# Patient Record
Sex: MALE | Race: WHITE | Employment: OTHER | URBAN - METROPOLITAN AREA
[De-identification: names, ages, dates, MRNs, and addresses within clinical notes are randomized per-mention and may not be internally consistent; named-entity substitution may affect disease eponyms.]

---

## 2017-01-16 DIAGNOSIS — N18.2 RENAL FAILURE, CHRONIC, STAGE 2 (MILD): ICD-10-CM

## 2017-01-16 DIAGNOSIS — I10 ESSENTIAL HYPERTENSION: ICD-10-CM

## 2017-01-16 DIAGNOSIS — E11.9 TYPE 2 DIABETES MELLITUS WITHOUT COMPLICATION, WITHOUT LONG-TERM CURRENT USE OF INSULIN (HCC): ICD-10-CM

## 2017-01-16 RX ORDER — LISINOPRIL 5 MG/1
5 TABLET ORAL DAILY
Qty: 90 TAB | Refills: 3 | Status: SHIPPED | OUTPATIENT
Start: 2017-01-16 | End: 2018-01-24 | Stop reason: SDUPTHER

## 2017-01-23 ENCOUNTER — HOSPITAL ENCOUNTER (OUTPATIENT)
Dept: LAB | Age: 72
Discharge: HOME | End: 2017-01-23
Payer: MEDICARE

## 2017-01-23 DIAGNOSIS — R23.3 EASY BRUISABILITY: ICD-10-CM

## 2017-01-23 PROBLEM — N52.9 ERECTILE DYSFUNCTION: Status: ACTIVE | Noted: 2017-01-23

## 2017-01-23 LAB
BASOPHILS # BLD AUTO: 0 K/UL (ref 0–0.06)
BASOPHILS # BLD: 0 % (ref 0–2)
DIFFERENTIAL METHOD BLD: ABNORMAL
EOSINOPHIL # BLD: 0.2 K/UL (ref 0–0.4)
EOSINOPHIL NFR BLD: 3 % (ref 0–5)
ERYTHROCYTE [DISTWIDTH] IN BLOOD BY AUTOMATED COUNT: 13.4 % (ref 11.6–14.5)
HCT VFR BLD AUTO: 42.2 % (ref 36–48)
HGB BLD-MCNC: 14.5 G/DL (ref 13–16)
LYMPHOCYTES # BLD AUTO: 32 % (ref 21–52)
LYMPHOCYTES # BLD: 1.9 K/UL (ref 0.9–3.6)
MCH RBC QN AUTO: 31.4 PG (ref 24–34)
MCHC RBC AUTO-ENTMCNC: 34.4 G/DL (ref 31–37)
MCV RBC AUTO: 91.3 FL (ref 74–97)
MONOCYTES # BLD: 0.3 K/UL (ref 0.05–1.2)
MONOCYTES NFR BLD AUTO: 6 % (ref 3–10)
NEUTS SEG # BLD: 3.5 K/UL (ref 1.8–8)
NEUTS SEG NFR BLD AUTO: 59 % (ref 40–73)
PLATELET # BLD AUTO: 196 K/UL (ref 135–420)
PMV BLD AUTO: 10.5 FL (ref 9.2–11.8)
RBC # BLD AUTO: 4.62 M/UL (ref 4.7–5.5)
WBC # BLD AUTO: 5.8 K/UL (ref 4.6–13.2)

## 2017-01-23 PROCEDURE — 36415 COLL VENOUS BLD VENIPUNCTURE: CPT | Performed by: INTERNAL MEDICINE

## 2017-01-23 PROCEDURE — 85025 COMPLETE CBC W/AUTO DIFF WBC: CPT | Performed by: INTERNAL MEDICINE

## 2017-01-24 ENCOUNTER — TELEPHONE (OUTPATIENT)
Dept: INTERNAL MEDICINE CLINIC | Age: 72
End: 2017-01-24

## 2017-01-24 NOTE — TELEPHONE ENCOUNTER
----- Message from Rohini Mei MD sent at 1/23/2017  9:50 PM EST -----  Please let Amanda Garcia know that his blood counts on his CBC are normal. His platelet count, WBC, and hemoglobin are within normal limits.     Dr. Olive Johnson  [facility] [place] [address]  Phone: [phone]  Fax: [phone]

## 2017-02-02 ENCOUNTER — OFFICE VISIT (OUTPATIENT)
Dept: INTERNAL MEDICINE CLINIC | Age: 72
End: 2017-02-02

## 2017-02-02 VITALS
HEART RATE: 57 BPM | WEIGHT: 228.2 LBS | OXYGEN SATURATION: 96 % | DIASTOLIC BLOOD PRESSURE: 82 MMHG | RESPIRATION RATE: 18 BRPM | BODY MASS INDEX: 32.67 KG/M2 | HEIGHT: 70 IN | TEMPERATURE: 97.2 F | SYSTOLIC BLOOD PRESSURE: 144 MMHG

## 2017-02-02 DIAGNOSIS — E03.4 HYPOTHYROIDISM DUE TO ACQUIRED ATROPHY OF THYROID: Primary | ICD-10-CM

## 2017-02-02 DIAGNOSIS — I10 ESSENTIAL HYPERTENSION WITH GOAL BLOOD PRESSURE LESS THAN 140/90: ICD-10-CM

## 2017-02-02 DIAGNOSIS — N52.1 ERECTILE DYSFUNCTION ASSOCIATED WITH TYPE 2 DIABETES MELLITUS (HCC): ICD-10-CM

## 2017-02-02 DIAGNOSIS — E11.69 ERECTILE DYSFUNCTION ASSOCIATED WITH TYPE 2 DIABETES MELLITUS (HCC): ICD-10-CM

## 2017-02-02 DIAGNOSIS — F33.40 RECURRENT MAJOR DEPRESSIVE DISORDER, IN REMISSION (HCC): ICD-10-CM

## 2017-02-02 RX ORDER — CARVEDILOL 6.25 MG/1
6.25 TABLET ORAL 2 TIMES DAILY WITH MEALS
Qty: 180 TAB | Refills: 2
Start: 2017-02-02 | End: 2017-05-03

## 2017-02-02 RX ORDER — LEVOTHYROXINE SODIUM 50 UG/1
50 TABLET ORAL
Qty: 90 TAB | Refills: 4 | Status: SHIPPED | OUTPATIENT
Start: 2017-02-02 | End: 2017-10-23 | Stop reason: SDUPTHER

## 2017-02-02 RX ORDER — TADALAFIL 2.5 MG/1
2.5 TABLET ORAL DAILY
Qty: 30 TAB | Refills: 11 | Status: SHIPPED | OUTPATIENT
Start: 2017-02-02 | End: 2017-05-22

## 2017-02-02 NOTE — PROGRESS NOTES
Chief Complaint   Patient presents with    Hypertension    Labs     done 01-23-17 to discuss     Patient advised to bring in all prescribed and over the counter medications at his next follow up with review. Patient still has a copy of the Advanced Directive form and understands to bring it in once completed. 1. Have you been to the ER, urgent care clinic since your last visit? Hospitalized since your last visit? No    2. Have you seen or consulted any other health care providers outside of the [address] [place] [address] since your last visit? Include any pap smears or colon screening.  No

## 2017-02-02 NOTE — PROGRESS NOTES
INTERNISTS OF [facility]:  2/7/2017, MRN: [id number]      Lacey Sellers is a 66 y.o. male and presents to clinic for Hypertension and Labs (done 01-23-17 to discuss)    Subjective:   Pt is a 75yo male with h/o ED, melanoma in situ on left dorsal forearm s/p removal, MDD, rectal polyp, diverticulosis, CKD stage 3, HTN, type 2 DM, hypothyroidism, DJD, gout, vocal cord nodule, vitamin D deficiency, and HLD. 1. HTN:   - Chronic, present >6 months   - On coreg and lisinopril   - Pt needs a refill on his coreg   - No adverse drug effects    2. ED:   - Chronic, present >6 months   - Pt was unable to afford viagra as his insurance did not cover it   - Pt wants to know if his insurance will cover cialis this year    3. Hypothyroidism:   - Present >6 months   - On Synthroid, needs a refill    4.  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 - just not being able to keep up with his work load   - He retired in December 2016   - He is enjoying his new free time, exercising regularly   - On venlafaxine, no adverse side effects   - Pt states that the venlafaxine is working well to control his sx of depression   - No suicidal ideation      Patient Active Problem List    Diagnosis Date Noted    Erectile dysfunction 01/23/2017    Melanoma in situ - on left dorsal forearm 2016 09/16/2016    Nevus 08/23/2016    Major depressive disorder in remission 08/03/2016    Rectal polyp -  seen on colonoscopy from 8/20/15 08/02/2016    Diverticulosis of intestine without bleeding - seen on colonoscopy from 8/20/15 08/02/2016    CKD (chronic kidney disease) stage 3, GFR 30-59 ml/min 11/09/2015    Zoster 07/21/2015    Essential hypertension 06/24/2015    Type 2 diabetes mellitus without complication ([facility] [address] 75/85/3382    Hypothyroidism due to acquired atrophy of thyroid 06/24/2015    Osteoarthritis, Status post left total knee replacement 04/23/2014    Vocal cord nodule 10/21/2013    Gout 10/21/2011    Family history of colon cancer. personal hx colon polyps 10/21/2011    Hyperlipidemia     Hypovitaminosis D        Current Outpatient Prescriptions   Medication Sig Dispense Refill    carvedilol (COREG) 6.25 mg tablet Take 1 Tab by mouth two (2) times daily (with meals) for 90 days. 180 Tab 2    levothyroxine (SYNTHROID) 50 mcg tablet Take 1 Tab by mouth Daily (before breakfast). 90 Tab 4    tadalafil (CIALIS) 2.5 mg tablet Take 1 Tab by mouth daily. 30 Tab 11    lisinopril (PRINIVIL, ZESTRIL) 5 mg tablet Take 1 Tab by mouth daily. 90 Tab 3    allopurinol (ZYLOPRIM) 100 mg tablet Take 2 Tabs by mouth daily. 180 Tab 3    atorvastatin (LIPITOR) 40 mg tablet Take 1 Tab by mouth daily. 30 Tab 11    multivitamin (ONE A DAY) tablet Take 1 Tab by mouth daily.  omega-3 fatty acids-vitamin e (FISH OIL) 1,000 mg cap Take 4 capsules by mouth daily.  Cholecalciferol, Vitamin D3, (VITAMIN D) 2,000 unit Cap Take 5,000 Units by mouth daily.  CoQ10, Ubiquinol, 200 mg Cap Take 200 mg by mouth daily.  aspirin 81 mg tablet Take 81 mg by mouth daily.  venlafaxine-SR (EFFEXOR XR) 37.5 mg capsule Take 37.5 mg by mouth daily.          Allergies   Allergen Reactions    Amlodipine Other (comments)     BLE edema       Past Medical History   Diagnosis Date    Chronic kidney disease     Colon polyps      dysplastic    Depression     Diabetes mellitus (HCC)      no meds    Diverticulosis      seen on colonoscopy from 8/20/15    Diverticulosis of sigmoid colon 01/27/10    GERD (gastroesophageal reflux disease)      no meds    Gout     Hepatitis     Hyperlipidemia     Hypertension     Hypovitaminosis D     Lumbar spondylosis     Nephrolithiasis     Plantar wart     Rectal polyp      seen on colonoscopy from 8/20/15    Thyroid disease      hypothyroidism       Past Surgical History   Procedure Laterality Date    Hx tonsillectomy      Lithotripsy      Endoscopy, colon, diagnostic  01/27/2010     polyp distal rectum, removed; diverticulosis of sigmoid    Hx polypectomy  01/27/2010     Distal rectum    Pr total knee arthroplasty Left 1/2014       Family History   Problem Relation Age of Onset   [address] [facility] Arthritis-rheumatoid Mother     Hypertension Mother     Elevated Lipids Mother     Thyroid Disease Sister     Cancer Father      colon    Heart Disease Other     Hypertension Other     Cancer Other      breast    Heart Disease Maternal Grandmother     Dementia Maternal Grandfather     Cancer Paternal Grandmother      breast    No Known Problems Paternal Grandfather        Social History   Substance Use Topics    Smoking status: Former Smoker     Packs/day: 1.00     Years: 6.00     Types: Cigarettes     Quit date: 1/1/1992    Smokeless tobacco: Never Used    Alcohol use No       ROS   Review of Systems   Constitutional: Negative for chills and fever. HENT: Negative for ear pain and sore throat. Eyes: Negative for blurred vision and pain. Respiratory: Negative for cough and shortness of breath. Cardiovascular: Negative for chest pain. Gastrointestinal: Negative for abdominal pain. Genitourinary: Negative for dysuria. Musculoskeletal: Negative for joint pain and myalgias. Skin: Negative for rash. Neurological: Negative for tingling, focal weakness and headaches. Endo/Heme/Allergies: Negative for environmental allergies. Does not bruise/bleed easily. Psychiatric/Behavioral: Negative for substance abuse. Objective     Vitals:    02/02/17 1002   BP: 144/82   Pulse: (!) 57   Resp: 18   Temp: 97.2 °F (36.2 °C)   TempSrc: Oral   SpO2: 96%   Weight: 228 lb 3.2 oz (103.5 kg)   Height: 5' 10\" (1.778 m)   PainSc:   0 - No pain       Physical Exam   Constitutional: He is oriented to person, place, and time and well-developed, well-nourished, and in no distress. HENT:   Head: Normocephalic and atraumatic.    Right Ear: External ear normal. Left Ear: External ear normal.   Nose: Nose normal.   Mouth/Throat: Oropharynx is clear and moist. No oropharyngeal exudate. Clear TMs   Eyes: Conjunctivae and EOM are normal. Pupils are equal, round, and reactive to light. Right eye exhibits no discharge. Left eye exhibits no discharge. No scleral icterus. Neck: Neck supple. Cardiovascular: Normal rate, regular rhythm, normal heart sounds and intact distal pulses. Pulmonary/Chest: Effort normal and breath sounds normal. No respiratory distress. He has no wheezes. He has no rales. Abdominal: Soft. Bowel sounds are normal. He exhibits no distension. There is no tenderness. There is no rebound and no guarding. Musculoskeletal: He exhibits no edema or tenderness (BUE are NTTP). Lymphadenopathy:     He has no cervical adenopathy. Neurological: He is alert and oriented to person, place, and time. He exhibits normal muscle tone. Gait normal.   Skin: Skin is warm and dry. No erythema. Psychiatric: Affect normal.   Nursing note and vitals reviewed.       LABS   Data Review:   Lab Results   Component Value Date/Time    WBC 5.8 01/23/2017 11:28 AM    HGB 14.5 01/23/2017 11:28 AM    HCT 42.2 01/23/2017 11:28 AM    PLATELET 781 09/74/3454 11:28 AM    MCV 91.3 01/23/2017 11:28 AM       Lab Results   Component Value Date/Time    Sodium 139 08/04/2016 09:20 AM    Potassium 4.2 08/04/2016 09:20 AM    Chloride 104 08/04/2016 09:20 AM    CO2 26 08/04/2016 09:20 AM    Anion gap 9 08/04/2016 09:20 AM    Glucose 125 08/04/2016 09:20 AM    BUN 28 08/04/2016 09:20 AM    Creatinine 1.38 08/04/2016 09:20 AM    BUN/Creatinine ratio 20 08/04/2016 09:20 AM    GFR est AA >60 08/04/2016 09:20 AM    GFR est non-AA 51 08/04/2016 09:20 AM    Calcium 8.9 08/04/2016 09:20 AM       Lab Results   Component Value Date/Time    Cholesterol, total 194 08/04/2016 09:20 AM    HDL Cholesterol 35 08/04/2016 09:20 AM    LDL, calculated 125.6 08/04/2016 09:20 AM    VLDL, calculated 33.4 08/04/2016 09:20 AM    Triglyceride 167 08/04/2016 09:20 AM    CHOL/HDL Ratio 5.5 08/04/2016 09:20 AM       Lab Results   Component Value Date/Time    Hemoglobin A1c 6.0 11/03/2015 07:56 AM    Hemoglobin A1c 6.6 05/17/2012 07:57 AM    Hemoglobin A1c (POC) 6.3 08/03/2016 09:37 AM       Assessment/Plan:   1. Essential hypertension with goal blood pressure less than 140/90: BP is borderline.  - C/w rx as prescribed. Coreg refilled. ORDERS:  - carvedilol (COREG) 6.25 mg tablet; Take 1 Tab by mouth two (2) times daily (with meals) for 90 days. Dispense: 180 Tab; Refill: 2    2. Hypothyroidism due to acquired atrophy of thyroid: Stable. - Synthroid refilled. ORDERS:  - levothyroxine (SYNTHROID) 50 mcg tablet; Take 1 Tab by mouth Daily (before breakfast). Dispense: 90 Tab; Refill: 4    3. Erectile dysfunction associated with type 2 diabetes mellitus:  - Cialis prescribed. ORDERS:  - tadalafil (CIALIS) 2.5 mg tablet; Take 1 Tab by mouth daily. Dispense: 30 Tab; Refill: 11    4. Depression: Stable. Followed by Psychiatry team.   - C/w venlafaxine. Health Maintenance Due   Topic Date Due    DTaP/Tdap/Td series (1 - Tdap) 01/02/2007    EYE EXAM RETINAL OR DILATED Q1  06/01/2015    Pneumococcal 65+ High/Highest Risk (2 of 2 - PPSV23) 08/19/2015    GLAUCOMA SCREENING Q2Y  06/01/2016    HEMOGLOBIN A1C Q6M  02/03/2017       Lab review: labs are reviewed    I have discussed the diagnosis with the patient and the intended plan as seen in the above orders. The patient has received an after-visit summary and questions were answered concerning future plans. I have discussed medication side effects and warnings with the patient as well. I have reviewed the plan of care with the patient, accepted their input and they are in agreement with the treatment goals. All questions were answered. The patient understands the plan of care. Handouts provided today with above information.  Pt instructed if symptoms worsen to call the office or report to the ED for continued care. Greater than 50% of the visit time was spent in counseling and/or coordination of care. Follow-up Disposition:  Return in about 6 months (around 8/2/2017) for BP check, DM check.     Erika Phoenix MD

## 2017-02-02 NOTE — MR AVS SNAPSHOT
Visit Information Date & Time Provider Department Dept. Phone Encounter #  
 2/2/2017  9:30 AM Chandler Rudd MD Internist of [facility] [id number] Follow-up Instructions Return in about 6 months (around 8/2/2017) for BP check, DM check. Your Appointments 8/2/2017  8:30 AM  
Office Visit with Chandler Rudd MD  
Internist of [address] [place] [address]) Appt Note:   
 [address] [facility], [address] [facility] [address] [place]  
  
   
 [address], [address]  
  
    
 8/24/2017  8:30 AM  
Office Visit with Chandler Rudd MD  
Internist of [address] [place] [address]) Appt Note: 1 yr; 1 yr  
 [address] [facility], [address] [facility] [address] [facility]  
[phone] Upcoming Health Maintenance Date Due DTaP/Tdap/Td series (1 - Tdap) 1/2/2007 EYE EXAM RETINAL OR DILATED Q1 6/1/2015 Pneumococcal 65+ High/Highest Risk (2 of 2 - PPSV23) 8/19/2015 GLAUCOMA SCREENING Q2Y 6/1/2016 HEMOGLOBIN A1C Q6M 2/3/2017 FOOT EXAM Q1 8/3/2017 MEDICARE YEARLY EXAM 8/4/2017 MICROALBUMIN Q1 8/4/2017 LIPID PANEL Q1 8/4/2017 COLONOSCOPY 8/20/2020 Allergies as of 2/2/2017  Review Complete On: 2/2/2017 By: Justice Code Severity Noted Reaction Type Reactions Amlodipine  08/23/2016   Intolerance Other (comments) BLE edema Current Immunizations  Reviewed on 6/24/2015 Name Date Influenza High Dose Vaccine PF 10/24/2016, 10/2/2015 Influenza Vaccine 11/20/2014, 10/15/2013 Influenza Vaccine Split 11/9/2012  2:07 PM, 10/21/2011 Pneumococcal Conjugate (PCV-13) 6/24/2015 10:06 AM  
 Pneumococcal Vaccine (Unspecified Type) 1/1/2007 Td 1/1/2007 Zoster Vaccine, Live 2/1/2013  2:19 PM  
  
 Not reviewed this visit You Were Diagnosed With   
  
 Codes Comments Hypothyroidism due to acquired atrophy of thyroid    -  Primary ICD-10-CM: E03.4 ICD-9-CM: 244.8, 246.8 Essential hypertension with goal blood pressure less than 140/90     ICD-10-CM: I10 
ICD-9-CM: 401.9 Erectile dysfunction associated with type 2 diabetes mellitus ([facility]     ICD-10-CM: E11.69, N52.1 ICD-9-CM: 250.80, 607.84 Vitals BP Pulse Temp Resp Height(growth percentile) Weight(growth percentile) 144/82 (BP 1 Location: Left arm, BP Patient Position: Sitting) (!) 57 97.2 °F (36.2 °C) (Oral) 18 5' 10\" (1.778 m) 228 lb 3.2 oz (103.5 kg) SpO2 BMI Smoking Status 96% 32.74 kg/m2 Former Smoker Vitals History BMI and BSA Data Body Mass Index Body Surface Area 32.74 kg/m 2 2.26 m 2 Preferred Pharmacy Pharmacy Name Phone [address] [facility] 22 1700 [facility] [phone] Your Updated Medication List  
  
   
This list is accurate as of: 2/2/17 10:32 AM.  Always use your most recent med list.  
  
  
  
  
 allopurinol 100 mg tablet Commonly known as:  Alexis Gault Take 2 Tabs by mouth daily. aspirin 81 mg tablet Take 81 mg by mouth daily. atorvastatin 40 mg tablet Commonly known as:  LIPITOR Take 1 Tab by mouth daily. carvedilol 6.25 mg tablet Commonly known as:  Chance Lawman Take 1 Tab by mouth two (2) times daily (with meals) for 90 days. CoQ10 (Ubiquinol) 200 mg Cap Take 200 mg by mouth daily. EFFEXOR XR 37.5 mg capsule Generic drug:  venlafaxine-SR Take 37.5 mg by mouth daily. FISH OIL 1,000 mg Cap Generic drug:  omega-3 fatty acids-vitamin e Take 4 capsules by mouth daily. levothyroxine 50 mcg tablet Commonly known as:  SYNTHROID Take 1 Tab by mouth Daily (before breakfast). lisinopril 5 mg tablet Commonly known as:  Delsie Commander Take 1 Tab by mouth daily. multivitamin tablet Commonly known as:  ONE A DAY Take 1 Tab by mouth daily. tadalafil 2.5 mg tablet Commonly known as:  CIALIS  
 Take 1 Tab by mouth daily. VITAMIN D 2,000 unit Cap capsule Generic drug:  Cholecalciferol (Vitamin D3) Take 5,000 Units by mouth daily. Prescriptions Sent to Pharmacy Refills  
 levothyroxine (SYNTHROID) 50 mcg tablet 4 Sig: Take 1 Tab by mouth Daily (before breakfast). Class: Normal  
 Pharmacy: [address] [place] [address] Ph #: [phone] Route: Oral  
 tadalafil (CIALIS) 2.5 mg tablet 11 Sig: Take 1 Tab by mouth daily. Class: Normal  
 Pharmacy: [address] [place] [address] Ph #: [phone] Route: Oral  
  
Follow-up Instructions Return in about 6 months (around 8/2/2017) for BP check, DM check. Patient Instructions Chief Complaint Patient presents with  Hypertension  Labs  
  done 01-23-17 to discuss Patient still has a copy of the Advanced Directive form and understands to bring it in once completed. PLAN: 
Jimenes points we discussed today: 1. Call our office if symptoms worsen or if you have any questions 2. Medications refilled. Dr. Noe Zavala [facility] of [address] [place] [address], [address] [place] [address] Phone: [phone] Fax: [phone] Thank you for choosing [facility] for all of your health care needs. Skin Cancer Prevention: Care Instructions Your Care Instructions Skin cancer is the abnormal growth of cells in the skin. It usually appears as a growth that changes in color, shape, or size. This can be a sore that does not heal or a change in a wart or a mole. Skin cancer is almost always curable when found early and treated. So it is important to see your doctor if you have any of these changes in your skin. Skin cancer is the most common type of cancer.  It often appears on areas of the body that have been exposed to the sun, such as the head, face, neck, back, chest, or shoulders. Follow-up care is a key part of your treatment and safety. Be sure to make and go to all appointments, and call your doctor if you are having problems. It's also a good idea to know your test results and keep a list of the medicines you take. How can you care for yourself at home? · Wear a wide-brimmed hat and long sleeves and pants if you are going to be outdoors for a long time. · Avoid the sun between 10 a.m. and 4 p.m., which is the peak time for UV rays. · Wear sunscreen on exposed skin. Make sure to use a broad-spectrum sunscreen that has a sun protection factor (SPF) of 30 or higher. Use it every day, even when it is cloudy. · Do not use tanning booths or sunlamps. · Use lip balm or cream that has sun protection factor (SPF) to protect your lips from getting sunburned. · Wear sunglasses that block UV rays. When should you call for help? Watch closely for changes in your health, and be sure to contact your doctor if: 
· You are concerned about any problem areas on your skin. · You notice a change in a mole or skin growth. For example: ¨ It gets bigger. ¨ It develops uneven borders. ¨ It gets thicker, raised, or worn down. ¨ It changes color. ¨ It starts to bleed easily. Where can you learn more? Go to http://sapna-araseli.info/. Enter P392 in the search box to learn more about \"Skin Cancer Prevention: Care Instructions. \" Current as of: July 26, 2016 Content Version: 11.1 © 3211-8173 MobileOCT. Care instructions adapted under license by [employer] (which disclaims liability or warranty for this information). If you have questions about a medical condition or this instruction, always ask your healthcare professional. [address] any warranty or liability for your use of this information. Introducing [address]! Dear Ivon Leggett: Thank you for requesting a [employer] account. Our records indicate that you already have an active [employer]ulStar account. You can access your account anytime at [email or web address] Did you know that you can access your hospital and ER discharge instructions at any time in AzulStar? You can also review all of your test results from your hospital stay or ER visit. Additional Information If you have questions, please visit the Frequently Asked Questions section of the [employer]ulStar website at [email or web address] Remember, AzulStar is NOT to be used for urgent needs. For medical emergencies, dial 911. Now available from your iPhone and Android! Please provide this summary of care documentation to your next provider. Your primary care clinician is listed as Wilda Martines. If you have any questions after today's visit, please call [phone].

## 2017-02-02 NOTE — PATIENT INSTRUCTIONS
Chief Complaint   Patient presents with    Hypertension    Labs     done 01-23-17 to discuss     Patient still has a copy of the Advanced Directive form and understands to bring it in once completed. PLAN:  Jimenes points we discussed today:  1. Call our office if symptoms worsen or if you have any questions    2. Medications refilled. Dr. Nnamdi Diane  Internists of [address], [facility] [address]  Phone: [phone]  Fax: [phone]    Thank you for choosing [facility] for all of your health care needs. Skin Cancer Prevention: Care Instructions  Your Care Instructions  Skin cancer is the abnormal growth of cells in the skin. It usually appears as a growth that changes in color, shape, or size. This can be a sore that does not heal or a change in a wart or a mole. Skin cancer is almost always curable when found early and treated. So it is important to see your doctor if you have any of these changes in your skin. Skin cancer is the most common type of cancer. It often appears on areas of the body that have been exposed to the sun, such as the head, face, neck, back, chest, or shoulders. Follow-up care is a key part of your treatment and safety. Be sure to make and go to all appointments, and call your doctor if you are having problems. It's also a good idea to know your test results and keep a list of the medicines you take. How can you care for yourself at home? · Wear a wide-brimmed hat and long sleeves and pants if you are going to be outdoors for a long time. · Avoid the sun between 10 a.m. and 4 p.m., which is the peak time for UV rays. · Wear sunscreen on exposed skin. Make sure to use a broad-spectrum sunscreen that has a sun protection factor (SPF) of 30 or higher. Use it every day, even when it is cloudy. · Do not use tanning booths or sunlamps.   · Use lip balm or cream that has sun protection factor (SPF) to protect your lips from getting sunburned. · Wear sunglasses that block UV rays. When should you call for help? Watch closely for changes in your health, and be sure to contact your doctor if:  · You are concerned about any problem areas on your skin. · You notice a change in a mole or skin growth. For example:  ¨ It gets bigger. ¨ It develops uneven borders. ¨ It gets thicker, raised, or worn down. ¨ It changes color. ¨ It starts to bleed easily. Where can you learn more? Go to http://sapna-araseli.info/. Enter P392 in the search box to learn more about \"Skin Cancer Prevention: Care Instructions. \"  Current as of: July 26, 2016  Content Version: 11.1  © 4098-0621 Pay4later[employer]orporated. Care instructions adapted under license by [employer] (which disclaims liability or warranty for this information). If you have questions about a medical condition or this instruction, always ask your healthcare professional. [place] [address] any warranty or liability for your use of this information.

## 2017-02-07 ENCOUNTER — TELEPHONE (OUTPATIENT)
Dept: INTERNAL MEDICINE CLINIC | Age: 72
End: 2017-02-07

## 2017-02-07 NOTE — TELEPHONE ENCOUNTER
Dr. Reyna Lobo, I spoke with [employer] regarding Prior Auth on Cialis. We faxed request over to them. Cialis has been denied, the only way this can be covered on his plan would be if he has a dx of BPH. Dr. Reyna Lobo, do you want to try to appeal this decision and explain medical necessity?

## 2017-02-17 ENCOUNTER — TELEPHONE (OUTPATIENT)
Dept: INTERNAL MEDICINE CLINIC | Age: 72
End: 2017-02-17

## 2017-02-17 NOTE — TELEPHONE ENCOUNTER
Please let Jimbo Lopez know that his insurance denied coverage of cialis.     Dr. Wilfredo Aomr  Internists of [facility], [address] [place] [address] Str.  Phone: [phone]  Fax: [phone]

## 2017-05-22 DIAGNOSIS — N52.9 ERECTILE DYSFUNCTION, UNSPECIFIED ERECTILE DYSFUNCTION TYPE: Primary | ICD-10-CM

## 2017-05-22 RX ORDER — SILDENAFIL CITRATE 20 MG/1
TABLET ORAL
Qty: 30 TAB | Refills: 11 | Status: SHIPPED | OUTPATIENT
Start: 2017-05-22 | End: 2018-08-02 | Stop reason: SDUPTHER

## 2017-05-31 RX ORDER — CARVEDILOL 6.25 MG/1
6.25 TABLET ORAL 2 TIMES DAILY WITH MEALS
Qty: 30 TAB | Status: SHIPPED | OUTPATIENT
Start: 2017-05-31 | End: 2017-10-03 | Stop reason: SDUPTHER

## 2017-07-25 DIAGNOSIS — E78.2 MIXED HYPERLIPIDEMIA: ICD-10-CM

## 2017-07-25 DIAGNOSIS — E11.9 TYPE 2 DIABETES MELLITUS WITHOUT COMPLICATION (HCC): ICD-10-CM

## 2017-07-26 RX ORDER — ATORVASTATIN CALCIUM 40 MG/1
40 TABLET, FILM COATED ORAL DAILY
Qty: 30 TAB | Refills: 11 | Status: SHIPPED | OUTPATIENT
Start: 2017-07-26 | End: 2018-01-12 | Stop reason: SDUPTHER

## 2017-07-26 NOTE — TELEPHONE ENCOUNTER
From: Lefty Yepez  To: Adriano Gonzalez MD  Sent: 7/25/2017 11:34 PM EDT  Subject: Medication Renewal Request    Original authorizing provider: MD Lefty Ridley would like a refill of the following medications:  atorvastatin (LIPITOR) 40 mg tablet Adriano Gonzalez MD]    Preferred pharmacy: [address] [place]:

## 2017-08-10 ENCOUNTER — OFFICE VISIT (OUTPATIENT)
Dept: INTERNAL MEDICINE CLINIC | Age: 72
End: 2017-08-10

## 2017-08-10 ENCOUNTER — HOSPITAL ENCOUNTER (OUTPATIENT)
Dept: LAB | Age: 72
Discharge: HOME | End: 2017-08-10
Payer: MEDICARE

## 2017-08-10 VITALS
TEMPERATURE: 96.7 F | WEIGHT: 228 LBS | HEIGHT: 70 IN | BODY MASS INDEX: 32.64 KG/M2 | SYSTOLIC BLOOD PRESSURE: 137 MMHG | OXYGEN SATURATION: 96 % | DIASTOLIC BLOOD PRESSURE: 94 MMHG | RESPIRATION RATE: 16 BRPM | HEART RATE: 65 BPM

## 2017-08-10 DIAGNOSIS — D03.9 MELANOMA IN SITU, UNSPECIFIED SITE (HCC): ICD-10-CM

## 2017-08-10 DIAGNOSIS — E11.9 TYPE 2 DIABETES MELLITUS WITHOUT COMPLICATION, UNSPECIFIED LONG TERM INSULIN USE STATUS: Primary | ICD-10-CM

## 2017-08-10 DIAGNOSIS — M10.9 ACUTE GOUT, UNSPECIFIED CAUSE, UNSPECIFIED SITE: ICD-10-CM

## 2017-08-10 DIAGNOSIS — I10 ESSENTIAL HYPERTENSION: ICD-10-CM

## 2017-08-10 DIAGNOSIS — B35.1 ONYCHOMYCOSIS: ICD-10-CM

## 2017-08-10 DIAGNOSIS — E55.9 HYPOVITAMINOSIS D: ICD-10-CM

## 2017-08-10 DIAGNOSIS — Z00.00 ROUTINE GENERAL MEDICAL EXAMINATION AT A HEALTH CARE FACILITY: ICD-10-CM

## 2017-08-10 DIAGNOSIS — Z87.891 PERSONAL HISTORY OF TOBACCO USE, PRESENTING HAZARDS TO HEALTH: ICD-10-CM

## 2017-08-10 DIAGNOSIS — F33.40 RECURRENT MAJOR DEPRESSIVE DISORDER, IN REMISSION (HCC): ICD-10-CM

## 2017-08-10 DIAGNOSIS — Z13.39 SCREENING FOR ALCOHOLISM: ICD-10-CM

## 2017-08-10 DIAGNOSIS — E03.4 HYPOTHYROIDISM DUE TO ACQUIRED ATROPHY OF THYROID: ICD-10-CM

## 2017-08-10 DIAGNOSIS — N52.9 ERECTILE DYSFUNCTION, UNSPECIFIED ERECTILE DYSFUNCTION TYPE: ICD-10-CM

## 2017-08-10 DIAGNOSIS — E78.2 MIXED HYPERLIPIDEMIA: ICD-10-CM

## 2017-08-10 DIAGNOSIS — E11.9 TYPE 2 DIABETES MELLITUS WITHOUT COMPLICATION, UNSPECIFIED LONG TERM INSULIN USE STATUS: ICD-10-CM

## 2017-08-10 LAB
ALBUMIN SERPL BCP-MCNC: 4 G/DL (ref 3.4–5)
ALBUMIN/GLOB SERPL: 1.3 {RATIO} (ref 0.8–1.7)
ALP SERPL-CCNC: 95 U/L (ref 45–117)
ALT SERPL-CCNC: 43 U/L (ref 16–61)
ANION GAP BLD CALC-SCNC: 9 MMOL/L (ref 3–18)
AST SERPL W P-5'-P-CCNC: 28 U/L (ref 15–37)
BILIRUB SERPL-MCNC: 0.5 MG/DL (ref 0.2–1)
BUN SERPL-MCNC: 17 MG/DL (ref 7–18)
BUN/CREAT SERPL: 13 (ref 12–20)
CALCIUM SERPL-MCNC: 8.9 MG/DL (ref 8.5–10.1)
CHLORIDE SERPL-SCNC: 104 MMOL/L (ref 100–108)
CHOLEST SERPL-MCNC: 163 MG/DL
CO2 SERPL-SCNC: 26 MMOL/L (ref 21–32)
CREAT SERPL-MCNC: 1.32 MG/DL (ref 0.6–1.3)
GLOBULIN SER CALC-MCNC: 3 G/DL (ref 2–4)
GLUCOSE SERPL-MCNC: 138 MG/DL (ref 74–99)
HBA1C MFR BLD: 7.5 % (ref 4.2–5.6)
HDLC SERPL-MCNC: 41 MG/DL (ref 40–60)
HDLC SERPL: 4 {RATIO} (ref 0–5)
LDLC SERPL CALC-MCNC: 84.2 MG/DL (ref 0–100)
LIPID PROFILE,FLP: ABNORMAL
POTASSIUM SERPL-SCNC: 4.5 MMOL/L (ref 3.5–5.5)
PROT SERPL-MCNC: 7 G/DL (ref 6.4–8.2)
SODIUM SERPL-SCNC: 139 MMOL/L (ref 136–145)
T4 FREE SERPL-MCNC: 1.3 NG/DL (ref 0.7–1.5)
TRIGL SERPL-MCNC: 189 MG/DL (ref ?–150)
TSH SERPL DL<=0.05 MIU/L-ACNC: 3.79 UIU/ML (ref 0.36–3.74)
VLDLC SERPL CALC-MCNC: 37.8 MG/DL

## 2017-08-10 PROCEDURE — 82043 UR ALBUMIN QUANTITATIVE: CPT | Performed by: INTERNAL MEDICINE

## 2017-08-10 PROCEDURE — 83036 HEMOGLOBIN GLYCOSYLATED A1C: CPT | Performed by: INTERNAL MEDICINE

## 2017-08-10 PROCEDURE — 82306 VITAMIN D 25 HYDROXY: CPT | Performed by: INTERNAL MEDICINE

## 2017-08-10 PROCEDURE — 80053 COMPREHEN METABOLIC PANEL: CPT | Performed by: INTERNAL MEDICINE

## 2017-08-10 PROCEDURE — 80061 LIPID PANEL: CPT | Performed by: INTERNAL MEDICINE

## 2017-08-10 PROCEDURE — 84439 ASSAY OF FREE THYROXINE: CPT | Performed by: INTERNAL MEDICINE

## 2017-08-10 RX ORDER — VENLAFAXINE HYDROCHLORIDE 150 MG/1
150 CAPSULE, EXTENDED RELEASE ORAL DAILY
COMMUNITY
Start: 2017-07-17 | End: 2020-10-06

## 2017-08-10 RX ORDER — METHYLPREDNISOLONE 4 MG/1
TABLET ORAL
Qty: 1 DOSE PACK | Refills: 1 | Status: SHIPPED | OUTPATIENT
Start: 2017-08-10 | End: 2017-12-06 | Stop reason: ALTCHOICE

## 2017-08-10 NOTE — PROGRESS NOTES
Chief Complaint   Patient presents with    Vitamin D Deficiency    Thyroid Problem    Diabetes    Results     Lab results   Patient is here today for 6 mth F/U. Patient will need medication refills. 1. Have you been to the ER, urgent care clinic since your last visit? Hospitalized since your last visit? No    2. Have you seen or consulted any other health care providers outside of the [address] [place] [address] since your last visit? Include any pap smears or colon screening. Yes Dr Schafer Nose [facility].

## 2017-08-10 NOTE — MR AVS SNAPSHOT
Visit Information Date & Time Provider Department Dept. Phone Encounter #  
 8/10/2017  8:00 AM Kimberley Ko MD Internist of [address] [place] [address] Place [id number] Your Appointments 8/24/2017  8:30 AM  
Office Visit with Kimberley Ko MD  
Internist of [address] [place] [address]) Appt Note: 1 yr; 1 yr  
 [address] [facility] [id number] [place] [address] [address]  
  
   
 [address], [address]  
  
    
 2/6/2018  8:05 AM  
LAB with [facility] NURSE VISIT Internist of [facility] ([address] [place] [address]) Appt Note: 6 mo lab  
 [address], Suite [facility] [address] [address]  
  
   
 [address], [address]  
  
    
 2/13/2018  8:00 AM  
Office Visit with Kimberley Ko MD  
Internist of [address] [place] [address]) Appt Note: 6 mo fu  
 [address], [facility] [address] [facility]  
[phone] Upcoming Health Maintenance Date Due DTaP/Tdap/Td series (1 - Tdap) 1/2/2007 EYE EXAM RETINAL OR DILATED Q1 6/1/2015 Pneumococcal 65+ High/Highest Risk (2 of 2 - PPSV23) 8/19/2015 GLAUCOMA SCREENING Q2Y 6/1/2016 HEMOGLOBIN A1C Q6M 2/3/2017 INFLUENZA AGE 9 TO ADULT 8/1/2017 FOOT EXAM Q1 8/3/2017 MEDICARE YEARLY EXAM 8/4/2017 MICROALBUMIN Q1 8/4/2017 LIPID PANEL Q1 8/4/2017 COLONOSCOPY 8/20/2020 Allergies as of 8/10/2017  Review Complete On: 8/10/2017 By: Kimberley Ko MD  
  
 Severity Noted Reaction Type Reactions Amlodipine  08/23/2016   Intolerance Other (comments) BLE edema Current Immunizations  Reviewed on 6/24/2015 Name Date Influenza High Dose Vaccine PF 10/24/2016, 10/2/2015 Influenza Vaccine 11/20/2014, 10/15/2013 Influenza Vaccine Split 11/9/2012  2:07 PM, 10/21/2011  Pneumococcal Conjugate (PCV-13) 6/24/2015 10:06 AM  
 Pneumococcal Polysaccharide (PPSV-23) 8/10/2017  8:43 AM  
 Pneumococcal Vaccine (Unspecified Type) 1/1/2007 Td 1/1/2007 Zoster Vaccine, Live 2/1/2013  2:19 PM  
  
 Not reviewed this visit You Were Diagnosed With   
  
 Codes Comments Type 2 diabetes mellitus without complication, unspecified long term insulin use status (HCC)    -  Primary ICD-10-CM: E11.9 ICD-9-CM: 250.00 Mixed hyperlipidemia     ICD-10-CM: E78.2 ICD-9-CM: 272.2 Essential hypertension     ICD-10-CM: I10 
ICD-9-CM: 401.9 Hypothyroidism due to acquired atrophy of thyroid     ICD-10-CM: E03.4 ICD-9-CM: 244.8, 246.8 Hypovitaminosis D     ICD-10-CM: E55.9 ICD-9-CM: 268.9 Routine general medical examination at a health care facility     ICD-10-CM: Z00.00 ICD-9-CM: V70.0 Screening for alcoholism     ICD-10-CM: Z13.89 ICD-9-CM: V79.1 Personal history of tobacco use, presenting hazards to health     ICD-10-CM: P71.808 ICD-9-CM: V15.82 Acute gout, unspecified cause, unspecified site     ICD-10-CM: M10.9 ICD-9-CM: 274.01 Vitals BP Pulse Temp Resp Height(growth percentile) Weight(growth percentile) (!) 137/94 (BP 1 Location: Left arm, BP Patient Position: Sitting) 65 96.7 °F (35.9 °C) (Oral) 16 5' 10\" (1.778 m) 228 lb (103.4 kg) SpO2 BMI Smoking Status 96% 32.71 kg/m2 Former Smoker Vitals History BMI and BSA Data Body Mass Index Body Surface Area 32.71 kg/m 2 2.26 m 2 Preferred Pharmacy Pharmacy Name Phone Myriam Verden [address] [facility], [address] [phone] Your Updated Medication List  
  
   
This list is accurate as of: 8/10/17  9:02 AM.  Always use your most recent med list.  
  
  
  
  
 allopurinol 100 mg tablet Commonly known as:  Theoplis Spry Take 2 Tabs by mouth daily. aspirin 81 mg tablet Take 81 mg by mouth daily. atorvastatin 40 mg tablet Commonly known as:  LIPITOR Take 1 Tab by mouth daily. carvedilol 6.25 mg tablet Commonly known as:  Pink Parrot Take 1 Tab by mouth two (2) times daily (with meals). CoQ10 (Ubiquinol) 200 mg Cap Take 200 mg by mouth daily. FISH OIL 1,000 mg Cap Generic drug:  omega-3 fatty acids-vitamin e Take 4 capsules by mouth daily. levothyroxine 50 mcg tablet Commonly known as:  SYNTHROID Take 1 Tab by mouth Daily (before breakfast). lisinopril 5 mg tablet Commonly known as:  Raul Bryan Take 1 Tab by mouth daily. methylPREDNISolone 4 mg tablet Commonly known as:  Vernestine Bonds Please follow the directions on the package. multivitamin tablet Commonly known as:  ONE A DAY Take 1 Tab by mouth daily. sildenafil 20 mg tablet Commonly known as:  REVATIO Take po 40 mg once daily 1 hour (range: 30 minutes to 4 hours) before sexual activity as needed; maximum dose: 100 mg once daily  
  
 venlafaxine- mg capsule Commonly known as:  EFFEXOR-XR  
  
 VITAMIN D 2,000 unit Cap capsule Generic drug:  Cholecalciferol (Vitamin D3) Take 5,000 Units by mouth daily. Prescriptions Sent to Pharmacy Refills  
 methylPREDNISolone (MEDROL DOSEPACK) 4 mg tablet 1 Sig: Please follow the directions on the package. Class: Normal  
 Pharmacy: Neftaly Yang [address] [place] [address] Ph #: [phone] We Performed the Following  DIABETES FOOT EXAM [[facility] Custom] To-Do List   
 Around 08/10/2017 Lab:  HEMOGLOBIN A1C W/O EAG   
  
 08/10/2017 Lab:  LIPID PANEL   
  
 08/10/2017 Lab:  METABOLIC PANEL, COMPREHENSIVE   
  
 08/10/2017 Lab:  MICROALBUMIN, UR, RAND W/ MICROALBUMIN/CREA RATIO Around 08/10/2017 Lab:  TSH AND FREE T4   
  
 08/10/2017 Lab:  VITAMIN D, 25 HYDROXY   
  
 08/13/2017 Imaging:  US EXAM SCREENING AAA Patient Instructions Medicare Part B Preventive Services Limitations Recommendation Scheduled Bone Mass Measurement (age 72 & older, biennial) Requires diagnosis related to osteoporosis or estrogen deficiency. Biennial benefit unless patient has history of long-term glucocorticoid tx or baseline is needed because initial test was by other method Not applicable Cardiovascular Screening Blood Tests (every 5 years) Total cholesterol, HDL, Triglycerides Order as a panel if possible We should check your cholesterol panel at least once every 5 years. Ordered today Colorectal Cancer Screening 
-Fecal occult blood test (annual) -Flexible sigmoidoscopy (5y) 
-Screening colonoscopy (10y) -Barium Enema  Due per your Gastroenterologist's recommendations. Next colonoscopy is due in 2020 Counseling to Prevent Tobacco Use (up to 8 sessions per year) - Counseling greater than 3 and up to 10 minutes - Counseling greater than 10 minutes Patients must be asymptomatic of tobacco-related conditions to receive as preventive service Continue with smoking cessation efforts. Diabetes Screening Tests (at least every 3 years, Medicare covers annually or at 6-month intervals for prediabetic patients) Fasting blood sugar (FBS) or glucose tolerance test (GTT) Patient must be diagnosed with one of the following: 
-Hypertension, Dyslipidemia, obesity, previous impaired FBS or GTT 
Or any two of the following: overweight, FH of diabetes, age ? 72, history of gestational diabetes, birth of baby weighing more than 9 pounds Should be done yearly Ordered today Diabetes Self-Management Training (DSMT) (no USPSTF recommendation) Requires referral by treating physician for patient with diabetes or renal disease. 10 hours of initial DSMT session of no less than 30 minutes each in a continuous 12-month period. 2 hours of follow-up DSMT in subsequent years. Not applicable Glaucoma Screening (no USPSTF recommendation) Diabetes mellitus, family history, , age 48 or over,  American, age 72 or over Continue with annual eye exams. Discussed today Human Immunodeficiency Virus (HIV) Screening (annually for increased risk patients) HIV-1 and HIV-2 by EIA, JEFERSON, rapid antibody test, or oral mucosa transudate Patient must be at increased risk for HIV infection per USPSTF guidelines or pregnant. Tests covered annually for patients at increased risk. Pregnant patients may receive up to 3 test during pregnancy. Not applicable Medical Nutrition Therapy (MNT) (for diabetes or renal disease not recommended schedule) Requires referral by treating physician for patient with diabetes or renal disease. Can be provided in same year as diabetes self-management training (DSMT), and CMS recommends medical nutrition therapy take place after DSMT. Up to 3 hours for initial year and 2 hours in subsequent years. Not applicable Shingles Vaccination A shingles vaccine is also recommended once in a lifetime after age 61 Vaccination recommended for shingles vaccination once after age 61 Up to date Seasonal Influenza Vaccination (annually)  Continue with yearly \"flu\" shot annually Due later this year Pneumococcal Vaccination (once after 65)  Please receive this vaccination at age 72. Overdue Hepatitis B Vaccinations (if medium/high risk) Medium/high risk factors:  End-stage renal disease, Hemophiliacs who received Factor VIII or IX concentrates, Clients of institutions for the mentally retarded, Persons who live in the same house as a HepB virus carrier, Homosexual men, Illicit injectable drug abusers. Not applicable Screening Mammography (biennial age 54-69) Annually (age 36 or over) Not applicable Screening Pap Tests and Pelvic Examination (up to age 79 and after 79 if unknown history or abnormal study last 10 years) Every 24 months except high risk Not applicable Ultrasound Screening for Abdominal Aortic Aneurysm (AAA) (once) Patient must be referred through [facility] and not have had a screening for abdominal aortic aneurysm before under Medicare. Limited to patients who meet one of the following criteria: 
- Men who are 73-68 years old and have smoked more than 100 cigarettes in their lifetime. 
-Anyone with a FH of AAA 
-Anyone recommended for screening by USPSTF Recommended once after age 72 if you have smoked cigarettes. Discussed today A Healthy Lifestyle: Care Instructions Your Care Instructions A healthy lifestyle can help you feel good, stay at a healthy weight, and have plenty of energy for both work and play. A healthy lifestyle is something you can share with your whole family. A healthy lifestyle also can lower your risk for serious health problems, such as high blood pressure, heart disease, and diabetes. You can follow a few steps listed below to improve your health and the health of your family. Follow-up care is a key part of your treatment and safety. Be sure to make and go to all appointments, and call your doctor if you are having problems. Its also a good idea to know your test results and keep a list of the medicines you take. How can you care for yourself at home? · Do not eat too much sugar, fat, or fast foods. You can still have dessert and treats now and then. The goal is moderation. · Start small to improve your eating habits. Pay attention to portion sizes, drink less juice and soda pop, and eat more fruits and vegetables. ¨ Eat a healthy amount of food. A 3-ounce serving of meat, for example, is about the size of a deck of cards. Fill the rest of your plate with vegetables and whole grains. ¨ Limit the amount of soda and sports drinks you have every day. Drink more water when you are thirsty. ¨ Eat at least 5 servings of fruits and vegetables every day.  It may seem like a lot, but it is not hard to reach this goal. A serving or helping is 1 piece of fruit, 1 cup of vegetables, or 2 cups of leafy, raw vegetables. Have an apple or some carrot sticks as an afternoon snack instead of a candy bar. Try to have fruits and/or vegetables at every meal. 
· Make exercise part of your daily routine. You may want to start with simple activities, such as walking, bicycling, or slow swimming. Try to be active 30 to 60 minutes every day. You do not need to do all 30 to 60 minutes all at once. For example, you can exercise 3 times a day for 10 or 20 minutes. Moderate exercise is safe for most people, but it is always a good idea to talk to your doctor before starting an exercise program. 
· Keep moving. Vance Mass the lawn, work in the garden, or [employer] 42cm. Take the stairs instead of the elevator at work. · If you smoke, quit. People who smoke have an increased risk for heart attack, stroke, cancer, and other lung illnesses. Quitting is hard, but there are ways to boost your chance of quitting tobacco for good. ¨ Use nicotine gum, patches, or lozenges. ¨ Ask your doctor about stop-smoking programs and medicines. ¨ Keep trying. In addition to reducing your risk of diseases in the future, you will notice some benefits soon after you stop using tobacco. If you have shortness of breath or asthma symptoms, they will likely get better within a few weeks after you quit. · Limit how much alcohol you drink. Moderate amounts of alcohol (up to 2 drinks a day for men, 1 drink a day for women) are okay. But drinking too much can lead to liver problems, high blood pressure, and other health problems. Family health If you have a family, there are many things you can do together to improve your health. · Eat meals together as a family as often as possible. · Eat healthy foods. This includes fruits, vegetables, lean meats and dairy, and whole grains. · Include your family in your fitness plan.  Most people think of activities such as jogging or tennis as the way to fitness, but there are many ways you and your family can be more active. Anything that makes you breathe hard and gets your heart pumping is exercise. Here are some tips: 
¨ Walk to do errands or to take your child to school or the bus. ¨ Go for a family bike ride after dinner instead of watching TV. Where can you learn more? Go to http://sapna-araseli.info/. Enter T531 in the search box to learn more about \"A Healthy Lifestyle: Care Instructions. \" Current as of: July 26, 2016 Content Version: 11.3 © 1672-2479 [employer]. Care instructions adapted under license by [employer] (formerly [employer]) (which disclaims liability or warranty for this information). If you have questions about a medical condition or this instruction, always ask your healthcare professional. [address] any warranty or liability for your use of this information. Introducing [facility]! Dear Angel Stephenson: Thank you for requesting a [employer] account. Our records indicate that you already have an active [employer] account. You can access your account anytime at [email or web address] Did you know that you can access your hospital and ER discharge instructions at any time in [employer]? You can also review all of your test results from your hospital stay or ER visit. Additional Information If you have questions, please visit the Frequently Asked Questions section of the [employer] website at [email or web address] Remember, [employer] is NOT to be used for urgent needs. For medical emergencies, dial 911. Now available from your iPhone and Android! Please provide this summary of care documentation to your next provider. Your primary care clinician is listed as Jhony Gao. If you have any questions after today's visit, please call [phone].

## 2017-08-10 NOTE — PATIENT INSTRUCTIONS
Medicare Part B Preventive Services Limitations Recommendation Scheduled   Bone Mass Measurement  (age 72 & older, biennial) Requires diagnosis related to osteoporosis or estrogen deficiency. Biennial benefit unless patient has history of long-term glucocorticoid tx or baseline is needed because initial test was by other method Not applicable    Cardiovascular Screening Blood Tests (every 5 years)  Total cholesterol, HDL, Triglycerides Order as a panel if possible We should check your cholesterol panel at least once every 5 years. Ordered today   Colorectal Cancer Screening  -Fecal occult blood test (annual)  -Flexible sigmoidoscopy (5y)  -Screening colonoscopy (10y)  -Barium Enema  Due per your Gastroenterologist's recommendations. Next colonoscopy is due in 2020   Counseling to Prevent Tobacco Use (up to 8 sessions per year)  - Counseling greater than 3 and up to 10 minutes  - Counseling greater than 10 minutes Patients must be asymptomatic of tobacco-related conditions to receive as preventive service Continue with smoking cessation efforts. Diabetes Screening Tests (at least every 3 years, Medicare covers annually or at 6-month intervals for prediabetic patients)    Fasting blood sugar (FBS) or glucose tolerance test (GTT) Patient must be diagnosed with one of the following:  -Hypertension, Dyslipidemia, obesity, previous impaired FBS or GTT  Or any two of the following: overweight, FH of diabetes, age ? 72, history of gestational diabetes, birth of baby weighing more than 9 pounds Should be done yearly Ordered today   Diabetes Self-Management Training (DSMT) (no USPSTF recommendation) Requires referral by treating physician for patient with diabetes or renal disease. 10 hours of initial DSMT session of no less than 30 minutes each in a continuous 12-month period. 2 hours of follow-up DSMT in subsequent years.  Not applicable    Glaucoma Screening (no USPSTF recommendation) Diabetes mellitus, family history, , age 48 or over,  American, age 72 or over Continue with annual eye exams. Discussed today   Human Immunodeficiency Virus (HIV) Screening (annually for increased risk patients)  HIV-1 and HIV-2 by EIA, JEFERSON, rapid antibody test, or oral mucosa transudate Patient must be at increased risk for HIV infection per USPSTF guidelines or pregnant. Tests covered annually for patients at increased risk. Pregnant patients may receive up to 3 test during pregnancy. Not applicable    Medical Nutrition Therapy (MNT) (for diabetes or renal disease not recommended schedule) Requires referral by treating physician for patient with diabetes or renal disease. Can be provided in same year as diabetes self-management training (DSMT), and CMS recommends medical nutrition therapy take place after DSMT. Up to 3 hours for initial year and 2 hours in subsequent years. Not applicable    Shingles Vaccination A shingles vaccine is also recommended once in a lifetime after age 61 Vaccination recommended for shingles vaccination once after age 61 Up to date   Seasonal Influenza Vaccination (annually)  Continue with yearly \"flu\" shot annually Due later this year   Pneumococcal Vaccination (once after 72)  Please receive this vaccination at age 72. Overdue   Hepatitis B Vaccinations (if medium/high risk) Medium/high risk factors:  End-stage renal disease,  Hemophiliacs who received Factor VIII or IX concentrates, Clients of institutions for the mentally retarded, Persons who live in the same house as a HepB virus carrier, Homosexual men, Illicit injectable drug abusers.  Not applicable    Screening Mammography (biennial age 54-69) Annually (age 36 or over) Not applicable    Screening Pap Tests and Pelvic Examination (up to age 79 and after 79 if unknown history or abnormal study last 10 years) Every 24 months except high risk Not applicable    Ultrasound Screening for Abdominal Aortic Aneurysm (AAA) (once) Patient must be referred through [facility] and not have had a screening for abdominal aortic aneurysm before under Medicare. Limited to patients who meet one of the following criteria:  - Men who are 73-68 years old and have smoked more than 100 cigarettes in their lifetime.  -Anyone with a FH of AAA  -Anyone recommended for screening by USPSTF Recommended once after age 72 if you have smoked cigarettes. Discussed today          A Healthy Lifestyle: Care Instructions  Your Care Instructions  A healthy lifestyle can help you feel good, stay at a healthy weight, and have plenty of energy for both work and play. A healthy lifestyle is something you can share with your whole family. A healthy lifestyle also can lower your risk for serious health problems, such as high blood pressure, heart disease, and diabetes. You can follow a few steps listed below to improve your health and the health of your family. Follow-up care is a key part of your treatment and safety. Be sure to make and go to all appointments, and call your doctor if you are having problems. Its also a good idea to know your test results and keep a list of the medicines you take. How can you care for yourself at home? · Do not eat too much sugar, fat, or fast foods. You can still have dessert and treats now and then. The goal is moderation. · Start small to improve your eating habits. Pay attention to portion sizes, drink less juice and soda pop, and eat more fruits and vegetables. ¨ Eat a healthy amount of food. A 3-ounce serving of meat, for example, is about the size of a deck of cards. Fill the rest of your plate with vegetables and whole grains. ¨ Limit the amount of soda and sports drinks you have every day. Drink more water when you are thirsty. ¨ Eat at least 5 servings of fruits and vegetables every day.  It may seem like a lot, but it is not hard to reach this goal. A serving or helping is 1 piece of fruit, 1 cup of vegetables, or 2 cups of leafy, raw vegetables. Have an apple or some carrot sticks as an afternoon snack instead of a candy bar. Try to have fruits and/or vegetables at every meal.  · Make exercise part of your daily routine. You may want to start with simple activities, such as walking, bicycling, or slow swimming. Try to be active 30 to 60 minutes every day. You do not need to do all 30 to 60 minutes all at once. For example, you can exercise 3 times a day for 10 or 20 minutes. Moderate exercise is safe for most people, but it is always a good idea to talk to your doctor before starting an exercise program.  · Keep moving. Angelito Aguilar the lawn, work in the garden, or [place]. Take the stairs instead of the elevator at work. · If you smoke, quit. People who smoke have an increased risk for heart attack, stroke, cancer, and other lung illnesses. Quitting is hard, but there are ways to boost your chance of quitting tobacco for good. ¨ Use nicotine gum, patches, or lozenges. ¨ Ask your doctor about stop-smoking programs and medicines. ¨ Keep trying. In addition to reducing your risk of diseases in the future, you will notice some benefits soon after you stop using tobacco. If you have shortness of breath or asthma symptoms, they will likely get better within a few weeks after you quit. · Limit how much alcohol you drink. Moderate amounts of alcohol (up to 2 drinks a day for men, 1 drink a day for women) are okay. But drinking too much can lead to liver problems, high blood pressure, and other health problems. Family health  If you have a family, there are many things you can do together to improve your health. · Eat meals together as a family as often as possible. · Eat healthy foods. This includes fruits, vegetables, lean meats and dairy, and whole grains. · Include your family in your fitness plan.  Most people think of activities such as jogging or tennis as the way to fitness, but there are many ways you and your family can be more active. Anything that makes you breathe hard and gets your heart pumping is exercise. Here are some tips:  ¨ Walk to do errands or to take your child to school or the bus. ¨ Go for a family bike ride after dinner instead of watching TV. Where can you learn more? Go to http://sapna-araseli.info/. Enter W874 in the search box to learn more about \"A Healthy Lifestyle: Care Instructions. \"  Current as of: July 26, 2016  Content Version: 11.3  © 8868-3859 [employer]. Care instructions adapted under license by [employer] (which disclaims liability or warranty for this information). If you have questions about a medical condition or this instruction, always ask your healthcare professional. [address] any warranty or liability for your use of this information.

## 2017-08-10 NOTE — ACP (ADVANCE CARE PLANNING)
Advance Care Planning  Advance Care Planning (ACP) Provider Conversation     Date of ACP Conversation: 08/10/17  Persons included in Conversation:  patient    Authorized Decision Maker (if patient is incapable of making informed decisions): This person is:   Healthcare Agent/Medical Power of  under Advance Directive (see below)          For Patients with Decision Making Capacity:   Values/Goals: Exploration of values, goals, and preferences if recovery is not expected, even with continued medical treatment in the event of:  Imminent death  Severe, permanent brain injury    Conversation Outcomes / Follow-Up Plan:   Completed new Advance Directive. Advanced Directive in the case that an injury or illness causes the patient to be unable to make health care decisions was discussed with the patient. He completed his advance directive today. He wants his wife to his power of  and his daughter to be his successor power of . He does not want aggressive care to prolong his life in the event that he is dying, death is eminent, and his doctors concluded there is no improvement or recovery even with medical treatment. He also does not want life prolonging procedures in the event that he is unaware of himself, surroundings, and others and is unable to regain  these abilities even with medical treatment.     Dr. Becca Dorantes  [facility] [address]  Phone: [phone]  Fax: [phone]

## 2017-08-10 NOTE — PROGRESS NOTES
INTERNISTS [facility]:  8/10/2017, MRN: [id number]      David Ayoub is a 66 y.o. male and presents to clinic for Vitamin D Deficiency; Thyroid Problem; Diabetes; and Results (Lab results)    Subjective:   Pt is a 77yo male with h/o ED, melanoma in situ on left dorsal forearm s/p removal, MDD, rectal polyp, diverticulosis, CKD stage 3, HTN, type 2 DM, hypothyroidism, DJD, gout, vocal cord nodule, vitamin D deficiency, and HLD. 1.  Hypertension: This is a chronic condition, present over 6 months. He is on carvedilol and lisinopril. He does not need any refills. No adverse side effects are known. 2.  Type 2 diabetes and HLD: The patient had hemoglobin A1c of 6.7 several years ago. Since then, his A1c's have been below 6.5. He has been controlling his blood sugar for the past several years with diet modification efforts alone. He is overdue for a diabetic foot exam.  He is on a statin and an ACE inhibitor. Atorvastatin was added to reduce his CVD risk given his most recent lipid panel. He has CKD stage 3.     3.  Hypothyroidism: This is a chronic condition, present over 6 months. He is on Synthroid. He is overdue for TFTs. 4.  Depression: The patient has a long-standing history of depression, present over 6 months. He is on venlafaxine. His dose was increased to 150 mg daily. He reports no adverse side effects from taking the higher dose of venlafaxine. He states that his depression anxiety has improved while on the venlafaxine and while participating in talk therapy sessions. 5.  Vitamin D deficiency: The patient has a history of vitamin D deficiency and has taken vitamin D supplementation in the past.  He has been taking 2 2000 international unit tabs every other day and 3 2000 international unit tabs on opposite days. 6.  Gout: This is a chronic condition, present over 6 months. Patient is on allopurinol. No refills are needed. He has not had a recent flare.   He has no joint aches today.    7.  Erectile dysfunction: This is a chronic condition, present over 6 months. He takes sildenafil and this is working well for him. He reports no adverse side effects to this medication. No refills are needed. 8.  Melanoma: The patient had a melanoma removed last year. He is followed by the dermatology team.  He reports no new skin lesions. No rashes. Patient Active Problem List    Diagnosis Date Noted    Erectile dysfunction 01/23/2017    Melanoma in situ - on left dorsal forearm 2016 09/16/2016    Major depressive disorder in remission 08/03/2016    Rectal polyp -  seen on colonoscopy from 8/20/15 08/02/2016    Diverticulosis of intestine without bleeding - seen on colonoscopy from 8/20/15 08/02/2016    CKD (chronic kidney disease) stage 3, GFR 30-59 ml/min 11/09/2015    Zoster 07/21/2015    Essential hypertension 06/24/2015    Type 2 diabetes mellitus without complication ([facility] [address] 91/30/3481    Hypothyroidism due to acquired atrophy of thyroid 06/24/2015    Osteoarthritis, Status post left total knee replacement 04/23/2014    Vocal cord nodule 10/21/2013    Gout 10/21/2011    Family history of colon cancer. personal hx colon polyps 10/21/2011    Hyperlipidemia     Hypovitaminosis D        Current Outpatient Prescriptions   Medication Sig Dispense Refill    venlafaxine-SR (EFFEXOR-XR) 150 mg capsule       methylPREDNISolone (MEDROL DOSEPACK) 4 mg tablet Please follow the directions on the package. 1 Dose Pack 1    atorvastatin (LIPITOR) 40 mg tablet Take 1 Tab by mouth daily. 30 Tab 11    carvedilol (COREG) 6.25 mg tablet Take 1 Tab by mouth two (2) times daily (with meals). 30 Tab 3refill request for    sildenafil (REVATIO) 20 mg tablet Take po 40 mg once daily 1 hour (range: 30 minutes to 4 hours) before sexual activity as needed; maximum dose: 100 mg once daily 30 Tab 11    levothyroxine (SYNTHROID) 50 mcg tablet Take 1 Tab by mouth Daily (before breakfast).  90 Tab 4  lisinopril (PRINIVIL, ZESTRIL) 5 mg tablet Take 1 Tab by mouth daily. 90 Tab 3    allopurinol (ZYLOPRIM) 100 mg tablet Take 2 Tabs by mouth daily. 180 Tab 3    multivitamin (ONE A DAY) tablet Take 1 Tab by mouth daily.  omega-3 fatty acids-vitamin e (FISH OIL) 1,000 mg cap Take 4 capsules by mouth daily.  Cholecalciferol, Vitamin D3, (VITAMIN D) 2,000 unit Cap Take 5,000 Units by mouth daily.  CoQ10, Ubiquinol, 200 mg Cap Take 200 mg by mouth daily.  aspirin 81 mg tablet Take 81 mg by mouth daily.          Allergies   Allergen Reactions    Amlodipine Other (comments)     BLE edema       Past Medical History:   Diagnosis Date    Chronic kidney disease     Colon polyps     dysplastic    Depression     Diabetes mellitus ([facility] [address]     no meds    Diverticulosis     seen on colonoscopy from 8/20/15    Diverticulosis of sigmoid colon 01/27/10    GERD (gastroesophageal reflux disease)     no meds    Gout     Hepatitis     Hyperlipidemia     Hypertension     Hypovitaminosis D     Lumbar spondylosis     Nephrolithiasis     Plantar wart     Rectal polyp     seen on colonoscopy from 8/20/15    Thyroid disease     hypothyroidism       Past Surgical History:   Procedure Laterality Date    ENDOSCOPY, COLON, DIAGNOSTIC  01/27/2010    polyp distal rectum, removed; diverticulosis of sigmoid    HX POLYPECTOMY  01/27/2010    Distal rectum    HX TONSILLECTOMY      LITHOTRIPSY      TOTAL KNEE ARTHROPLASTY Left 1/2014       Family History   Problem Relation Age of Onset    Arthritis-rheumatoid Mother     Hypertension Mother     Elevated Lipids Mother     Thyroid Disease Sister     Cancer Father      colon    Heart Disease Other     Hypertension Other     Cancer Other      breast    Heart Disease Maternal Grandmother     Dementia Maternal Grandfather     Cancer Paternal Grandmother      breast    No Known Problems Paternal Grandfather        Social History   Substance Use Topics    Smoking status: Former Smoker     Packs/day: 1.00     Years: 6.00     Types: Cigarettes     Quit date: 1/1/1992    Smokeless tobacco: Never Used    Alcohol use No       ROS   Review of Systems   Constitutional: Negative for chills and fever. HENT: Positive for hearing loss. Negative for ear pain and sore throat. Eyes: Negative for blurred vision and pain. Respiratory: Negative for shortness of breath. Cardiovascular: Negative for chest pain and leg swelling. Gastrointestinal: Negative for abdominal pain, blood in stool and melena. Genitourinary: Negative for dysuria. Musculoskeletal: Negative for joint pain and myalgias. Skin: Negative for rash. Neurological: Negative for tingling, focal weakness and headaches. Endo/Heme/Allergies: Does not bruise/bleed easily. Psychiatric/Behavioral: Negative for substance abuse. Objective     Vitals:    08/10/17 0752   BP: (!) 137/94   Pulse: 65   Resp: 16   Temp: 96.7 °F (35.9 °C)   TempSrc: Oral   SpO2: 96%   Weight: 228 lb (103.4 kg)   Height: 5' 10\" (1.778 m)   PainSc:   0 - No pain       Physical Exam   Constitutional: He is oriented to person, place, and time and well-developed, well-nourished, and in no distress. HENT:   Head: Normocephalic and atraumatic. Right Ear: External ear normal.   Left Ear: External ear normal.   Nose: Nose normal.   Mouth/Throat: Oropharynx is clear and moist. No oropharyngeal exudate. Eyes: Conjunctivae and EOM are normal. Pupils are equal, round, and reactive to light. Right eye exhibits no discharge. Left eye exhibits no discharge. No scleral icterus. Neck: Neck supple. No thyromegaly present. Cardiovascular: Normal rate, regular rhythm, normal heart sounds and intact distal pulses. Pulmonary/Chest: Effort normal and breath sounds normal. No respiratory distress. He has no wheezes. He has no rales. Abdominal: Soft. Bowel sounds are normal. He exhibits no distension. There is no tenderness.  There is no rebound and no guarding. Musculoskeletal: He exhibits no edema or tenderness (BUE are NTTP). Lymphadenopathy:     He has no cervical adenopathy. Neurological: He is alert and oriented to person, place, and time. He exhibits normal muscle tone. Gait normal.   Skin: Skin is warm and dry. No erythema. Psychiatric: Affect normal.   Nursing note and vitals reviewed. Diabetic foot exam:     Left: Filament test normal sensation with micro filament   Pulse DP: 2+ (normal)   Pulse PT: 2+ (normal)   Deformities: Yes - His toes look pale but he has +2 distal pulses. +Onychomycosis  Right: Filament test normal sensation with micro filament   Pulse DP: 2+ (normal)   Pulse PT: 2+ (normal)   Deformities: Yes - His toes look pale but he has +2 distal pulses.  +Onychomycosis      LABS   Data Review:   Lab Results   Component Value Date/Time    WBC 5.8 01/23/2017 11:28 AM    HGB 14.5 01/23/2017 11:28 AM    HCT 42.2 01/23/2017 11:28 AM    PLATELET 206 57/78/0943 11:28 AM    MCV 91.3 01/23/2017 11:28 AM       Lab Results   Component Value Date/Time    Sodium 139 08/04/2016 09:20 AM    Potassium 4.2 08/04/2016 09:20 AM    Chloride 104 08/04/2016 09:20 AM    CO2 26 08/04/2016 09:20 AM    Anion gap 9 08/04/2016 09:20 AM    Glucose 125 08/04/2016 09:20 AM    BUN 28 08/04/2016 09:20 AM    Creatinine 1.38 08/04/2016 09:20 AM    BUN/Creatinine ratio 20 08/04/2016 09:20 AM    GFR est AA >60 08/04/2016 09:20 AM    GFR est non-AA 51 08/04/2016 09:20 AM    Calcium 8.9 08/04/2016 09:20 AM       Lab Results   Component Value Date/Time    Cholesterol, total 194 08/04/2016 09:20 AM    HDL Cholesterol 35 08/04/2016 09:20 AM    LDL, calculated 125.6 08/04/2016 09:20 AM    VLDL, calculated 33.4 08/04/2016 09:20 AM    Triglyceride 167 08/04/2016 09:20 AM    CHOL/HDL Ratio 5.5 08/04/2016 09:20 AM       Lab Results   Component Value Date/Time    Hemoglobin A1c 6.0 11/03/2015 07:56 AM    Hemoglobin A1c (POC) 6.3 08/03/2016 09:37 AM Assessment/Plan:   1. Type 2 diabetes mellitus without complication: Diet controlled. -Checking a lipid panel, CMP, A1c, TFTs,  and a urine protein screen.  -A diabetic foot exam was done today. Placing a referral to Podiatry for assistance with onychomycosis and with cutting thickened toe nails.  -I am requesting his last formal eye exam.  -Continue with dietary modification efforts pending lab results mentioned above. ORDERS:  - LIPID PANEL; Future  - METABOLIC PANEL, COMPREHENSIVE; Future  - HEMOGLOBIN A1C W/O EAG; Future  - MICROALBUMIN, UR, RAND W/ MICROALBUMIN/CREA RATIO; Future  - TSH AND FREE T4; Future  -  DIABETES FOOT EXAM    2. Mixed hyperlipidemia: Stable. -Checking a lipid panel, CMP, and A1c.  -Continue statin therapy as prescribed. ORDERS:  - LIPID PANEL; Future  - METABOLIC PANEL, COMPREHENSIVE; Future  - HEMOGLOBIN A1C W/O EAG; Future    3. Essential hypertension: Stable. -Checking a lipid panel, CMP, A1c, PFTs, and a urine protein screen.  -Continue with medication as prescribed. ORDERS:  - LIPID PANEL; Future  - METABOLIC PANEL, COMPREHENSIVE; Future  - HEMOGLOBIN A1C W/O EAG; Future  - MICROALBUMIN, UR, RAND W/ MICROALBUMIN/CREA RATIO; Future  - TSH AND FREE T4; Future    4. Hypothyroidism due to acquired atrophy of thyroid: Stable. -Checking lipid panel, CMP, A1c, and TFTs.  -Continue Synthroid as prescribed pending the results of the labs mentioned above. ORDERS:  - LIPID PANEL; Future  - METABOLIC PANEL, COMPREHENSIVE; Future  - HEMOGLOBIN A1C W/O EAG; Future  - TSH AND FREE T4; Future    5. Hypovitaminosis D: Stable. -Continue taking over-the-counter vitamin D pending a repeat vitamin D level and CMP. ORDERS:   - METABOLIC PANEL, COMPREHENSIVE; Future    6. Gout: Stable. -Continue with allopurinol. Will give a Medrol pack to use as needed in the event that he has a gout flareup. 7.  Erectile dysfunction: Stable.   -Continue with sildenafil as prescribed. 8.  Melanoma: Status post removal.  -His next exam is later this year. I encouraged him to follow-up with the dermatology team for this exam.      Lab review: labs are reviewed    I have discussed the diagnosis with the patient and the intended plan as seen in the above orders. The patient has received an after-visit summary and questions were answered concerning future plans. I have discussed medication side effects and warnings with the patient as well. I have reviewed the plan of care with the patient, accepted their input and they are in agreement with the treatment goals. All questions were answered. The patient understands the plan of care. Handouts provided today with above information. Pt instructed if symptoms worsen to call the office or report to the ED for continued care. Greater than 50% of the visit time was spent in counseling and/or coordination of care. Follow-up Disposition:  Return if symptoms worsen or fail to improve. MEDICARE ANNUAL WELLNESS VISIT/EXAM   INTERNISTS [facility]:  08/10/17, [id number]      The Subsequent AWV PROGRESS NOTE    I have reviewed the patient's medical history in detail and updated the computerized patient record. Carlos Márquez is a 66 y.o.  male and presents for an annual wellness exam.    Subjective:   Health Maintenance History:  Immunizations reviewed:    Tdap over-due   Pneumovax: over-due   Flu: due later this year  Zoster: up to date    Immunization History   Administered Date(s) Administered    Influenza High Dose Vaccine PF 10/02/2015, 10/24/2016    Influenza Vaccine 10/15/2013, 11/20/2014    Influenza Vaccine Split 10/21/2011, 11/09/2012    Pneumococcal Conjugate (PCV-13) 06/24/2015    Pneumococcal Polysaccharide (PPSV-23) 08/10/2017    Pneumococcal Vaccine (Unspecified Type) 01/01/2007    Td 01/01/2007    Zoster Vaccine, Live 02/01/2013       Colonoscopy: Due in 2020     Eye exam: Overdue per our records      Past Medical History:   Diagnosis Date    Chronic kidney disease     Colon polyps     dysplastic    Depression     Diabetes mellitus ([facility] [address]     no meds    Diverticulosis     seen on colonoscopy from 8/20/15    Diverticulosis of sigmoid colon 01/27/10    GERD (gastroesophageal reflux disease)     no meds    Gout     Hepatitis     Hyperlipidemia     Hypertension     Hypovitaminosis D     Lumbar spondylosis     Nephrolithiasis     Plantar wart     Rectal polyp     seen on colonoscopy from 8/20/15    Thyroid disease     hypothyroidism        Past Surgical History:   Procedure Laterality Date    ENDOSCOPY, COLON, DIAGNOSTIC  01/27/2010    polyp distal rectum, removed; diverticulosis of sigmoid    HX POLYPECTOMY  01/27/2010    Distal rectum    HX TONSILLECTOMY      LITHOTRIPSY      TOTAL KNEE ARTHROPLASTY Left 1/2014       Current Outpatient Prescriptions   Medication Sig Dispense Refill    venlafaxine-SR (EFFEXOR-XR) 150 mg capsule       methylPREDNISolone (MEDROL DOSEPACK) 4 mg tablet Please follow the directions on the package. 1 Dose Pack 1    atorvastatin (LIPITOR) 40 mg tablet Take 1 Tab by mouth daily. 30 Tab 11    carvedilol (COREG) 6.25 mg tablet Take 1 Tab by mouth two (2) times daily (with meals). 30 Tab 3refill request for    sildenafil (REVATIO) 20 mg tablet Take po 40 mg once daily 1 hour (range: 30 minutes to 4 hours) before sexual activity as needed; maximum dose: 100 mg once daily 30 Tab 11    levothyroxine (SYNTHROID) 50 mcg tablet Take 1 Tab by mouth Daily (before breakfast). 90 Tab 4    lisinopril (PRINIVIL, ZESTRIL) 5 mg tablet Take 1 Tab by mouth daily. 90 Tab 3    allopurinol (ZYLOPRIM) 100 mg tablet Take 2 Tabs by mouth daily. 180 Tab 3    multivitamin (ONE A DAY) tablet Take 1 Tab by mouth daily.  omega-3 fatty acids-vitamin e (FISH OIL) 1,000 mg cap Take 4 capsules by mouth daily.       Cholecalciferol, Vitamin D3, (VITAMIN D) 2,000 unit Cap Take 5,000 Units by mouth daily.  CoQ10, Ubiquinol, 200 mg Cap Take 200 mg by mouth daily.  aspirin 81 mg tablet Take 81 mg by mouth daily. Allergies   Allergen Reactions    Amlodipine Other (comments)     BLE edema       Family History   Problem Relation Age of Onset   [facility] Arthritis-rheumatoid Mother     Hypertension Mother    [facility] Elevated Lipids Mother     Thyroid Disease Sister     Cancer Father      colon    Heart Disease Other     Hypertension Other     Cancer Other      breast    Heart Disease Maternal Grandmother     Dementia Maternal Grandfather     Cancer Paternal Grandmother      breast    No Known Problems Paternal Grandfather        Social History   Substance Use Topics    Smoking status: Former Smoker     Packs/day: 1.00     Years: 6.00     Types: Cigarettes     Quit date: 1/1/1992    Smokeless tobacco: Never Used    Alcohol use No       Patient Active Problem List   Diagnosis Code    Hyperlipidemia E78.5    Hypovitaminosis D E55.9    Gout M10.9    Family history of colon cancer. personal hx colon polyps Z80.0    Vocal cord nodule J38.2    Osteoarthritis, Status post left total knee replacement M19.90    Essential hypertension I10    Type 2 diabetes mellitus without complication (HCC) C58.4    Hypothyroidism due to acquired atrophy of thyroid E03.4    Zoster B02.9    CKD (chronic kidney disease) stage 3, GFR 30-59 ml/min N18.3    Rectal polyp -  seen on colonoscopy from 8/20/15 K62.1    Diverticulosis of intestine without bleeding - seen on colonoscopy from 8/20/15 K57.90    Major depressive disorder in remission F32.9    Melanoma in situ - on left dorsal forearm 2016 D03.9    Erectile dysfunction N52.9       Review of Systems   Constitutional: Negative for chills and fever. HENT: Positive for hearing loss. Negative for ear pain and sore throat. Eyes: Negative for blurred vision and pain. Respiratory: Negative for shortness of breath.     Cardiovascular: Negative for chest pain and leg swelling. Gastrointestinal: Negative for abdominal pain, blood in stool and melena. Genitourinary: Negative for dysuria. Musculoskeletal: Negative for joint pain and myalgias. Skin: Negative for rash. Neurological: Negative for tingling, focal weakness and headaches. Endo/Heme/Allergies: Does not bruise/bleed easily. Psychiatric/Behavioral: Negative for substance abuse. Depression Risk Factor Screening:    Patient Health Questionnaire (PHQ-2)   Over the last 2 weeks, how often have you been bothered by any of the following problems? · Little interest or pleasure in doing things? · Not at all. [0]  · Feeling down, depressed, or hopeless? · Several days. [1]    Total Score: 1/6  PHQ-2 Assessment Scoring:   A score of 2 or more requires further screening with the PHQ-9    Alcohol Risk Factor Screening:   Men:   1. On any occasion during the past 3 months, have you had more than 4 drinks containing alcohol? no  2. Do you average more than 14 drinks per week? no    Tobacco Use Screening:     History   Smoking Status    Former Smoker    Packs/day: 1.00    Years: 6.00    Types: Cigarettes    Quit date: 1/1/1992   Smokeless Tobacco    Never Used       Hearing Loss    Yes but the pt declines audiometry    Activities of Daily Living   Self-care. Requires assistance with: no ADLs    Fall Risk   No fall risk factors; no falls within the past year    Abuse Screen   None; he feels safe in his home. Additional Examination Findings:  Vitals:    08/10/17 0752   BP: (!) 137/94   Pulse: 65   Resp: 16   Temp: 96.7 °F (35.9 °C)   TempSrc: Oral   SpO2: 96%   Weight: 228 lb (103.4 kg)   Height: 5' 10\" (1.778 m)   PainSc:   0 - No pain      Body mass index is 32.71 kg/(m^2). Evaluation of Cognitive Function:  Mood/affect: Euthymic  Appearance: Well-groomed  Family member/caregiver input: He is not accompanied by a family member.     Physical Exam   Constitutional: He is oriented to person, place, and time and well-developed, well-nourished, and in no distress. HENT:   Head: Normocephalic and atraumatic. Right Ear: External ear normal.   Left Ear: External ear normal.   Nose: Nose normal.   Mouth/Throat: Oropharynx is clear and moist. No oropharyngeal exudate. Eyes: Conjunctivae and EOM are normal. Pupils are equal, round, and reactive to light. Right eye exhibits no discharge. Left eye exhibits no discharge. No scleral icterus. Neck: Neck supple. No thyromegaly present. Cardiovascular: Normal rate, regular rhythm, normal heart sounds and intact distal pulses. Pulmonary/Chest: Effort normal and breath sounds normal. No respiratory distress. He has no wheezes. He has no rales. Abdominal: Soft. Bowel sounds are normal. He exhibits no distension. There is no tenderness. There is no rebound and no guarding. Musculoskeletal: He exhibits no edema or tenderness (BUE are NTTP). Lymphadenopathy:     He has no cervical adenopathy. Neurological: He is alert and oriented to person, place, and time. He exhibits normal muscle tone. Gait normal.   Skin: Skin is warm and dry. No erythema. Psychiatric: Affect normal.   Nursing note and vitals reviewed. Dementia Screen (Mini-Cog):   Three Item Recall: 3/3  Clock Drawing (ten past eleven) Exercise: unremarkable clock drawing exercise      LABS   Data Review:   Lab Results   Component Value Date/Time    Sodium 139 08/04/2016 09:20 AM    Potassium 4.2 08/04/2016 09:20 AM    Chloride 104 08/04/2016 09:20 AM    CO2 26 08/04/2016 09:20 AM    Anion gap 9 08/04/2016 09:20 AM    Glucose 125 08/04/2016 09:20 AM    BUN 28 08/04/2016 09:20 AM    Creatinine 1.38 08/04/2016 09:20 AM    BUN/Creatinine ratio 20 08/04/2016 09:20 AM    GFR est AA >60 08/04/2016 09:20 AM    GFR est non-AA 51 08/04/2016 09:20 AM    Calcium 8.9 08/04/2016 09:20 AM       Lab Results   Component Value Date/Time    WBC 5.8 01/23/2017 11:28 AM    HGB 14.5 2017 11:28 AM    HCT 42.2 2017 11:28 AM    PLATELET 851  11:28 AM    MCV 91.3 2017 11:28 AM       Lab Results   Component Value Date/Time    Hemoglobin A1c 6.0 2015 07:56 AM    Hemoglobin A1c (POC) 6.3 2016 09:37 AM       Lab Results   Component Value Date/Time    Cholesterol, total 194 2016 09:20 AM    HDL Cholesterol 35 2016 09:20 AM    LDL, calculated 125.6 2016 09:20 AM    VLDL, calculated 33.4 2016 09:20 AM    Triglyceride 167 2016 09:20 AM    CHOL/HDL Ratio 5.5 2016 09:20 AM       Patient Care Team:  Stone Moore MD as PCP - General (Family Practice)  Patricia Amaya RN as [facility] (Internal Medicine)  JOHN Sequeira (Physician Assistant)  China Willis MD (Gastroenterology)  Norah Flood MD (Otolaryngology)  Mliss Saint, MD (Orthopedic Surgery)  Ludmila Gotti DPM (Podiatry)  Alferd Apgar, DPM (Podiatry)  Elton Christensen MD (Ophthalmology)  Glenn Fontenot MD (Orthopedic Surgery)  Geneva Gallagher MD (Unknown Physician Specialty)  Timoteo Townsend, SHELBIE as Ambulatory Care Navigator (Internal Medicine)    End-of-life planning  Advanced Directive in the case that an injury or illness causes the patient to be unable to make health care decisions was discussed with the patient. He completed his advance directive today. He wants his wife to his power of  and his daughter to be his successor power of . He does not want aggressive care to prolong his life in the event that he is dying, death is eminent, and his doctors concluded there is no improvement or recovery even with medical treatment. He also does not want life prolonging procedures in the event that he is unaware of himself, surroundings, and others and is unable to regain  these abilities even with medical treatment.     Advice/Referrals/Counselling/Plan:   Education and counseling provided:  Are appropriate based on today's review and evaluation  End-of-Life planning (with patient's consent)  Pneumococcal Vaccine  Influenza Vaccine  Prostate cancer screening tests (PSA, covered annually)  Colorectal cancer screening tests  Cardiovascular screening blood test  Screening for glaucoma  Diabetes screening test     Include in education list (weight loss, physical activity, smoking cessation, fall prevention, and nutrition)    ICD-10-CM ICD-9-CM    1. Type 2 diabetes mellitus without complication, unspecified long term insulin use status (HCC) E11.9 250.00 LIPID PANEL      METABOLIC PANEL, COMPREHENSIVE      HEMOGLOBIN A1C W/O EAG      MICROALBUMIN, UR, RAND W/ MICROALBUMIN/CREA RATIO      TSH AND FREE T4      HM DIABETES FOOT EXAM      REFERRAL TO PODIATRY   2. Mixed hyperlipidemia E78.2 272.2 LIPID PANEL      METABOLIC PANEL, COMPREHENSIVE      HEMOGLOBIN A1C W/O EAG   3. Essential hypertension I10 401.9 LIPID PANEL      METABOLIC PANEL, COMPREHENSIVE      HEMOGLOBIN A1C W/O EAG      MICROALBUMIN, UR, RAND W/ MICROALBUMIN/CREA RATIO      TSH AND FREE T4   4. Hypothyroidism due to acquired atrophy of thyroid E03.4 244.8 LIPID PANEL     177.1 METABOLIC PANEL, COMPREHENSIVE      HEMOGLOBIN A1C W/O EAG      TSH AND FREE T4   5. Hypovitaminosis D V78.0 982.3 METABOLIC PANEL, COMPREHENSIVE      VITAMIN D, 25 HYDROXY   6. Routine general medical examination at a health care facility Z00.00 V70.0    7. Screening for alcoholism Z13.89 V79.1    8. Personal history of tobacco use, presenting hazards to health Z87.891 V15.82 US EXAM SCREENING AAA   9. Acute gout, unspecified cause, unspecified site M10.9 274.01 methylPREDNISolone (MEDROL DOSEPACK) 4 mg tablet   10. Onychomycosis B35.1 110.1 REFERRAL TO PODIATRY     reviewed diet, exercise and weight control. Brief written plan, checklist - health maintenance - given to patient.     Assessment/Plan:    Health Maintenance:  -AAA screening ordered as well  -Checking a lipid panel and A1c today.  - Advanced care planning session - his directive was completed today  - Pneumococcal 23 vaccine administered today  - Colonoscopy is due in 2020  - Asymptomatic, so will not pursue PSA/АННА at this time.  - Requesting a copy of his last formal eye exam.    ORDERS:  - LIPID PANEL; Future  - HEMOGLOBIN A1C W/O EAG; Future      Lab review: labs are reviewed in the [facility] ([facility]) Provider Conversation     Date of ACP Conversation: 08/10/17  Persons included in Conversation:  patient    Authorized Decision Maker (if patient is incapable of making informed decisions): This person is:   Healthcare Agent/Medical Power of  under Advance Directive (see below)          For Patients with Decision Making Capacity:   Values/Goals: Exploration of values, goals, and preferences if recovery is not expected, even with continued medical treatment in the event of:  Imminent death  Severe, permanent brain injury    Conversation Outcomes / Follow-Up Plan:   Completed new Advance Directive. Advanced Directive in the case that an injury or illness causes the patient to be unable to make health care decisions was discussed with the patient. He completed his advance directive today. He wants his wife to his power of  and his daughter to be his successor power of . He does not want aggressive care to prolong his life in the event that he is dying, death is eminent, and his doctors concluded there is no improvement or recovery even with medical treatment. He also does not want life prolonging procedures in the event that he is unaware of himself, surroundings, and others and is unable to regain  these abilities even with medical treatment. I have discussed the diagnosis/diagnoses with the patient and the intended plan as seen in the above orders. The patient has received an after-visit summary and questions were answered concerning future plans.   I have discussed medication side effects and warnings with the patient as well. I have reviewed the plan of care with the patient, accepted their input and they are in agreement with the treatment goals. All questions were answered. Follow-up Disposition:  Return if symptoms worsen or fail to improve.

## 2017-08-10 NOTE — ACP (ADVANCE CARE PLANNING)
Do you have an Advance Care Planning? Yes  Would you like to receive some information about ACP?  YES

## 2017-08-11 LAB
25(OH)D3 SERPL-MCNC: 43.1 NG/ML (ref 30–100)
CREAT UR-MCNC: 168.22 MG/DL (ref 30–125)
MICROALBUMIN UR-MCNC: 1.6 MG/DL (ref 0–3)
MICROALBUMIN/CREAT UR-RTO: 10 MG/G (ref 0–30)

## 2017-08-15 ENCOUNTER — HOSPITAL ENCOUNTER (OUTPATIENT)
Dept: ULTRASOUND IMAGING | Age: 72
Discharge: HOME | End: 2017-08-15
Attending: INTERNAL MEDICINE
Payer: MEDICARE

## 2017-08-15 DIAGNOSIS — Z87.891 PERSONAL HISTORY OF TOBACCO USE, PRESENTING HAZARDS TO HEALTH: ICD-10-CM

## 2017-08-15 PROCEDURE — 76706 US ABDL AORTA SCREEN AAA: CPT

## 2017-08-16 ENCOUNTER — TELEPHONE (OUTPATIENT)
Dept: INTERNAL MEDICINE CLINIC | Age: 72
End: 2017-08-16

## 2017-08-16 DIAGNOSIS — E11.9 TYPE 2 DIABETES MELLITUS WITHOUT COMPLICATION, UNSPECIFIED LONG TERM INSULIN USE STATUS: Primary | ICD-10-CM

## 2017-08-18 DIAGNOSIS — E11.9 TYPE 2 DIABETES MELLITUS WITHOUT COMPLICATION, UNSPECIFIED LONG TERM INSULIN USE STATUS: Primary | ICD-10-CM

## 2017-08-18 RX ORDER — GLIMEPIRIDE 1 MG/1
1 TABLET ORAL
Qty: 30 TAB | Refills: 6 | Status: SHIPPED | OUTPATIENT
Start: 2017-08-18 | End: 2017-12-07 | Stop reason: ALTCHOICE

## 2017-08-18 NOTE — PROGRESS NOTES
I discussed his new diagnosis of type 2 Diabetes. Ordering glimepiride in setting of CKD. We discussed the remainder of his labs.      PLAN:   - F/u with me for DM counseling next week   - Amaryl ordered   - Rechecking TFTs in 6-12 months    Dr. Shanika Carr  [facility] [address]  Phone: [phone]  Fax: [phone]

## 2017-08-24 ENCOUNTER — OFFICE VISIT (OUTPATIENT)
Dept: INTERNAL MEDICINE CLINIC | Age: 72
End: 2017-08-24

## 2017-08-24 VITALS
DIASTOLIC BLOOD PRESSURE: 86 MMHG | HEIGHT: 70 IN | RESPIRATION RATE: 18 BRPM | SYSTOLIC BLOOD PRESSURE: 134 MMHG | WEIGHT: 221.8 LBS | TEMPERATURE: 97.4 F | BODY MASS INDEX: 31.75 KG/M2 | HEART RATE: 67 BPM | OXYGEN SATURATION: 98 %

## 2017-08-24 DIAGNOSIS — H66.92 LEFT OTITIS MEDIA, UNSPECIFIED CHRONICITY, UNSPECIFIED OTITIS MEDIA TYPE: ICD-10-CM

## 2017-08-24 DIAGNOSIS — E11.9 TYPE 2 DIABETES MELLITUS WITHOUT COMPLICATION, UNSPECIFIED LONG TERM INSULIN USE STATUS: Primary | ICD-10-CM

## 2017-08-24 RX ORDER — AMOXICILLIN AND CLAVULANATE POTASSIUM 875; 125 MG/1; MG/1
1 TABLET, FILM COATED ORAL EVERY 12 HOURS
Qty: 20 TAB | Refills: 0 | Status: SHIPPED | OUTPATIENT
Start: 2017-08-24 | End: 2017-09-03

## 2017-08-24 RX ORDER — TERBINAFINE HYDROCHLORIDE 250 MG/1
250 TABLET ORAL
COMMUNITY
End: 2020-10-06

## 2017-08-24 NOTE — PROGRESS NOTES
1. Have you been to the ER, urgent care clinic since your last visit? Hospitalized since your last visit? No    2. Have you seen or consulted any other health care providers outside of the [address] [place] [address] since your last visit? Include any pap smears or colon screening.  No

## 2017-08-24 NOTE — MR AVS SNAPSHOT
Visit Information Date & Time Provider Department Dept. Phone Encounter #  
 8/24/2017  8:30 AM Adriano Gonzalez MD [facility] of [address] [id number] Your Appointments 2/6/2018  8:05 AM  
LAB with C NURSE VISIT [facility] of [facility] ([address]) Appt Note: 6 mo lab  
 [address], [address] [address] [address]  
  
   
 [address], [address]  
  
    
 2/13/2018  8:00 AM  
Office Visit with Adriano Gonzalez MD  
[facility] of [address] [place] [address] [address] [facility]) Appt Note: 6 mo fu  
 [address], [address] [address] [address]  
  
   
 [address], [address] Upcoming Health Maintenance Date Due DTaP/Tdap/Td series (1 - Tdap) 1/2/2007 EYE EXAM RETINAL OR DILATED Q1 6/1/2015 GLAUCOMA SCREENING Q2Y 6/1/2016 INFLUENZA AGE 9 TO ADULT 8/1/2017 HEMOGLOBIN A1C Q6M 2/10/2018 FOOT EXAM Q1 8/10/2018 MICROALBUMIN Q1 8/10/2018 LIPID PANEL Q1 8/10/2018 MEDICARE YEARLY EXAM 8/11/2018 COLONOSCOPY 8/20/2020 Allergies as of 8/24/2017  Review Complete On: 8/24/2017 By: Carol Funk LPN Severity Noted Reaction Type Reactions Amlodipine  08/23/2016   Intolerance Other (comments) BLE edema Current Immunizations  Reviewed on 6/24/2015 Name Date Influenza High Dose Vaccine PF 10/24/2016, 10/2/2015 Influenza Vaccine 11/20/2014, 10/15/2013 Influenza Vaccine Split 11/9/2012  2:07 PM, 10/21/2011 Pneumococcal Conjugate (PCV-13) 6/24/2015 10:06 AM  
 Pneumococcal Polysaccharide (PPSV-23) 8/10/2017  8:43 AM  
 Pneumococcal Vaccine (Unspecified Type) 1/1/2007 Td 1/1/2007 Zoster Vaccine, Live 2/1/2013  2:19 PM  
  
 Not reviewed this visit You Were Diagnosed With   
  
 Codes Comments  Left otitis media, unspecified chronicity, unspecified otitis media type    -  Primary ICD-10-CM: G85.25 
 ICD-9-CM: 382. 9 Type 2 diabetes mellitus without complication, unspecified long term insulin use status (HCC)     ICD-10-CM: E11.9 ICD-9-CM: 250.00 Vitals BP Pulse Temp Resp Height(growth percentile) Weight(growth percentile) 134/86 67 97.4 °F (36.3 °C) 18 5' 10\" (1.778 m) 221 lb 12.8 oz (100.6 kg) SpO2 BMI Smoking Status 98% 31.82 kg/m2 Former Smoker BMI and BSA Data Body Mass Index Body Surface Area  
 31.82 kg/m 2 2.23 m 2 [facility] Pharm[facility] [address], [address] [phone] Your Updated Medication List  
  
   
This list is accurate as of: 8/24/17  9:19 AM.  Always use your most recent med list.  
  
  
  
  
 allopurinol 100 mg tablet Commonly known as:  Vena Ditch Take 2 Tabs by mouth daily. amoxicillin-clavulanate 875-125 mg per tablet Commonly known as:  AUGMENTIN Take 1 Tab by mouth every twelve (12) hours for 10 days. aspirin 81 mg tablet Take 81 mg by mouth daily. atorvastatin 40 mg tablet Commonly known as:  LIPITOR Take 1 Tab by mouth daily. carvedilol 6.25 mg tablet Commonly known as:  Pennye Louder Take 1 Tab by mouth two (2) times daily (with meals). CoQ10 (Ubiquinol) 200 mg Cap Take 200 mg by mouth daily. FISH OIL 1,000 mg Cap Generic drug:  omega-3 fatty acids-vitamin e Take 4 capsules by mouth daily. glimepiride 1 mg tablet Commonly known as:  AMARYL Take 1 Tab by mouth Daily (before breakfast). levothyroxine 50 mcg tablet Commonly known as:  SYNTHROID Take 1 Tab by mouth Daily (before breakfast). lisinopril 5 mg tablet Commonly known as:  Wayne Boehringer Take 1 Tab by mouth daily. methylPREDNISolone 4 mg tablet Commonly known as:  [place] Sample Please follow the directions on the package. multivitamin tablet Commonly known as:  ONE A DAY Take 1 Tab by mouth daily. sildenafil 20 mg tablet Commonly known as:  REVATIO Take po 40 mg once daily 1 hour (range: 30 minutes to 4 hours) before sexual activity as needed; maximum dose: 100 mg once daily  
  
 terbinafine HCl 250 mg tablet Commonly known as:  LAMISIL Take 250 mg by mouth daily. venlafaxine- mg capsule Commonly known as:  EFFEXOR-XR  
  
 VITAMIN D 2,000 unit Cap capsule Generic drug:  Cholecalciferol (Vitamin D3) Take 5,000 Units by mouth daily. Prescriptions Sent to Pharmacy Refills  
 amoxicillin-clavulanate (AUGMENTIN) 875-125 mg per tablet 0 Sig: Take 1 Tab by mouth every twelve (12) hours for 10 days. Class: Normal  
 Pharmacy: [facility] [address] [place] [address] Ph #: [phone] Route: Oral  
  
Patient Instructions DIABETES:  
 
 1. Your fasting (before breakfast) blood sugar goal is less than 140. 
 
2. Your blood sugar goal 2 hours after you eat a meal is less than 180. 
 
3. Your \"A1C\" measures your average blood sugar over the course of 3-4 months. Your goal is to keep your \"A1C\" lab test measurement, less than 7. 
 
4. Eye exam: You should have a formal eye exam to screen for diabetic eye disease once a year 5. Vaccinations: You should have a tetanus vaccination every 10 years, a flu shot once every winter/fall season, and a pneumonia vaccination every 5 years 6. Do not skip meals. Eat small meals throughout the day. Try to limit carb/sugar intake by limiting: soda intake, pasta/rice/chips/pretzels/potato/bread/bagels/cookies/candy in your diet 7. Cholesterol: You need to have your cholesterol checked once a year 8. Kidney function blood/urine tests: You need to have a blood and urine test once a year to screen for diabetic kidney disease 9. Diabetes supplies: Let us know if you need any diabetes supplies 10.  Limit your carb intake to no more than 45 grams per meal assuming you eat 3 meals a day. Do not consume more than 15 grams of carb per snack. Learning About Tests When You Have Diabetes Why do you need regular diabetes tests? Diabetes can be hard on your body if it's not well controlled. But having tests on a regular schedule can help you and your doctor find problems early, when it's easier to start managing them. What tests do you need? The tests you may have, how often you should have them, and the goals of the tests are: 
A1c blood test. This test shows the average level of blood sugar over the past 2 to 3 months. It helps your doctor see whether blood sugar levels have been staying within your target range. · How often: Every 3 to 6 months · Goal: A blood sugar level in your target range Blood pressure test: This test measures the pressure of blood flow in the arteries. Controlling blood pressure can help prevent damage to nerves and blood vessels. · How often: Every 3 to 6 months · Goal: A blood pressure level in your target range Cholesterol test: This test measures the amount of a type of fat in the blood. It is common for people with diabetes to also have high cholesterol. Too much cholesterol in the blood can build up inside the blood vessels and raise the risk for heart attack and stroke. · How often: At the time of your diabetes diagnosis, and as often as your doctor recommends after that · Goal: A cholesterol level in your target range Albumin-creatinine ratio test: This test checks for kidney damage by looking for the protein albumin (say \"al-BYOO-anushka\") in the urine. Albumin is normally found in the blood. Kidney damage can let small amounts of it (microalbumin) leak into the urine. · How often: Once a year · Goal: No protein in the urine Blood creatinine test/estimated glomerular filtration (eGFR): The blood creatinine (say \"wpqd-YG-nf-neen\") level shows how well your kidneys are working.  Creatinine is a waste product that muscles release into the blood. Blood creatinine is used to estimate the glomerular filtration rate. A high level of creatinine and/or a low eGFR may mean your kidneys are not working as well as they should. · How often: Once a year · Goal: Normal level of creatinine in the blood. The eGFR goal is greater than 60 mL/min/1.73 m². Complete foot exam: The doctor checks for foot sores and whether any sensation has been lost. 
· How often: Once a year · Goal: Healthy feet with no foot ulcers or loss of feeling Dental exam and cleaning: The dentist checks for gum disease and tooth decay. People with high blood sugar are more likely to have these problems. · How often: Every 6 months · Goal: Healthy teeth and gums Complete eye exam: High blood sugar levels can damage the eyes. This exam is done by an ophthalmologist or optometrist. It includes a dilated eye exam. The exam shows whether there's damage to the back of the eye (diabetic retinopathy). · How often: Once a year. If you don't have any signs of diabetic retinopathy, your doctor may recommend an exam every 2 years. · Goal: No damage to the back of the eye Thyroid-stimulating hormone (TSH) blood test: This test checks for thyroid disease. Too little thyroid hormone can cause some medicines (like insulin) to stay in the body longer. This can cause low blood sugar. You may be tested if you have high cholesterol or are a woman over 48years old. · How often: As part of your diabetes diagnosis, and as often as your doctor recommends after that · Goal: Normal level of TSH in the blood Follow-up care is a key part of your treatment and safety. Be sure to make and go to all appointments, and call your doctor if you are having problems. It's also a good idea to know your test results and keep a list of the medicines you take. Where can you learn more? Go to http://sapna-araseli.info/.  
Enter [id number] in the search box to learn more about \"Learning About Tests When You Have Diabetes. \" Current as of: March 13, 2017 Content Version: 11.3 © 8231-7668 [employer]. Care instructions adapted under license by [employer] (which disclaims liability or warranty for this information). If you have questions about a medical condition or this instruction, always ask your healthcare professional. [address] any warranty or liability for your use of this information. Introducing [facility]! Dear Annita Grover: Thank you for requesting a Recondo account. Our records indicate that you already have an active Recondo account. You can access your account anytime at [email or web address] Did you know that you can access your hospital and ER discharge instructions at any time in Recondo? You can also review all of your test results from your hospital stay or ER visit. Additional Information If you have questions, please visit the Frequently Asked Questions section of the Recondo website at [email or web address] Remember, Recondo is NOT to be used for urgent needs. For medical emergencies, dial 911. Now available from your iPhone and Android! Please provide this summary of care documentation to your next provider. Your primary care clinician is listed as Diana Sotelo. If you have any questions after today's visit, please call [phone].

## 2017-08-24 NOTE — PATIENT INSTRUCTIONS
DIABETES:      1. Your fasting (before breakfast) blood sugar goal is less than 140.    2. Your blood sugar goal 2 hours after you eat a meal is less than 180.    3. Your \"A1C\" measures your average blood sugar over the course of 3-4 months. Your goal is to keep your \"A1C\" lab test measurement, less than 7.    4. Eye exam: You should have a formal eye exam to screen for diabetic eye disease once a year    5. Vaccinations: You should have a tetanus vaccination every 10 years, a flu shot once every winter/fall season, and a pneumonia vaccination every 5 years    6. Do not skip meals. Eat small meals throughout the day. Try to limit carb/sugar intake by limiting: soda intake, pasta/rice/chips/pretzels/potato/bread/bagels/cookies/candy in your diet    7. Cholesterol: You need to have your cholesterol checked once a year    8. Kidney function blood/urine tests: You need to have a blood and urine test once a year to screen for diabetic kidney disease    9. Diabetes supplies: Let us know if you need any diabetes supplies    10. Limit your carb intake to no more than 45 grams per meal assuming you eat 3 meals a day. Do not consume more than 15 grams of carb per snack. Learning About Tests When You Have Diabetes  Why do you need regular diabetes tests? Diabetes can be hard on your body if it's not well controlled. But having tests on a regular schedule can help you and your doctor find problems early, when it's easier to start managing them. What tests do you need? The tests you may have, how often you should have them, and the goals of the tests are:  A1c blood test. This test shows the average level of blood sugar over the past 2 to 3 months. It helps your doctor see whether blood sugar levels have been staying within your target range. · How often: Every 3 to 6 months  · Goal: A blood sugar level in your target range  Blood pressure test: This test measures the pressure of blood flow in the arteries. Controlling blood pressure can help prevent damage to nerves and blood vessels. · How often: Every 3 to 6 months  · Goal: A blood pressure level in your target range  Cholesterol test: This test measures the amount of a type of fat in the blood. It is common for people with diabetes to also have high cholesterol. Too much cholesterol in the blood can build up inside the blood vessels and raise the risk for heart attack and stroke. · How often: At the time of your diabetes diagnosis, and as often as your doctor recommends after that  · Goal: A cholesterol level in your target range  Albumin-creatinine ratio test: This test checks for kidney damage by looking for the protein albumin (say \"al-BYOO-anushka\") in the urine. Albumin is normally found in the blood. Kidney damage can let small amounts of it (microalbumin) leak into the urine. · How often: Once a year  · Goal: No protein in the urine  Blood creatinine test/estimated glomerular filtration (eGFR): The blood creatinine (say \"tlqh-MT-ft-neen\") level shows how well your kidneys are working. Creatinine is a waste product that muscles release into the blood. Blood creatinine is used to estimate the glomerular filtration rate. A high level of creatinine and/or a low eGFR may mean your kidneys are not working as well as they should. · How often: Once a year  · Goal: Normal level of creatinine in the blood. The eGFR goal is greater than 60 mL/min/1.73 m². Complete foot exam: The doctor checks for foot sores and whether any sensation has been lost.  · How often: Once a year  · Goal: Healthy feet with no foot ulcers or loss of feeling  Dental exam and cleaning: The dentist checks for gum disease and tooth decay. People with high blood sugar are more likely to have these problems. · How often: Every 6 months  · Goal: Healthy teeth and gums  Complete eye exam: High blood sugar levels can damage the eyes.  This exam is done by an ophthalmologist or optometrist. It includes a dilated eye exam. The exam shows whether there's damage to the back of the eye (diabetic retinopathy). · How often: Once a year. If you don't have any signs of diabetic retinopathy, your doctor may recommend an exam every 2 years. · Goal: No damage to the back of the eye  Thyroid-stimulating hormone (TSH) blood test: This test checks for thyroid disease. Too little thyroid hormone can cause some medicines (like insulin) to stay in the body longer. This can cause low blood sugar. You may be tested if you have high cholesterol or are a woman over 48years old. · How often: As part of your diabetes diagnosis, and as often as your doctor recommends after that  · Goal: Normal level of TSH in the blood  Follow-up care is a key part of your treatment and safety. Be sure to make and go to all appointments, and call your doctor if you are having problems. It's also a good idea to know your test results and keep a list of the medicines you take. Where can you learn more? Go to http://sapna-araseli.info/. Enter [id number] in the search box to learn more about \"Learning About Tests When You Have Diabetes. \"  Current as of: March 13, 2017  Content Version: 11.3  © 0953-1282 [employer]. Care instructions adapted under license by [employer] (which disclaims liability or warranty for this information). If you have questions about a medical condition or this instruction, always ask your healthcare professional. [place] [address] any warranty or liability for your use of this information.

## 2017-08-24 NOTE — PROGRESS NOTES
INTERNISTS [facility]:  8/24/2017, MRN: [id number]      Black Ziegler is a 66 y.o. male and presents to clinic for Hypertension (follow up) and Diabetes (follow up)    Subjective:   Pt is a 77yo male with h/o ED, melanoma in situ on left dorsal forearm s/p removal, MDD, rectal polyp, diverticulosis, CKD stage 3, HTN, type 2 DM, hypothyroidism, DJD, gout, vocal cord nodule, vitamin D deficiency, and HLD.     1. Type 2 diabetes: The patient's last A1c was 7.5. He was started in between appointments on Amaryl. He reports no adverse side effects in taking his medication. No refills are needed. Since finding out that his A1c was at this range, he has aggressively pursue dietary modifications. He is participating with his wife in a chemical free non-wheat-based diet. He has never checked his blood sugars. He is on atorvastatin and ACE inhibitor. He has a stable chronic kidney disease stage III. He is accompanied by his wife today to this appointment. His weight is down to 221 pounds since his last visit. 2.  Upper respiratory tract infection: The patient reports a one-week history of worsening nasal congestion. Initially he had a sore throat. He has no sore throat today. No cough. No fever or chills. He has had exposure to sick individuals in his family. No alleviating factors are known.       Patient Active Problem List    Diagnosis Date Noted    Erectile dysfunction 01/23/2017    Melanoma in situ - on left dorsal forearm 2016 09/16/2016    Major depressive disorder in remission 08/03/2016    Rectal polyp -  seen on colonoscopy from 8/20/15 08/02/2016    Diverticulosis of intestine without bleeding - seen on colonoscopy from 8/20/15 08/02/2016    CKD (chronic kidney disease) stage 3, GFR 30-59 ml/min 11/09/2015    Zoster 07/21/2015    Essential hypertension 06/24/2015    Type 2 diabetes mellitus without complication ([facility] [address] 49/41/6944    Hypothyroidism due to acquired atrophy of thyroid 06/24/2015    Osteoarthritis, Status post left total knee replacement 04/23/2014    Vocal cord nodule 10/21/2013    Gout 10/21/2011    Family history of colon cancer. personal hx colon polyps 10/21/2011    Hyperlipidemia     Hypovitaminosis D        Current Outpatient Prescriptions   Medication Sig Dispense Refill    terbinafine HCl (LAMISIL) 250 mg tablet Take 250 mg by mouth daily.  amoxicillin-clavulanate (AUGMENTIN) 875-125 mg per tablet Take 1 Tab by mouth every twelve (12) hours for 10 days. 20 Tab 0    glimepiride (AMARYL) 1 mg tablet Take 1 Tab by mouth Daily (before breakfast). 30 Tab 6    venlafaxine-SR (EFFEXOR-XR) 150 mg capsule       methylPREDNISolone (MEDROL DOSEPACK) 4 mg tablet Please follow the directions on the package. 1 Dose Pack 1    atorvastatin (LIPITOR) 40 mg tablet Take 1 Tab by mouth daily. 30 Tab 11    carvedilol (COREG) 6.25 mg tablet Take 1 Tab by mouth two (2) times daily (with meals). 30 Tab 3refill request for    sildenafil (REVATIO) 20 mg tablet Take po 40 mg once daily 1 hour (range: 30 minutes to 4 hours) before sexual activity as needed; maximum dose: 100 mg once daily 30 Tab 11    levothyroxine (SYNTHROID) 50 mcg tablet Take 1 Tab by mouth Daily (before breakfast). 90 Tab 4    lisinopril (PRINIVIL, ZESTRIL) 5 mg tablet Take 1 Tab by mouth daily. 90 Tab 3    allopurinol (ZYLOPRIM) 100 mg tablet Take 2 Tabs by mouth daily. 180 Tab 3    multivitamin (ONE A DAY) tablet Take 1 Tab by mouth daily.  omega-3 fatty acids-vitamin e (FISH OIL) 1,000 mg cap Take 4 capsules by mouth daily.  Cholecalciferol, Vitamin D3, (VITAMIN D) 2,000 unit Cap Take 5,000 Units by mouth daily.  CoQ10, Ubiquinol, 200 mg Cap Take 200 mg by mouth daily.  aspirin 81 mg tablet Take 81 mg by mouth daily.          Allergies   Allergen Reactions    Amlodipine Other (comments)     BLE edema       Past Medical History:   Diagnosis Date    Chronic kidney disease     Colon polyps     dysplastic    Depression     Diabetes mellitus ([facility] [address]     no meds    Diverticulosis     seen on colonoscopy from 8/20/15    Diverticulosis of sigmoid colon 01/27/10    GERD (gastroesophageal reflux disease)     no meds    Gout     Hepatitis     Hyperlipidemia     Hypertension     Hypovitaminosis D     Lumbar spondylosis     Nephrolithiasis     Plantar wart     Rectal polyp     seen on colonoscopy from 8/20/15    Thyroid disease     hypothyroidism       Past Surgical History:   Procedure Laterality Date    ENDOSCOPY, COLON, DIAGNOSTIC  01/27/2010    polyp distal rectum, removed; diverticulosis of sigmoid    HX POLYPECTOMY  01/27/2010    Distal rectum    HX TONSILLECTOMY      LITHOTRIPSY      TOTAL KNEE ARTHROPLASTY Left 1/2014       Family History   Problem Relation Age of Onset    Arthritis-rheumatoid Mother     Hypertension Mother     Elevated Lipids Mother     Thyroid Disease Sister     Cancer Father      colon    Heart Disease Other     Hypertension Other     Cancer Other      breast    Heart Disease Maternal Grandmother     Dementia Maternal Grandfather     Cancer Paternal Grandmother      breast    No Known Problems Paternal Grandfather        Social History   Substance Use Topics    Smoking status: Former Smoker     Packs/day: 1.00     Years: 6.00     Types: Cigarettes     Quit date: 1/1/1992    Smokeless tobacco: Never Used    Alcohol use No       ROS   Review of Systems   Constitutional: Negative for chills and fever. HENT: Negative for ear pain and sore throat. Eyes: Negative for blurred vision and pain. Respiratory: Negative for cough and shortness of breath. Cardiovascular: Negative for chest pain. Gastrointestinal: Negative for abdominal pain, blood in stool and melena. Genitourinary: Negative for dysuria and hematuria. Musculoskeletal: Negative for joint pain and myalgias. Skin: Negative for rash.    Neurological: Negative for tingling, focal weakness and headaches. Endo/Heme/Allergies: Does not bruise/bleed easily. Psychiatric/Behavioral: Negative for substance abuse. Objective     Vitals:    08/24/17 0834   BP: 134/86   Pulse: 67   Resp: 18   Temp: 97.4 °F (36.3 °C)   SpO2: 98%   Weight: 221 lb 12.8 oz (100.6 kg)   Height: 5' 10\" (1.778 m)   PainSc:   0 - No pain       Physical Exam   Constitutional: He is oriented to person, place, and time and well-developed, well-nourished, and in no distress. HENT:   Head: Normocephalic and atraumatic. Right Ear: External ear normal.   Left Ear: External ear normal.   Nose: Nose normal.   Mouth/Throat: Oropharynx is clear and moist. No oropharyngeal exudate. Left TM is erythematous Right TM is unremarkable. Sinus areas are NTTP   Eyes: Conjunctivae and EOM are normal. Pupils are equal, round, and reactive to light. Right eye exhibits no discharge. Left eye exhibits no discharge. No scleral icterus. Neck: Neck supple. Cardiovascular: Normal rate, regular rhythm, normal heart sounds and intact distal pulses. Pulmonary/Chest: Effort normal and breath sounds normal. No respiratory distress. He has no wheezes. He has no rales. Abdominal: Soft. Bowel sounds are normal. He exhibits no distension. There is no tenderness. There is no rebound and no guarding. Musculoskeletal: He exhibits no edema or tenderness (BUE are NTTP). Lymphadenopathy:     He has no cervical adenopathy. Neurological: He is alert and oriented to person, place, and time. He exhibits normal muscle tone. Gait normal.   Skin: Skin is warm and dry. No erythema. Psychiatric: Affect normal.   Nursing note and vitals reviewed.       LABS   Data Review:   Lab Results   Component Value Date/Time    WBC 5.8 01/23/2017 11:28 AM    HGB 14.5 01/23/2017 11:28 AM    HCT 42.2 01/23/2017 11:28 AM    PLATELET 736 20/02/5217 11:28 AM    MCV 91.3 01/23/2017 11:28 AM       Lab Results   Component Value Date/Time    Sodium 139 08/10/2017 08:40 AM    Potassium 4.5 08/10/2017 08:40 AM    Chloride 104 08/10/2017 08:40 AM    CO2 26 08/10/2017 08:40 AM    Anion gap 9 08/10/2017 08:40 AM    Glucose 138 08/10/2017 08:40 AM    BUN 17 08/10/2017 08:40 AM    Creatinine 1.32 08/10/2017 08:40 AM    BUN/Creatinine ratio 13 08/10/2017 08:40 AM    GFR est AA >60 08/10/2017 08:40 AM    GFR est non-AA 53 08/10/2017 08:40 AM    Calcium 8.9 08/10/2017 08:40 AM       Lab Results   Component Value Date/Time    Cholesterol, total 163 08/10/2017 08:40 AM    HDL Cholesterol 41 08/10/2017 08:40 AM    LDL, calculated 84.2 08/10/2017 08:40 AM    VLDL, calculated 37.8 08/10/2017 08:40 AM    Triglyceride 189 08/10/2017 08:40 AM    CHOL/HDL Ratio 4.0 08/10/2017 08:40 AM       Lab Results   Component Value Date/Time    Hemoglobin A1c 7.5 08/10/2017 08:40 AM    Hemoglobin A1c (POC) 6.3 08/03/2016 09:37 AM       Assessment/Plan:   1. Left otitis media:   -A course of Augmentin was prescribed. ORDERS:  - amoxicillin-clavulanate (AUGMENTIN) 875-125 mg per tablet; Take 1 Tab by mouth every twelve (12) hours for 10 days. Dispense: 20 Tab; Refill: 0    2. Type 2 diabetes mellitus without complication: His most recent A1C is 7.5  -I had a thorough conversation with the patient and his wife about diabetes. We discussed his A1c, with his A1c goal is, his blood sugar goals are, ways to prevent complications, recommended preventative screenings, and how to maintain a diabetic diet. I also went over her carb counting. I encouraged patient to maintain less than 45 g of carb per meal and less than 15 g of carb per snack. I also encouraged him to attend prediabetes education classes at [address] were also distributed to the patient and his wife.     Health Maintenance Due   Topic Date Due    DTaP/Tdap/Td series (1 - Tdap) 01/02/2007    EYE EXAM RETINAL OR DILATED Q1  06/01/2015    GLAUCOMA SCREENING Q2Y  06/01/2016    INFLUENZA AGE 9 TO ADULT  08/01/2017     Lab review: labs are reviewed in the EHR    I have discussed the diagnosis with the patient and the intended plan as seen in the above orders. The patient has received an after-visit summary and questions were answered concerning future plans. I have discussed medication side effects and warnings with the patient as well. I have reviewed the plan of care with the patient, accepted their input and they are in agreement with the treatment goals. All questions were answered. The patient understands the plan of care. Handouts provided today with above information. Pt instructed if symptoms worsen to call the office or report to the ED for continued care. Greater than 50% of the visit time was spent in counseling and/or coordination of care. I spent 25 minutes with the patient for this encounter, 15 of which were spent counseling him as described above. Follow-up Disposition:  Return in about 3 months (around 11/24/2017) for BP check, DM check.     Catherine Bansal MD

## 2017-09-05 ENCOUNTER — TELEPHONE (OUTPATIENT)
Dept: INTERNAL MEDICINE CLINIC | Age: 72
End: 2017-09-05

## 2017-09-05 DIAGNOSIS — E11.21 CONTROLLED TYPE 2 DIABETES MELLITUS WITH DIABETIC NEPHROPATHY, WITHOUT LONG-TERM CURRENT USE OF INSULIN (HCC): Primary | ICD-10-CM

## 2017-10-04 DIAGNOSIS — E08.00 DIABETES MELLITUS DUE TO UNDERLYING CONDITION WITH HYPEROSMOLARITY WITHOUT COMA, WITHOUT LONG-TERM CURRENT USE OF INSULIN (HCC): Primary | ICD-10-CM

## 2017-10-04 RX ORDER — LANCETS
EACH MISCELLANEOUS
Qty: 1 PACKAGE | Refills: 11 | Status: SHIPPED | OUTPATIENT
Start: 2017-10-04 | End: 2017-10-06 | Stop reason: SDUPTHER

## 2017-10-06 ENCOUNTER — TELEPHONE (OUTPATIENT)
Dept: INTERNAL MEDICINE CLINIC | Age: 72
End: 2017-10-06

## 2017-10-06 DIAGNOSIS — E08.00 DIABETES MELLITUS DUE TO UNDERLYING CONDITION WITH HYPEROSMOLARITY WITHOUT COMA, WITHOUT LONG-TERM CURRENT USE OF INSULIN (HCC): ICD-10-CM

## 2017-10-06 RX ORDER — LANCETS
EACH MISCELLANEOUS
Qty: 1 PACKAGE | Refills: 11 | Status: SHIPPED | OUTPATIENT
Start: 2017-10-06 | End: 2018-02-01

## 2017-10-06 NOTE — TELEPHONE ENCOUNTER
Jewell at [address] [place] [address] is calling regarging rx for Lancets. They need another rx sent that says exactly what type of lancet.

## 2017-10-09 NOTE — TELEPHONE ENCOUNTER
Adelina never had to send name brand lancets before. .. Im wondering if they meant test strips.  Spoke with [facility] they said that the pt already picked up the testing supplies and his supplies were covered

## 2017-10-23 DIAGNOSIS — E03.4 HYPOTHYROIDISM DUE TO ACQUIRED ATROPHY OF THYROID: ICD-10-CM

## 2017-10-23 RX ORDER — LEVOTHYROXINE SODIUM 50 UG/1
50 TABLET ORAL
Qty: 90 TAB | Refills: 4 | Status: SHIPPED | OUTPATIENT
Start: 2017-10-23 | End: 2018-11-06 | Stop reason: SDUPTHER

## 2017-10-23 NOTE — TELEPHONE ENCOUNTER
From: Bela Laboy  To: Windle Babinski, MD  Sent: 10/23/2017 9:26 AM EDT  Subject: Medication Renewal Request    Original authorizing provider: Windle Babinski, MD Benita Awe would like a refill of the following medications:  levothyroxine (SYNTHROID) 50 mcg tablet Windle Babinski, MD]    [facility]:

## 2017-11-06 DIAGNOSIS — N18.2 RENAL FAILURE, CHRONIC, STAGE 2 (MILD): ICD-10-CM

## 2017-11-06 DIAGNOSIS — M10.9 ACUTE GOUT, UNSPECIFIED CAUSE, UNSPECIFIED SITE: ICD-10-CM

## 2017-11-06 DIAGNOSIS — E11.9 TYPE 2 DIABETES MELLITUS WITHOUT COMPLICATION (HCC): ICD-10-CM

## 2017-11-06 NOTE — TELEPHONE ENCOUNTER
From: Marcio Garcia  To: Esther Contreras MD  Sent: 11/6/2017 1:35 PM EST  Subject: Medication Renewal Request    Original authorizing provider: MD Marcio Nelson would like a refill of the following medications:  allopurinol (ZYLOPRIM) 100 mg tablet Esther Contreras MD]    [facility]erred [facility]:

## 2017-11-08 RX ORDER — ALLOPURINOL 100 MG/1
200 TABLET ORAL DAILY
Qty: 180 TAB | Refills: 3 | Status: SHIPPED | OUTPATIENT
Start: 2017-11-08 | End: 2018-12-03 | Stop reason: SDUPTHER

## 2017-12-06 ENCOUNTER — HOSPITAL ENCOUNTER (OUTPATIENT)
Dept: LAB | Age: 72
Discharge: HOME | End: 2017-12-06
Payer: MEDICARE

## 2017-12-06 ENCOUNTER — OFFICE VISIT (OUTPATIENT)
Dept: INTERNAL MEDICINE CLINIC | Age: 72
End: 2017-12-06

## 2017-12-06 VITALS
TEMPERATURE: 98 F | HEART RATE: 71 BPM | RESPIRATION RATE: 18 BRPM | WEIGHT: 210 LBS | BODY MASS INDEX: 30.06 KG/M2 | DIASTOLIC BLOOD PRESSURE: 76 MMHG | HEIGHT: 70 IN | OXYGEN SATURATION: 98 % | SYSTOLIC BLOOD PRESSURE: 132 MMHG

## 2017-12-06 DIAGNOSIS — E11.9 TYPE 2 DIABETES MELLITUS WITHOUT COMPLICATION, UNSPECIFIED LONG TERM INSULIN USE STATUS: Primary | ICD-10-CM

## 2017-12-06 DIAGNOSIS — R79.89 ABNORMAL TSH: ICD-10-CM

## 2017-12-06 DIAGNOSIS — I10 ESSENTIAL HYPERTENSION: ICD-10-CM

## 2017-12-06 DIAGNOSIS — N52.9 ERECTILE DYSFUNCTION, UNSPECIFIED ERECTILE DYSFUNCTION TYPE: ICD-10-CM

## 2017-12-06 DIAGNOSIS — E03.4 HYPOTHYROIDISM DUE TO ACQUIRED ATROPHY OF THYROID: ICD-10-CM

## 2017-12-06 DIAGNOSIS — E11.9 TYPE 2 DIABETES MELLITUS WITHOUT COMPLICATION, UNSPECIFIED LONG TERM INSULIN USE STATUS: ICD-10-CM

## 2017-12-06 DIAGNOSIS — J01.00 ACUTE NON-RECURRENT MAXILLARY SINUSITIS: ICD-10-CM

## 2017-12-06 DIAGNOSIS — F33.40 RECURRENT MAJOR DEPRESSIVE DISORDER, IN REMISSION (HCC): ICD-10-CM

## 2017-12-06 DIAGNOSIS — E78.2 MIXED HYPERLIPIDEMIA: ICD-10-CM

## 2017-12-06 LAB
CHOLEST SERPL-MCNC: 146 MG/DL
HBA1C MFR BLD: 5.6 % (ref 4.2–5.6)
HDLC SERPL-MCNC: 39 MG/DL (ref 40–60)
HDLC SERPL: 3.7 {RATIO} (ref 0–5)
LDLC SERPL CALC-MCNC: 89.4 MG/DL (ref 0–100)
LIPID PROFILE,FLP: ABNORMAL
T4 FREE SERPL-MCNC: 1.3 NG/DL (ref 0.7–1.5)
TRIGL SERPL-MCNC: 88 MG/DL (ref ?–150)
TSH SERPL DL<=0.05 MIU/L-ACNC: 2.48 UIU/ML (ref 0.36–3.74)
VLDLC SERPL CALC-MCNC: 17.6 MG/DL

## 2017-12-06 PROCEDURE — 84439 ASSAY OF FREE THYROXINE: CPT | Performed by: INTERNAL MEDICINE

## 2017-12-06 PROCEDURE — 80061 LIPID PANEL: CPT | Performed by: INTERNAL MEDICINE

## 2017-12-06 PROCEDURE — 36415 COLL VENOUS BLD VENIPUNCTURE: CPT | Performed by: INTERNAL MEDICINE

## 2017-12-06 PROCEDURE — 83036 HEMOGLOBIN GLYCOSYLATED A1C: CPT | Performed by: INTERNAL MEDICINE

## 2017-12-06 RX ORDER — AMOXICILLIN AND CLAVULANATE POTASSIUM 875; 125 MG/1; MG/1
1 TABLET, FILM COATED ORAL EVERY 12 HOURS
Qty: 10 TAB | Refills: 0 | Status: SHIPPED | OUTPATIENT
Start: 2017-12-06 | End: 2017-12-11

## 2017-12-06 NOTE — PROGRESS NOTES
1. Have you been to the ER, urgent care clinic since your last visit? Hospitalized since your last visit? No    2. Have you seen or consulted any other health care providers outside of the [address] [place] [address] since your last visit? Include any pap smears or colon screening.  No

## 2017-12-06 NOTE — PROGRESS NOTES
INTERNISTS OF [facility]:  12/6/2017, MRN: [id number]      Aura Noe is a 74 y.o. male and presents to clinic for Diabetes (follow up) and Cold Symptoms (nasal coingestion for a week)    Subjective:   Pt is a 66yo male with h/o ED, melanoma in situ on left dorsal forearm s/p removal, MDD, rectal polyp, diverticulosis, CKD stage 3, HTN, type 2 DM (foot exams are done by Lucho Garces), hypothyroidism, DJD, gout, vocal cord nodule, vitamin D deficiency, and HLD. 1.  Type 2 diabetes: Patient's most recent A1c was 7.5. He was started on Amaryl given history of underlying CKD stage III. His creatinine is in 1.3 range. He completed diabetes education classes. Since his last appointment, his weight has come down to 210 pounds. He was 221 pounds. He has been working aggressively on lifestyle modification measures. He is on a statin and an ACE inhibitor. He reports no adverse side effects of taking his medications. No refills are needed. He does yoga once a week. He gave up all week based foods. He had a formal foot exam by his podiatry team and on terbinafine for onychomycosis. Wt Readings from Last 3 Encounters:   12/06/17 210 lb (95.3 kg)   08/24/17 221 lb 12.8 oz (100.6 kg)   08/10/17 228 lb (103.4 kg)     2. Respiratory tract infection: Patient reports worsening nasal congestion for 1 week. His grandson was ill with similar sx. No ear pain, eye pain, and sore throat. No cough or shortness of breath. No chest pain. No rash or diarrhea. No relief with over-the-counter medications. 3.  Hypothyroidism: His most recent TSH was slightly elevated. His free T4 was normal.  He is on Synthroid. No refills are needed. He has not had follow-up labs to investigate his slightly elevated TSH. 4.  Erectile dysfunction: Chronic condition, present for at least 3 months. He had significant relief with sildenafil but this medication is no longer working well for symptomatic relief.   He has a difficult time maintaining an erection. His insurance will not cover other medication options. 5.  Depression: The patient is followed by a counselor for talk therapy. Depression symptoms are stable and have not worsened since his last appointment. He plans to clean out his garage tomorrow in admits that this is stressful for him. He takes Effexor 150 mg daily. He reports no adverse side effects to taking this medication. His PHQ 9 score is 5 today. There is no suicidal ideation. 6.  Hypertension and hyperlipidemia: These are chronic conditions, present for at least 6 months. He is on lisinopril, carvedilol, and atorvastatin. He reports no adverse side effects to taking his medications. He is losing weight as mentioned above. Patient Active Problem List    Diagnosis Date Noted    Erectile dysfunction 01/23/2017    Melanoma in situ - on left dorsal forearm 2016 09/16/2016    Major depressive disorder in remission 08/03/2016    Rectal polyp -  seen on colonoscopy from 8/20/15 08/02/2016    Diverticulosis of intestine without bleeding - seen on colonoscopy from 8/20/15 08/02/2016    CKD (chronic kidney disease) stage 3, GFR 30-59 ml/min 11/09/2015    Zoster 07/21/2015    Essential hypertension 06/24/2015    [facility] 76/39/2928    Hypothyroidism due to acquired atrophy of thyroid 06/24/2015    Osteoarthritis, Status post left total knee replacement 04/23/2014    Vocal cord nodule 10/21/2013    Gout 10/21/2011    Family history of colon cancer. personal hx colon polyps 10/21/2011    Hyperlipidemia     Hypovitaminosis D        Current Outpatient Prescriptions   Medication Sig Dispense Refill    allopurinol (ZYLOPRIM) 100 mg tablet Take 2 Tabs by mouth daily. 180 Tab 3    levothyroxine (SYNTHROID) 50 mcg tablet Take 1 Tab by mouth Daily (before breakfast).  90 Tab 4    glucose blood VI test strips (CONTOUR NEXT STRIPS) strip Use test strips with associated glucometer to check your blood sugars twice a day diagnosis code e11.9 200 Strip 11    Lancets misc Diabetes type 2 E08.0 1 Package 11    carvedilol (COREG) 6.25 mg tablet Take 1 Tab by mouth two (2) times daily (with meals). 30 Tab 11    terbinafine HCl (LAMISIL) 250 mg tablet Take 250 mg by mouth daily.  glimepiride (AMARYL) 1 mg tablet Take 1 Tab by mouth Daily (before breakfast). 30 Tab 6    venlafaxine-SR (EFFEXOR-XR) 150 mg capsule       atorvastatin (LIPITOR) 40 mg tablet Take 1 Tab by mouth daily. 30 Tab 11    sildenafil (REVATIO) 20 mg tablet Take po 40 mg once daily 1 hour (range: 30 minutes to 4 hours) before sexual activity as needed; maximum dose: 100 mg once daily 30 Tab 11    lisinopril (PRINIVIL, ZESTRIL) 5 mg tablet Take 1 Tab by mouth daily. 90 Tab 3    multivitamin (ONE A DAY) tablet Take 1 Tab by mouth daily.  omega-3 fatty acids-vitamin e (FISH OIL) 1,000 mg cap Take 4 capsules by mouth daily.  Cholecalciferol, Vitamin D3, (VITAMIN D) 2,000 unit Cap Take 5,000 Units by mouth daily.  CoQ10, Ubiquinol, 200 mg Cap Take 200 mg by mouth daily.  aspirin 81 mg tablet Take 81 mg by mouth daily.  methylPREDNISolone (MEDROL DOSEPACK) 4 mg tablet Please follow the directions on the package.  1 Dose Pack 1       Allergies   Allergen Reactions    Amlodipine Other (comments)     BLE edema       Past Medical History:   Diagnosis Date    Chronic kidney disease     Colon polyps     dysplastic    Depression     Diabetes mellitus (HCC)     no meds    Diverticulosis     seen on colonoscopy from 8/20/15    Diverticulosis of sigmoid colon 01/27/10    GERD (gastroesophageal reflux disease)     no meds    Gout     Hepatitis     Hyperlipidemia     Hypertension     Hypovitaminosis D     Lumbar spondylosis     Nephrolithiasis     Plantar wart     Rectal polyp     seen on colonoscopy from 8/20/15    Thyroid disease     hypothyroidism       Past Surgical History:   Procedure Laterality Date    ENDOSCOPY, COLON, DIAGNOSTIC  01/27/2010    polyp distal rectum, removed; diverticulosis of sigmoid    HX POLYPECTOMY  01/27/2010    Distal rectum    HX TONSILLECTOMY      LITHOTRIPSY      TOTAL KNEE ARTHROPLASTY Left 1/2014       Family History   Problem Relation Age of Onset   [de-identified] Arthritis-rheumatoid Mother     Hypertension Mother     Elevated Lipids Mother     Thyroid Disease Sister     Cancer Father      colon    Heart Disease Other     Hypertension Other     Cancer Other      breast    Heart Disease Maternal Grandmother     Dementia Maternal Grandfather     Cancer Paternal Grandmother      breast    No Known Problems Paternal Grandfather        Social History   Substance Use Topics    Smoking status: Former Smoker     Packs/day: 1.00     Years: 6.00     Types: Cigarettes     Quit date: 1/1/1992    Smokeless tobacco: Never Used    Alcohol use No       ROS   Review of Systems   Constitutional: Negative for chills and fever. HENT: Positive for congestion. Negative for ear pain and sore throat. Eyes: Negative for blurred vision and pain. Respiratory: Negative for cough and shortness of breath. Cardiovascular: Negative for chest pain. Gastrointestinal: Negative for abdominal pain, blood in stool and melena. Genitourinary: Negative for dysuria and hematuria. Musculoskeletal: Negative for joint pain and myalgias. Skin: Negative for rash. Neurological: Negative for tingling, focal weakness and headaches. Endo/Heme/Allergies: Does not bruise/bleed easily. Psychiatric/Behavioral: Negative for substance abuse. Objective     Vitals:    12/06/17 0838   Weight: 210 lb (95.3 kg)   Height: 5' 10\" (1.778 m)   PainSc:   0 - No pain       Physical Exam   Constitutional: He is oriented to person, place, and time and well-developed, well-nourished, and in no distress. HENT:   Head: Normocephalic and atraumatic.    Right Ear: External ear normal.   Left Ear: External ear normal.   Mouth/Throat: Oropharynx is clear and moist. No oropharyngeal exudate. Sinus areas are NTTP but his maxillary sinus area is erythematous. +Nasal congestion   Eyes: Conjunctivae and EOM are normal. Pupils are equal, round, and reactive to light. Right eye exhibits no discharge. Left eye exhibits no discharge. No scleral icterus. Neck: Neck supple. Cardiovascular: Normal rate, regular rhythm, normal heart sounds and intact distal pulses. Pulmonary/Chest: Effort normal and breath sounds normal. No respiratory distress. He has no wheezes. He has no rales. Abdominal: Soft. Bowel sounds are normal. He exhibits no distension. There is no tenderness. There is no rebound and no guarding. No hepatomegaly   Musculoskeletal: He exhibits no edema or tenderness (BUE are NTTP). Lymphadenopathy:     He has no cervical adenopathy. Neurological: He is alert and oriented to person, place, and time. He exhibits normal muscle tone. Gait normal.   Skin: Skin is warm and dry. No erythema. Psychiatric: Affect normal.   Nursing note and vitals reviewed.       LABS   Data Review:   Lab Results   Component Value Date/Time    WBC 5.8 01/23/2017 11:28 AM    HGB 14.5 01/23/2017 11:28 AM    HCT 42.2 01/23/2017 11:28 AM    PLATELET 262 66/72/8265 11:28 AM    MCV 91.3 01/23/2017 11:28 AM       Lab Results   Component Value Date/Time    Sodium 139 08/10/2017 08:40 AM    Potassium 4.5 08/10/2017 08:40 AM    Chloride 104 08/10/2017 08:40 AM    CO2 26 08/10/2017 08:40 AM    Anion gap 9 08/10/2017 08:40 AM    Glucose 138 08/10/2017 08:40 AM    BUN 17 08/10/2017 08:40 AM    Creatinine 1.32 08/10/2017 08:40 AM    BUN/Creatinine ratio 13 08/10/2017 08:40 AM    GFR est AA >60 08/10/2017 08:40 AM    GFR est non-AA 53 08/10/2017 08:40 AM    Calcium 8.9 08/10/2017 08:40 AM       Lab Results   Component Value Date/Time    Cholesterol, total 163 08/10/2017 08:40 AM    HDL Cholesterol 41 08/10/2017 08:40 AM    LDL, calculated 84.2 08/10/2017 08:40 AM    VLDL, calculated 37.8 08/10/2017 08:40 AM    Triglyceride 189 08/10/2017 08:40 AM    CHOL/HDL Ratio 4.0 08/10/2017 08:40 AM       Lab Results   Component Value Date/Time    Hemoglobin A1c 7.5 08/10/2017 08:40 AM    Hemoglobin A1c (POC) 6.3 08/03/2016 09:37 AM       Assessment/Plan:   1. Type 2 diabetes: +Losing weight. +Weight is down to 210lbs. -Continue with aggressive lifestyle modification measures.  -Continue with Amaryl.  -Retrieving his last formal eye exam records.  -Checking his A1c today.  -I encouraged the patient to incorporate more exercise into his daily routine and to continue with aggressive lifestyle modifications. I encouraged him to continue limiting his grain intake. I will recheck his weight at his follow-up appointment.  -Continue to follow-up with Dr. Isidro Christensen (Podiatry)    2. Sinusitis:  -Prescribing a 5 day course of Augmentin. The patient was instructed to take probiotics while on antibiotics and to notify me if he develops diarrhea. 3.  Erectile dysfunction: No longer responding to sildenafil. His insurance will not cover other therapeutic options and the patient cannot afford out-of-pocket cost of these other options. .  Observation. 4.  Depression: PHQ 9 score today is 5.  -Continue with counseling services. Continue with Effexor. 5.  Hypothyroidism:  -Checking TFTs. Continue with Synthroid pending results of the PFTs. 6.  Hyperlipidemia and hypertension: Stable. -I will check a follow-up lipid panel to assess how his numbers have improved with his weight loss to provide encouragement to the patient to continue with aggressive lifestyle modification measures as mentioned above.  -Continue with medications as prescribed.       Health Maintenance Due   Topic Date Due    DTaP/Tdap/Td series (1 - Tdap) 01/02/2007    EYE EXAM RETINAL OR DILATED Q1  06/01/2015    GLAUCOMA SCREENING Q2Y  06/01/2016     Lab review: labs are reviewed in the EHR    I have discussed the diagnosis with the patient and the intended plan as seen in the above orders. The patient has received an after-visit summary and questions were answered concerning future plans. I have discussed medication side effects and warnings with the patient as well. I have reviewed the plan of care with the patient, accepted their input and they are in agreement with the treatment goals. All questions were answered. The patient understands the plan of care. Handouts provided today with above information. Pt instructed if symptoms worsen to call the office or report to the ED for continued care. Greater than 50% of the visit time was spent in counseling and/or coordination of care.       Follow-up Disposition: Not on File    Hussain Burleson MD

## 2017-12-06 NOTE — PATIENT INSTRUCTIONS

## 2017-12-06 NOTE — MR AVS SNAPSHOT
Visit Information Date & Time Provider Department Dept. Phone Encounter #  
 12/6/2017  8:30 AM Rickey Garber MD [facility] [id number] [id number] [phone] Follow-up Instructions Return in about 4 months (around 4/6/2018) for BP check, DM check. Your Appointments 12/18/2017 11:30 AM  
Office Visit with Rickey Garber MD  
[facility] [address]) Appt Note: surgery clearance / Dr Pal/ knee replacement 01/2/2018  
 [address] [facility], [address] [address] [address] [place] [place]  
  
   
 [address], [address]  
  
    
 2/6/2018  8:05 AM  
LAB with [facility] NURSE VISIT [facility] ([address] [facility]) Appt Note: 6 mo lab  
 [address], [address] [address] [address] [place]  
  
   
 [address], [address]  
  
    
 2/13/2018  8:00 AM  
Office Visit with Rickey Garber MD  
[facility] [address]) Appt Note: 6 mo fu  
 [address], [address] [facility]  
[phone] Upcoming Health Maintenance Date Due DTaP/Tdap/Td series (1 - Tdap) 1/2/2007 EYE EXAM RETINAL OR DILATED Q1 6/1/2015 GLAUCOMA SCREENING Q2Y 6/1/2016 HEMOGLOBIN A1C Q6M 2/10/2018 MICROALBUMIN Q1 8/10/2018 LIPID PANEL Q1 8/10/2018 MEDICARE YEARLY EXAM 8/11/2018 FOOT EXAM Q1 8/16/2018 COLONOSCOPY 8/20/2020 Allergies as of 12/6/2017  Review Complete On: 12/6/2017 By: Loida Johnson LPN Severity Noted Reaction Type Reactions Amlodipine  08/23/2016   Intolerance Other (comments) BLE edema Current Immunizations  Reviewed on 6/24/2015 Name Date Influenza High Dose Vaccine PF 10/5/2017, 10/24/2016, 10/2/2015 Influenza Vaccine 11/20/2014, 10/15/2013 Influenza Vaccine Split 11/9/2012  2:07 PM, 10/21/2011  Pneumococcal Conjugate (PCV-13) 6/24/2015 10:06 AM  
 Pneumococcal Polysaccharide (PPSV-23) 8/10/2017  8:43 AM  
 Pneumococcal Vaccine (Unspecified Type) 1/1/2007 Td 1/1/2007 Zoster Vaccine, Live 2/1/2013  2:19 PM  
  
 Not reviewed this visit You Were Diagnosed With   
  
 Codes Comments Abnormal TSH    -  Primary ICD-10-CM: R94.6 ICD-9-CM: 790.6 Type 2 diabetes mellitus without complication, unspecified long term insulin use status (HCC)     ICD-10-CM: E11.9 ICD-9-CM: 250.00 Hypothyroidism due to acquired atrophy of thyroid     ICD-10-CM: E03.4 ICD-9-CM: 244.8, 246.8 Acute non-recurrent maxillary sinusitis     ICD-10-CM: J01.00 ICD-9-CM: 461.0 Vitals BP Pulse Temp Resp Height(growth percentile) Weight(growth percentile) 132/76 71 98 °F (36.7 °C) 18 5' 10\" (1.778 m) 210 lb (95.3 kg) SpO2 BMI Smoking Status 98% 30.13 kg/m2 Former Smoker Vitals History BMI and BSA Data Body Mass Index Body Surface Area  
 30.13 kg/m 2 2.17 m 2 Preferred Pharmacy Pharmacy Name Phone [facility] [address] [facility], [address] [place] [address] Road [phone] Your Updated Medication List  
  
   
This list is accurate as of: 12/6/17  9:06 AM.  Always use your most recent med list.  
  
  
  
  
 allopurinol 100 mg tablet Commonly known as:  Bernett Carlos Take 2 Tabs by mouth daily. amoxicillin-clavulanate 875-125 mg per tablet Commonly known as:  AUGMENTIN Take 1 Tab by mouth every twelve (12) hours for 5 days. aspirin 81 mg tablet Take 81 mg by mouth daily. atorvastatin 40 mg tablet Commonly known as:  LIPITOR Take 1 Tab by mouth daily. carvedilol 6.25 mg tablet Commonly known as:  Tho Liter Take 1 Tab by mouth two (2) times daily (with meals). CoQ10 (Ubiquinol) 200 mg Cap Take 200 mg by mouth daily. FISH OIL 1,000 mg Cap Generic drug:  omega-3 fatty acids-vitamin e Take 4 capsules by mouth daily. glimepiride 1 mg tablet Commonly known as:  AMARYL Take 1 Tab by mouth Daily (before breakfast). glucose blood VI test strips strip Commonly known as:  CONTOUR NEXT STRIPS Use test strips with associated glucometer to check your blood sugars twice a day diagnosis code e11.9 Lancets Misc Diabetes type 2 E08.0  
  
 levothyroxine 50 mcg tablet Commonly known as:  SYNTHROID Take 1 Tab by mouth Daily (before breakfast). lisinopril 5 mg tablet Commonly known as:  Donnald Code Take 1 Tab by mouth daily. multivitamin tablet Commonly known as:  ONE A DAY Take 1 Tab by mouth daily. sildenafil 20 mg tablet Commonly known as:  REVATIO Take po 40 mg once daily 1 hour (range: 30 minutes to 4 hours) before sexual activity as needed; maximum dose: 100 mg once daily  
  
 terbinafine HCl 250 mg tablet Commonly known as:  LAMISIL Take 250 mg by mouth daily. venlafaxine- mg capsule Commonly known as:  EFFEXOR-XR  
  
 VITAMIN D 2,000 unit Cap capsule Generic drug:  Cholecalciferol (Vitamin D3) Take 5,000 Units by mouth daily. Prescriptions Sent to Pharmacy Refills  
 amoxicillin-clavulanate (AUGMENTIN) 875-125 mg per tablet 0 Sig: Take 1 Tab by mouth every twelve (12) hours for 5 days. Class: Normal  
 Pharmacy: [facility] [address] [place] [address] Ph #: [phone] Route: Oral  
  
Follow-up Instructions Return in about 4 months (around 4/6/2018) for BP check, DM check. To-Do List   
 Around 12/06/2017 Lab:  HEMOGLOBIN A1C W/O EAG   
  
 12/06/2017 Lab:  LIPID PANEL Around 12/06/2017 Lab:  TSH AND FREE T4 Patient Instructions Learning About Diabetes Food Guidelines Your Care Instructions Meal planning is important to manage diabetes. It helps keep your blood sugar at a target level (which you set with your doctor).  You don't have to eat special foods. You can eat what your family eats, including sweets once in a while. But you do have to pay attention to how often you eat and how much you eat of certain foods. You may want to work with a dietitian or a certified diabetes educator (CDE) to help you plan meals and snacks. A dietitian or CDE can also help you lose weight if that is one of your goals. What should you know about eating carbs? Managing the amount of carbohydrate (carbs) you eat is an important part of healthy meals when you have diabetes. Carbohydrate is found in many foods. · Learn which foods have carbs. And learn the amounts of carbs in different foods. ¨ Bread, cereal, pasta, and rice have about 15 grams of carbs in a serving. A serving is 1 slice of bread (1 ounce), ½ cup of cooked cereal, or 1/3 cup of cooked pasta or rice. ¨ Fruits have 15 grams of carbs in a serving. A serving is 1 small fresh fruit, such as an apple or orange; ½ of a banana; ½ cup of cooked or canned fruit; ½ cup of fruit juice; 1 cup of melon or raspberries; or 2 tablespoons of dried fruit. ¨ Milk and no-sugar-added yogurt have 15 grams of carbs in a serving. A serving is 1 cup of milk or 2/3 cup of no-sugar-added yogurt. ¨ Starchy vegetables have 15 grams of carbs in a serving. A serving is ½ cup of mashed potatoes or sweet potato; 1 cup winter squash; ½ of a small baked potato; ½ cup of cooked beans; or ½ cup cooked corn or green peas. · Learn how much carbs to eat each day and at each meal. A dietitian or CDE can teach you how to keep track of the amount of carbs you eat. This is called carbohydrate counting. · If you are not sure how to count carbohydrate grams, use the Plate Method to plan meals. It is a good, quick way to make sure that you have a balanced meal. It also helps you spread carbs throughout the day. ¨ Divide your plate by types of foods.  Put non-starchy vegetables on half the plate, meat or other protein food on one-quarter of the plate, and a grain or starchy vegetable in the final quarter of the plate. To this you can add a small piece of fruit and 1 cup of milk or yogurt, depending on how many carbs you are supposed to eat at a meal. 
· Try to eat about the same amount of carbs at each meal. Do not \"save up\" your daily allowance of carbs to eat at one meal. 
· Proteins have very little or no carbs per serving. Examples of proteins are beef, chicken, turkey, fish, eggs, tofu, cheese, cottage cheese, and peanut butter. A serving size of meat is 3 ounces, which is about the size of a deck of cards. Examples of meat substitute serving sizes (equal to 1 ounce of meat) are 1/4 cup of cottage cheese, 1 egg, 1 tablespoon of peanut butter, and ½ cup of tofu. How can you eat out and still eat healthy? · Learn to estimate the serving sizes of foods that have carbohydrate. If you measure food at home, it will be easier to estimate the amount in a serving of restaurant food. · If the meal you order has too much carbohydrate (such as potatoes, corn, or baked beans), ask to have a low-carbohydrate food instead. Ask for a salad or green vegetables. · If you use insulin, check your blood sugar before and after eating out to help you plan how much to eat in the future. · If you eat more carbohydrate at a meal than you had planned, take a walk or do other exercise. This will help lower your blood sugar. What else should you know? · Limit saturated fat, such as the fat from meat and dairy products. This is a healthy choice because people who have diabetes are at higher risk of heart disease. So choose lean cuts of meat and nonfat or low-fat dairy products. Use olive or canola oil instead of butter or shortening when cooking. · Don't skip meals. Your blood sugar may drop too low if you skip meals and take insulin or certain medicines for diabetes. · Check with your doctor before you drink alcohol. Alcohol can cause your blood sugar to drop too low. Alcohol can also cause a bad reaction if you take certain diabetes medicines. Follow-up care is a key part of your treatment and safety. Be sure to make and go to all appointments, and call your doctor if you are having problems. It's also a good idea to know your test results and keep a list of the medicines you take. Where can you learn more? Go to http://sapna-araseli.info/. Enter I075 in the search box to learn more about \"Learning About Diabetes Food Guidelines. \" Current as of: March 13, 2017 Content Version: 11.4 © 3507-6093 [employer]. Care instructions adapted under license by [employer] (which disclaims liability or warranty for this information). If you have questions about a medical condition or this instruction, always ask your healthcare professional. [place] [address] any warranty or liability for your use of this information. Introducing \[facility]\"" & HEALTH SERVICES! Dear Jocelyn Ferraro: Thank you for requesting a [employer] account. Our records indicate that you already have an active [employer] account. You can access your account anytime at [email or web address] Did you know that you can access your hospital and ER discharge instructions at any time in [employer]? You can also review all of your test results from your hospital stay or ER visit. Additional Information If you have questions, please visit the Frequently Asked Questions section of the [employer] website at [email or web address] Remember, [employer] is NOT to be used for urgent needs. For medical emergencies, dial 911. Now available from your iPhone and Android! Please provide this summary of care documentation to your next provider. Your primary care clinician is listed as Sallie Price.  If you have any questions after today's visit, please call [phone].

## 2017-12-07 ENCOUNTER — TELEPHONE (OUTPATIENT)
Dept: INTERNAL MEDICINE CLINIC | Age: 72
End: 2017-12-07

## 2017-12-07 NOTE — TELEPHONE ENCOUNTER
----- Message from Sherman Barragan MD sent at 12/7/2017  9:08 AM EST -----  Please let the pt know that he decreased his A1C to 5.6!!! This is great! He can stop taking glimepiride/amaryl for now. I will recheck his A1C in 3 months at his f/u apt. His thyroid function is normal. He should continue taking all of his other medications including Synthroid and Atorvastatin as prescribed - just not the glimepiride. He needs to continue adhering to a low carb diet in order to diet control his diabetes. He should also continue getting regular exercise to reduce his A1C and CVD risk.     Dr. Sree Ho  [facility], [address] [place] [address].  Phone: [phone]  Fax: [phone]

## 2017-12-07 NOTE — PROGRESS NOTES
Please let the pt know that he decreased his A1C to 5.6!!! This is great! He can stop taking glimepiride/amaryl for now. I will recheck his A1C in 3 months at his f/u apt. His thyroid function is normal. He should continue taking all of his other medications including Synthroid and Atorvastatin as prescribed - just not the glimepiride. He needs to continue adhering to a low carb diet in order to diet control his diabetes. He should also continue getting regular exercise to reduce his A1C and CVD risk.     Dr. Trever Lowe  [facility] [address].  Phone: [phone]  Fax: [phone]

## 2018-01-12 DIAGNOSIS — E78.2 MIXED HYPERLIPIDEMIA: ICD-10-CM

## 2018-01-12 RX ORDER — ATORVASTATIN CALCIUM 40 MG/1
40 TABLET, FILM COATED ORAL DAILY
Qty: 90 TAB | Refills: 3 | Status: SHIPPED | OUTPATIENT
Start: 2018-01-12 | End: 2019-02-25 | Stop reason: SDUPTHER

## 2018-01-24 DIAGNOSIS — I10 ESSENTIAL HYPERTENSION: ICD-10-CM

## 2018-01-24 DIAGNOSIS — E11.9 TYPE 2 DIABETES MELLITUS WITHOUT COMPLICATION, WITHOUT LONG-TERM CURRENT USE OF INSULIN (HCC): ICD-10-CM

## 2018-01-24 DIAGNOSIS — N18.2 RENAL FAILURE, CHRONIC, STAGE 2 (MILD): ICD-10-CM

## 2018-01-24 RX ORDER — LISINOPRIL 5 MG/1
5 TABLET ORAL DAILY
Qty: 90 TAB | Refills: 3 | Status: SHIPPED | OUTPATIENT
Start: 2018-01-24 | End: 2019-02-11 | Stop reason: SDUPTHER

## 2018-01-24 RX ORDER — LISINOPRIL 5 MG/1
5 TABLET ORAL DAILY
Qty: 90 TAB | Refills: 3 | Status: CANCELLED | OUTPATIENT
Start: 2018-01-24

## 2018-01-24 NOTE — TELEPHONE ENCOUNTER
Last Visit: 12/06/2017 with MD Misti Dong    Next Appointment: 02/13/2018 with MD Misti Dong   Previous Refill Encounters: 01/16/2017 per MD Misti Dong #[phone] with 3 refills     Requested Prescriptions     Pending Prescriptions Disp Refills    lisinopril (PRINIVIL, ZESTRIL) 5 mg tablet 90 Tab 3     Sig: Take 1 Tab by mouth daily.

## 2018-01-30 ENCOUNTER — HOSPITAL ENCOUNTER (OUTPATIENT)
Dept: PREADMISSION TESTING | Age: 73
Discharge: HOME | End: 2018-01-30
Payer: MEDICARE

## 2018-01-30 DIAGNOSIS — M17.11 OSTEOARTHRITIS OF RIGHT KNEE: ICD-10-CM

## 2018-01-30 LAB
ALBUMIN SERPL-MCNC: 4.1 G/DL (ref 3.4–5)
ALBUMIN/GLOB SERPL: 1.5 {RATIO} (ref 0.8–1.7)
ALP SERPL-CCNC: 101 U/L (ref 45–117)
ALT SERPL-CCNC: 35 U/L (ref 16–61)
ANION GAP SERPL CALC-SCNC: 10 MMOL/L (ref 3–18)
APPEARANCE UR: CLEAR
APTT PPP: 32.2 SEC (ref 23–36.4)
AST SERPL-CCNC: 32 U/L (ref 15–37)
ATRIAL RATE: 54 BPM
BACTERIA SPEC CULT: NORMAL
BASOPHILS # BLD: 0 K/UL (ref 0–0.06)
BASOPHILS NFR BLD: 0 % (ref 0–2)
BILIRUB SERPL-MCNC: 0.6 MG/DL (ref 0.2–1)
BILIRUB UR QL: NEGATIVE
BUN SERPL-MCNC: 29 MG/DL (ref 7–18)
BUN/CREAT SERPL: 20 (ref 12–20)
CALCIUM SERPL-MCNC: 9 MG/DL (ref 8.5–10.1)
CALCULATED P AXIS, ECG09: 60 DEGREES
CALCULATED R AXIS, ECG10: 67 DEGREES
CALCULATED T AXIS, ECG11: 70 DEGREES
CHLORIDE SERPL-SCNC: 106 MMOL/L (ref 100–108)
CO2 SERPL-SCNC: 28 MMOL/L (ref 21–32)
COLOR UR: YELLOW
CREAT SERPL-MCNC: 1.42 MG/DL (ref 0.6–1.3)
DIAGNOSIS, 93000: NORMAL
DIFFERENTIAL METHOD BLD: ABNORMAL
EOSINOPHIL # BLD: 0.1 K/UL (ref 0–0.4)
EOSINOPHIL NFR BLD: 2 % (ref 0–5)
ERYTHROCYTE [DISTWIDTH] IN BLOOD BY AUTOMATED COUNT: 13.4 % (ref 11.6–14.5)
ERYTHROCYTE [SEDIMENTATION RATE] IN BLOOD: 7 MM/HR (ref 0–20)
EST. AVERAGE GLUCOSE BLD GHB EST-MCNC: 126 MG/DL
GLOBULIN SER CALC-MCNC: 2.8 G/DL (ref 2–4)
GLUCOSE SERPL-MCNC: 124 MG/DL (ref 74–99)
GLUCOSE UR STRIP.AUTO-MCNC: NEGATIVE MG/DL
HBA1C MFR BLD: 6 % (ref 4.5–5.6)
HCT VFR BLD AUTO: 42.5 % (ref 36–48)
HGB BLD-MCNC: 14.1 G/DL (ref 13–16)
HGB UR QL STRIP: NEGATIVE
INR PPP: 1 (ref 0.8–1.2)
KETONES UR QL STRIP.AUTO: NEGATIVE MG/DL
LEUKOCYTE ESTERASE UR QL STRIP.AUTO: NEGATIVE
LYMPHOCYTES # BLD: 1.5 K/UL (ref 0.9–3.6)
LYMPHOCYTES NFR BLD: 29 % (ref 21–52)
MCH RBC QN AUTO: 30.2 PG (ref 24–34)
MCHC RBC AUTO-ENTMCNC: 33.2 G/DL (ref 31–37)
MCV RBC AUTO: 91 FL (ref 74–97)
MONOCYTES # BLD: 0.5 K/UL (ref 0.05–1.2)
MONOCYTES NFR BLD: 9 % (ref 3–10)
NEUTS SEG # BLD: 3.1 K/UL (ref 1.8–8)
NEUTS SEG NFR BLD: 60 % (ref 40–73)
NITRITE UR QL STRIP.AUTO: NEGATIVE
P-R INTERVAL, ECG05: 184 MS
PH UR STRIP: 5 [PH] (ref 5–8)
PLATELET # BLD AUTO: 216 K/UL (ref 135–420)
PMV BLD AUTO: 9.6 FL (ref 9.2–11.8)
POTASSIUM SERPL-SCNC: 4.6 MMOL/L (ref 3.5–5.5)
PROT SERPL-MCNC: 6.9 G/DL (ref 6.4–8.2)
PROT UR STRIP-MCNC: NEGATIVE MG/DL
PROTHROMBIN TIME: 12.8 SEC (ref 11.5–15.2)
Q-T INTERVAL, ECG07: 448 MS
QRS DURATION, ECG06: 108 MS
QTC CALCULATION (BEZET), ECG08: 424 MS
RBC # BLD AUTO: 4.67 M/UL (ref 4.7–5.5)
SERVICE CMNT-IMP: NORMAL
SODIUM SERPL-SCNC: 144 MMOL/L (ref 136–145)
SP GR UR REFRACTOMETRY: 1.02 (ref 1–1.03)
UROBILINOGEN UR QL STRIP.AUTO: 1 EU/DL (ref 0.2–1)
VENTRICULAR RATE, ECG03: 54 BPM
WBC # BLD AUTO: 5.1 K/UL (ref 4.6–13.2)

## 2018-01-30 PROCEDURE — 83036 HEMOGLOBIN GLYCOSYLATED A1C: CPT | Performed by: ORTHOPAEDIC SURGERY

## 2018-01-30 PROCEDURE — 93005 ELECTROCARDIOGRAM TRACING: CPT

## 2018-01-30 PROCEDURE — 85025 COMPLETE CBC W/AUTO DIFF WBC: CPT | Performed by: ORTHOPAEDIC SURGERY

## 2018-01-30 PROCEDURE — 80053 COMPREHEN METABOLIC PANEL: CPT | Performed by: ORTHOPAEDIC SURGERY

## 2018-01-30 PROCEDURE — 85652 RBC SED RATE AUTOMATED: CPT | Performed by: ORTHOPAEDIC SURGERY

## 2018-01-30 PROCEDURE — 36415 COLL VENOUS BLD VENIPUNCTURE: CPT | Performed by: ORTHOPAEDIC SURGERY

## 2018-01-30 PROCEDURE — 81003 URINALYSIS AUTO W/O SCOPE: CPT | Performed by: ORTHOPAEDIC SURGERY

## 2018-01-30 PROCEDURE — 85730 THROMBOPLASTIN TIME PARTIAL: CPT | Performed by: ORTHOPAEDIC SURGERY

## 2018-01-30 PROCEDURE — 87641 MR-STAPH DNA AMP PROBE: CPT | Performed by: ORTHOPAEDIC SURGERY

## 2018-01-30 PROCEDURE — 85610 PROTHROMBIN TIME: CPT | Performed by: ORTHOPAEDIC SURGERY

## 2018-01-31 LAB
FAX TO INFO,FAXT: NORMAL
FAX TO NUMBER,FAXN: NORMAL

## 2018-02-13 ENCOUNTER — OFFICE VISIT (OUTPATIENT)
Dept: INTERNAL MEDICINE CLINIC | Age: 73
End: 2018-02-13

## 2018-02-13 VITALS
HEIGHT: 69 IN | OXYGEN SATURATION: 98 % | WEIGHT: 207 LBS | HEART RATE: 62 BPM | TEMPERATURE: 96 F | DIASTOLIC BLOOD PRESSURE: 80 MMHG | BODY MASS INDEX: 30.66 KG/M2 | SYSTOLIC BLOOD PRESSURE: 134 MMHG | RESPIRATION RATE: 12 BRPM

## 2018-02-13 DIAGNOSIS — I45.10 RIGHT BUNDLE BRANCH BLOCK (RBBB): Primary | ICD-10-CM

## 2018-02-13 DIAGNOSIS — E11.9 TYPE 2 DIABETES MELLITUS WITHOUT COMPLICATION, UNSPECIFIED LONG TERM INSULIN USE STATUS: ICD-10-CM

## 2018-02-13 DIAGNOSIS — I10 ESSENTIAL HYPERTENSION: ICD-10-CM

## 2018-02-13 DIAGNOSIS — M25.561 CHRONIC PAIN OF RIGHT KNEE: ICD-10-CM

## 2018-02-13 DIAGNOSIS — G89.29 CHRONIC PAIN OF RIGHT KNEE: ICD-10-CM

## 2018-02-13 DIAGNOSIS — E03.4 HYPOTHYROIDISM DUE TO ACQUIRED ATROPHY OF THYROID: ICD-10-CM

## 2018-02-13 RX ORDER — GLIMEPIRIDE 1 MG/1
TABLET ORAL
COMMUNITY
Start: 2017-12-18 | End: 2018-02-13 | Stop reason: ALTCHOICE

## 2018-02-13 NOTE — PROGRESS NOTES
Chief Complaint   Patient presents with    Pre-op Exam     Orthopedic Surgery scheduled for 02-26-18 for Right Knee Total Replacement with Dr Devon Eugene     1. Have you been to the ER, urgent care clinic since your last visit? Hospitalized since your last visit? No    2. Have you seen or consulted any other health care providers outside of the [facility] [place] since your last visit? Include any pap smears or colon screening.  No

## 2018-02-13 NOTE — MR AVS SNAPSHOT
[address] [facility] 
 
 
 [address], [facility] [address] [facility] [address] 
[phone] Patient: Carolin Aceves MRN: [id number] Visit Information Date & Time Provider Department Dept. Phone Encounter #  
 2/13/2018  8:00 AM Idania Red MD [facility] [address] [place] [address] [phone] [id number] Your Appointments 4/5/2018  8:30 AM  
Office Visit with Idania Red MD  
[facility] [address] [place] [address]) Appt Note: ov 4mo [address] [facility] [address] [address] [facility] [address]  
  
   
 [address] [facility]  
  
    
 8/13/2018  8:00 AM  
Office Visit with Idania Red MD  
[facility] of [address] [place] [address]) Appt Note: 6 month f/u  
 [address] [address] [address] [facility] [address]  
[phone] Upcoming Health Maintenance Date Due DTaP/Tdap/Td series (1 - Tdap) 1/2/2007 EYE EXAM RETINAL OR DILATED Q1 6/1/2015 GLAUCOMA SCREENING Q2Y 6/1/2016 HEMOGLOBIN A1C Q6M 7/30/2018 MICROALBUMIN Q1 8/10/2018 MEDICARE YEARLY EXAM 8/11/2018 FOOT EXAM Q1 11/20/2018 LIPID PANEL Q1 12/6/2018 COLONOSCOPY 8/20/2020 Allergies as of 2/13/2018  Review Complete On: 2/13/2018 By: Gail Euceda Severity Noted Reaction Type Reactions Amlodipine  08/23/2016   Intolerance Other (comments) BLE edema Current Immunizations  Reviewed on 6/24/2015 Name Date Influenza High Dose Vaccine PF 10/5/2017, 10/24/2016, 10/2/2015 Influenza Vaccine 11/20/2014, 10/15/2013 Influenza Vaccine Split 11/9/2012  2:07 PM, 10/21/2011 Pneumococcal Conjugate (PCV-13) 6/24/2015 10:06 AM  
 Pneumococcal Polysaccharide (PPSV-23) 8/10/2017  8:43 AM  
 Pneumococcal Vaccine (Unspecified Type) 1/1/2007 Td 1/1/2007 Zoster Vaccine, Live 2/1/2013  2:19 PM  
  
 Not reviewed this visit You Were Diagnosed With   
  
 Codes Comments Right bundle branch block (RBBB)    -  Primary ICD-10-CM: I45.10 ICD-9-CM: 426.4 Vitals BP Pulse Temp Resp Height(growth percentile) Weight(growth percentile) 134/80 (BP 1 Location: Left arm, BP Patient Position: Sitting) 62 96 °F (35.6 °C) (Oral) 12 5' 9\" (1.753 m) 207 lb (93.9 kg) SpO2 BMI Smoking Status 98% 30.57 kg/m2 Former Smoker BMI and BSA Data Body Mass Index Body Surface Area 30.57 kg/m 2 2.14 m 2 Preferred Pharmacy Pharmacy Name Phone [facility] [address], [address] [phone] Your Updated Medication List  
  
   
This list is accurate as of: 2/13/18  8:47 AM.  Always use your most recent med list.  
  
  
  
  
 allopurinol 100 mg tablet Commonly known as:  Jalaine Peeks Take 2 Tabs by mouth daily. aspirin 81 mg tablet Take 81 mg by mouth daily. atorvastatin 40 mg tablet Commonly known as:  LIPITOR Take 1 Tab by mouth daily. carvedilol 6.25 mg tablet Commonly known as:  Kip Evener Take 1 Tab by mouth two (2) times daily (with meals). CoQ10 (Ubiquinol) 200 mg Cap Take 200 mg by mouth daily. levothyroxine 50 mcg tablet Commonly known as:  SYNTHROID Take 1 Tab by mouth Daily (before breakfast). lisinopril 5 mg tablet Commonly known as:  Eli Cockayne Take 1 Tab by mouth daily. multivitamin tablet Commonly known as:  ONE A DAY Take 1 Tab by mouth daily. sildenafil (antihypertensive) 20 mg tablet Commonly known as:  REVATIO Take po 40 mg once daily 1 hour (range: 30 minutes to 4 hours) before sexual activity as needed; maximum dose: 100 mg once daily  
  
 terbinafine HCl 250 mg tablet Commonly known as:  LAMISIL Take 250 mg by mouth daily. venlafaxine- mg capsule Commonly known as:  EFFEXOR-XR  
150 mg daily. VITAMIN D 2,000 unit Cap capsule Generic drug:  Cholecalciferol (Vitamin D3) Take 4,000 Units by mouth daily. To-Do List   
 02/13/2018 Imaging:  NM CARDIAC SPECT W STRS/REST SNGL Patient Instructions Learning About Diabetes Food Guidelines Your Care Instructions Meal planning is important to manage diabetes. It helps keep your blood sugar at a target level (which you set with your doctor). You don't have to eat special foods. You can eat what your family eats, including sweets once in a while. But you do have to pay attention to how often you eat and how much you eat of certain foods. You may want to work with a dietitian or a certified diabetes educator (CDE) to help you plan meals and snacks. A dietitian or CDE can also help you lose weight if that is one of your goals. What should you know about eating carbs? Managing the amount of carbohydrate (carbs) you eat is an important part of healthy meals when you have diabetes. Carbohydrate is found in many foods. · Learn which foods have carbs. And learn the amounts of carbs in different foods. ¨ Bread, cereal, pasta, and rice have about 15 grams of carbs in a serving. A serving is 1 slice of bread (1 ounce), ½ cup of cooked cereal, or 1/3 cup of cooked pasta or rice. ¨ Fruits have 15 grams of carbs in a serving. A serving is 1 small fresh fruit, such as an apple or orange; ½ of a banana; ½ cup of cooked or canned fruit; ½ cup of fruit juice; 1 cup of melon or raspberries; or 2 tablespoons of dried fruit. ¨ Milk and no-sugar-added yogurt have 15 grams of carbs in a serving. A serving is 1 cup of milk or 2/3 cup of no-sugar-added yogurt. ¨ Starchy vegetables have 15 grams of carbs in a serving. A serving is ½ cup of mashed potatoes or sweet potato; 1 cup winter squash; ½ of a small baked potato; ½ cup of cooked beans; or ½ cup cooked corn or green peas. · Learn how much carbs to eat each day and at each meal. A dietitian or CDE can teach you how to keep track of the amount of carbs you eat.  This is called carbohydrate counting. · If you are not sure how to count carbohydrate grams, use the Plate Method to plan meals. It is a good, quick way to make sure that you have a balanced meal. It also helps you spread carbs throughout the day. ¨ Divide your plate by types of foods. Put non-starchy vegetables on half the plate, meat or other protein food on one-quarter of the plate, and a grain or starchy vegetable in the final quarter of the plate. To this you can add a small piece of fruit and 1 cup of milk or yogurt, depending on how many carbs you are supposed to eat at a meal. 
· Try to eat about the same amount of carbs at each meal. Do not \"save up\" your daily allowance of carbs to eat at one meal. 
· Proteins have very little or no carbs per serving. Examples of proteins are beef, chicken, turkey, fish, eggs, tofu, cheese, cottage cheese, and peanut butter. A serving size of meat is 3 ounces, which is about the size of a deck of cards. Examples of meat substitute serving sizes (equal to 1 ounce of meat) are 1/4 cup of cottage cheese, 1 egg, 1 tablespoon of peanut butter, and ½ cup of tofu. How can you eat out and still eat healthy? · Learn to estimate the serving sizes of foods that have carbohydrate. If you measure food at home, it will be easier to estimate the amount in a serving of restaurant food. · If the meal you order has too much carbohydrate (such as potatoes, corn, or baked beans), ask to have a low-carbohydrate food instead. Ask for a salad or green vegetables. · If you use insulin, check your blood sugar before and after eating out to help you plan how much to eat in the future. · If you eat more carbohydrate at a meal than you had planned, take a walk or do other exercise. This will help lower your blood sugar. What else should you know? · Limit saturated fat, such as the fat from meat and dairy products.  This is a healthy choice because people who have diabetes are at higher risk of heart disease. So choose lean cuts of meat and nonfat or low-fat dairy products. Use olive or canola oil instead of butter or shortening when cooking. · Don't skip meals. Your blood sugar may drop too low if you skip meals and take insulin or certain medicines for diabetes. · Check with your doctor before you drink alcohol. Alcohol can cause your blood sugar to drop too low. Alcohol can also cause a bad reaction if you take certain diabetes medicines. Follow-up care is a key part of your treatment and safety. Be sure to make and go to all appointments, and call your doctor if you are having problems. It's also a good idea to know your test results and keep a list of the medicines you take. Where can you learn more? Go to http://sapna-araseli.info/. Enter M123 in the search box to learn more about \"Learning About Diabetes Food Guidelines. \" Current as of: March 13, 2017 Content Version: 11.4 © 0104-4001 [employer]. Care instructions adapted under license by [employer] (which disclaims liability or warranty for this information). If you have questions about a medical condition or this instruction, always ask your healthcare professional. [place] [address] any warranty or liability for your use of this information. Health Maintenance Due Topic Date Due  
 DTaP/Tdap/Td series (1 - Tdap) 01/02/2007  
 EYE EXAM RETINAL OR DILATED Q1  06/01/2015  GLAUCOMA SCREENING Q2Y  06/01/2016 Introducing [address]! Dear Johnathon Ulloa: Thank you for requesting a Feebbo account. Our records indicate that you already have an active Feebbo account. You can access your account anytime at [email or web address] Did you know that you can access your hospital and ER discharge instructions at any time in Feebbo? You can also review all of your test results from your hospital stay or ER visit. Additional Information If you have questions, please visit the Frequently Asked Questions section of the [email or web address] website at [email or web address] Remember, LUMOback is NOT to be used for urgent needs. For medical emergencies, dial 911. Now available from your iPhone and Android! Please provide this summary of care documentation to your next provider. Your primary care clinician is listed as Jordyn Acosta. If you have any questions after today's visit, please call [phone].

## 2018-02-13 NOTE — PROGRESS NOTES
[facility]:   Preoperative Evaluation    Date of Exam: 02/13/18    MRN: [id number]    Owen Orozco  Is a 74 y.o.  male  who presents for preoperative evaluation. Chief Complaint   Patient presents with    Pre-op Exam     Orthopedic Surgery scheduled for 02-26-18 for Right Knee Total Replacement with Dr Monroy Silence:   Pt is a 64yo male with h/o ED, melanoma in situ on left dorsal forearm s/p removal, MDD, rectal polyp, diverticulosis, CKD stage 3, HTN, type 2 DM (foot exams are done by Royce Dillard), hypothyroidism, DJD, gout, vocal cord nodule, vitamin D deficiency, RBBB, and HLD. 1. Type 2 DM: A1C dropped from 7.5 last year to 6.0 on 1/30/18. On ACEi and a statin. He is diet controlled. Wt Readings from Last 3 Encounters:   02/13/18 207 lb (93.9 kg)   02/01/18 205 lb (93 kg)   12/06/17 210 lb (95.3 kg)     2. HTN: Chronic condition, present >3 months. On lisinopril and carvedilol. He has CKD stage 3 but his creatinine is stable in the 1.3-1.4 range. 3. Hypothyroidism: Chronic condition, present >3 months. +Synthroid. His most recent TFTs were unremarkable. Lab Results   Component Value Date/Time    TSH 2.48 12/06/2017 09:19 AM     4. Right Knee Pain: Present for several wks. Pain has been worsening. Pt is scheduled for a right total knee replacement and presents to clinic for clearance. General Information:  Procedure/Surgery: Right knee replacement  Date of Procedure/Surgery: 2/26/18  Surgeon: Becky Green  [facility]  Primary Physician: Tara Negrete MD  Surgery status: Elective  Surgery risk: Intermediate (head/neck, intraperitoneal, intrathoracic, orthopedic, and prostate    Cardiovascular Risk Factors:  1. Coronary revascularization within 5 years: no  2. Recurrent chest pain: no  3. Shortness of breath:  no  4. Recent coronary evaluation/stress test/angiogram:  no  5. Recent MI (less than 1 month ago):  no  6.  Prior MI (by way of history or pathological waves):  no  7. Compensated CHF or h/o CHF:  no  8. Severe valvular disease:  no  9. Decompensated CHF:  no  10. High-grade atrioventricular block:  no  11. Arrhythmia:  no but EKGs have been positive for RBBB since 2009 per EKGs and  's notes. The pt has never had a cardiac stress test. Meanwhile, he had no complications during his last knee replacement surgery (left knee) since 2009 per his hx. Other Risk Factors:  1. Diabetes hx:  yes  2. H/o CVA:  no  3. Uncontrolled hypertension:  no  4, Advanced age:  yes  5. Low functional capacity:  no  6. Recent use of: +ASA  7. Tetanus up to date: tetanus status unknown to the patient  8. Anesthesia Complications: None  9. History of abnormal bleeding : None  10. History of Blood Transfusions: no  11. Health Care Directive or Living Will: yes, pt wants to remove his DNR status from his advance directive. 12. Latex Allergy: no      Problem List:     Patient Active Problem List    Diagnosis Date Noted    Erectile dysfunction 01/23/2017    Melanoma in situ - on left dorsal forearm 2016 09/16/2016    Major depressive disorder in remission 08/03/2016    Rectal polyp -  seen on colonoscopy from 8/20/15 08/02/2016    Diverticulosis of intestine without bleeding - seen on colonoscopy from 8/20/15 08/02/2016    CKD (chronic kidney disease) stage 3, GFR 30-59 ml/min 11/09/2015    Zoster 07/21/2015    Essential hypertension 06/24/2015    Type [facility] 20/07/6564    Hypothyroidism due to acquired atrophy of thyroid 06/24/2015    Osteoarthritis, Status post left total knee replacement 04/23/2014    Vocal cord nodule 10/21/2013    Gout 10/21/2011    Family history of colon cancer.   personal hx colon polyps 10/21/2011    Hyperlipidemia     Hypovitaminosis D      Medical History:     Past Medical History:   Diagnosis Date    Arthritis     hip, knee    Chronic kidney disease     had previous reaction with kidneys after a bp med, no issues now    Colon polyps     dysplastic    Depression     Diabetes mellitus ([facility] 2017    no meds    Diverticulosis     seen on colonoscopy from 8/20/15    Diverticulosis of sigmoid colon 01/27/10    GERD (gastroesophageal reflux disease)     no meds    Gout     Hepatitis     Hyperlipidemia     Hypertension 15 years    Hypovitaminosis D     Nephrolithiasis     Plantar wart     Rectal polyp     seen on colonoscopy from 8/20/15    Thyroid disease     hypothyroidism     Allergies: Allergies   Allergen Reactions    Amlodipine Other (comments)     BLE edema      Medications:     Current Outpatient Prescriptions   Medication Sig    lisinopril (PRINIVIL, ZESTRIL) 5 mg tablet Take 1 Tab by mouth daily. (Patient taking differently: Take 5 mg by mouth nightly.)    atorvastatin (LIPITOR) 40 mg tablet Take 1 Tab by mouth daily. (Patient taking differently: Take 40 mg by mouth nightly.)    allopurinol (ZYLOPRIM) 100 mg tablet Take 2 Tabs by mouth daily.  levothyroxine (SYNTHROID) 50 mcg tablet Take 1 Tab by mouth Daily (before breakfast).  carvedilol (COREG) 6.25 mg tablet Take 1 Tab by mouth two (2) times daily (with meals).  terbinafine HCl (LAMISIL) 250 mg tablet Take 250 mg by mouth daily.  venlafaxine-SR (EFFEXOR-XR) 150 mg capsule 150 mg daily.  sildenafil (REVATIO) 20 mg tablet Take po 40 mg once daily 1 hour (range: 30 minutes to 4 hours) before sexual activity as needed; maximum dose: 100 mg once daily    multivitamin (ONE A DAY) tablet Take 1 Tab by mouth daily.  Cholecalciferol, Vitamin D3, (VITAMIN D) 2,000 unit Cap Take 4,000 Units by mouth daily.  CoQ10, Ubiquinol, 200 mg Cap Take 200 mg by mouth daily.  aspirin 81 mg tablet Take 81 mg by mouth daily. No current facility-administered medications for this visit.       Surgical History:     Past Surgical History:   Procedure Laterality Date    ENDOSCOPY, COLON, DIAGNOSTIC 01/27/2010    polyp distal rectum, removed; diverticulosis of sigmoid    HX CATARACT REMOVAL Bilateral     HX HEENT  2008    vocal cord polyp removed    HX POLYPECTOMY  01/27/2010    Distal rectum    HX TONSILLECTOMY      LITHOTRIPSY      TOTAL KNEE ARTHROPLASTY Left 1/2014     Social History:     Social History     Social History    Marital status:      Spouse name: N/A    Number of children: N/A    Years of education: N/A     Social History Main Topics    Smoking status: Former Smoker     Packs/day: 1.00     Years: 6.00     Types: Cigarettes     Quit date: 1/1/1992    Smokeless tobacco: Never Used    Alcohol use No      Comment: wine with dinner sometimes    Drug use: No    Sexual activity: Yes     Partners: Female     Other Topics Concern    Not on file     Social History Narrative         REVIEW OF SYSTEMS:  Review of Systems   Constitutional: Negative for chills and fever. HENT: Negative for ear pain and sore throat. Eyes: Negative for blurred vision and pain. Respiratory: Negative for cough and shortness of breath. Cardiovascular: Negative for chest pain. Gastrointestinal: Negative for abdominal pain, blood in stool and melena. Genitourinary: Negative for dysuria and hematuria. Musculoskeletal: Positive for joint pain. Negative for myalgias. Skin: Negative for rash. Neurological: Negative for tingling, focal weakness and headaches. Endo/Heme/Allergies: Does not bruise/bleed easily. Psychiatric/Behavioral: Negative for substance abuse. Objective: There were no vitals filed for this visit. Physical Exam   Constitutional: He is oriented to person, place, and time and well-developed, well-nourished, and in no distress. HENT:   Head: Normocephalic and atraumatic. Right Ear: External ear normal.   Left Ear: External ear normal.   Nose: Nose normal.   Mouth/Throat: Oropharynx is clear and moist. No oropharyngeal exudate.    Eyes: Conjunctivae and EOM are normal. Pupils are equal, round, and reactive to light. Right eye exhibits no discharge. Left eye exhibits no discharge. No scleral icterus. Neck: Neck supple. Cardiovascular: Normal rate, regular rhythm, normal heart sounds and intact distal pulses. Pulmonary/Chest: Effort normal and breath sounds normal. No respiratory distress. He has no wheezes. He has no rales. Abdominal: Soft. Bowel sounds are normal. He exhibits no distension. There is no tenderness. There is no rebound and no guarding. Musculoskeletal: He exhibits no edema or tenderness (BUe0. Pt has pain with passive flexion/extension along right knee. Right knee jt is NTTP   Lymphadenopathy:     He has no cervical adenopathy. Neurological: He is alert and oriented to person, place, and time. He exhibits normal muscle tone. Gait normal.   Skin: Skin is warm and dry. No erythema. Psychiatric: Affect normal.   Nursing note and vitals reviewed. DIAGNOSTICS:   Lab Results   Component Value Date/Time    WBC 5.1 01/30/2018 08:14 AM    HGB 14.1 01/30/2018 08:14 AM    HCT 42.5 01/30/2018 08:14 AM    PLATELET 178 31/63/4409 08:14 AM    MCV 91.0 01/30/2018 08:14 AM     Lab Results   Component Value Date/Time    Sodium 144 01/30/2018 08:14 AM    Potassium 4.6 01/30/2018 08:14 AM    Chloride 106 01/30/2018 08:14 AM    CO2 28 01/30/2018 08:14 AM    Anion gap 10 01/30/2018 08:14 AM    Glucose 124 (H) 01/30/2018 08:14 AM    BUN 29 (H) 01/30/2018 08:14 AM    Creatinine 1.42 (H) 01/30/2018 08:14 AM    BUN/Creatinine ratio 20 01/30/2018 08:14 AM    GFR est AA 59 (L) 01/30/2018 08:14 AM    GFR est non-AA 49 (L) 01/30/2018 08:14 AM    Calcium 9.0 01/30/2018 08:14 AM     Lab Results   Component Value Date/Time    Hemoglobin A1c 6.0 (H) 01/30/2018 08:17 AM    Hemoglobin A1c (POC) 6.3 08/03/2016 09:37 AM       Assessment/Plan:   1. HTN: Stable. +H/o RBBB.  - C/w rx as prescribed.   - Ordering a stat nuclear stress test since the pt has never had one since being diagnosed with RBBB    2. DM Type 2: Stable. - C/w dietary control alone. Recheck A1C in 3-6 months    3. Hypothyroidism: Stable. - C/w Synthroid     4. Right Knee Pain:  Preoperative Assessment: No contraindications to planned surgery pending cardiac stress test results. Orders/studies that need to be obtained prior to surgical clearance: medical clearance has been obtained pending cardiac stress test results. Pt is to undergo a low risk procedure with a low cardiac risk based on current history. Labs and imaging within acceptable range. No contraindications to planned surgery. Discontinue NSAIDS 1 week prior to surgical procedure. Follow up as scheduled post operatively. I have discussed the plan of care with the patient. The patient has received an after-visit summary and questions were answered concerning future plans. I have discussed medication side effects and warnings with the patient as well. All questions were answered. The patient understands the plan of care. Handouts provided today with the above information. Pt instructed if symptoms worsen to call the office or report to the ED for continued care. Greater than 50% of the visit time was spent in counseling and/or coordination of care.       Dr. Dougie Blake  [facility] of [address].  Phone: [phone]  Fax: [phone]

## 2018-02-13 NOTE — PATIENT INSTRUCTIONS

## 2018-02-15 ENCOUNTER — TELEPHONE (OUTPATIENT)
Dept: INTERNAL MEDICINE CLINIC | Age: 73
End: 2018-02-15

## 2018-02-15 NOTE — TELEPHONE ENCOUNTER
Call from Dr. Anuja Sanchez office, pt is having total knee replacement on 02/26/18, they are asking if from his last ov 02/13/18 there can be a medical clearance note written and sent to them, he has had all of his tests done, fax to [phone], BRENT

## 2018-02-19 ENCOUNTER — HOSPITAL ENCOUNTER (OUTPATIENT)
Dept: NON INVASIVE DIAGNOSTICS | Age: 73
Discharge: HOME | End: 2018-02-19
Attending: INTERNAL MEDICINE
Payer: MEDICARE

## 2018-02-19 DIAGNOSIS — I45.10 RIGHT BUNDLE BRANCH BLOCK (RBBB): ICD-10-CM

## 2018-02-19 PROBLEM — M17.11 OSTEOARTHRITIS OF RIGHT KNEE: Chronic | Status: ACTIVE | Noted: 2018-02-19

## 2018-02-19 LAB
ATTENDING PHYSICIAN, CST07: NORMAL
DIAGNOSIS, 93000: NORMAL
DUKE TM SCORE RESULT, CST14: NORMAL
DUKE TREADMILL SCORE, CST13: NORMAL
ECG INTERP BEFORE EX, CST11: NORMAL
ECG INTERP DURING EX, CST12: NORMAL
FUNCTIONAL CAPACITY, CST17: NORMAL
KNOWN CARDIAC CONDITION, CST08: NORMAL
MAX. DIASTOLIC BP, CST04: 76 MMHG
MAX. HEART RATE, CST05: 81 BPM
MAX. SYSTOLIC BP, CST03: 120 MMHG
OVERALL BP RESPONSE TO EXERCISE, CST16: NORMAL
OVERALL HR RESPONSE TO EXERCISE, CST15: NORMAL
PEAK EX METS, CST10: 1.5 METS
PROTOCOL NAME, CST01: NORMAL
TEST INDICATION, CST09: NORMAL

## 2018-02-19 PROCEDURE — A9500 TC99M SESTAMIBI: HCPCS

## 2018-02-19 PROCEDURE — 78452 HT MUSCLE IMAGE SPECT MULT: CPT

## 2018-02-19 PROCEDURE — 74011250636 HC RX REV CODE- 250/636: Performed by: INTERNAL MEDICINE

## 2018-02-19 PROCEDURE — 93017 CV STRESS TEST TRACING ONLY: CPT

## 2018-02-19 PROCEDURE — 74011000258 HC RX REV CODE- 258: Performed by: INTERNAL MEDICINE

## 2018-02-19 RX ORDER — SODIUM CHLORIDE 9 MG/ML
100 INJECTION, SOLUTION INTRAVENOUS ONCE
Status: COMPLETED | OUTPATIENT
Start: 2018-02-19 | End: 2018-02-19

## 2018-02-19 RX ADMIN — REGADENOSON 0.4 MG: 0.08 INJECTION, SOLUTION INTRAVENOUS at 09:45

## 2018-02-19 RX ADMIN — SODIUM CHLORIDE 100 ML/HR: 900 INJECTION, SOLUTION INTRAVENOUS at 09:45

## 2018-02-19 NOTE — PROCEDURES
[address]  MR#: [id number]  : 1945  ACCOUNT #: [de-identified]   DATE OF SERVICE: 2018    PHARMACOLOGIC NUCLEAR STRESS GATED SPECT IMAGING    ORDERING PHYSICIAN:  Dr. Pierre Adam, PCP. INDICATION:  Abnormal EKG. Resting heart rate 57, blood pressure 120/76. Resting EKG is sinus rhythm, incomplete right bundle branch block with nonspecific T-wave abnormality. PHARMACOLOGICAL STRESS PORTION:  The patient underwent Lexiscan infusion 0.4 mg intravenously per protocol via the left antecubital IV and then did a low level 3 minute walk. The patient remained in sinus rhythm, did not have any significant ST-T abnormalities during the stress portion making this a negative EKG portion of the stress test.    PERFUSION IMAGING:  The patient received intravenous technetium 99 sestamibi 10.9 mCi intravenously per protocol via the left antecubital IV for resting images and then 33.0 mCi via the same IV an hour and 45 minutes later for stress images. Tomographic views of the left ventricle obtained to compare. The pelvic cavity size is normal during both rest and stress imaging. There is no transient ischemic dilatation present. There is fairly uniform radiotracer uptake throughout the left anterior myocardium in both stress and rest imaging. There are no perfusion image abnormalities concerning for ischemia or infarction. Gated analysis shows normal LV size, normal systolic function, normal regional wall motion with ejection fraction calculated at 61%. CONCLUSIONS  1. Normal and low risk pharmacological nuclear stress test.  2.  No perfusion imaging abnormalities concerning for ischemia or infarction. 3.  Normal LV size and systolic function, ejection fraction calculated at 61%.       Norman Lind MD       MR / Ples Expose  D: 2018 12:37     T: 2018 13:19  JOB #: [id number]

## 2018-02-19 NOTE — PROGRESS NOTES
The pt is cleared for surgery! Please let him know that he is cleared for surgery and that his stress test is negative. Meanwhile, please fax over my last office note and this result note to his surgeon.     Dr. Tsiha Baez  [facility] [address]  Phone: [phone]  Fax: [phone]

## 2018-02-19 NOTE — DISCHARGE INSTRUCTIONS
[address] [facility] Sports Medicine   Patient Discharge Instructions    Jesica Hamilton / [id number] : 1945    Admitted (Not on file) Discharged: 2018     IF YOU HAVE ANY PROBLEMS ONCE YOU ARE  [facility] [address]:   Main office number: [phone]    Your follow up appointment to see either Dr. Thony Denson PA-C, or [facility] AUDRA as scheduled in 2 weeks. If you are unsure of your appointment date call the office at [phone]. Medication Instructions     · Resume your home medictions as directed, you may have directed not to resume supplements until after your follow up. · A prescription for pain medication has been given   · It is important that you take the medication exactly as they are prescribed. · Keep your medication in the bottles provided by the pharmacist and keep a list of the medication names, dosages, and times to be taken in your wallet. · Do not take other medications without consulting your doctor. What to do at [address] [place] [address] Ave your prehospital diet. If you have excessive nausea or vomitting call your doctor's office. Be sure to maintain adequate fluid intake. Some pain medications may cause constipation. Remember to drink fluids, stay as active as possible, and eat plenty of fiber-rich foods. Begin In-Home Physical Therapy; 3 times a week to work on gait training, range of motion, strengthening, and weight bearing exercises as tolerable. Continue to use your walker or cane when walking. May progress from the walker to a cane to complete total bearing as tolerable. Patient may shower. Wrap incision with plastic wrap/covering to prevent incision from getting wet. Avoid complete immersion. YOUR DRESSING SHOULD BE CHANGED IN 3-5 DAYS BY [facility] NURSE.       When to Call    - Call if you have a temperature greater then 101  - Unable to keep food down  - Are unable to bear any wieght   - Need a pain medication refill     Information obtained by :  I understand that if any problems occur once I am at home I am to contact my physician. I understand and acknowledge receipt of the instructions indicated above.                                                                                                                                            Physician's or R.N.'s Signature                                                                  Date/Time                                                                                                                                              Patient or Representative Signature                                                          Date/Time

## 2018-02-19 NOTE — H&P
[address] [facility] [address]  History and Physical Exam    Patient: Linda Cotton MRN: [id number]  SSN: [id number]    YOB: 1945  Age: 74 y.o. Sex: male      Subjective:      Chief Complaint: Right knee pain    History of Present Illness:  Patient complains of pain to the right knee and difficulty ambulating, which has progressively worsened over several months. X-rays showed osteoarthritis of the joint. The patient's pain has persisted and progressed despite conservative treatments and therapies. The patient has been previously treated with nsaids. The patient has at this time opted for surgical intervention. Admitting as inpatient because patient will need SNF placement for recovery due to lack of appropriate in home adult caretaker availability. Past Medical History:   Diagnosis Date    Arthritis     hip, knee    Chronic kidney disease     had previous reaction with kidneys after a bp med, no issues now    Colon polyps     dysplastic    Depression     Diabetes mellitus ([facility] [address] 2017    no meds    Diverticulosis     seen on colonoscopy from 8/20/15    Diverticulosis of sigmoid colon 01/27/10    GERD (gastroesophageal reflux disease)     no meds    Gout     Hepatitis     Hyperlipidemia     Hypertension 15 years    Hypovitaminosis D     Nephrolithiasis     Osteoarthritis of right knee 2/19/2018    Plantar wart     Rectal polyp     seen on colonoscopy from 8/20/15    Thyroid disease     hypothyroidism     Past Surgical History:   Procedure Laterality Date    ENDOSCOPY, COLON, DIAGNOSTIC  01/27/2010    polyp distal rectum, removed; diverticulosis of sigmoid    HX CATARACT REMOVAL Bilateral     HX HEENT  2008    vocal cord polyp removed    HX POLYPECTOMY  01/27/2010    Distal rectum    HX TONSILLECTOMY      LITHOTRIPSY      TOTAL KNEE ARTHROPLASTY Left 1/2014     Social History     Occupational History    Not on file.      Social History Main Topics  Smoking status: Former Smoker     Packs/day: 1.00     Years: 6.00     Types: Cigarettes     Quit date: 1/1/1992    Smokeless tobacco: Never Used    Alcohol use No      Comment: wine with dinner sometimes    Drug use: No    Sexual activity: Yes     Partners: Female     Prior to Admission medications    Medication Sig Start Date End Date Taking? Authorizing Provider   lisinopril (PRINIVIL, ZESTRIL) 5 mg tablet Take 1 Tab by mouth daily. Patient taking differently: Take 5 mg by mouth nightly. 1/24/18   Antoine Olivera MD   atorvastatin (LIPITOR) 40 mg tablet Take 1 Tab by mouth daily. Patient taking differently: Take 40 mg by mouth nightly. 1/12/18   Antoine Olivera MD   allopurinol (ZYLOPRIM) 100 mg tablet Take 2 Tabs by mouth daily. 11/8/17   Antoine Olivera MD   levothyroxine (SYNTHROID) 50 mcg tablet Take 1 Tab by mouth Daily (before breakfast). 10/23/17   Antoine Olivera MD   carvedilol (COREG) 6.25 mg tablet Take 1 Tab by mouth two (2) times daily (with meals). 10/3/17   Antoine Olivera MD   terbinafine HCl (LAMISIL) 250 mg tablet Take 250 mg by mouth daily. Historical Provider   venlafaxine-SR [facility] 150 mg capsule 150 mg daily. 7/17/17   Historical Provider   sildenafil (REVATIO) 20 mg tablet Take po 40 mg once daily 1 hour (range: 30 minutes to 4 hours) before sexual activity as needed; maximum dose: 100 mg once daily 5/22/17   Antoine Olivera MD   multivitamin (ONE A DAY) tablet Take 1 Tab by mouth daily. Historical Provider   Cholecalciferol, Vitamin D3, (VITAMIN D) 2,000 unit Cap Take 4,000 Units by mouth daily. Historical Provider   CoQ10, Ubiquinol, 200 mg Cap Take 200 mg by mouth daily. Historical Provider   aspirin 81 mg tablet Take 81 mg by mouth daily. Historical Provider       Allergies:    Allergies   Allergen Reactions    Amlodipine Other (comments)     BLE edema        Review of Systems:  A comprehensive review of systems was negative except for that written in the History of Present Illness. Objective:       Physical Exam:  HEENT: Normocephalic, atraumatic  Lungs:  Clear to auscultation  Heart:   Regular rate and rhythm  Abdomen: Soft  Extremities:  Pain with range of motion of the right knee(s). Active extension decreased, active flexion decreased. Tenderness generalized. No deformity. No effusion. Positive crepitus. Antalgic gait. Assessment:      Arthritis of the right knee. Plan:       Proceed with scheduled RIGHT TOTAL KNEE ARTHROPLASTY. The various methods of treatment have been discussed with the patient and family. After consideration of risks, benefits, and other options for treatment, the patient has consented to surgical interventions. Questions were answered and preoperative teaching was done by Dr Beryle Crumb.      Signed By: JOHN Kahn     February 19, 2018

## 2018-02-19 NOTE — PROGRESS NOTES
Patient was given 10.87 milliCuries of 99mTc-Sestamibi for the resting images. Patient was also given 33.0 milliCuries of 99mTc-Sestamibi for the stress images. Injected 0.4mg Lexiscan while slowly walking on treadmill. Patient's armband was discarded and shredded.

## 2018-02-26 ENCOUNTER — ANESTHESIA EVENT (OUTPATIENT)
Dept: SURGERY | Age: 73
DRG: 470 | End: 2018-02-26
Payer: MEDICARE

## 2018-02-26 ENCOUNTER — HOSPITAL ENCOUNTER (INPATIENT)
Age: 73
LOS: 3 days | Discharge: NURSING, REHAB OR LONG-TERM CARE | DRG: 470 | End: 2018-03-01
Attending: ORTHOPAEDIC SURGERY | Admitting: ORTHOPAEDIC SURGERY
Payer: MEDICARE

## 2018-02-26 ENCOUNTER — APPOINTMENT (OUTPATIENT)
Dept: GENERAL RADIOLOGY | Age: 73
DRG: 470 | End: 2018-02-26
Attending: PHYSICIAN ASSISTANT
Payer: MEDICARE

## 2018-02-26 ENCOUNTER — ANESTHESIA (OUTPATIENT)
Dept: SURGERY | Age: 73
DRG: 470 | End: 2018-02-26
Payer: MEDICARE

## 2018-02-26 DIAGNOSIS — M17.11 PRIMARY OSTEOARTHRITIS OF RIGHT KNEE: Primary | Chronic | ICD-10-CM

## 2018-02-26 LAB
ABO + RH BLD: NORMAL
BLOOD GROUP ANTIBODIES SERPL: NORMAL
GLUCOSE BLD STRIP.AUTO-MCNC: 119 MG/DL (ref 70–110)
GLUCOSE BLD STRIP.AUTO-MCNC: 138 MG/DL (ref 70–110)
GLUCOSE BLD STRIP.AUTO-MCNC: 138 MG/DL (ref 70–110)
GLUCOSE BLD STRIP.AUTO-MCNC: 179 MG/DL (ref 70–110)
GLUCOSE BLD STRIP.AUTO-MCNC: 199 MG/DL (ref 70–110)
SPECIMEN EXP DATE BLD: NORMAL

## 2018-02-26 PROCEDURE — 3E0T3BZ INTRODUCTION OF ANESTHETIC AGENT INTO PERIPHERAL NERVES AND PLEXI, PERCUTANEOUS APPROACH: ICD-10-PCS | Performed by: ORTHOPAEDIC SURGERY

## 2018-02-26 PROCEDURE — 36415 COLL VENOUS BLD VENIPUNCTURE: CPT | Performed by: ORTHOPAEDIC SURGERY

## 2018-02-26 PROCEDURE — 76060000033 HC ANESTHESIA 1 TO 1.5 HR: Performed by: ORTHOPAEDIC SURGERY

## 2018-02-26 PROCEDURE — 76010000149 HC OR TIME 1 TO 1.5 HR: Performed by: ORTHOPAEDIC SURGERY

## 2018-02-26 PROCEDURE — C9290 INJ, BUPIVACAINE LIPOSOME: HCPCS | Performed by: ORTHOPAEDIC SURGERY

## 2018-02-26 PROCEDURE — 77030020813 HC INST SCULP CEM KT DISP S&N -B: Performed by: ORTHOPAEDIC SURGERY

## 2018-02-26 PROCEDURE — 77030018836 HC SOL IRR NACL ICUM -A: Performed by: ORTHOPAEDIC SURGERY

## 2018-02-26 PROCEDURE — 77010033678 HC OXYGEN DAILY

## 2018-02-26 PROCEDURE — 73560 X-RAY EXAM OF KNEE 1 OR 2: CPT

## 2018-02-26 PROCEDURE — 74011250636 HC RX REV CODE- 250/636: Performed by: ANESTHESIOLOGY

## 2018-02-26 PROCEDURE — 74011250637 HC RX REV CODE- 250/637: Performed by: ANESTHESIOLOGY

## 2018-02-26 PROCEDURE — 97116 GAIT TRAINING THERAPY: CPT

## 2018-02-26 PROCEDURE — 77030037875 HC DRSG MEPILEX <16IN BORD MOLN -A: Performed by: ORTHOPAEDIC SURGERY

## 2018-02-26 PROCEDURE — 74011250637 HC RX REV CODE- 250/637: Performed by: PHYSICIAN ASSISTANT

## 2018-02-26 PROCEDURE — 77030020782 HC GWN BAIR PAWS FLX 3M -B: Performed by: ORTHOPAEDIC SURGERY

## 2018-02-26 PROCEDURE — 77030002934 HC SUT MCRYL J&J -B: Performed by: ORTHOPAEDIC SURGERY

## 2018-02-26 PROCEDURE — 77030034694 HC SCPL CANADY PLSM DISP USMD -E: Performed by: ORTHOPAEDIC SURGERY

## 2018-02-26 PROCEDURE — 77030036563 HC WRP CLD THER KNE S2SG -B: Performed by: ORTHOPAEDIC SURGERY

## 2018-02-26 PROCEDURE — 77030027138 HC INCENT SPIROMETER -A: Performed by: ORTHOPAEDIC SURGERY

## 2018-02-26 PROCEDURE — 77030011628: Performed by: ORTHOPAEDIC SURGERY

## 2018-02-26 PROCEDURE — 86900 BLOOD TYPING SEROLOGIC ABO: CPT | Performed by: ORTHOPAEDIC SURGERY

## 2018-02-26 PROCEDURE — 74011000258 HC RX REV CODE- 258: Performed by: ORTHOPAEDIC SURGERY

## 2018-02-26 PROCEDURE — 77030016060 HC NDL NRV BLK TELE -A: Performed by: ANESTHESIOLOGY

## 2018-02-26 PROCEDURE — 76210000006 HC OR PH I REC 0.5 TO 1 HR: Performed by: ORTHOPAEDIC SURGERY

## 2018-02-26 PROCEDURE — 65270000029 HC RM PRIVATE

## 2018-02-26 PROCEDURE — 77030003666 HC NDL SPINAL BD -A: Performed by: ORTHOPAEDIC SURGERY

## 2018-02-26 PROCEDURE — 74011250637 HC RX REV CODE- 250/637: Performed by: ORTHOPAEDIC SURGERY

## 2018-02-26 PROCEDURE — 74011000250 HC RX REV CODE- 250: Performed by: ORTHOPAEDIC SURGERY

## 2018-02-26 PROCEDURE — 76942 ECHO GUIDE FOR BIOPSY: CPT | Performed by: ORTHOPAEDIC SURGERY

## 2018-02-26 PROCEDURE — 74011636637 HC RX REV CODE- 636/637: Performed by: ANESTHESIOLOGY

## 2018-02-26 PROCEDURE — 74011250636 HC RX REV CODE- 250/636

## 2018-02-26 PROCEDURE — 77030013708 HC HNDPC SUC IRR PULS STRY –B: Performed by: ORTHOPAEDIC SURGERY

## 2018-02-26 PROCEDURE — 74011250636 HC RX REV CODE- 250/636: Performed by: PHYSICIAN ASSISTANT

## 2018-02-26 PROCEDURE — 64447 NJX AA&/STRD FEMORAL NRV IMG: CPT | Performed by: ANESTHESIOLOGY

## 2018-02-26 PROCEDURE — 77030011640 HC PAD GRND REM COVD -A: Performed by: ORTHOPAEDIC SURGERY

## 2018-02-26 PROCEDURE — 97161 PT EVAL LOW COMPLEX 20 MIN: CPT

## 2018-02-26 PROCEDURE — 74011000250 HC RX REV CODE- 250

## 2018-02-26 PROCEDURE — C1776 JOINT DEVICE (IMPLANTABLE): HCPCS | Performed by: ORTHOPAEDIC SURGERY

## 2018-02-26 PROCEDURE — 77030012891

## 2018-02-26 PROCEDURE — 74011636637 HC RX REV CODE- 636/637: Performed by: ORTHOPAEDIC SURGERY

## 2018-02-26 PROCEDURE — 0SRC0J9 REPLACEMENT OF RIGHT KNEE JOINT WITH SYNTHETIC SUBSTITUTE, CEMENTED, OPEN APPROACH: ICD-10-PCS | Performed by: ORTHOPAEDIC SURGERY

## 2018-02-26 PROCEDURE — 82962 GLUCOSE BLOOD TEST: CPT

## 2018-02-26 PROCEDURE — 77030038010: Performed by: ORTHOPAEDIC SURGERY

## 2018-02-26 PROCEDURE — 77030037400 HC ADH TISS HI VISC EXOFIN CHMP -B: Performed by: ORTHOPAEDIC SURGERY

## 2018-02-26 PROCEDURE — C1713 ANCHOR/SCREW BN/BN,TIS/BN: HCPCS | Performed by: ORTHOPAEDIC SURGERY

## 2018-02-26 PROCEDURE — 77030020259 HC SOL INJ SOD CL 0.9% 100ML BG: Performed by: ORTHOPAEDIC SURGERY

## 2018-02-26 PROCEDURE — 77030031139 HC SUT VCRL2 J&J -A: Performed by: ORTHOPAEDIC SURGERY

## 2018-02-26 PROCEDURE — 74011250636 HC RX REV CODE- 250/636: Performed by: ORTHOPAEDIC SURGERY

## 2018-02-26 PROCEDURE — 77030012508 HC MSK AIRWY LMA AMBU -A: Performed by: ANESTHESIOLOGY

## 2018-02-26 PROCEDURE — 77030032489 HC SLV COMPR SCD FT CUF COVD -B: Performed by: ORTHOPAEDIC SURGERY

## 2018-02-26 DEVICE — CEMENT BNE 20GM HALF DOSE PMMA VISC RADPQ FAST: Type: IMPLANTABLE DEVICE | Site: KNEE | Status: FUNCTIONAL

## 2018-02-26 DEVICE — COMPONENT PAT DIA41MM POLYETH DOME CEM MEDIALIZED ATTUNE: Type: IMPLANTABLE DEVICE | Site: KNEE | Status: FUNCTIONAL

## 2018-02-26 DEVICE — INSERT TIB RP FEM KNEE CEM: Type: IMPLANTABLE DEVICE | Site: KNEE | Status: FUNCTIONAL

## 2018-02-26 RX ORDER — INSULIN LISPRO 100 [IU]/ML
INJECTION, SOLUTION INTRAVENOUS; SUBCUTANEOUS ONCE
Status: COMPLETED | OUTPATIENT
Start: 2018-02-26 | End: 2018-02-26

## 2018-02-26 RX ORDER — MAGNESIUM SULFATE 100 %
16 CRYSTALS MISCELLANEOUS AS NEEDED
Status: DISCONTINUED | OUTPATIENT
Start: 2018-02-26 | End: 2018-02-26 | Stop reason: HOSPADM

## 2018-02-26 RX ORDER — OXYCODONE HYDROCHLORIDE 5 MG/1
5-10 TABLET ORAL
Status: DISCONTINUED | OUTPATIENT
Start: 2018-02-26 | End: 2018-03-01 | Stop reason: HOSPADM

## 2018-02-26 RX ORDER — GLYCOPYRROLATE 0.2 MG/ML
INJECTION INTRAMUSCULAR; INTRAVENOUS AS NEEDED
Status: DISCONTINUED | OUTPATIENT
Start: 2018-02-26 | End: 2018-02-26 | Stop reason: HOSPADM

## 2018-02-26 RX ORDER — VENLAFAXINE HYDROCHLORIDE 150 MG/1
150 CAPSULE, EXTENDED RELEASE ORAL DAILY
Status: DISCONTINUED | OUTPATIENT
Start: 2018-02-27 | End: 2018-03-01 | Stop reason: HOSPADM

## 2018-02-26 RX ORDER — ATORVASTATIN CALCIUM 20 MG/1
40 TABLET, FILM COATED ORAL
Status: DISCONTINUED | OUTPATIENT
Start: 2018-02-26 | End: 2018-03-01 | Stop reason: HOSPADM

## 2018-02-26 RX ORDER — SODIUM CHLORIDE 0.9 % (FLUSH) 0.9 %
5-10 SYRINGE (ML) INJECTION AS NEEDED
Status: DISCONTINUED | OUTPATIENT
Start: 2018-02-26 | End: 2018-03-01 | Stop reason: HOSPADM

## 2018-02-26 RX ORDER — POLYETHYLENE GLYCOL 3350 17 G/17G
17 POWDER, FOR SOLUTION ORAL DAILY
Status: DISCONTINUED | OUTPATIENT
Start: 2018-02-27 | End: 2018-02-26

## 2018-02-26 RX ORDER — INSULIN LISPRO 100 [IU]/ML
INJECTION, SOLUTION INTRAVENOUS; SUBCUTANEOUS
Status: DISCONTINUED | OUTPATIENT
Start: 2018-02-26 | End: 2018-03-01 | Stop reason: HOSPADM

## 2018-02-26 RX ORDER — MELATONIN
4000 DAILY
Status: DISCONTINUED | OUTPATIENT
Start: 2018-02-27 | End: 2018-03-01 | Stop reason: HOSPADM

## 2018-02-26 RX ORDER — KETAMINE HYDROCHLORIDE 10 MG/ML
INJECTION, SOLUTION INTRAMUSCULAR; INTRAVENOUS AS NEEDED
Status: DISCONTINUED | OUTPATIENT
Start: 2018-02-26 | End: 2018-02-26 | Stop reason: HOSPADM

## 2018-02-26 RX ORDER — METOCLOPRAMIDE HYDROCHLORIDE 5 MG/ML
10 INJECTION INTRAMUSCULAR; INTRAVENOUS
Status: DISCONTINUED | OUTPATIENT
Start: 2018-02-26 | End: 2018-03-01 | Stop reason: HOSPADM

## 2018-02-26 RX ORDER — SODIUM CHLORIDE 0.9 % (FLUSH) 0.9 %
5-10 SYRINGE (ML) INJECTION EVERY 8 HOURS
Status: DISCONTINUED | OUTPATIENT
Start: 2018-02-26 | End: 2018-03-01 | Stop reason: HOSPADM

## 2018-02-26 RX ORDER — MAGNESIUM SULFATE 100 %
16 CRYSTALS MISCELLANEOUS AS NEEDED
Status: DISCONTINUED | OUTPATIENT
Start: 2018-02-26 | End: 2018-03-01 | Stop reason: HOSPADM

## 2018-02-26 RX ORDER — DEXTROSE 50 % IN WATER (D50W) INTRAVENOUS SYRINGE
25-50 AS NEEDED
Status: DISCONTINUED | OUTPATIENT
Start: 2018-02-26 | End: 2018-02-26 | Stop reason: HOSPADM

## 2018-02-26 RX ORDER — CEFAZOLIN SODIUM 2 G/50ML
2 SOLUTION INTRAVENOUS EVERY 8 HOURS
Status: COMPLETED | OUTPATIENT
Start: 2018-02-26 | End: 2018-02-27

## 2018-02-26 RX ORDER — NALOXONE HYDROCHLORIDE 0.4 MG/ML
0.4 INJECTION, SOLUTION INTRAMUSCULAR; INTRAVENOUS; SUBCUTANEOUS AS NEEDED
Status: DISCONTINUED | OUTPATIENT
Start: 2018-02-26 | End: 2018-03-01 | Stop reason: HOSPADM

## 2018-02-26 RX ORDER — SODIUM CHLORIDE 0.9 % (FLUSH) 0.9 %
5-10 SYRINGE (ML) INJECTION AS NEEDED
Status: DISCONTINUED | OUTPATIENT
Start: 2018-02-26 | End: 2018-02-26 | Stop reason: HOSPADM

## 2018-02-26 RX ORDER — CELECOXIB 100 MG/1
400 CAPSULE ORAL
Status: COMPLETED | OUTPATIENT
Start: 2018-02-26 | End: 2018-02-26

## 2018-02-26 RX ORDER — SODIUM CHLORIDE 9 MG/ML
125 INJECTION, SOLUTION INTRAVENOUS CONTINUOUS
Status: DISPENSED | OUTPATIENT
Start: 2018-02-26 | End: 2018-02-27

## 2018-02-26 RX ORDER — MIDAZOLAM HYDROCHLORIDE 1 MG/ML
INJECTION, SOLUTION INTRAMUSCULAR; INTRAVENOUS AS NEEDED
Status: DISCONTINUED | OUTPATIENT
Start: 2018-02-26 | End: 2018-02-26 | Stop reason: HOSPADM

## 2018-02-26 RX ORDER — DOCUSATE SODIUM 100 MG/1
100 CAPSULE, LIQUID FILLED ORAL 2 TIMES DAILY
Status: DISCONTINUED | OUTPATIENT
Start: 2018-02-26 | End: 2018-03-01 | Stop reason: HOSPADM

## 2018-02-26 RX ORDER — CEFAZOLIN SODIUM 2 G/50ML
2 SOLUTION INTRAVENOUS ONCE
Status: COMPLETED | OUTPATIENT
Start: 2018-02-26 | End: 2018-02-26

## 2018-02-26 RX ORDER — LANOLIN ALCOHOL/MO/W.PET/CERES
1 CREAM (GRAM) TOPICAL 3 TIMES DAILY
Status: DISCONTINUED | OUTPATIENT
Start: 2018-02-26 | End: 2018-03-01 | Stop reason: HOSPADM

## 2018-02-26 RX ORDER — LISINOPRIL 5 MG/1
5 TABLET ORAL
Status: DISCONTINUED | OUTPATIENT
Start: 2018-02-26 | End: 2018-03-01 | Stop reason: HOSPADM

## 2018-02-26 RX ORDER — ASPIRIN 325 MG
325 TABLET ORAL 2 TIMES DAILY
Qty: 42 TAB | Refills: 0 | Status: SHIPPED | OUTPATIENT
Start: 2018-02-26 | End: 2018-03-19

## 2018-02-26 RX ORDER — DEXTROSE 50 % IN WATER (D50W) INTRAVENOUS SYRINGE
50 AS NEEDED
Status: DISCONTINUED | OUTPATIENT
Start: 2018-02-26 | End: 2018-02-26 | Stop reason: HOSPADM

## 2018-02-26 RX ORDER — DIPHENHYDRAMINE HYDROCHLORIDE 50 MG/ML
12.5 INJECTION, SOLUTION INTRAMUSCULAR; INTRAVENOUS
Status: DISCONTINUED | OUTPATIENT
Start: 2018-02-26 | End: 2018-03-01 | Stop reason: HOSPADM

## 2018-02-26 RX ORDER — ROPIVACAINE HYDROCHLORIDE 5 MG/ML
INJECTION, SOLUTION EPIDURAL; INFILTRATION; PERINEURAL AS NEEDED
Status: DISCONTINUED | OUTPATIENT
Start: 2018-02-26 | End: 2018-02-26 | Stop reason: HOSPADM

## 2018-02-26 RX ORDER — PANTOPRAZOLE SODIUM 40 MG/1
40 TABLET, DELAYED RELEASE ORAL DAILY
Status: DISCONTINUED | OUTPATIENT
Start: 2018-02-26 | End: 2018-03-01 | Stop reason: HOSPADM

## 2018-02-26 RX ORDER — LIDOCAINE HYDROCHLORIDE 20 MG/ML
INJECTION, SOLUTION EPIDURAL; INFILTRATION; INTRACAUDAL; PERINEURAL AS NEEDED
Status: DISCONTINUED | OUTPATIENT
Start: 2018-02-26 | End: 2018-02-26 | Stop reason: HOSPADM

## 2018-02-26 RX ORDER — ACETAMINOPHEN 325 MG/1
650 TABLET ORAL EVERY 6 HOURS
Status: DISCONTINUED | OUTPATIENT
Start: 2018-02-26 | End: 2018-03-01 | Stop reason: HOSPADM

## 2018-02-26 RX ORDER — NALOXONE HYDROCHLORIDE 0.4 MG/ML
0.4 INJECTION, SOLUTION INTRAMUSCULAR; INTRAVENOUS; SUBCUTANEOUS AS NEEDED
Status: DISCONTINUED | OUTPATIENT
Start: 2018-02-26 | End: 2018-02-26 | Stop reason: HOSPADM

## 2018-02-26 RX ORDER — FENTANYL CITRATE 50 UG/ML
50 INJECTION, SOLUTION INTRAMUSCULAR; INTRAVENOUS
Status: DISCONTINUED | OUTPATIENT
Start: 2018-02-26 | End: 2018-02-26 | Stop reason: HOSPADM

## 2018-02-26 RX ORDER — TRANEXAMIC ACID 650 1/1
1950 TABLET ORAL ONCE
Status: COMPLETED | OUTPATIENT
Start: 2018-02-26 | End: 2018-02-26

## 2018-02-26 RX ORDER — DEXTROSE 50 % IN WATER (D50W) INTRAVENOUS SYRINGE
25-50 AS NEEDED
Status: DISCONTINUED | OUTPATIENT
Start: 2018-02-26 | End: 2018-02-26 | Stop reason: SDUPTHER

## 2018-02-26 RX ORDER — FLUMAZENIL 0.1 MG/ML
0.2 INJECTION INTRAVENOUS
Status: DISCONTINUED | OUTPATIENT
Start: 2018-02-26 | End: 2018-02-26 | Stop reason: HOSPADM

## 2018-02-26 RX ORDER — ACETAMINOPHEN 10 MG/ML
1000 INJECTION, SOLUTION INTRAVENOUS EVERY 6 HOURS
Status: DISCONTINUED | OUTPATIENT
Start: 2018-02-26 | End: 2018-02-26 | Stop reason: ALTCHOICE

## 2018-02-26 RX ORDER — SODIUM CHLORIDE, SODIUM LACTATE, POTASSIUM CHLORIDE, CALCIUM CHLORIDE 600; 310; 30; 20 MG/100ML; MG/100ML; MG/100ML; MG/100ML
125 INJECTION, SOLUTION INTRAVENOUS CONTINUOUS
Status: DISCONTINUED | OUTPATIENT
Start: 2018-02-26 | End: 2018-03-01 | Stop reason: HOSPADM

## 2018-02-26 RX ORDER — CARVEDILOL 3.12 MG/1
6.25 TABLET ORAL 2 TIMES DAILY WITH MEALS
Status: DISCONTINUED | OUTPATIENT
Start: 2018-02-26 | End: 2018-03-01 | Stop reason: HOSPADM

## 2018-02-26 RX ORDER — DEXTROSE 50 % IN WATER (D50W) INTRAVENOUS SYRINGE
25-50 AS NEEDED
Status: DISCONTINUED | OUTPATIENT
Start: 2018-02-26 | End: 2018-03-01 | Stop reason: HOSPADM

## 2018-02-26 RX ORDER — ACETAMINOPHEN 10 MG/ML
1000 INJECTION, SOLUTION INTRAVENOUS ONCE
Status: COMPLETED | OUTPATIENT
Start: 2018-02-26 | End: 2018-02-26

## 2018-02-26 RX ORDER — PREGABALIN 25 MG/1
25 CAPSULE ORAL ONCE
Status: COMPLETED | OUTPATIENT
Start: 2018-02-26 | End: 2018-02-26

## 2018-02-26 RX ORDER — MAGNESIUM SULFATE 100 %
16 CRYSTALS MISCELLANEOUS AS NEEDED
Status: DISCONTINUED | OUTPATIENT
Start: 2018-02-26 | End: 2018-02-26 | Stop reason: SDUPTHER

## 2018-02-26 RX ORDER — SODIUM CHLORIDE, SODIUM LACTATE, POTASSIUM CHLORIDE, CALCIUM CHLORIDE 600; 310; 30; 20 MG/100ML; MG/100ML; MG/100ML; MG/100ML
125 INJECTION, SOLUTION INTRAVENOUS CONTINUOUS
Status: DISCONTINUED | OUTPATIENT
Start: 2018-02-26 | End: 2018-02-26 | Stop reason: HOSPADM

## 2018-02-26 RX ORDER — LIDOCAINE HYDROCHLORIDE 10 MG/ML
INJECTION INFILTRATION; PERINEURAL AS NEEDED
Status: DISCONTINUED | OUTPATIENT
Start: 2018-02-26 | End: 2018-02-26 | Stop reason: HOSPADM

## 2018-02-26 RX ORDER — ONDANSETRON 2 MG/ML
INJECTION INTRAMUSCULAR; INTRAVENOUS AS NEEDED
Status: DISCONTINUED | OUTPATIENT
Start: 2018-02-26 | End: 2018-02-26 | Stop reason: HOSPADM

## 2018-02-26 RX ORDER — INSULIN LISPRO 100 [IU]/ML
INJECTION, SOLUTION INTRAVENOUS; SUBCUTANEOUS ONCE
Status: DISCONTINUED | OUTPATIENT
Start: 2018-02-26 | End: 2018-02-26 | Stop reason: HOSPADM

## 2018-02-26 RX ORDER — DEXAMETHASONE SODIUM PHOSPHATE 4 MG/ML
4 INJECTION, SOLUTION INTRA-ARTICULAR; INTRALESIONAL; INTRAMUSCULAR; INTRAVENOUS; SOFT TISSUE ONCE
Status: COMPLETED | OUTPATIENT
Start: 2018-02-26 | End: 2018-02-26

## 2018-02-26 RX ORDER — ALLOPURINOL 100 MG/1
200 TABLET ORAL DAILY
Status: DISCONTINUED | OUTPATIENT
Start: 2018-02-26 | End: 2018-03-01 | Stop reason: HOSPADM

## 2018-02-26 RX ORDER — SODIUM CHLORIDE 9 MG/ML
300 INJECTION, SOLUTION INTRAVENOUS CONTINUOUS
Status: DISPENSED | OUTPATIENT
Start: 2018-02-26 | End: 2018-02-26

## 2018-02-26 RX ORDER — LEVOTHYROXINE SODIUM 50 UG/1
50 TABLET ORAL
Status: DISCONTINUED | OUTPATIENT
Start: 2018-02-27 | End: 2018-03-01 | Stop reason: HOSPADM

## 2018-02-26 RX ORDER — INSULIN LISPRO 100 [IU]/ML
INJECTION, SOLUTION INTRAVENOUS; SUBCUTANEOUS
Status: DISCONTINUED | OUTPATIENT
Start: 2018-02-26 | End: 2018-02-26 | Stop reason: HOSPADM

## 2018-02-26 RX ORDER — DEXAMETHASONE SODIUM PHOSPHATE 4 MG/ML
8 INJECTION, SOLUTION INTRA-ARTICULAR; INTRALESIONAL; INTRAMUSCULAR; INTRAVENOUS; SOFT TISSUE ONCE
Status: DISCONTINUED | OUTPATIENT
Start: 2018-02-26 | End: 2018-02-26

## 2018-02-26 RX ORDER — ONDANSETRON 2 MG/ML
4 INJECTION INTRAMUSCULAR; INTRAVENOUS
Status: DISCONTINUED | OUTPATIENT
Start: 2018-02-26 | End: 2018-03-01 | Stop reason: HOSPADM

## 2018-02-26 RX ORDER — POLYETHYLENE GLYCOL 3350 17 G/17G
17 POWDER, FOR SOLUTION ORAL DAILY
Status: DISCONTINUED | OUTPATIENT
Start: 2018-02-26 | End: 2018-03-01 | Stop reason: HOSPADM

## 2018-02-26 RX ORDER — MAGNESIUM SULFATE 100 %
4 CRYSTALS MISCELLANEOUS AS NEEDED
Status: DISCONTINUED | OUTPATIENT
Start: 2018-02-26 | End: 2018-02-26 | Stop reason: HOSPADM

## 2018-02-26 RX ORDER — ZOLPIDEM TARTRATE 5 MG/1
5-10 TABLET ORAL
Status: DISCONTINUED | OUTPATIENT
Start: 2018-02-26 | End: 2018-03-01 | Stop reason: HOSPADM

## 2018-02-26 RX ORDER — PANTOPRAZOLE SODIUM 40 MG/1
40 TABLET, DELAYED RELEASE ORAL DAILY
Status: DISCONTINUED | OUTPATIENT
Start: 2018-02-26 | End: 2018-02-26

## 2018-02-26 RX ORDER — ASPIRIN 325 MG
325 TABLET ORAL 2 TIMES DAILY
Qty: 42 TAB | Refills: 0 | Status: SHIPPED | OUTPATIENT
Start: 2018-02-26 | End: 2018-02-26

## 2018-02-26 RX ORDER — OXYCODONE AND ACETAMINOPHEN 5; 325 MG/1; MG/1
TABLET ORAL
Qty: 60 TAB | Refills: 0 | Status: SHIPPED | OUTPATIENT
Start: 2018-02-26 | End: 2018-05-29

## 2018-02-26 RX ORDER — PROPOFOL 10 MG/ML
INJECTION, EMULSION INTRAVENOUS AS NEEDED
Status: DISCONTINUED | OUTPATIENT
Start: 2018-02-26 | End: 2018-02-26 | Stop reason: HOSPADM

## 2018-02-26 RX ORDER — ASPIRIN 325 MG/1
325 TABLET, FILM COATED ORAL
Status: DISCONTINUED | OUTPATIENT
Start: 2018-02-26 | End: 2018-03-01 | Stop reason: HOSPADM

## 2018-02-26 RX ORDER — DIPHENHYDRAMINE HCL 25 MG
25 CAPSULE ORAL
Status: DISCONTINUED | OUTPATIENT
Start: 2018-02-26 | End: 2018-03-01 | Stop reason: HOSPADM

## 2018-02-26 RX ADMIN — ROPIVACAINE HYDROCHLORIDE 25 ML: 5 INJECTION, SOLUTION EPIDURAL; INFILTRATION; PERINEURAL at 08:36

## 2018-02-26 RX ADMIN — ACETAMINOPHEN 650 MG: 325 TABLET ORAL at 21:58

## 2018-02-26 RX ADMIN — CEFAZOLIN SODIUM 2 G: 2 SOLUTION INTRAVENOUS at 17:51

## 2018-02-26 RX ADMIN — CEFAZOLIN SODIUM 2 G: 2 SOLUTION INTRAVENOUS at 09:39

## 2018-02-26 RX ADMIN — CELECOXIB 400 MG: 100 CAPSULE ORAL at 08:18

## 2018-02-26 RX ADMIN — INSULIN LISPRO 2 UNITS: 100 INJECTION, SOLUTION INTRAVENOUS; SUBCUTANEOUS at 15:00

## 2018-02-26 RX ADMIN — MIDAZOLAM HYDROCHLORIDE 2 MG: 1 INJECTION, SOLUTION INTRAMUSCULAR; INTRAVENOUS at 09:31

## 2018-02-26 RX ADMIN — FERROUS SULFATE TAB 325 MG (65 MG ELEMENTAL FE) 325 MG: 325 (65 FE) TAB at 17:50

## 2018-02-26 RX ADMIN — ALLOPURINOL 200 MG: 100 TABLET ORAL at 13:40

## 2018-02-26 RX ADMIN — ASPIRIN 325 MG: 325 TABLET, FILM COATED ORAL at 17:51

## 2018-02-26 RX ADMIN — GLYCOPYRROLATE 0.2 MG: 0.2 INJECTION INTRAMUSCULAR; INTRAVENOUS at 09:31

## 2018-02-26 RX ADMIN — PROPOFOL 200 MG: 10 INJECTION, EMULSION INTRAVENOUS at 09:36

## 2018-02-26 RX ADMIN — LISINOPRIL 5 MG: 5 TABLET ORAL at 21:58

## 2018-02-26 RX ADMIN — TRANEXAMIC ACID 1950 MG: 650 TABLET ORAL at 08:02

## 2018-02-26 RX ADMIN — PANTOPRAZOLE SODIUM 40 MG: 40 TABLET, DELAYED RELEASE ORAL at 08:18

## 2018-02-26 RX ADMIN — LIDOCAINE HYDROCHLORIDE 5 ML: 10 INJECTION INFILTRATION; PERINEURAL at 08:34

## 2018-02-26 RX ADMIN — DOCUSATE SODIUM 100 MG: 100 CAPSULE, LIQUID FILLED ORAL at 13:40

## 2018-02-26 RX ADMIN — FERROUS SULFATE TAB 325 MG (65 MG ELEMENTAL FE) 325 MG: 325 (65 FE) TAB at 21:58

## 2018-02-26 RX ADMIN — SODIUM CHLORIDE, SODIUM LACTATE, POTASSIUM CHLORIDE, AND CALCIUM CHLORIDE 1000 ML: 600; 310; 30; 20 INJECTION, SOLUTION INTRAVENOUS at 08:07

## 2018-02-26 RX ADMIN — SODIUM CHLORIDE, SODIUM LACTATE, POTASSIUM CHLORIDE, AND CALCIUM CHLORIDE 125 ML/HR: 600; 310; 30; 20 INJECTION, SOLUTION INTRAVENOUS at 08:06

## 2018-02-26 RX ADMIN — OXYCODONE HYDROCHLORIDE 5 MG: 5 TABLET ORAL at 17:51

## 2018-02-26 RX ADMIN — CARVEDILOL 6.25 MG: 3.12 TABLET, FILM COATED ORAL at 17:51

## 2018-02-26 RX ADMIN — ATORVASTATIN CALCIUM 40 MG: 20 TABLET, FILM COATED ORAL at 21:57

## 2018-02-26 RX ADMIN — OXYCODONE HYDROCHLORIDE 5 MG: 5 TABLET ORAL at 14:16

## 2018-02-26 RX ADMIN — KETAMINE HYDROCHLORIDE 30 MG: 10 INJECTION, SOLUTION INTRAMUSCULAR; INTRAVENOUS at 09:36

## 2018-02-26 RX ADMIN — PREGABALIN 25 MG: 25 CAPSULE ORAL at 08:18

## 2018-02-26 RX ADMIN — SODIUM CHLORIDE 300 ML/HR: 900 INJECTION, SOLUTION INTRAVENOUS at 12:20

## 2018-02-26 RX ADMIN — POLYETHYLENE GLYCOL 3350 17 G: 17 POWDER, FOR SOLUTION ORAL at 17:50

## 2018-02-26 RX ADMIN — SODIUM CHLORIDE, SODIUM LACTATE, POTASSIUM CHLORIDE, AND CALCIUM CHLORIDE: 600; 310; 30; 20 INJECTION, SOLUTION INTRAVENOUS at 10:09

## 2018-02-26 RX ADMIN — ACETAMINOPHEN 1000 MG: 10 INJECTION, SOLUTION INTRAVENOUS at 09:47

## 2018-02-26 RX ADMIN — ACETAMINOPHEN 650 MG: 325 TABLET ORAL at 17:51

## 2018-02-26 RX ADMIN — DOCUSATE SODIUM 100 MG: 100 CAPSULE, LIQUID FILLED ORAL at 21:58

## 2018-02-26 RX ADMIN — KETAMINE HYDROCHLORIDE 50 MG: 10 INJECTION, SOLUTION INTRAMUSCULAR; INTRAVENOUS at 09:45

## 2018-02-26 RX ADMIN — MIDAZOLAM HYDROCHLORIDE 2 MG: 1 INJECTION, SOLUTION INTRAMUSCULAR; INTRAVENOUS at 08:31

## 2018-02-26 RX ADMIN — PROPOFOL 40 MG: 10 INJECTION, EMULSION INTRAVENOUS at 09:44

## 2018-02-26 RX ADMIN — INSULIN LISPRO 2 UNITS: 100 INJECTION, SOLUTION INTRAVENOUS; SUBCUTANEOUS at 11:15

## 2018-02-26 RX ADMIN — ONDANSETRON 4 MG: 2 INJECTION INTRAMUSCULAR; INTRAVENOUS at 09:55

## 2018-02-26 RX ADMIN — LIDOCAINE HYDROCHLORIDE 60 MG: 20 INJECTION, SOLUTION EPIDURAL; INFILTRATION; INTRACAUDAL; PERINEURAL at 09:36

## 2018-02-26 RX ADMIN — DEXAMETHASONE SODIUM PHOSPHATE 4 MG: 4 INJECTION, SOLUTION INTRAMUSCULAR; INTRAVENOUS at 08:19

## 2018-02-26 NOTE — OP NOTES
[address] [address]  Total Knee Arthroplasty    Patient: Lo White MRN: [id number]  SSN: [id number]    YOB: 1945  Age: 74 y.o. Sex: male      Date of Surgery: 2/26/2018   Preoperative Diagnosis: RIGHT KNEE OSTEOARTHRITIS   Postoperative Diagnosis: RIGHT KNEE OSTEOARTHRITIS   Location: [facility]  Surgeon: Carla Beyer MD  [facility]    Anesthesia: General and Femoral Nerve Block    Procedure: Total Knee Arthroplasty: Depuy   The complexity of the total joint surgery requires the use of a first assistant for positioning, retraction and assistance in closure. Tourniquet Time: Tourniquet not used. Estimated Blood Loss: Less than 100cc     Implants:   Implant Name Type Inv. Item Serial No.  Lot No. LRB No. Used Action   CEMENT BNE FAST SET 20GM --  - [id number]  CEMENT BNE FAST SET 20GM --   [facility] [employer] [id number] Right 1 Implanted   ATTUNE TIBIAL BASE    [facility] [employer] [id number] Right 1 Implanted   PAT MAVIS DOME MEDIAL 41MM -- ATTUNE - [id number]  PAT MAVIS DOME MEDIAL 41MM -- ATTUNE  [facility] [employer] [id number] Right 1 Implanted   ATTUNE TIBIAL INSERT    [facility] [employer] [id number] Right 1 Implanted   ATTUNE FEMORAL       [facility] [employer] [id number] Right 1 Implanted        Specimens: None    Additional Findings: None     Body Mass Index: Body mass index is 30.62 kg/(m^2). Procedure Detail:  Prior to the surgery the patient was administered a femoral nerve block in the preoperative holding area by the anesthesiologist. Lo White was brought to the operating room and positioned on the operating table. He was anesthetized with anesthesia. Intravenous antibiotics were administered. Prior to the incision being made a timeout was called identifying the patient, procedure, operative side, and surgeon.  A pneumatic tourniquet was placed about the limb and the right leg was prepped and draped in the usual sterile manner. The tourniquet was not inflated throughout the case. A midline anterior incision made over the knee. The incision was carried down through the subcutaneous tissue to the underlying capsule. A medial parapatellar capsular incision was performed. The medial capsular flap was carefully elevated around to the posterior medial corner protecting the medial collateral ligaments and the fibers. The patella was sized with a caliper, and approximately 10-12 mm was resected with an oscillating saw allowing the patella to be slid into the lateral gutter. It was not everted throughout the case. Our attention was first turned to the distal femur and using intermedullary instrumentation, a 5-degree valgus cut was on the distal end of the femur. The distal end of the femur was sized to a size 9+2 femoral component. Pins were inserted through the sizer and the corresponding 4-in-1 block was slid into place and pinned for stability. Anterior and posterior and chamfer cuts were made to accommodate the femoral component. The medial and lateral menisci were excised as were the anterior cruciate ligaments. Our attention was then turned to the tibia. Using extramedullary instrumentation, a 3-degree cut was made on the proximal end of the tibia. A spacer block was placed to show gaps had been balanced and a size 9  tibial base plate was placed on the tibia and pinned into place. Intramedullary reaming guide was placed on the tibia and the appropriate reamer was used followed by the keel punch to complete the preparation of the tibia. A trial femoral component was then impacted on to the distal end of the femur. Trial reduction was then performed with incremental size trial bearing surfaces. The Attune RP CR 8mm bearing surface matching the femur was inserted and allowed for full extension, good medial, lateral stability at 90 degrees of flexion, especially medially.     Our attention was then turned to the patella. The patella was sized to a 41mm oval DePuy patella. The guide was pinned, placed over patella, 3 holes were drilled. Trial patella button was inserted and the patella was reduced in the knee. The patella tracked normally using no-touch technique. The trial components were then all removed. The real components were opened on the back. The cut surfaces of the bone were prepared using the PulsaVac lavage. Prior to this, the Aquamantys was used to cauterize any soft tissue bleeding. 20 grams of Depuy #2 cement was mixed. The femoral and tibial components were impacted in place and patellar component was cemented into place. Excess cement was removed from around the edge of bone using plastic curette. Once the cement had hardened, the knee was placed through range of motion and noted to be stable as mentioned above with the trail components. The wound was dry, therefore no drain was used. The operative knee was injected with 20 cc of exparel. The knee was then soaked with a diluted betadine solution for approximately 3 min. This was then thoroughly irrigated. The capsular layer was closed using a #2 stratafix suture with the knee flexed 90 degrees, while subcutaneous layers were closed using 2-0 Vicryl suture. Finally the skin was closed using Prineo, which were applied in occlusive fashion and sterile bandage applied. An Iceman cryotherapy pad was applied on the operative leg. Sponge count and needle counts were correct. He was taken to the recovery room extubated in stable condition.     Signed By: Josefa Ayon MD     February 26, 2018

## 2018-02-26 NOTE — PERIOP NOTES
Patient transferred to room 208, [facility] via bed. NC at 2 LPM. PIV with IVF infusing per orders. Blood pressure 137/85, pulse 66, temperature 98.3 °F (36.8 °C), resp. rate 16, height 5' 9\" (1.753 m), weight 94.1 kg (207 lb 6 oz), SpO2 97 %. JARED Gusman RN.

## 2018-02-26 NOTE — ANESTHESIA PROCEDURE NOTES
Peripheral Block    Start time: 2/26/2018 8:31 AM  End time: 2/26/2018 8:36 AM  Performed by: Facundo Dahl  Authorized by: Facundo Dahl       Pre-procedure: Indications: at surgeon's request and post-op pain management    Preanesthetic Checklist: patient identified, risks and benefits discussed, site marked, timeout performed, anesthesia consent given and patient being monitored    Timeout Time: 08:31          Block Type:   Block Type:   Adductor canal  Monitoring:  Standard ASA monitoring, continuous pulse ox, frequent vital sign checks, heart rate, responsive to questions and oxygen  Injection Technique:  Single shot  Procedures: ultrasound guided    Patient Position: supine  Prep: chlorhexidine    Location:  Lower thigh  Needle Type:  Stimuplex  Needle Gauge:  21 G  Needle Localization:  Anatomical landmarks, infiltration and ultrasound guidance  Medication Injected:  0.5%  ropivacaine  Volume (mL):  25    Assessment:  Number of attempts:  1  Injection Assessment:  Incremental injection every 5 mL, local visualized surrounding nerve on ultrasound, negative aspiration for blood, no paresthesia, no intravascular symptoms and ultrasound image on chart  Patient tolerance:  Patient tolerated the procedure well with no immediate complications

## 2018-02-26 NOTE — ANESTHESIA PREPROCEDURE EVALUATION
Anesthetic History        Pertinent negatives: No PONV       Review of Systems / Medical History  Patient summary reviewed, nursing notes reviewed and pertinent labs reviewed    Pulmonary              Pertinent negatives: No asthma, sleep apnea and smoker (former)     Neuro/Psych         Psychiatric history (h/o depression; took antidepressant this AM)  Pertinent negatives: No seizures and CVA  Comments: H/o vertigo with frequent falls; pt being followed for vertigo Cardiovascular    Hypertension (did not take meds this AM): well controlled          Hyperlipidemia  Pertinent negatives: No past MI and CAD (negative stress test 2/2018)  Exercise tolerance: >4 METS     GI/Hepatic/Renal     GERD (very rare; diet related; no meds): well controlled      Liver disease (h/o hepatitis; resolved)  Pertinent negatives: No renal disease (episode of renal dysfunction related to amlodipine allergy/sensitivity; resolved)   Endo/Other    Diabetes (diet controlled; no meds): type 2  Hypothyroidism (took meds this AM): well controlled  Arthritis     Other Findings            Physical Exam    Airway  Mallampati: II  TM Distance: 4 - 6 cm  Neck ROM: normal range of motion   Mouth opening: Normal     Cardiovascular    Rhythm: regular  Rate: normal         Dental  No notable dental hx       Pulmonary  Breath sounds clear to auscultation               Abdominal  GI exam deferred       Other Findings            Anesthetic Plan    ASA: 2  Anesthesia type: general      Post-op pain plan if not by surgeon: peripheral nerve block single    Induction: Intravenous  Anesthetic plan and risks discussed with: Patient      Plan GA with single shot adductor canal block. All risks discussed. Pt accepts risks and agrees to proceed.

## 2018-02-26 NOTE — ANESTHESIA POSTPROCEDURE EVALUATION
Post-Anesthesia Evaluation and Assessment    Cardiovascular Function/Vital Signs  Visit Vitals    /71    Pulse 67    Temp 36.5 °C (97.7 °F)    Resp 12    Ht 5' 9\" (1.753 m)    Wt 94.1 kg (207 lb 6 oz)    SpO2 97%    BMI 30.62 kg/m2       Patient is status post Procedure(s):  RIGHT TOTAL KNEE REPLACEMENT . Nausea/Vomiting: Controlled. Postoperative hydration reviewed and adequate. Pain:  Pain Scale 1: Numeric (0 - 10) (02/26/18 1120)  Pain Intensity 1: 0 (02/26/18 1120)   Managed. Neurological Status:   Neuro (WDL): Exceptions to WDL (02/26/18 1120)   At baseline. Mental Status and Level of Consciousness: Arousable. Pulmonary Status:   O2 Device: Nasal cannula (02/26/18 1105)   Adequate oxygenation and airway patent. Complications related to anesthesia: None    Post-anesthesia assessment completed. No concerns. Patient has met all discharge requirements.     Signed By: Robert Camp MD    February 26, 2018

## 2018-02-26 NOTE — PROGRESS NOTES
1140 - Patient arrives to unit at this time. Admission completed at this time. Patient is A/O x 4. IV to left forearm intact and patent. TEDS and plexis applied bilaterally. Patient fell from vertigo on 2/25 and hit his head and has red johanny between eyes on forehead. ACE dressing to right knee CDI. Patient denies numbness/tingling. Pedal pulses palpable. Pain 2/10. Patient was oriented to the room to include use of call bell, meal ordering, and use of incentive spirometer patient able to get up to 4000 on IS. Patient was given explanation of \" up for dinner\" program and has verbalized understanding. Phone and call bell left within reach. Plan of care for the day addressed with patient. Educated on pain medication availability and possible side effects. Whiteboard updated. 1410-Left message for [place] that patient would like to go to rehab. 1417 - Pain 3/10. PRN 5mg roxicodone pain medication administered at this time. Patient has been educated on side effects. 1515-Paged [facility] to request Miralax due to patient report of severe narcotic induced constipation. 1528-Took telephone order with readback for miralax. 1750- Pain 2/10. PRN 5 mg roxicodone pain medication administered at this time. Patient has been educated on side effects. 1905-Shift Summary: Patient's pain well controlled this shift. IV remains intact. Dressing to right knee remains CDI. DIANA to left knee and plexis compression device in place.

## 2018-02-26 NOTE — PERIOP NOTES
TRANSFER - OUT REPORT:    Verbal report given to JARED Gusman RN(name) on Gee Garcia  being transferred to [address] [place] [address], room 208(unit) for routine progression of care       Report consisted of patients Situation, Background, Assessment and   Recommendations(SBAR). Information from the following report(s) SBAR, OR Summary, Procedure Summary, Intake/Output and MAR was reviewed with the receiving nurse. Lines:   Peripheral IV 02/26/18 Left Forearm (Active)   Site Assessment Clean, dry, & intact 2/26/2018 11:20 AM   Phlebitis Assessment 0 2/26/2018 11:20 AM   Infiltration Assessment 0 2/26/2018 11:20 AM   Dressing Status Clean, dry, & intact 2/26/2018 11:20 AM   Dressing Type Transparent;Tape 2/26/2018 11:20 AM   Hub Color/Line Status Green; Infusing;Patent 2/26/2018 11:20 AM        Opportunity for questions and clarification was provided.       Patient transported with:   O2 @ 2 liters  Registered Nurse  [facility]

## 2018-02-26 NOTE — PERIOP NOTES
TRANSFER - IN REPORT:    Verbal report received from JARED Haile RN and CHRISTOPHER Romero CRNA(name) on Sia Hernandez  being received from OR(unit) for routine post - op      Report consisted of patients Situation, Background, Assessment and   Recommendations(SBAR). Information from the following report(s) SBAR, OR Summary, Procedure Summary, Intake/Output and MAR was reviewed with the receiving nurse. Opportunity for questions and clarification was provided. Assessment completed upon patients arrival to unit and care assumed.

## 2018-02-26 NOTE — PERIOP NOTES
Pt. Offered restroom while in pre-op, declined at this present time. Patient placed on Deven Paws for a minimum of 30 min in  Preop.

## 2018-02-26 NOTE — PROGRESS NOTES
Problem: Mobility Impaired (Adult and Pediatric)  Goal: *Acute Goals and Plan of Care (Insert Text)  In 1-7 days pt will be able to perform:  ST.  Bed mobility:  Rolling L to R to L modified independent for positioning. 2.  Supine to sit to supine S with HR for meals. 3.  Sit to stand to sit S with RW in prep for ambulation. LT.  Gait:  Ambulate >150ft S with RW, WBAT, for home/community mobility. 2.  Activity tolerance: Tolerate up in chair 1-2 hours for ADLs. 3.  Patient/Family Education:  Patient/family to be independent with HEP for follow-up care and safe discharge. physical Therapy EVALUATION    Patient: Felice Huizar (49 y.o. male)  Date: 2018  Primary Diagnosis: RIGHT KNEE OSTEOARTHRITIS  Osteoarthritis of right knee  Procedure(s) (LRB):  RIGHT TOTAL KNEE REPLACEMENT  (Right) Day of Surgery   Precautions:   Fall, WBAT    ASSESSMENT :  Based on the objective data described below, the patient presents with decreased functional mobility and independence in regard to bed mobility, transfers, gt quality and tolerance, R knee AROM, R knee strength, pain, stair negotiation and safety due to recent R TKA surgery. Pt rating pain in R knee 3/10 on numerical pain scale. Pt participated in sit to stand CGA/min A. Pt able to participate in gt training w/ RW, WBAT, GB and CGA w/ antalgic pattern. Pt had recent fall 2018 w/ hit to head, LOC. Pt reports vertigo due to fall. Pt returned to sitting EOB w/ all needs within reach, and ice pack to R knee. Nurse Jyothi Carter aware and family present. Recommend rehab upon hospital d/c. Patient will benefit from skilled intervention to address the above impairments.   Patients rehabilitation potential is considered to be Good  Factors which may influence rehabilitation potential include:   []         None noted  []         Mental ability/status  []         Medical condition  []         Home/family situation and support systems  []         Safety awareness  [x]         Pain tolerance/management  []         Other:      PLAN :  Recommendations and Planned Interventions:  [x]           Bed Mobility Training             []    Neuromuscular Re-Education  [x]           Transfer Training                   []    Orthotic/Prosthetic Training  [x]           Gait Training                          []    Modalities  [x]           Therapeutic Exercises          []    Edema Management/Control  [x]           Therapeutic Activities            [x]    Patient and Family Training/Education  []           Other (comment):    Frequency/Duration: Patient will be followed by physical therapy 1-2 times per day/4-7 days per week to address goals. Discharge Recommendations: Rehab  Further Equipment Recommendations for Discharge: N/A     SUBJECTIVE:   Patient stated I want to try.     OBJECTIVE DATA SUMMARY:     Past Medical History:   Diagnosis Date    Arthritis     hip, knee    Chronic kidney disease     had previous reaction with kidneys after a bp med, no issues now    Colon polyps     dysplastic    Depression     Diabetes mellitus ([facility] [address] 2017    no meds    Diverticulosis     seen on colonoscopy from 8/20/15    Diverticulosis of sigmoid colon 01/27/10    GERD (gastroesophageal reflux disease)     no meds    Gout     Hepatitis     Hyperlipidemia     Hypertension 15 years    Hypovitaminosis D     Nephrolithiasis     Osteoarthritis of right knee 2/19/2018    Plantar wart     Rectal polyp     seen on colonoscopy from 8/20/15    Thyroid disease     hypothyroidism     Past Surgical History:   Procedure Laterality Date    ENDOSCOPY, COLON, DIAGNOSTIC  01/27/2010    polyp distal rectum, removed; diverticulosis of sigmoid    HX CATARACT REMOVAL Bilateral     HX HEENT  2008    vocal cord polyp removed    HX POLYPECTOMY  01/27/2010    Distal rectum    HX TONSILLECTOMY      LITHOTRIPSY      TOTAL KNEE ARTHROPLASTY Left 1/2014     Barriers to Learning/Limitations: None  Compensate with: visual, verbal, tactile, kinesthetic cues/model  Prior Level of Function/Home Situation:   Home Situation  Home Environment: Rehabilitation facility  # Steps to Enter: 3  Rails to Enter: No  One/Two Story Residence: One story  Living Alone: No  Support Systems: Spouse/Significant Other/Partner, Family member(s)  Patient Expects to be Discharged to[de-identified] Rehabilitation facility  Current DME Used/Available at Home: Walker, rolling, Shower chair, Raised toilet seat, Grab bars  Critical Behavior:  Neurologic State: Alert; Appropriate for age  Orientation Level: Oriented X4  Cognition: Appropriate decision making; Appropriate for age attention/concentration; Appropriate safety awareness; Follows commands  Safety/Judgement: Awareness of environment  Psychosocial  Patient Behaviors: Calm; Cooperative  Purposeful Interaction: Yes  Pt Identified Daily Priority: Clinical issues (comment)  Caritas Process: Nurture loving kindness;Establish trust;Teaching/learning;Create healing environment  Caring Interventions: Reassure  Skin Condition/Temp: Dry;Warm  Skin Integrity: Incision (comment) (R knee)  Skin Integumentary  Skin Color: Appropriate for ethnicity  Skin Condition/Temp: Dry;Warm  Skin Integrity: Incision (comment) (R knee)  Turgor: Non-tenting  Hair Growth: Present  Varicosities: Absent  Strength:    Strength: Generally decreased, functional  Tone & Sensation:   Tone: Normal  Sensation: Intact  Range Of Motion:  AROM: Generally decreased, functional  Functional Mobility:  Bed Mobility:  Supine to Sit: Contact guard assistance  Scooting: Contact guard assistance  Transfers:  Sit to Stand: Minimum assistance;Contact guard assistance (vc)  Stand to Sit: Contact guard assistance (vc)  Balance:   Sitting: Intact  Standing: Intact; With support  Ambulation/Gait Training:  Distance (ft): 30 Feet (ft)  Assistive Device: Walker, rolling;Gait belt  Ambulation - Level of Assistance: Contact guard assistance (vc)  Gait Abnormalities: Antalgic;Decreased step clearance; Step to gait  Right Side Weight Bearing: As tolerated  Base of Support: Shift to left  Stance: Right decreased  Speed/Sidra: Slow  Step Length: Left shortened;Right shortened  Swing Pattern: Left asymmetrical;Right asymmetrical  Interventions: Safety awareness training; Tactile cues; Verbal cues  Therapeutic Exercises:   HEP written copy issued to pt per MD protocol. Pain:  Pain Scale 1: Numeric (0 - 10)  Pain Intensity 1: 3  Pain Location 1: Knee  Pain Orientation 1: Posterior;Right  Pain Description 1: Aching  Pain Intervention(s) 1: Medication (see MAR)  Activity Tolerance:   Fair   Please refer to the flowsheet for vital signs taken during this treatment. After treatment:   [x]         Patient left in no apparent distress sitting up EOB  []         Patient left in no apparent distress in bed  [x]         Call bell left within reach  [x]         Nursing notified  []         Caregiver present  []         Bed alarm activated    COMMUNICATION/EDUCATION:   [x]         Fall prevention education was provided and the patient/caregiver indicated understanding. [x]         Patient/family have participated as able in goal setting and plan of care. [x]         Patient/family agree to work toward stated goals and plan of care. []         Patient understands intent and goals of therapy, but is neutral about his/her participation. []         Patient is unable to participate in goal setting and plan of care.     Thank you for this referral.  Douglas Carrillo, PT   Time Calculation: 27 mins    Eval Complexity: History: HIGH Complexity :3+ comorbidities / personal factors will impact the outcome/ POC Exam:MEDIUM Complexity : 3 Standardized tests and measures addressing body structure, function, activity limitation and / or participation in recreation  Presentation: MEDIUM Complexity : Evolving with changing characteristics  Clinical Decision Making:Low Complexity amb >30' Overall Complexity:LOW      Mobility [id number] Current  CJ= 20-39%   Goal  CI= 1-19%. The severity rating is based on the Level of Assistance required for Functional Mobility and ADLs.

## 2018-02-26 NOTE — ROUTINE PROCESS
TRANSFER - IN REPORT:    Verbal report received from Maggie Healy on Felice Huizar  being received from PACU for routine post - op. Report consisted of patients Situation, Background, Assessment and   Recommendations(SBAR). Information from the following report(s) SBAR, Kardex, OR Summary, Intake/Output and MAR was reviewed with the receiving nurse. Opportunity for questions and clarification was provided. Assessment to be completed upon patients arrival to unit and care assumed.

## 2018-02-26 NOTE — DISCHARGE SUMMARY
[address] [address]     DISCHARGE SUMMARY     PATIENT: Yogi Donald     MRN: [id number]   ADMIT DATE: 2018   BILLIN   DISCHARGE DATE:      ATTENDING: Wyatt Gaxiola MD   DICTATING: Nilsa Honeycutt3, PA     ADMISSION DIAGNOSIS: RIGHT KNEE OSTEOARTHRITIS  Osteoarthritis of right knee    DISCHARGE DIAGNOSIS: Status post RIGHT TOTAL KNEE ARTHROPLASTY    HISTORY OF PRESENT ILLNESS: The patient is a 74y.o. year-old male   with ongoing right knee pain secondary to osteoarthritis of his left knee. The patient's pain has persisted and progressed despite conservative treatments and therapies. The patient has at this time opted for surgical intervention.     PAST MEDICAL HISTORY:   Past Medical History:   Diagnosis Date    Arthritis     hip, knee    Chronic kidney disease     had previous reaction with kidneys after a bp med, no issues now    Colon polyps     dysplastic    Depression     Diabetes mellitus ([facility] [address]     no meds    Diverticulosis     seen on colonoscopy from 8/20/15    Diverticulosis of sigmoid colon 01/27/10    GERD (gastroesophageal reflux disease)     no meds    Gout     Hepatitis     Hyperlipidemia     Hypertension 15 years    Hypovitaminosis D     Nephrolithiasis     Osteoarthritis of right knee 2018    Plantar wart     Rectal polyp     seen on colonoscopy from 8/20/15    Thyroid disease     hypothyroidism       PAST SURGICAL HISTORY:   Past Surgical History:   Procedure Laterality Date    ENDOSCOPY, COLON, DIAGNOSTIC  2010    polyp distal rectum, removed; diverticulosis of sigmoid    HX CATARACT REMOVAL Bilateral     HX HEENT      vocal cord polyp removed    HX POLYPECTOMY  2010    Distal rectum    HX TONSILLECTOMY      LITHOTRIPSY      TOTAL KNEE ARTHROPLASTY Left 2014       ALLERGIES:   Allergies   Allergen Reactions    Amlodipine Other (comments)     BLE edema CURRENT MEDICATIONS:  A list of medications prior to the time of admission include:  Prior to Admission medications    Medication Sig Start Date End Date Taking? Authorizing Provider   oxyCODONE-acetaminophen (PERCOCET) 5-325 mg per tablet Take 1 to 2 tab PO q 4-6 hrs prn pain 2/26/18  Yes JOHN Huynh   aspirin (ASPIRIN) 325 mg tablet Take 1 Tab by mouth two (2) times a day for 21 days. 2/26/18 3/19/18 Yes JOHN Nielson   lisinopril (PRINIVIL, ZESTRIL) 5 mg tablet Take 1 Tab by mouth daily. Patient taking differently: Take 5 mg by mouth nightly. 1/24/18  Yes Aure Kendrick MD   atorvastatin (LIPITOR) 40 mg tablet Take 1 Tab by mouth daily. Patient taking differently: Take 40 mg by mouth nightly. 1/12/18  Yes Aure Kendrick MD   allopurinol (ZYLOPRIM) 100 mg tablet Take 2 Tabs by mouth daily. 11/8/17  Yes Aure Kendrick MD   levothyroxine (SYNTHROID) 50 mcg tablet Take 1 Tab by mouth Daily (before breakfast). 10/23/17  Yes Aure Kendrick MD   carvedilol (COREG) 6.25 mg tablet Take 1 Tab by mouth two (2) times daily (with meals). 10/3/17  Yes Aure Kendrick MD   terbinafine HCl (LAMISIL) 250 mg tablet Take 250 mg by mouth daily. Indications: Taken for 1 full week a month; and then not for another 3 weeks   Yes Historical Provider   venlafaxine-SR [facility] 150 mg capsule 150 mg daily. 7/17/17  Yes Historical Provider   sildenafil (REVATIO) 20 mg tablet Take po 40 mg once daily 1 hour (range: 30 minutes to 4 hours) before sexual activity as needed; maximum dose: 100 mg once daily 5/22/17  Yes Aure Kendrick MD   multivitamin (ONE A DAY) tablet Take 1 Tab by mouth daily. Yes Historical Provider   Cholecalciferol, Vitamin D3, (VITAMIN D) 2,000 unit Cap Take 4,000 Units by mouth daily. Yes Historical Provider   CoQ10, Ubiquinol, 200 mg Cap Take 200 mg by mouth every evening. Yes Historical Provider   aspirin 81 mg tablet Take 81 mg by mouth daily.    Yes Historical Provider FAMILY HISTORY:   Family History   Problem Relation Age of Onset   [facility] Arthritis-rheumatoid Mother     Hypertension Mother     Elevated Lipids Mother     Thyroid Disease Sister     Cancer Father      colon    Heart Disease Other     Hypertension Other     Cancer Other      breast    Heart Disease Maternal Grandmother     Dementia Maternal Grandfather     Cancer Paternal Grandmother      breast    No Known Problems Paternal Grandfather        SOCIAL HISTORY:   Social History     Social History    Marital status:      Spouse name: N/A    Number of children: N/A    Years of education: N/A     Social History Main Topics    Smoking status: Former Smoker     Packs/day: 1.00     Years: 6.00     Types: Cigarettes     Quit date: 1/1/1992    Smokeless tobacco: Never Used    Alcohol use No      Comment: wine with dinner sometimes    Drug use: No    Sexual activity: Yes     Partners: Female     Other Topics Concern    None     Social History Narrative       REVIEW OF SYSTEMS: All review of systems are negative. PHYSICAL EXAMINATION: For a detailed physical exam, please refer to the patient's chart. HOSPITAL COURSE: The patient was taken to surgery the day of admission. he underwent right total knee replacement. Operative course was benign. Estimated blood loss approximately 150 cc. The patient was taken to the PACU in stable condition and was later taken to the floor in stable condition. During his hospital stay, the patient progressed well with physical therapy and occupational therapy, adherent to instructions. he was placed on Aspirin for DVT prophylaxis. his pain has been well controlled with oral pain medications. his vitals have remained stable. he has also remained hemodynamically stable. DISCHARGE INSTRUCTIONS: The patient is to be transferred to a SNF. he is to continue on his prior medications per the medication reconciliation form, to which we will add:      1.  Aspirin 325 mg; 1 tablet po bid x 21 days  2. Percocet 5/325 mg; 1-2 tablets p.o. every 4 to 6 hours p.r.n. for pain    The patient is to continue with physical therapy to work on gait training, range of motion, strengthening, and weightbearing exercises as tolerated on his right lower extremity. The patient is to progress from a walker to a cane to complete total weightbearing as tolerable. The patient is to continue to keep his incision dry. The patient is to followup with Dr. Khari Andres, Damion Natarajan PA-C, and/or Martell Ohara PA-C in the office approximately 10-14 days status post for x-rays and further evaluation.     Nilsa Love 1723, [address]  03/01/18  7:08 AM

## 2018-02-26 NOTE — INTERVAL H&P NOTE
H&P Update: Walker Cole was seen and examined. History and physical has been reviewed. The patient has been examined.  There have been no significant clinical changes since the completion of the originally dated History and Physical.    Signed By: Dougie Jacobson MD     February 26, 2018 8:15 AM

## 2018-02-27 ENCOUNTER — APPOINTMENT (OUTPATIENT)
Dept: GENERAL RADIOLOGY | Age: 73
DRG: 470 | End: 2018-02-27
Attending: ORTHOPAEDIC SURGERY
Payer: MEDICARE

## 2018-02-27 LAB
ANION GAP SERPL CALC-SCNC: 7 MMOL/L (ref 3–18)
BUN SERPL-MCNC: 17 MG/DL (ref 7–18)
BUN/CREAT SERPL: 13 (ref 12–20)
CALCIUM SERPL-MCNC: 8.2 MG/DL (ref 8.5–10.1)
CHLORIDE SERPL-SCNC: 111 MMOL/L (ref 100–108)
CO2 SERPL-SCNC: 27 MMOL/L (ref 21–32)
CREAT SERPL-MCNC: 1.34 MG/DL (ref 0.6–1.3)
ERYTHROCYTE [DISTWIDTH] IN BLOOD BY AUTOMATED COUNT: 13.7 % (ref 11.6–14.5)
GLUCOSE BLD STRIP.AUTO-MCNC: 101 MG/DL (ref 70–110)
GLUCOSE BLD STRIP.AUTO-MCNC: 103 MG/DL (ref 70–110)
GLUCOSE BLD STRIP.AUTO-MCNC: 107 MG/DL (ref 70–110)
GLUCOSE BLD STRIP.AUTO-MCNC: 147 MG/DL (ref 70–110)
GLUCOSE SERPL-MCNC: 129 MG/DL (ref 74–99)
HCT VFR BLD AUTO: 34.1 % (ref 36–48)
HGB BLD-MCNC: 11.3 G/DL (ref 13–16)
MCH RBC QN AUTO: 30.4 PG (ref 24–34)
MCHC RBC AUTO-ENTMCNC: 33.1 G/DL (ref 31–37)
MCV RBC AUTO: 91.7 FL (ref 74–97)
PLATELET # BLD AUTO: 138 K/UL (ref 135–420)
PMV BLD AUTO: 9.7 FL (ref 9.2–11.8)
POTASSIUM SERPL-SCNC: 4.5 MMOL/L (ref 3.5–5.5)
RBC # BLD AUTO: 3.72 M/UL (ref 4.7–5.5)
SODIUM SERPL-SCNC: 145 MMOL/L (ref 136–145)
WBC # BLD AUTO: 8.6 K/UL (ref 4.6–13.2)

## 2018-02-27 PROCEDURE — 97535 SELF CARE MNGMENT TRAINING: CPT

## 2018-02-27 PROCEDURE — 71045 X-RAY EXAM CHEST 1 VIEW: CPT

## 2018-02-27 PROCEDURE — 85027 COMPLETE CBC AUTOMATED: CPT | Performed by: PHYSICIAN ASSISTANT

## 2018-02-27 PROCEDURE — 74011250636 HC RX REV CODE- 250/636: Performed by: PHYSICIAN ASSISTANT

## 2018-02-27 PROCEDURE — 74011250637 HC RX REV CODE- 250/637: Performed by: PHYSICIAN ASSISTANT

## 2018-02-27 PROCEDURE — 97116 GAIT TRAINING THERAPY: CPT

## 2018-02-27 PROCEDURE — 97165 OT EVAL LOW COMPLEX 30 MIN: CPT

## 2018-02-27 PROCEDURE — 80048 BASIC METABOLIC PNL TOTAL CA: CPT | Performed by: PHYSICIAN ASSISTANT

## 2018-02-27 PROCEDURE — 65270000029 HC RM PRIVATE

## 2018-02-27 PROCEDURE — 36415 COLL VENOUS BLD VENIPUNCTURE: CPT | Performed by: PHYSICIAN ASSISTANT

## 2018-02-27 PROCEDURE — 82962 GLUCOSE BLOOD TEST: CPT

## 2018-02-27 PROCEDURE — 74011250637 HC RX REV CODE- 250/637: Performed by: ORTHOPAEDIC SURGERY

## 2018-02-27 PROCEDURE — 97530 THERAPEUTIC ACTIVITIES: CPT

## 2018-02-27 RX ADMIN — ATORVASTATIN CALCIUM 40 MG: 20 TABLET, FILM COATED ORAL at 22:05

## 2018-02-27 RX ADMIN — LEVOTHYROXINE SODIUM 50 MCG: 50 TABLET ORAL at 06:34

## 2018-02-27 RX ADMIN — ACETAMINOPHEN 650 MG: 325 TABLET ORAL at 04:20

## 2018-02-27 RX ADMIN — SODIUM CHLORIDE 125 ML/HR: 900 INJECTION, SOLUTION INTRAVENOUS at 00:16

## 2018-02-27 RX ADMIN — ACETAMINOPHEN 650 MG: 325 TABLET ORAL at 09:22

## 2018-02-27 RX ADMIN — CEFAZOLIN SODIUM 2 G: 2 SOLUTION INTRAVENOUS at 02:11

## 2018-02-27 RX ADMIN — FERROUS SULFATE TAB 325 MG (65 MG ELEMENTAL FE) 325 MG: 325 (65 FE) TAB at 09:22

## 2018-02-27 RX ADMIN — VITAMIN D, TAB 1000IU (100/BT) 4000 UNITS: 25 TAB at 09:22

## 2018-02-27 RX ADMIN — ASPIRIN 325 MG: 325 TABLET, FILM COATED ORAL at 09:22

## 2018-02-27 RX ADMIN — FERROUS SULFATE TAB 325 MG (65 MG ELEMENTAL FE) 325 MG: 325 (65 FE) TAB at 17:02

## 2018-02-27 RX ADMIN — ALLOPURINOL 200 MG: 100 TABLET ORAL at 09:22

## 2018-02-27 RX ADMIN — POLYETHYLENE GLYCOL 3350 17 G: 17 POWDER, FOR SOLUTION ORAL at 09:22

## 2018-02-27 RX ADMIN — ASPIRIN 325 MG: 325 TABLET, FILM COATED ORAL at 17:02

## 2018-02-27 RX ADMIN — CARVEDILOL 6.25 MG: 3.12 TABLET, FILM COATED ORAL at 17:02

## 2018-02-27 RX ADMIN — FERROUS SULFATE TAB 325 MG (65 MG ELEMENTAL FE) 325 MG: 325 (65 FE) TAB at 22:05

## 2018-02-27 RX ADMIN — Medication 10 ML: at 22:07

## 2018-02-27 RX ADMIN — MULTIPLE VITAMINS W/ MINERALS TAB 1 TABLET: TAB at 09:22

## 2018-02-27 RX ADMIN — DOCUSATE SODIUM 100 MG: 100 CAPSULE, LIQUID FILLED ORAL at 09:22

## 2018-02-27 RX ADMIN — DOCUSATE SODIUM 100 MG: 100 CAPSULE, LIQUID FILLED ORAL at 22:05

## 2018-02-27 RX ADMIN — ACETAMINOPHEN 650 MG: 325 TABLET ORAL at 22:05

## 2018-02-27 RX ADMIN — PANTOPRAZOLE SODIUM 40 MG: 40 TABLET, DELAYED RELEASE ORAL at 09:22

## 2018-02-27 RX ADMIN — CARVEDILOL 6.25 MG: 3.12 TABLET, FILM COATED ORAL at 09:24

## 2018-02-27 RX ADMIN — ACETAMINOPHEN 650 MG: 325 TABLET ORAL at 17:02

## 2018-02-27 RX ADMIN — OXYCODONE HYDROCHLORIDE 5 MG: 5 TABLET ORAL at 06:34

## 2018-02-27 RX ADMIN — LISINOPRIL 5 MG: 5 TABLET ORAL at 22:05

## 2018-02-27 RX ADMIN — VENLAFAXINE HYDROCHLORIDE 150 MG: 150 CAPSULE, EXTENDED RELEASE ORAL at 09:22

## 2018-02-27 NOTE — PROGRESS NOTES
Problem: Falls - Risk of  Goal: *Absence of Falls  Document Elida Fall Risk and appropriate interventions in the flowsheet.    Outcome: Progressing Towards Goal  Fall Risk Interventions:  Mobility Interventions: Utilize walker, cane, or other assitive device, Patient to call before getting OOB, PT Consult for mobility concerns, PT Consult for assist device competence, Strengthening exercises (ROM-active/passive)    Mentation Interventions: Adequate sleep, hydration, pain control    Medication Interventions: Teach patient to arise slowly, Patient to call before getting OOB    Elimination Interventions: Call light in reach, Elevated toilet seat, Patient to call for help with toileting needs, Toilet paper/wipes in reach, Toileting schedule/hourly rounds, Urinal in reach

## 2018-02-27 NOTE — PROGRESS NOTES
Problem: Falls - Risk of  Goal: *Absence of Falls  Document Elida Fall Risk and appropriate interventions in the flowsheet.    Outcome: Progressing Towards Goal  Fall Risk Interventions:  Mobility Interventions: Patient to call before getting OOB, Utilize walker, cane, or other assitive device    Mentation Interventions: Adequate sleep, hydration, pain control    Medication Interventions: Assess postural VS orthostatic hypotension, Evaluate medications/consider consulting pharmacy, Patient to call before getting OOB    Elimination Interventions: Call light in reach, Patient to call for help with toileting needs, Urinal in reach

## 2018-02-27 NOTE — ROUTINE PROCESS
1905 - Bedside and Verbal shift change report given to Camille Ort RN by Med Gomes RN. Report included the following information SBAR, Kardex, OR Summary, Intake/Output and MAR.

## 2018-02-27 NOTE — PROGRESS NOTES
7655 Assumed care of pt at this time. Pt in chair with no signs of distress. Pt left with call light within reach and encouraged to call for assistance. 4508 Head to toe assessment completed at this time  Patient is A&OX4. Pt denies N/V chest pain and SOB or distress. Pt is calm and cooperative. Pedal pulses are present. Capillary refill less than 3 seconds. Skin in warm and dry with ACE wrap dressing on right knee and is CDI. Lungs are clear bilaterally. Patient instructed on use and reason for incentive spirometer. Bowel sounds are active. Abdomen is soft and non-tender. DIANA  On LLE & plexi in place bilaterally . Positive dorsalis pedis pulse, sensation, warm. No tingling or numbness to lower extremities. 18 g needle in the LFA. Pain scale explained to patient. Reasons for taking PRN meds explained to patient. Patient instructed to call for prn when needed. Pain level is 2. Patient was oriented to call bell and bed function. Will continue to monitor    1215 pt is up in chair for meal and eating lunch, no complain and concern at this time    1510 reassessment done at this time, no change to previous assessment , pt resting Quietly on bed, no complain and concern at this time    1800 gave the scheduled medication pt is sitting on chair, call light within reach . Shift summary - Pt pain managed . Pt informed about all medications and side effects and encouraged to ask questions about medication. Pt encouraged throughout the day to use incentive spirometer and the purpose of the incentive spirometer. Pt left with no signs of distress and any concerns of pt addressed.

## 2018-02-27 NOTE — PROGRESS NOTES
20:00  Assessment completed. Lungs are clear bilat. Ace wrap on RLE remains C/D/I with + CMS & + PP. Denies pain or discomfort in calves. Ice pack was refilled & re-applied. Resting quietly in bed x for voiding w/o difficulty per urinal.    22:55 Shift assessment completed. See ns flow sheet for details. 03:00 Reassessed with 0 changes noted. Ace wrap remains C/D/I with + CMS & + PP. Continues to deny pain or discomfort in calves. Ice packs were refilled & re-applied. Resting quietly in bed with eyes closed between cares x for voiding per urinal w/o difficulty. 06:30 Up in the chair @ bedside with call bell & phone within reach. 08:00  Bedside and Verbal shift change report given to Jeronimo Busch RN (oncoming nurse) by Felix Prince RN (offgoing nurse). Report included the following information SBAR.

## 2018-02-27 NOTE — PROGRESS NOTES
Chart reviewed, spoke with pt and wife in room yesterday, [facility] offered, pt chose [facility] for follow up; referral placed with CMS this morning. Plan: pt has been accepted for admission Thursday 3/1 to [place], [place] . Report to [phone]. Please include all hard scripts for controlled substances, med rec and dc summary in packet. Please medicate for pain prior to dc if possible and needed to help offset delay when patient first arrives to facility. Pt plans for wife to transport him to facility, will plan to leave by noon. Care Management Interventions  PCP Verified by CM:  Yes  Transition of Care Consult (CM Consult): SNF  Partner SNF: Yes  Discharge Durable Medical Equipment: No  Physical Therapy Consult: Yes  Occupational Therapy Consult: Yes  Current Support Network: Lives with Spouse, Own Home  Confirm Follow Up Transport: Family  Plan discussed with Pt/Family/Caregiver: Yes  Freedom of Choice Offered: Yes  Discharge Location  Discharge Placement: Skilled nursing facility

## 2018-02-27 NOTE — PROGRESS NOTES
Problem: Self Care Deficits Care Plan (Adult)  Goal: *Acute Goals and Plan of Care (Insert Text)  Occupational Therapy EVALUATION/discharge    Patient: Emeka Van (76 y.o. male)  Date: 2/27/2018  Primary Diagnosis: RIGHT KNEE OSTEOARTHRITIS  Osteoarthritis of right knee  Procedure(s) (LRB):  RIGHT TOTAL KNEE REPLACEMENT  (Right) 1 Day Post-Op   Precautions:  Fall, WBAT    ASSESSMENT AND RECOMMENDATIONS:  Pt is a 73 y/o male s/p R TKA. Pt received OOB and seated in chair when OT arrived to room. Pt is functioning at S/SBA level overall for completion of LB ADL tasks implementing adaptive strategies discussed during session. Pt is able to perform functional transfers, including on/off toilet at S level using RW. Pt tolerated standing at sink to perform grooming task with setup and no LOB. Reviewed home safety, to include shower transfers for showers when appropriate per d/c instructions and pt verbalized understanding. Pt has walk in shower at home, a RW, shower chair, raised toilet seat height and grab bars available at home. No further acute care OT/ADL concerns or questions at this time. Will sign off.       Discharge Recommendations: pt planning to go to rehab  Further Equipment Recommendations for Discharge: N/A      SUBJECTIVE:   Patient alert   OBJECTIVE DATA SUMMARY:     Past Medical History:   Diagnosis Date    Arthritis     hip, knee    Chronic kidney disease     had previous reaction with kidneys after a bp med, no issues now    Colon polyps     dysplastic    Depression     Diabetes mellitus ([facility] [address] 2017    no meds    Diverticulosis     seen on colonoscopy from 8/20/15    Diverticulosis of sigmoid colon 01/27/10    GERD (gastroesophageal reflux disease)     no meds    Gout     Hepatitis     Hyperlipidemia     Hypertension 15 years    Hypovitaminosis D     Nephrolithiasis     Osteoarthritis of right knee 2/19/2018    Plantar wart     Rectal polyp     seen on colonoscopy from 8/20/15    Thyroid disease     hypothyroidism     Past Surgical History:   Procedure Laterality Date    ENDOSCOPY, COLON, DIAGNOSTIC  01/27/2010    polyp distal rectum, removed; diverticulosis of sigmoid    HX CATARACT REMOVAL Bilateral     HX HEENT  2008    vocal cord polyp removed    HX POLYPECTOMY  01/27/2010    Distal rectum    HX TONSILLECTOMY      LITHOTRIPSY      TOTAL KNEE ARTHROPLASTY Left 1/2014     Barriers to Learning/Limitations: None  Compensate with: visual, verbal, tactile, kinesthetic cues/model    Prior Level of Function/Home Situation:   Home Situation  Home Environment: Rehabilitation facility  # Steps to Enter: 3  Rails to Enter: No  One/Two Story Residence: One story  Living Alone: No  Support Systems: Spouse/Significant Other/Partner, Family member(s)  Patient Expects to be Discharged to[de-identified] Rehabilitation facility  Current DME Used/Available at Home: Walker, rolling, [address] chair, Raised toilet seat, Grab bars  Tub or Shower Type: Shower  [x]     Right hand dominant   []     Left hand dominant    Cognitive/Behavioral Status:  Neurologic State: Alert; Appropriate for age  Orientation Level: Appropriate for age;Oriented X4  Cognition: Appropriate decision making; Appropriate for age attention/concentration; Appropriate safety awareness; Follows commands  Safety/Judgement: Awareness of environment    Skin: ace wrap dressing R LE  Edema: R LE  Coordination:  Coordination: Within functional limits    Balance:  Sitting: Intact  Standing: Intact; With support    Strength:  Strength:  Within functional limits B UE     Tone & Sensation:  Tone: Normal  Range of Motion:  AROM: Within functional limits B UE  Decreased R LE     Functional Mobility and Transfers for ADLs:    Transfers:  Sit to Stand: Supervision       Toilet Transfer : Supervision      ADL Assessment:    Oral Facial Hygiene/Grooming: Supervision;Setup    Bathing: Supervision;Setup    Upper Body Dressing: Setup    Lower Body Dressing: Stand-by assistance    Toileting: Supervision    ADL Intervention:    Grooming  Brushing Teeth: Supervision/set-up    Upper Body Bathing  Bathing Assistance: Supervision/set-up  Position Performed: Standing    Lower Body Bathing  Lower Body : Supervision/set-up  Position Performed: Seated in chair    Upper Body Dressing Assistance  Pullover Shirt: Supervision/set-up    Lower Body Dressing Assistance  Underpants: Supervision/set-up  Pants With Elastic Waist: Supervision/set-up  Socks: Stand-by assistance  Position Performed: Seated in chair    Cognitive Retraining  Safety/Judgement: Awareness of environment    Pain:  Pre-treatment: 2/10 R knee  Post-treatment: 2/10 R knee  Activity Tolerance:   Good  Please refer to the flowsheet for vital signs taken during this treatment. After treatment:   [x]  Patient left in no apparent distress sitting up in chair  []  Patient left in no apparent distress in bed  [x]  Call bell left within reach  []  Nursing notified  []  Caregiver present  []  Bed alarm activated    COMMUNICATION/EDUCATION:   Communication/Collaboration:  [x]      Home safety education was provided and the patient/caregiver indicated understanding. [x]      Patient/family have participated as able and agree with findings and recommendations. []      Patient is unable to participate in plan of care at this time. Thank you for this referral.  Trinidad Bacon, OTR/L  Time Calculation: 23 mins    Eval Complexity: History: LOW Complexity : Brief history review ; Examination: LOW Complexity : 1-3 performance deficits relating to physical, cognitive , or psychosocial skils that result in activity limitations and / or participation restrictions ; Decision Making:MEDIUM Complexity : Patient may present with comorbidities that affect occupational performnce.  Miniml to moderate modification of tasks or assistance (eg, physical or verbal ) with assesment(s) is necessary to enable patient to complete evaluation

## 2018-02-27 NOTE — PROGRESS NOTES
Patient was visited by [facility] volunteer [facility]. Volunteer conducted a Spiritual Care Screening and reported no needs to this . Spiritual Care literature was provided. Chaplains will continue to follow and will provide pastoral care as needed or requested. [address], M.Div.   Board Certified   [phone] - Office

## 2018-02-27 NOTE — PROGRESS NOTES
Progress Note        Patient: Teena Ayala MRN: [id number]  SSN: [id number]    YOB: 1945  Age: 74 y.o. Sex: male      1 Day Post-Op status post Procedure(s) (LRB):  RIGHT TOTAL KNEE REPLACEMENT  (Right)    Admit Date: 2018  Admit Diagnosis: RIGHT KNEE OSTEOARTHRITIS  Osteoarthritis of right knee    Subjective:      Doing well. No complaints. No SOB. No Chest Pain. No Nausea or Vomiting. No problems eating or voiding. Objective:        Temp (24hrs), Av °F (36.7 °C), Min:97.4 °F (36.3 °C), Max:98.5 °F (36.9 °C)    Body mass index is 30.62 kg/(m^2). Patient Vitals for the past 12 hrs:   BP Temp Pulse Resp SpO2   18 0417 141/74 97.9 °F (36.6 °C) 66 15 100 %   18 0000 149/89 97.6 °F (36.4 °C) 66 15 100 %   18 2157 147/88 - 63 - -   18 1934 (!) 152/93 97.9 °F (36.6 °C) 73 15 99 %     Recent Labs      18   0539   HGB  11.3*   HCT  34.1*   NA  145   K  4.5   CL  111*   CO2  27   BUN  17   CREA  1.34*   GLU  129*       Physical Exam:  Vital Signs are Stable. No Acute Distress. Alert and Oriented. Negative Homans sign. Toes AROM Full. Neurovascular exam is normal.    Dressing is Clean, Dry, and Intact. Assessment/Plan:     1. Continue oob/rehab  2.  D/c planning    Continue PT/OT  Continue CPM/ROM    Discharge Plan: SNF-per pt request    Signed By: Vikram Valente MD     2018

## 2018-02-27 NOTE — PROGRESS NOTES
Problem: Mobility Impaired (Adult and Pediatric)  Goal: *Acute Goals and Plan of Care (Insert Text)  In 1-7 days pt will be able to perform:  ST.  Bed mobility:  Rolling L to R to L modified independent for positioning. 2.  Supine to sit to supine S with HR for meals. 3.  Sit to stand to sit S with RW in prep for ambulation. LT.  Gait:  Ambulate >150ft S with RW, WBAT, for home/community mobility. 2.  Activity tolerance: Tolerate up in chair 1-2 hours for ADLs. 3.  Patient/Family Education:  Patient/family to be independent with HEP for follow-up care and safe discharge. Outcome: Progressing Towards Goal  physical Therapy TREATMENT    Patient: China Pina (6 y.o. male)  Date: 2018  Diagnosis: RIGHT KNEE OSTEOARTHRITIS  Osteoarthritis of right knee Osteoarthritis of right knee  Procedure(s) (LRB):  RIGHT TOTAL KNEE REPLACEMENT  (Right) 1 Day Post-Op  Precautions: Fall, WBAT  Chart, physical therapy assessment, plan of care and goals were reviewed. ASSESSMENT:  Pt sitting up in chair upon arrival.  Pt rating pain in R knee 2/10 on numerical pain scale. Pt able to participate in transfer training, gt training and HEP. Pt need vc for improved gt quality w/ RW. Pt BP prior to gt, sitting in chair 148/72; after gt training 172/88. Pt remained light headed throughout session. Pt requested to return sitting in chair upon return to room. Pt given all needs within reach, ice pack to R knee. Nurse Daryn Clinton aware. Recommend rehab upon hospital d/c. Progression toward goals:  [x]      Improving appropriately and progressing toward goals  []      Improving slowly and progressing toward goals  []      Not making progress toward goals and plan of care will be adjusted     PLAN:  Patient continues to benefit from skilled intervention to address the above impairments. Continue treatment per established plan of care.   Discharge Recommendations:  Rehab  Further Equipment Recommendations for Discharge:  N/A     SUBJECTIVE:   Patient stated I feel a little light headed.     OBJECTIVE DATA SUMMARY:   Critical Behavior:  Neurologic State: Alert, Appropriate for age  Orientation Level: Oriented X4  Cognition: Appropriate decision making, Appropriate for age attention/concentration, Appropriate safety awareness, Follows commands  Safety/Judgement: Awareness of environment  Functional Mobility Training:  Bed Mobility:  Scooting: Supervision  Transfers:  Sit to Stand: Contact guard assistance (vc)  Stand to Sit: Contact guard assistance (vc)  Balance:  Sitting: Intact  Standing: Intact; With support   Range Of Motion:  Ambulation/Gait Training:  Distance (ft): 80 Feet (ft)  Assistive Device: Walker, rolling;Gait belt  Ambulation - Level of Assistance: Contact guard assistance  Gait Abnormalities: Antalgic;Decreased step clearance; Step to gait  Right Side Weight Bearing: As tolerated  Base of Support: Shift to left  Stance: Right decreased  Speed/Sidra: Slow  Step Length: Left shortened;Right shortened  Swing Pattern: Left asymmetrical;Right asymmetrical  Interventions: Safety awareness training; Tactile cues; Verbal cues  Neuro Re-Education:  Therapeutic Exercises:   Instructed and participated in LAQ, ankle pumps, ankle circles, R knee flexion sitting in chair and seated marches ~7 reps each on R. Pain:  Pain Scale 1: Numeric (0 - 10)  Pain Intensity 1: 2  Pain Location 1: Knee  Pain Orientation 1: Right  Pain Description 1: Aching  Pain Intervention(s) 1: Ice  Activity Tolerance:   Fair+  Please refer to the flowsheet for vital signs taken during this treatment.   After treatment:   [x] Patient left in no apparent distress sitting up in chair  [] Patient left in no apparent distress in bed  [x] Call bell left within reach  [x] Nursing notified  [] Caregiver present  [] Bed alarm activated      Matilde Living, PT   Time Calculation: 24 mins

## 2018-02-28 LAB
ANION GAP SERPL CALC-SCNC: 6 MMOL/L (ref 3–18)
BUN SERPL-MCNC: 21 MG/DL (ref 7–18)
BUN/CREAT SERPL: 15 (ref 12–20)
CALCIUM SERPL-MCNC: 8.6 MG/DL (ref 8.5–10.1)
CHLORIDE SERPL-SCNC: 108 MMOL/L (ref 100–108)
CO2 SERPL-SCNC: 26 MMOL/L (ref 21–32)
CREAT SERPL-MCNC: 1.37 MG/DL (ref 0.6–1.3)
ERYTHROCYTE [DISTWIDTH] IN BLOOD BY AUTOMATED COUNT: 14 % (ref 11.6–14.5)
GLUCOSE BLD STRIP.AUTO-MCNC: 101 MG/DL (ref 70–110)
GLUCOSE BLD STRIP.AUTO-MCNC: 107 MG/DL (ref 70–110)
GLUCOSE BLD STRIP.AUTO-MCNC: 125 MG/DL (ref 70–110)
GLUCOSE BLD STRIP.AUTO-MCNC: 82 MG/DL (ref 70–110)
GLUCOSE SERPL-MCNC: 110 MG/DL (ref 74–99)
HCT VFR BLD AUTO: 32.2 % (ref 36–48)
HGB BLD-MCNC: 10.7 G/DL (ref 13–16)
MCH RBC QN AUTO: 30.6 PG (ref 24–34)
MCHC RBC AUTO-ENTMCNC: 33.2 G/DL (ref 31–37)
MCV RBC AUTO: 92 FL (ref 74–97)
PLATELET # BLD AUTO: 133 K/UL (ref 135–420)
PMV BLD AUTO: 9.8 FL (ref 9.2–11.8)
POTASSIUM SERPL-SCNC: 5 MMOL/L (ref 3.5–5.5)
RBC # BLD AUTO: 3.5 M/UL (ref 4.7–5.5)
SODIUM SERPL-SCNC: 140 MMOL/L (ref 136–145)
WBC # BLD AUTO: 6.2 K/UL (ref 4.6–13.2)

## 2018-02-28 PROCEDURE — 85027 COMPLETE CBC AUTOMATED: CPT | Performed by: PHYSICIAN ASSISTANT

## 2018-02-28 PROCEDURE — 74011250637 HC RX REV CODE- 250/637: Performed by: PHYSICIAN ASSISTANT

## 2018-02-28 PROCEDURE — 80048 BASIC METABOLIC PNL TOTAL CA: CPT | Performed by: PHYSICIAN ASSISTANT

## 2018-02-28 PROCEDURE — 97116 GAIT TRAINING THERAPY: CPT

## 2018-02-28 PROCEDURE — 74011250637 HC RX REV CODE- 250/637: Performed by: ORTHOPAEDIC SURGERY

## 2018-02-28 PROCEDURE — 82962 GLUCOSE BLOOD TEST: CPT

## 2018-02-28 PROCEDURE — 65270000029 HC RM PRIVATE

## 2018-02-28 PROCEDURE — 36415 COLL VENOUS BLD VENIPUNCTURE: CPT | Performed by: PHYSICIAN ASSISTANT

## 2018-02-28 RX ORDER — BISACODYL 5 MG
10 TABLET, DELAYED RELEASE (ENTERIC COATED) ORAL DAILY PRN
Status: DISCONTINUED | OUTPATIENT
Start: 2018-02-28 | End: 2018-03-01 | Stop reason: HOSPADM

## 2018-02-28 RX ADMIN — Medication 10 ML: at 14:00

## 2018-02-28 RX ADMIN — ALLOPURINOL 200 MG: 100 TABLET ORAL at 08:57

## 2018-02-28 RX ADMIN — ACETAMINOPHEN 650 MG: 325 TABLET ORAL at 15:07

## 2018-02-28 RX ADMIN — MULTIPLE VITAMINS W/ MINERALS TAB 1 TABLET: TAB at 08:57

## 2018-02-28 RX ADMIN — LISINOPRIL 5 MG: 5 TABLET ORAL at 21:04

## 2018-02-28 RX ADMIN — ATORVASTATIN CALCIUM 40 MG: 20 TABLET, FILM COATED ORAL at 21:03

## 2018-02-28 RX ADMIN — CARVEDILOL 6.25 MG: 3.12 TABLET, FILM COATED ORAL at 17:28

## 2018-02-28 RX ADMIN — Medication 10 ML: at 21:05

## 2018-02-28 RX ADMIN — DOCUSATE SODIUM 100 MG: 100 CAPSULE, LIQUID FILLED ORAL at 08:57

## 2018-02-28 RX ADMIN — BISACODYL 10 MG: 5 TABLET, COATED ORAL at 21:03

## 2018-02-28 RX ADMIN — ACETAMINOPHEN 650 MG: 325 TABLET ORAL at 04:15

## 2018-02-28 RX ADMIN — ASPIRIN 325 MG: 325 TABLET, FILM COATED ORAL at 08:57

## 2018-02-28 RX ADMIN — DOCUSATE SODIUM 100 MG: 100 CAPSULE, LIQUID FILLED ORAL at 21:03

## 2018-02-28 RX ADMIN — OXYCODONE HYDROCHLORIDE 5 MG: 5 TABLET ORAL at 06:42

## 2018-02-28 RX ADMIN — VITAMIN D, TAB 1000IU (100/BT) 4000 UNITS: 25 TAB at 08:57

## 2018-02-28 RX ADMIN — PANTOPRAZOLE SODIUM 40 MG: 40 TABLET, DELAYED RELEASE ORAL at 08:57

## 2018-02-28 RX ADMIN — FERROUS SULFATE TAB 325 MG (65 MG ELEMENTAL FE) 325 MG: 325 (65 FE) TAB at 21:03

## 2018-02-28 RX ADMIN — LEVOTHYROXINE SODIUM 50 MCG: 50 TABLET ORAL at 06:43

## 2018-02-28 RX ADMIN — ACETAMINOPHEN 650 MG: 325 TABLET ORAL at 21:03

## 2018-02-28 RX ADMIN — OXYCODONE HYDROCHLORIDE 5 MG: 5 TABLET ORAL at 19:30

## 2018-02-28 RX ADMIN — ACETAMINOPHEN 650 MG: 325 TABLET ORAL at 09:00

## 2018-02-28 RX ADMIN — POLYETHYLENE GLYCOL 3350 17 G: 17 POWDER, FOR SOLUTION ORAL at 08:58

## 2018-02-28 RX ADMIN — OXYCODONE HYDROCHLORIDE 5 MG: 5 TABLET ORAL at 02:31

## 2018-02-28 RX ADMIN — VENLAFAXINE HYDROCHLORIDE 150 MG: 150 CAPSULE, EXTENDED RELEASE ORAL at 08:57

## 2018-02-28 RX ADMIN — OXYCODONE HYDROCHLORIDE 5 MG: 5 TABLET ORAL at 11:11

## 2018-02-28 RX ADMIN — CARVEDILOL 6.25 MG: 3.12 TABLET, FILM COATED ORAL at 08:57

## 2018-02-28 RX ADMIN — FERROUS SULFATE TAB 325 MG (65 MG ELEMENTAL FE) 325 MG: 325 (65 FE) TAB at 15:07

## 2018-02-28 RX ADMIN — ASPIRIN 325 MG: 325 TABLET, FILM COATED ORAL at 17:28

## 2018-02-28 RX ADMIN — FERROUS SULFATE TAB 325 MG (65 MG ELEMENTAL FE) 325 MG: 325 (65 FE) TAB at 08:57

## 2018-02-28 RX ADMIN — Medication 10 ML: at 06:44

## 2018-02-28 NOTE — PROGRESS NOTES
80 - Received report from Kaylan Welch RN. Assumed care of patient at this time. 2015 - Patient in bed at this time. A/O x 4. IV to left forearm intact and patent. Thomas to LLE and plexi's bilaterally. Elastic dressing to RLE CDI. Denies numbness/tingling. Pedal pulses palpable. Lungs CTA. Bowel sounds active to all quadrants. Pain 3/10.      0231 - Pain 5/10. PRN oxycodone pain medication administered at this time. Patient has been educated on side effects. Side effect education sheets have been provided. 4169 - Pain 4/10. PRN oxycodone pain medication administered at this time. Patient has been educated on side effects. Side effect education sheets have been provided. 2220 - Bedside and Verbal shift change report given to Svetlana Scherer RN by Sara Landeros RN. Report included the following information SBAR, Kardex, OR Summary, Intake/Output and MAR.

## 2018-02-28 NOTE — PROGRESS NOTES
Problem: Mobility Impaired (Adult and Pediatric)  Goal: *Acute Goals and Plan of Care (Insert Text)  In 1-7 days pt will be able to perform:  ST.  Bed mobility:  Rolling L to R to L modified independent for positioning. 2.  Supine to sit to supine S with HR for meals. 3.  Sit to stand to sit S with RW in prep for ambulation. LT.  Gait:  Ambulate >150ft S with RW, WBAT, for home/community mobility. 2.  Activity tolerance: Tolerate up in chair 1-2 hours for ADLs. 3.  Patient/Family Education:  Patient/family to be independent with HEP for follow-up care and safe discharge. physical Therapy TREATMENT    Patient: Lenard Cruz (37 y.o. male)  Date: 2018  Diagnosis: RIGHT KNEE OSTEOARTHRITIS  Osteoarthritis of right knee Osteoarthritis of right knee  Procedure(s) (LRB):  RIGHT TOTAL KNEE REPLACEMENT  (Right) 2 Days Post-Op  Precautions: Fall, WBAT   Chart, physical therapy assessment, plan of care and goals were reviewed. ASSESSMENT:  Pt continues to be very motivated to participate with PT. During gait training pt increased his ambulation distance to 120 feet with RW/GB use with an antalgic/step-through gait pattern CGA with verbal cuing required for heel strike instead of forefoot strike. Left pt in bed as requested with all needs met, ice pack to R knee, SCDs applied and R LE elevated. Recommend SNF at time of discharge. Progression toward goals:  [x]      Improving appropriately and progressing toward goals  []      Improving slowly and progressing toward goals  []      Not making progress toward goals and plan of care will be adjusted     PLAN:  Patient continues to benefit from skilled intervention to address the above impairments. Continue treatment per established plan of care.   Discharge Recommendations:  Donny Read  Further Equipment Recommendations for Discharge:  rolling walker     SUBJECTIVE:   Patient stated I am doing ok, my leg feels more swollen today.    OBJECTIVE DATA SUMMARY:   Critical Behavior:  Neurologic State: Alert, Appropriate for age  Orientation Level: Oriented X4  Cognition: Appropriate decision making, Appropriate for age attention/concentration, Appropriate safety awareness, Follows commands  Safety/Judgement: Awareness of environment  Functional Mobility Training:  Bed Mobility:   Supine to Sit: Supervision   Scooting: Supervision   Transfers:  Sit to Stand: Supervision; Additional time  Stand to Sit: Supervision   Balance:  Sitting: Intact  Standing: Intact; With support  Ambulation/Gait Training:  Distance (ft): 120 Feet (ft)  Assistive Device: Walker, rolling;Gait belt  Ambulation - Level of Assistance: Contact guard assistance   Gait Abnormalities: Antalgic;Decreased step clearance  Right Side Weight Bearing: As tolerated   Base of Support: Shift to left  Stance: Right decreased; Left increased  Speed/Sidra: Pace decreased (<100 feet/min)  Step Length: Right shortened;Left shortened  Swing Pattern: Right asymmetrical;Left asymmetrical   Interventions: Visual/Demos; Verbal cues; Tactile cues; Safety awareness training   Therapeutic Exercises:   HEP included ankle pumps, LAQ, knee flexion, marching, quad sets, heel slides x 10 reps; pt unable to perform SLR at this time without assistance  Pain:  Pain Scale 1: Numeric (0 - 10)  Pain Intensity 1: 5  Pain Location 1: Leg  Pain Orientation 1: Right  Pain Description 1: Aching; Sore  Pain Intervention(s) 1: Medication (see MAR)  Activity Tolerance:   Fair+/Good  Please refer to the flowsheet for vital signs taken during this treatment.   After treatment:   [] Patient left in no apparent distress sitting up in chair  [x] Patient left in no apparent distress in bed  [x] Call bell left within reach  [x] Nursing notified  [] Caregiver present  [] Bed alarm activated        Derek Sunil Noe PT, DPT     Time Calculation: 21 mins

## 2018-02-28 NOTE — PROGRESS NOTES
0901 - Patient in chair at this time. A/O x 4. IV to left forearm  intact and patent. Plexis to bilateral feet when in bed and TEDs to left leg. Ace wrap dressing to right leg CDI. No numbness/tingling. Pedal pulses palpable. Lungs CTA. Bowel sounds active to all quadrants. Pain 3/10.       1111-Oxycodone 5 mg given for c/o pain rated 5/10 to right leg.    1200-Pain decreased to 2/10. Shift summary-Patient up to bathroom with walker and one assist, gait steady. Tolerating meals. Pain controlled with PRN pain medication. Voiding without difficulty.

## 2018-02-28 NOTE — PROGRESS NOTES
Spoke with pt today, pt plans for wife to transport to [facility]. in the morning, wife will be at [facility] by 10am. Pt may leave when wife arrives. Plan: pt has been accepted for admission Thursday 3/1 to [place], [place] . Report to [phone]. Please include all hard scripts for controlled substances, med rec and dc summary in packet. Please medicate for pain prior to dc if possible and needed to help offset delay when patient first arrives to facility.

## 2018-02-28 NOTE — PROGRESS NOTES
Problem: Falls - Risk of  Goal: *Absence of Falls  Document Elida Fall Risk and appropriate interventions in the flowsheet.    Outcome: Progressing Towards Goal  Fall Risk Interventions:  Mobility Interventions: Patient to call before getting OOB    Mentation Interventions: Adequate sleep, hydration, pain control    Medication Interventions: Assess postural VS orthostatic hypotension    Elimination Interventions: Call light in reach

## 2018-02-28 NOTE — ROUTINE PROCESS
Bedside shift change report given to [address] (oncoming nurse) by Phuong COLEMAN RN (offgoing nurse). Report included the following information SBAR, Kardex and MAR.

## 2018-02-28 NOTE — PROGRESS NOTES
Problem: Falls - Risk of  Goal: *Absence of Falls  Document Elida Fall Risk and appropriate interventions in the flowsheet.    Outcome: Progressing Towards Goal  Fall Risk Interventions:  Mobility Interventions: Patient to call before getting OOB    Mentation Interventions: Adequate sleep, hydration, pain control    Medication Interventions: Assess postural VS orthostatic hypotension, Patient to call before getting OOB    Elimination Interventions: Call light in reach

## 2018-02-28 NOTE — PROGRESS NOTES
Progress Note        Patient: Yesenia Griffiths MRN: [id number]  SSN: [id number]    YOB: 1945  Age: 74 y.o. Sex: male      2 Days Post-Op status post Procedure(s) (LRB):  RIGHT TOTAL KNEE REPLACEMENT  (Right)    Admit Date: 2018  Admit Diagnosis: RIGHT KNEE OSTEOARTHRITIS  Osteoarthritis of right knee    Subjective:      Doing well. No complaints. No SOB. No Chest Pain. No Nausea or Vomiting. No problems eating or voiding. Objective:        Temp (24hrs), Av.9 °F (36.6 °C), Min:97.2 °F (36.2 °C), Max:98.6 °F (37 °C)    Body mass index is 30.62 kg/(m^2). Patient Vitals for the past 12 hrs:   BP Temp Pulse Resp SpO2   18 0613 156/83 98.6 °F (37 °C) 67 15 100 %   18 0343 154/73 98 °F (36.7 °C) 67 15 100 %   18 2331 134/81 97.9 °F (36.6 °C) 68 15 100 %   18 1917 121/65 97.8 °F (36.6 °C) 71 15 93 %     Recent Labs      18   0358   HGB  10.7*   HCT  32.2*   NA  140   K  5.0   CL  108   CO2  26   BUN  21*   CREA  1.37*   GLU  110*       Physical Exam:  Vital Signs are Stable. No Acute Distress. Alert and Oriented. Negative Homans sign. Toes AROM Full. Neurovascular exam is normal.    Dressing is Clean, Dry, and Intact. Assessment/Plan:     1. Continue oob/rehab  2.  D/c planning    Continue PT/OT  Continue CPM/ROM    Discharge Plan: SNF    Signed By: Dylan Cameron MD     2018

## 2018-02-28 NOTE — ROUTINE PROCESS
Bedside and Verbal shift change report given to TABITHA Alvarez RN (oncoming nurse) by Nain Plascencia. Sergio Flanagan RN (offgoing nurse). Report included the following information SBAR, Kardex, Intake/Output and MAR.

## 2018-03-01 VITALS
TEMPERATURE: 97.3 F | OXYGEN SATURATION: 100 % | WEIGHT: 207.38 LBS | BODY MASS INDEX: 30.72 KG/M2 | HEART RATE: 68 BPM | HEIGHT: 69 IN | DIASTOLIC BLOOD PRESSURE: 75 MMHG | RESPIRATION RATE: 17 BRPM | SYSTOLIC BLOOD PRESSURE: 136 MMHG

## 2018-03-01 LAB
ANION GAP SERPL CALC-SCNC: 11 MMOL/L (ref 3–18)
BUN SERPL-MCNC: 15 MG/DL (ref 7–18)
BUN/CREAT SERPL: 11 (ref 12–20)
CALCIUM SERPL-MCNC: 9.3 MG/DL (ref 8.5–10.1)
CHLORIDE SERPL-SCNC: 109 MMOL/L (ref 100–108)
CO2 SERPL-SCNC: 26 MMOL/L (ref 21–32)
CREAT SERPL-MCNC: 1.37 MG/DL (ref 0.6–1.3)
ERYTHROCYTE [DISTWIDTH] IN BLOOD BY AUTOMATED COUNT: 14 % (ref 11.6–14.5)
GLUCOSE BLD STRIP.AUTO-MCNC: 115 MG/DL (ref 70–110)
GLUCOSE BLD STRIP.AUTO-MCNC: 116 MG/DL (ref 70–110)
GLUCOSE SERPL-MCNC: 133 MG/DL (ref 74–99)
HCT VFR BLD AUTO: 35.3 % (ref 36–48)
HGB BLD-MCNC: 11.8 G/DL (ref 13–16)
MCH RBC QN AUTO: 30.8 PG (ref 24–34)
MCHC RBC AUTO-ENTMCNC: 33.4 G/DL (ref 31–37)
MCV RBC AUTO: 92.2 FL (ref 74–97)
PLATELET # BLD AUTO: 179 K/UL (ref 135–420)
PMV BLD AUTO: 10.1 FL (ref 9.2–11.8)
POTASSIUM SERPL-SCNC: 5.1 MMOL/L (ref 3.5–5.5)
RBC # BLD AUTO: 3.83 M/UL (ref 4.7–5.5)
SODIUM SERPL-SCNC: 146 MMOL/L (ref 136–145)
WBC # BLD AUTO: 7.9 K/UL (ref 4.6–13.2)

## 2018-03-01 PROCEDURE — 74011250637 HC RX REV CODE- 250/637: Performed by: PHYSICIAN ASSISTANT

## 2018-03-01 PROCEDURE — 36415 COLL VENOUS BLD VENIPUNCTURE: CPT | Performed by: PHYSICIAN ASSISTANT

## 2018-03-01 PROCEDURE — 80048 BASIC METABOLIC PNL TOTAL CA: CPT | Performed by: PHYSICIAN ASSISTANT

## 2018-03-01 PROCEDURE — 97116 GAIT TRAINING THERAPY: CPT

## 2018-03-01 PROCEDURE — 74011250637 HC RX REV CODE- 250/637: Performed by: ORTHOPAEDIC SURGERY

## 2018-03-01 PROCEDURE — 82962 GLUCOSE BLOOD TEST: CPT

## 2018-03-01 PROCEDURE — 97110 THERAPEUTIC EXERCISES: CPT

## 2018-03-01 PROCEDURE — 85027 COMPLETE CBC AUTOMATED: CPT | Performed by: PHYSICIAN ASSISTANT

## 2018-03-01 RX ADMIN — FERROUS SULFATE TAB 325 MG (65 MG ELEMENTAL FE) 325 MG: 325 (65 FE) TAB at 09:03

## 2018-03-01 RX ADMIN — POLYETHYLENE GLYCOL 3350 17 G: 17 POWDER, FOR SOLUTION ORAL at 09:04

## 2018-03-01 RX ADMIN — LEVOTHYROXINE SODIUM 50 MCG: 50 TABLET ORAL at 07:44

## 2018-03-01 RX ADMIN — OXYCODONE HYDROCHLORIDE 5 MG: 5 TABLET ORAL at 12:06

## 2018-03-01 RX ADMIN — VENLAFAXINE HYDROCHLORIDE 150 MG: 150 CAPSULE, EXTENDED RELEASE ORAL at 09:03

## 2018-03-01 RX ADMIN — VITAMIN D, TAB 1000IU (100/BT) 4000 UNITS: 25 TAB at 09:03

## 2018-03-01 RX ADMIN — Medication 10 ML: at 07:47

## 2018-03-01 RX ADMIN — PANTOPRAZOLE SODIUM 40 MG: 40 TABLET, DELAYED RELEASE ORAL at 09:03

## 2018-03-01 RX ADMIN — CARVEDILOL 6.25 MG: 3.12 TABLET, FILM COATED ORAL at 07:44

## 2018-03-01 RX ADMIN — ASPIRIN 325 MG: 325 TABLET, FILM COATED ORAL at 09:03

## 2018-03-01 RX ADMIN — ACETAMINOPHEN 650 MG: 325 TABLET ORAL at 03:37

## 2018-03-01 RX ADMIN — DOCUSATE SODIUM 100 MG: 100 CAPSULE, LIQUID FILLED ORAL at 09:03

## 2018-03-01 RX ADMIN — ACETAMINOPHEN 650 MG: 325 TABLET ORAL at 09:06

## 2018-03-01 RX ADMIN — OXYCODONE HYDROCHLORIDE 5 MG: 5 TABLET ORAL at 03:38

## 2018-03-01 RX ADMIN — ALLOPURINOL 200 MG: 100 TABLET ORAL at 09:04

## 2018-03-01 RX ADMIN — MULTIPLE VITAMINS W/ MINERALS TAB 1 TABLET: TAB at 09:03

## 2018-03-01 NOTE — PROGRESS NOTES
Problem: Falls - Risk of  Goal: *Absence of Falls  Document Elida Fall Risk and appropriate interventions in the flowsheet.    Outcome: Progressing Towards Goal  Fall Risk Interventions:  Mobility Interventions: Patient to call before getting OOB, Utilize walker, cane, or other assitive device    Mentation Interventions: Adequate sleep, hydration, pain control    Medication Interventions: Patient to call before getting OOB, Teach patient to arise slowly    Elimination Interventions: Call light in reach, Patient to call for help with toileting needs, Toileting schedule/hourly rounds, Urinal in reach

## 2018-03-01 NOTE — PROGRESS NOTES
Progress Note        Patient: Lo White MRN: [id number]  SSN: [id number]    YOB: 1945  Age: 74 y.o. Sex: male      3 Days Post-Op status post Procedure(s) (LRB):  RIGHT TOTAL KNEE REPLACEMENT  (Right)    Admit Date: 2018  Admit Diagnosis: RIGHT KNEE OSTEOARTHRITIS  Osteoarthritis of right knee    Subjective:      Doing well. No complaints. No SOB. No Chest Pain. No Nausea or Vomiting. No problems eating or voiding. Objective:        Temp (24hrs), Av °F (36.7 °C), Min:97.5 °F (36.4 °C), Max:98.5 °F (36.9 °C)    Body mass index is 30.62 kg/(m^2). Patient Vitals for the past 12 hrs:   BP Temp Pulse Resp SpO2   18 0240 161/86 98.2 °F (36.8 °C) 64 16 100 %   18 2103 147/81 - 60 - -   18 1924 152/85 97.7 °F (36.5 °C) 70 16 100 %     Recent Labs      18   0238   HGB  11.8*   HCT  35.3*   NA  146*   K  5.1   CL  109*   CO2  26   BUN  15   CREA  1.37*   GLU  133*       Physical Exam:  Vital Signs are Stable. No Acute Distress. Alert and Oriented. Negative Homans sign. Toes AROM Full. Neurovascular exam is normal.    Dressing is Clean, Dry, and Intact. Assessment/Plan:     1. Continue oob/rehab  2.  D/c planning    Continue PT/OT  Continue CPM/ROM    Discharge Plan: Home    Signed By: Carla Beyer MD     2018

## 2018-03-01 NOTE — PROGRESS NOTES
2355 - Assumed care of patient at this time. Patient is alert and oriented x 4. Patient denies chest pain, shortness of breath, or numbness or tingling in the upper or lower extremities. Mepilex dressing on the right knee is clean, dry and intact. Venous foot pumps and DIANA hose in place on the patient bilaterally. 18g IV in the left forearm is patent when flushed, with no signs of phlebitis or infiltration. Patient currently experiencing 3/10 pain. Bed is in lowest position with the wheels locked. Siderails x 3 are up. Call bell within reach. Patient is currently resting in bed in no apparent distress. Will continue to monitor. 0100 - Head to toe assessment performed at this time. Patient has no complaints of chest pain or shortness of breath. Denies any numbness or tingling in extremities. Lungs are clear to auscultation. Bowel sounds are present in all 4 quadrants. Patient educated how to  actively manage their pain. Patient encouraged to use the incentive spirometer. Patient educated on the side effects of medications. Explained the importance of ambulation. Instructed the patient not to attempt to get out of bed and/or ambulate without a staff member present. Patient verbalized understanding. Patient left in bed with call light within reach, bed in lowest position with wheels locked, and siderails x 3 up. Will continue to monitor. 0330 - Patient stated pain to be 5/10. Patient received PRN oral pain medication per the [facility]. Patient educated on the side effects of the medications. Patient encouraged to continue to actively manage pain and call for assistance. Will continue to monitor.     2090 - Patient out of bed to a chair for breakfast.

## 2018-03-01 NOTE — PROGRESS NOTES
Problem: Mobility Impaired (Adult and Pediatric)  Goal: *Acute Goals and Plan of Care (Insert Text)  In 1-7 days pt will be able to perform:  ST.  Bed mobility:  Rolling L to R to L modified independent for positioning. 2.  Supine to sit to supine S with HR for meals. 3.  Sit to stand to sit S with RW in prep for ambulation. LT.  Gait:  Ambulate >150ft S with RW, WBAT, for home/community mobility. 2.  Activity tolerance: Tolerate up in chair 1-2 hours for ADLs. 3.  Patient/Family Education:  Patient/family to be independent with HEP for follow-up care and safe discharge. Outcome: Progressing Towards Goal  physical Therapy TREATMENT    Patient: Ramila Beatty (48 y.o. male)  Date: 3/1/2018  Diagnosis: RIGHT KNEE OSTEOARTHRITIS  Osteoarthritis of right knee Osteoarthritis of right knee  Procedure(s) (LRB):  RIGHT TOTAL KNEE REPLACEMENT  (Right) 3 Days Post-Op  Precautions: Fall, WBAT   Chart, physical therapy assessment, plan of care and goals were reviewed. ASSESSMENT:  Patient reported 3/10 pain level on entry. Patient is S with bed mobility and at S level for sit<>supine and mod I sit<>stand. PT ambulated 200' with RW and slow antalgic pattern with good balance . Pt performed TKA ex as documented below. AROM right  knee 20 to 90 degrees, requires mod A with SAQ, SLR and  LAQ. PT left up in chair, ice to right knee, needs in reach. Pt scheduled to transfer to [facility] this afternoon. Progression toward goals:  []      Improving appropriately and progressing toward goals  [x]      Improving slowly and progressing toward goals  []      Not making progress toward goals and plan of care will be adjusted     PLAN:  Patient continues to benefit from skilled intervention to address the above impairments. Continue treatment per established plan of care.   Discharge Recommendations:  Skilled Nursing Facility  Further Equipment Recommendations for Discharge:  N/A     SUBJECTIVE:   Patient stated I am good.     OBJECTIVE DATA SUMMARY:   Critical Behavior:  Neurologic State: Alert, Appropriate for age  Orientation Level: Oriented X4  Cognition: Appropriate decision making, Appropriate for age attention/concentration, Appropriate safety awareness, Follows commands  Safety/Judgement: Awareness of environment  Functional Mobility Training:  Bed Mobility:  Supine to Sit: Supervision  Sit to Supine: Supervision  Scooting: Supervision  Transfers:  Sit to Stand: Modified independent  Stand to Sit: Modified independent  Balance:  Sitting: Intact  Standing: Intact; With support  Ambulation/Gait Training:  Distance (ft): 200 Feet (ft)  Assistive Device: Walker, rolling;Gait belt  Ambulation - Level of Assistance: Supervision  Gait Abnormalities: Antalgic;Decreased step clearance  Right Side Weight Bearing: As tolerated  Base of Support: Shift to left  Stance: Right decreased  Speed/Sidra: Slow  Step Length: Right shortened;Left shortened  Swing Pattern: Right asymmetrical  Interventions: Verbal cues    Therapeutic Exercises:   Patient performed 10 reps of TKA exercises per protocol for right lower extremity(s), including supine ankle pumps, ankle circles quad sets,  heel slides, straight leg raises, short arc quads/ terminal knee extension, seated heel slides/ kneeflexion exercises. LAQ. Pain:  Pain Scale 1: Numeric (0 - 10)  Pain Intensity 1: 3  Pain Location 1: Knee  Pain Orientation 1: Right  Pain Description 1: Sore  Pain Intervention(s) 1: Rest  Activity Tolerance:   good  Please refer to the flowsheet for vital signs taken during this treatment.   After treatment:   [x] Patient left in no apparent distress sitting up in chair  [] Patient left in no apparent distress in bed  [] Call bell left within reach  [] Nursing notified  [] Caregiver present  [] Bed alarm activated      Darryle Shropshire, PT   Time Calculation: 29 mins

## 2018-03-01 NOTE — PROGRESS NOTES
0750  Pt is awake, alert, oriented x 4. Sitting on chair. Mepilex silver to right knee dry and intact. With TEDs on both legs. Plan for pt is to go to [facility] today. Wife to take him to [facility]. 9:09 AM   Pain level 3/10. Does not want pain med at this time. Lungs are clear. Abdomen soft with active BS.    1206   eating lunch. Medicated with Oxycodone 5 mg po for pain level 4/10.    1230   Report given to Nurse  Monica Wolfe at [facility]. 36  Pt discharged per wheelchair accompanied by wife who will bring pt to [facility] via car.

## 2018-03-01 NOTE — ROUTINE PROCESS
Bedside and Verbal shift change report given to RADHA Moore RN (oncoming nurse) by Layla Conrad RN (offgoing nurse). Report included the following information SBAR, Kardex, Intake/Output and MAR.

## 2018-03-01 NOTE — PROGRESS NOTES
19:30 Assessment completed. Lungs are clear bilat. but are slightly decreased in the bases. Mepilex dsg o R knee remains C/D/I with + CMS & + PP. Continues to deny pain or discomfort in calves. Sitting up in the chair with wife @ bedside washing pt up for comfort & changing clothes. Voiding per urinal w/o difficulty. 23:45 Bedside and Verbal shift change report given to [address] [place] [address] (oncoming nurse) by Earl Gibson RN (offgoing nurse). Report included the following information SBAR.

## 2018-03-02 ENCOUNTER — PATIENT OUTREACH (OUTPATIENT)
Dept: INTERNAL MEDICINE CLINIC | Age: 73
End: 2018-03-02

## 2018-03-02 NOTE — PROGRESS NOTES
Transition of Care Coordination/Hospital to SNF:     Date/Time:  3/2/2018 9:43 AM    Patient was admitted to [facility]  on 18 and discharged on 3/1/18 for right total knee replacement. Patient was transferred to [address] [place] [address] for continuation of care. Nurse Navigator(NN) contacted Wilda Lee to verify admission, review discharge orders and reconcile discharge medications. Spoke to "[employer]. Provided introduction to self, and explanation of the Nurses Navigator role. Verified name and  as patient identifiers. Admission verified. Reconciled transfer medications and reviewed discharge orders. Notified JANETH Disla of upcoming speciality appointments and/or appointments that need to be scheduled. Appointments:  Dr. Emory Narayanan 3/14/18 at  2:20 PM    Opportunity to ask questions was provided. Will continue to monitor.

## 2018-03-08 ENCOUNTER — PATIENT OUTREACH (OUTPATIENT)
Dept: CASE MANAGEMENT | Age: 73
End: 2018-03-08

## 2018-03-14 NOTE — PROGRESS NOTES
Community Care Team Documentation for Patient in [facility]     [facility]. Hospital Discharge diagnosis:  RIGHT TOTAL KNEE ARTHROPLASTY. SNF Attending Provider:      Anticipated discharge date from SNF:  3/18/2018      PCP : Aliyah Akbar MD    Nurse Navigator: Mark Muse team rounds completed, updates provided by facility. Dc 3/18/2018 to home with [facility]. PT/OT: participating and progressing with therapy. Medium Risk            18       Total Score        3 Has Seen PCP in Last 6 Months (Yes=3, No=0)    2 . Living with Significant Other. Assisted Living. LTAC. SNF. or   Rehab    13 Charlson Comorbidity Score (Age + Comorbid Conditions)        Criteria that do not apply:    Patient Length of Stay (>5 days = 3)    IP Visits Last 12 Months (1-3=4, 4=9, >4=11)    Pt. Coverage (Medicare=5 , Medicaid, or Self-Pay=4)        Active Ambulatory Problems     Diagnosis Date Noted    Hyperlipidemia     Hypovitaminosis D     Gout 10/21/2011    Family history of colon cancer.   personal hx colon polyps 10/21/2011    Vocal cord nodule 10/21/2013    Osteoarthritis, Status post left total knee replacement 04/23/2014    Essential hypertension 06/24/2015    Type 2 diabetes mellitus without complication ([facility] [address] 41/80/7312    Hypothyroidism due to acquired atrophy of thyroid 06/24/2015    Zoster 07/21/2015    CKD (chronic kidney disease) stage 3, GFR 30-59 ml/min 11/09/2015    Rectal polyp -  seen on colonoscopy from 8/20/15 08/02/2016    Diverticulosis of intestine without bleeding - seen on colonoscopy from 8/20/15 08/02/2016    Major depressive disorder in remission 08/03/2016    Melanoma in situ - on left dorsal forearm 2016 09/16/2016    Erectile dysfunction 01/23/2017    Osteoarthritis of right knee 02/19/2018     Resolved Ambulatory Problems Diagnosis Date Noted    Creatinine elevation 04/27/2014    Nevus 08/23/2016     Past Medical History:   Diagnosis Date    Arthritis     Chronic kidney disease     Colon polyps     Depression     Diabetes mellitus ([facility] [address] 2017    Diverticulosis     Diverticulosis of sigmoid colon 01/27/10    GERD (gastroesophageal reflux disease)     Gout     Hepatitis     Hyperlipidemia     Hypertension 15 years    Hypovitaminosis D     Nephrolithiasis     Osteoarthritis of right knee 2/19/2018    Plantar wart     Rectal polyp     Thyroid disease

## 2018-03-15 ENCOUNTER — PATIENT OUTREACH (OUTPATIENT)
Dept: CASE MANAGEMENT | Age: 73
End: 2018-03-15

## 2018-03-15 NOTE — PROGRESS NOTES
Community Care Team Documentation for Patient in [facility]     Patient discharged [facility] 2/26/2018 - 3/1/2018 to [facility], on 3/1/2018. Hospital Discharge diagnosis:  RIGHT TOTAL KNEE ARTHROPLASTY. SNF Attending Provider:      Anticipated discharge date from SNF:  3/18/2018      PCP : Stacy Baker MD    Nurse Navigator: Mark Dial team rounds completed, updates provided by facility. Dc 3/18/2018 to home with [facility]. PT/OT: continues to progress with therapy. On track for discharge 3/18. Medium Risk            18       Total Score        3 Has Seen PCP in Last 6 Months (Yes=3, No=0)    2 . Living with Significant Other. Assisted Living. LTAC. SNF. or   Rehab    13 Charlson Comorbidity Score (Age + Comorbid Conditions)        Criteria that do not apply:    Patient Length of Stay (>5 days = 3)    IP Visits Last 12 Months (1-3=4, 4=9, >4=11)    Pt. Coverage (Medicare=5 , Medicaid, or Self-Pay=4)        Active Ambulatory Problems     Diagnosis Date Noted    Hyperlipidemia     Hypovitaminosis D     Gout 10/21/2011    Family history of colon cancer.   personal hx colon polyps 10/21/2011    Vocal cord nodule 10/21/2013    Osteoarthritis, Status post left total knee replacement 04/23/2014    Essential hypertension 06/24/2015    Type 2 diabetes mellitus without complication ([facility] [address] 59/29/0142    Hypothyroidism due to acquired atrophy of thyroid 06/24/2015    Zoster 07/21/2015    CKD (chronic kidney disease) stage 3, GFR 30-59 ml/min 11/09/2015    Rectal polyp -  seen on colonoscopy from 8/20/15 08/02/2016    Diverticulosis of intestine without bleeding - seen on colonoscopy from 8/20/15 08/02/2016    Major depressive disorder in remission 08/03/2016    Melanoma in situ - on left dorsal forearm 2016 09/16/2016    Erectile dysfunction 01/23/2017    Osteoarthritis of right knee 02/19/2018     Resolved Ambulatory Problems     Diagnosis Date Noted    Creatinine elevation 04/27/2014    Nevus 08/23/2016     Past Medical History:   Diagnosis Date    Arthritis     Chronic kidney disease     Colon polyps     Depression     Diabetes mellitus ([facility] [address] 2017    Diverticulosis     Diverticulosis of sigmoid colon 01/27/10    GERD (gastroesophageal reflux disease)     Gout     Hepatitis     Hyperlipidemia     Hypertension 15 years    Hypovitaminosis D     Nephrolithiasis     Osteoarthritis of right knee 2/19/2018    Plantar wart     Rectal polyp     Thyroid disease

## 2018-03-20 ENCOUNTER — PATIENT OUTREACH (OUTPATIENT)
Dept: INTERNAL MEDICINE CLINIC | Age: 73
End: 2018-03-20

## 2018-03-20 ENCOUNTER — HOSPITAL ENCOUNTER (OUTPATIENT)
Dept: PHYSICAL THERAPY | Age: 73
Discharge: HOME | End: 2018-03-20
Payer: MEDICARE

## 2018-03-20 ENCOUNTER — OFFICE VISIT (OUTPATIENT)
Dept: INTERNAL MEDICINE CLINIC | Age: 73
End: 2018-03-20

## 2018-03-20 VITALS
HEIGHT: 69 IN | BODY MASS INDEX: 30.66 KG/M2 | WEIGHT: 207 LBS | DIASTOLIC BLOOD PRESSURE: 73 MMHG | SYSTOLIC BLOOD PRESSURE: 128 MMHG | TEMPERATURE: 96.4 F | OXYGEN SATURATION: 98 % | RESPIRATION RATE: 16 BRPM | HEART RATE: 63 BPM

## 2018-03-20 DIAGNOSIS — M17.11 PRIMARY OSTEOARTHRITIS OF RIGHT KNEE: Primary | ICD-10-CM

## 2018-03-20 DIAGNOSIS — R60.0 LEG EDEMA, RIGHT: ICD-10-CM

## 2018-03-20 DIAGNOSIS — E11.9 TYPE 2 DIABETES MELLITUS WITHOUT COMPLICATION, UNSPECIFIED LONG TERM INSULIN USE STATUS: ICD-10-CM

## 2018-03-20 PROCEDURE — G8978 MOBILITY CURRENT STATUS: HCPCS

## 2018-03-20 PROCEDURE — G8979 MOBILITY GOAL STATUS: HCPCS

## 2018-03-20 PROCEDURE — 97162 PT EVAL MOD COMPLEX 30 MIN: CPT

## 2018-03-20 PROCEDURE — 97110 THERAPEUTIC EXERCISES: CPT

## 2018-03-20 NOTE — PROGRESS NOTES
1. Have you been to the ER, urgent care clinic since your last visit? Hospitalized since your last visit? Yes Reason for visit: right knee replacement    2. Have you seen or consulted any other health care providers outside of the [address] [place] [address] since your last visit? Include any pap smears or colon screening.  No

## 2018-03-20 NOTE — PROGRESS NOTES
TRANSITIONS OF CARE FACE-TO-FACE VISIT  INTERNISTS OF [facility]:  3/20/2018, MRN: [id number]  Chief Complaint   Patient presents with   [facility] Follow Up     knee replacement         Lenard Cruz is a 74 y.o. male and presents to clinic after recent hospitalization. Subjective:   Discharged from: [facility]    Summary of hospitalization problems/diagnoses:   Pt is a 66yo male with h/o ED, melanoma in situ on left dorsal forearm s/p removal, MDD, rectal polyp, diverticulosis, CKD stage 3, HTN, type 2 DM (foot exams are done by Karuna Sanches), hypothyroidism, DJD, gout, vocal cord nodule, vitamin D deficiency, RBBB (s/p normal stress test 2018), and HLD. The patient underwent a right knee replacement surgery on February 26, 2018. He presents today for follow-up. He is scheduled to see Anitra Ortiz 5/2/18 per pt hx. He has some swelling along his right knee but it is slowly improving. He completed aspirin as DVT ppx w/o adverse side effects. He is now taking his 81 mg of aspirin per day. His right lower extremity edema improves with elevation. He is participating actively in physical therapy. He is trying to adhere to a diabetic diet. He reports no adverse side effects from his medication. He reports a 4 out of 10 pain along his right knee. Pain is well controlled with use of Percocet as needed. He reports no adverse side effects from Percocet at this time. He is taking stool softener to prevent constipation. Current Outpatient Prescriptions on File Prior to Visit   Medication Sig Dispense Refill    oxyCODONE-acetaminophen (PERCOCET) 5-325 mg per tablet Take 1 to 2 tab PO q 4-6 hrs prn pain 60 Tab 0    lisinopril (PRINIVIL, ZESTRIL) 5 mg tablet Take 1 Tab by mouth daily. (Patient taking differently: Take 5 mg by mouth nightly.) 90 Tab 3    atorvastatin (LIPITOR) 40 mg tablet Take 1 Tab by mouth daily.  (Patient taking differently: Take 40 mg by mouth nightly.) 90 Tab 3    allopurinol (ZYLOPRIM) 100 mg tablet Take 2 Tabs by mouth daily. 180 Tab 3    levothyroxine (SYNTHROID) 50 mcg tablet Take 1 Tab by mouth Daily (before breakfast). 90 Tab 4    carvedilol (COREG) 6.25 mg tablet Take 1 Tab by mouth two (2) times daily (with meals). 30 Tab 11    terbinafine HCl (LAMISIL) 250 mg tablet Take 250 mg by mouth daily. Indications: Taken for 1 full week a month; and then not for another 3 weeks      venlafaxine-SR (EFFEXOR-XR) 150 mg capsule Take 150 mg by mouth daily.  sildenafil (REVATIO) 20 mg tablet Take po 40 mg once daily 1 hour (range: 30 minutes to 4 hours) before sexual activity as needed; maximum dose: 100 mg once daily 30 Tab 11    multivitamin (ONE A DAY) tablet Take 1 Tab by mouth daily.  Cholecalciferol, Vitamin D3, (VITAMIN D) 2,000 unit Cap Take 4,000 Units by mouth daily.  [] aspirin (ASPIRIN) 325 mg tablet Take 1 Tab by mouth two (2) times a day for 21 days. 42 Tab 0     No current facility-administered medications on file prior to visit. Medication changes: no  Medication list updated:  yes  Needs referral or labs:  no  Treatment Barriers: no      Patient Active Problem List    Diagnosis Date Noted    Osteoarthritis of right knee 2018    Erectile dysfunction 2017    Melanoma in situ - on left dorsal forearm 2016 2016    Major depressive disorder in remission 2016    Rectal polyp -  seen on colonoscopy from 8/20/15 2016    Diverticulosis of intestine without bleeding - seen on colonoscopy from 8/20/15 2016    CKD (chronic kidney disease) stage 3, GFR 30-59 ml/min 2015    Essential hypertension 2015    Type 2 diabetes mellitus without complication ([facility] [address]     Hypothyroidism due to acquired atrophy of thyroid 2015    Osteoarthritis, Status post left total knee replacement 2014    Vocal cord nodule 10/21/2013    Gout 10/21/2011    Family history of colon cancer.   personal hx colon polyps 10/21/2011    Hyperlipidemia     Hypovitaminosis D        Current Outpatient Prescriptions   Medication Sig Dispense Refill    oxyCODONE-acetaminophen (PERCOCET) 5-325 mg per tablet Take 1 to 2 tab PO q 4-6 hrs prn pain 60 Tab 0    lisinopril (PRINIVIL, ZESTRIL) 5 mg tablet Take 1 Tab by mouth daily. (Patient taking differently: Take 5 mg by mouth nightly.) 90 Tab 3    atorvastatin (LIPITOR) 40 mg tablet Take 1 Tab by mouth daily. (Patient taking differently: Take 40 mg by mouth nightly.) 90 Tab 3    allopurinol (ZYLOPRIM) 100 mg tablet Take 2 Tabs by mouth daily. 180 Tab 3    levothyroxine (SYNTHROID) 50 mcg tablet Take 1 Tab by mouth Daily (before breakfast). 90 Tab 4    carvedilol (COREG) 6.25 mg tablet Take 1 Tab by mouth two (2) times daily (with meals). 30 Tab 11    terbinafine HCl (LAMISIL) 250 mg tablet Take 250 mg by mouth daily. Indications: Taken for 1 full week a month; and then not for another 3 weeks      venlafaxine-SR (EFFEXOR-XR) 150 mg capsule Take 150 mg by mouth daily.  sildenafil (REVATIO) 20 mg tablet Take po 40 mg once daily 1 hour (range: 30 minutes to 4 hours) before sexual activity as needed; maximum dose: 100 mg once daily 30 Tab 11    multivitamin (ONE A DAY) tablet Take 1 Tab by mouth daily.  Cholecalciferol, Vitamin D3, (VITAMIN D) 2,000 unit Cap Take 4,000 Units by mouth daily.          Allergies   Allergen Reactions    Amlodipine Other (comments)     BLE edema       Past Medical History:   Diagnosis Date    Arthritis     hip, knee    Chronic kidney disease     had previous reaction with kidneys after a bp med, no issues now    Colon polyps     dysplastic    Depression     Diabetes mellitus ([facility] [address] 2017    no meds    Diverticulosis     seen on colonoscopy from 8/20/15    Diverticulosis of sigmoid colon 01/27/10    GERD (gastroesophageal reflux disease)     no meds    Gout     Hepatitis     Hyperlipidemia     Hypertension 15 years    Hypovitaminosis D     Nephrolithiasis     Osteoarthritis of right knee 2/19/2018    Plantar wart     Rectal polyp     seen on colonoscopy from 8/20/15    Thyroid disease     hypothyroidism       Past Surgical History:   Procedure Laterality Date    ENDOSCOPY, COLON, DIAGNOSTIC  01/27/2010    polyp distal rectum, removed; diverticulosis of sigmoid    HX CATARACT REMOVAL Bilateral     HX HEENT  2008    vocal cord polyp removed    HX POLYPECTOMY  01/27/2010    Distal rectum    HX TONSILLECTOMY      LITHOTRIPSY      TOTAL KNEE ARTHROPLASTY Left 1/2014       Family History   Problem Relation Age of Onset   [facility] Arthritis-rheumatoid Mother     Hypertension Mother     Elevated Lipids Mother     Thyroid Disease Sister     Cancer Father      colon    Heart Disease Other     Hypertension Other     Cancer Other      breast    Heart Disease Maternal Grandmother     Dementia Maternal Grandfather     Cancer Paternal Grandmother      breast    No Known Problems Paternal Grandfather        Social History   Substance Use Topics    Smoking status: Former Smoker     Packs/day: 1.00     Years: 6.00     Types: Cigarettes     Quit date: 1/1/1992    Smokeless tobacco: Never Used    Alcohol use No      Comment: wine with dinner sometimes       ROS   Review of Systems   Constitutional: Negative for chills and fever. HENT: Negative for ear pain and sore throat. Eyes: Negative for blurred vision and pain. Respiratory: Negative for cough and shortness of breath. Cardiovascular: Positive for leg swelling. Negative for chest pain. Gastrointestinal: Negative for abdominal pain, blood in stool and melena. Genitourinary: Negative for dysuria and hematuria. Musculoskeletal: Positive for joint pain (knee pain is tight). Negative for myalgias. Skin: Negative for rash. Neurological: Negative for tingling, focal weakness and headaches. Endo/Heme/Allergies: Does not bruise/bleed easily. Psychiatric/Behavioral: Negative for substance abuse. Objective     Vitals:    03/20/18 1133   BP: 128/73   Pulse: 63   Resp: 16   Temp: 96.4 °F (35.8 °C)   SpO2: 98%   Weight: 207 lb (93.9 kg)   Height: 5' 9\" (1.753 m)   PainSc:   3   PainLoc: Knee       Physical Exam   Constitutional: He is oriented to person, place, and time and well-developed, well-nourished, and in no distress. HENT:   Head: Normocephalic and atraumatic. Right Ear: External ear normal.   Left Ear: External ear normal.   Nose: Nose normal.   Mouth/Throat: Oropharynx is clear and moist. No oropharyngeal exudate. Eyes: Conjunctivae and EOM are normal. Pupils are equal, round, and reactive to light. Right eye exhibits no discharge. Left eye exhibits no discharge. No scleral icterus. Neck: Neck supple. Cardiovascular: Normal rate, regular rhythm, normal heart sounds and intact distal pulses. Pulmonary/Chest: Effort normal and breath sounds normal. No respiratory distress. He has no wheezes. He has no rales. Abdominal: Soft. Bowel sounds are normal. He exhibits no distension. There is no tenderness. There is no rebound and no guarding. Musculoskeletal: He exhibits no edema (BUE) or tenderness (BUE). RLE with pitting edema. LLE is w/o edema. He has swelling along his right knee jt. No erythema is present. His surgical incision is intact. He has some TTP along the medial aspect of his right patella jt. Lymphadenopathy:     He has no cervical adenopathy. Neurological: He is alert and oriented to person, place, and time. He exhibits normal muscle tone. Gait normal.   Skin: Skin is warm and dry. Psychiatric: Affect normal.   Nursing note and vitals reviewed.       LABS   Data Review:   Lab Results   Component Value Date/Time    WBC 7.9 03/01/2018 02:38 AM    HGB 11.8 (L) 03/01/2018 02:38 AM    HCT 35.3 (L) 03/01/2018 02:38 AM    PLATELET 598 38/06/7870 02:38 AM    MCV 92.2 03/01/2018 02:38 AM       Lab Results   Component Value Date/Time    Sodium 146 (H) 03/01/2018 02:38 AM    Potassium 5.1 03/01/2018 02:38 AM    Chloride 109 (H) 03/01/2018 02:38 AM    CO2 26 03/01/2018 02:38 AM    Anion gap 11 03/01/2018 02:38 AM    Glucose 133 (H) 03/01/2018 02:38 AM    BUN 15 03/01/2018 02:38 AM    Creatinine 1.37 (H) 03/01/2018 02:38 AM    BUN/Creatinine ratio 11 (L) 03/01/2018 02:38 AM    GFR est AA >60 03/01/2018 02:38 AM    GFR est non-AA 51 (L) 03/01/2018 02:38 AM    Calcium 9.3 03/01/2018 02:38 AM       Lab Results   Component Value Date/Time    Cholesterol, total 146 12/06/2017 09:19 AM    HDL Cholesterol 39 (L) 12/06/2017 09:19 AM    LDL, calculated 89.4 12/06/2017 09:19 AM    VLDL, calculated 17.6 12/06/2017 09:19 AM    Triglyceride 88 12/06/2017 09:19 AM    CHOL/HDL Ratio 3.7 12/06/2017 09:19 AM       Lab Results   Component Value Date/Time    Hemoglobin A1c 6.0 (H) 01/30/2018 08:17 AM    Hemoglobin A1c (POC) 6.3 08/03/2016 09:37 AM       Assessment/Plan:   Lab review: labs are reviewed in the EHR  Referrals: not needed  Complexity: Moderate    Community resources identified for patient: none needed  Durable Medical Equipment: none needed      1. Right Knee DJD: S/p knee replacement. He took high dose aspirin for DVT ppx. Has significant edema along his RLE - likely postoperative edema. - Will r/o DVT with a RLE PVL - the pt was instructed to elevated his RLE and if his edema resolves with elevation, then no PVL study is warranted. If his edema does not improve/resolve, then he was instructed to get the PVL study to r/o DVT. - I encouraged  the patient to follow-up with Dr. Kelly Yun and to continue with physical therapy as planned. 2. DM Type 2:  I instructed him to maintain a diabetic diet. Continue with medication as prescribed. I will recheck his A1c at his 3 month follow-up appointment. A. Key points we discussed today:  1.  Pt instructed to call our office if symptoms worsen or if the pt/caregiver has any questions. 2. Handout given     Orders Placed This Encounter    DUPLEX LOWER EXT VENOUS RIGHT     Standing Status:   Future     Standing Expiration Date:   4/20/2019     B. New labs/medications ordered today:   1. A new medication list was given to the patient/family/caregiver. The medication list has been updated. A new medication list was given today to the patient. I have discussed the diagnosis with the patient and the intended plan as seen in the above orders. The patient has received an after-visit summary and questions were answered concerning future plans. I have discussed medication side effects and warnings with the patient as well. I have reviewed the plan of care with the patient, accepted their input and they are in agreement with the treatment goals. All questions were answered. The patient understands the plan of care. Handouts provided today with above information. Pt instructed if symptoms worsen to call the office or report to the ED for continued care. Greater than 50% of the visit time was spent in counseling and/or coordination of care. Follow-up Disposition:  Return in about 3 months (around 6/20/2018) for BP check, DM check.   Initial transitional care contact was made on: 3/2/18

## 2018-03-20 NOTE — PROGRESS NOTES
Hospital Discharge Follow-Up      Date/Time:  3/20/2018 1:50 PM     Patient was admitted to [facility]  on 2/26/18 and discharged on 3/1/18 for right total knee replacement . Was transferred to [address] [place] [address] for continuation of care. The physician discharge summary was available at the time of outreach. Inpatient RRAT score: 23    Patient attended face-to-face transition of care with Dr. Temo Lorenzana (PCP) today.

## 2018-03-20 NOTE — MR AVS SNAPSHOT
[address] [facility] [address] 
 
 
 [address], [facility] [address] [facility] [address] 
[phone] Patient: Nirali Ashley MRN: [id number] DPD:5/9/9107 Visit Information Date & Time Provider Department Dept. Phone Encounter #  
 3/20/2018 11:30 AM Charlotte Blackwell MD [facility] of [address] [place] [address] [phone] [id number] Follow-up Instructions Return in about 3 months (around 6/20/2018) for BP check, DM check. Your Appointments 6/20/2018  8:30 AM  
Office Visit with Charlotte Blackwell MD  
[facility] of [address] [place] [address]) Appt Note: for BP check, DM check. [address], [address] [place]  
  
   
 [address], [address]  
  
    
 8/13/2018  8:00 AM  
Office Visit with Charlotte Blackwell MD  
[facility] of [address] [place] [address]) Appt Note: 6 month f/u  
 [address] [address] [address] [facility] [address]  
[phone] Upcoming Health Maintenance Date Due DTaP/Tdap/Td series (1 - Tdap) 1/2/2007 EYE EXAM RETINAL OR DILATED Q1 6/1/2015 GLAUCOMA SCREENING Q2Y 6/1/2016 HEMOGLOBIN A1C Q6M 7/30/2018 MICROALBUMIN Q1 8/10/2018 MEDICARE YEARLY EXAM 8/11/2018 FOOT EXAM Q1 11/20/2018 LIPID PANEL Q1 12/6/2018 COLONOSCOPY 8/20/2020 Allergies as of 3/20/2018  Review Complete On: 3/20/2018 By: Charlotte Blackwell MD  
  
 Severity Noted Reaction Type Reactions Amlodipine  08/23/2016   Intolerance Other (comments) BLE edema Current Immunizations  Reviewed on 6/24/2015 Name Date Influenza High Dose Vaccine PF 10/5/2017, 10/24/2016, 10/2/2015 Influenza Vaccine 11/20/2014, 10/15/2013 Influenza Vaccine Split 11/9/2012  2:07 PM, 10/21/2011 Pneumococcal Conjugate (PCV-13) 6/24/2015 10:06 AM  
 Pneumococcal Polysaccharide (PPSV-23) 8/10/2017  8:43 AM  
 Pneumococcal Vaccine (Unspecified Type) 1/1/2007 Td 1/1/2007 Zoster Vaccine, Live 2/1/2013  2:19 PM  
  
 Not reviewed this visit You Were Diagnosed With   
  
 Codes Comments Leg edema, right    -  Primary ICD-10-CM: R60.0 ICD-9-CM: 942. 3 Vitals BP Pulse Temp Resp Height(growth percentile) Weight(growth percentile) 128/73 63 96.4 °F (35.8 °C) 16 5' 9\" (1.753 m) 207 lb (93.9 kg) SpO2 BMI Smoking Status 98% 30.57 kg/m2 Former Smoker Vitals History BMI and BSA Data Body Mass Index Body Surface Area 30.57 kg/m 2 2.14 m 2 Preferred Pharmacy Pharmacy Name Phone [facility] [address], [address] [phone] Your Updated Medication List  
  
   
This list is accurate as of 3/20/18 11:56 AM.  Always use your most recent med list.  
  
  
  
  
 allopurinol 100 mg tablet Commonly known as:  Jalaine Peeks Take 2 Tabs by mouth daily. atorvastatin 40 mg tablet Commonly known as:  LIPITOR Take 1 Tab by mouth daily. carvedilol 6.25 mg tablet Commonly known as:  Kip Evener Take 1 Tab by mouth two (2) times daily (with meals). levothyroxine 50 mcg tablet Commonly known as:  SYNTHROID Take 1 Tab by mouth Daily (before breakfast). lisinopril 5 mg tablet Commonly known as:  Eli Cockayne Take 1 Tab by mouth daily. multivitamin tablet Commonly known as:  ONE A DAY Take 1 Tab by mouth daily. oxyCODONE-acetaminophen 5-325 mg per tablet Commonly known as:  PERCOCET Take 1 to 2 tab PO q 4-6 hrs prn pain  
  
 sildenafil (antihypertensive) 20 mg tablet Commonly known as:  REVATIO Take po 40 mg once daily 1 hour (range: 30 minutes to 4 hours) before sexual activity as needed; maximum dose: 100 mg once daily  
  
 terbinafine HCl 250 mg tablet Commonly known as:  LAMISIL Take 250 mg by mouth daily. Indications: Taken for 1 full week a month; and then not for another 3 weeks  
  
 venlafaxine- mg capsule Commonly known as:  EFFEXOR-XR Take 150 mg by mouth daily. VITAMIN D 2,000 unit Cap capsule Generic drug:  Cholecalciferol (Vitamin D3) Take 4,000 Units by mouth daily. Follow-up Instructions Return in about 3 months (around 6/20/2018) for BP check, DM check. To-Do List   
 03/20/2018 Imaging:  DUPLEX LOWER EXT VENOUS RIGHT   
  
 03/22/2018 10:30 AM  
  Appointment with Elle Zhang at [address] [place] [address] ([phone])  
  
 03/23/2018 4:00 PM  
  Appointment with Elle Zhang at [address] [place] [address] ([phone])  
  
 03/27/2018 9:30 AM  
  Appointment with Elle Zhang at [address] [place] [address] ([phone])  
  
 03/29/2018 9:00 AM  
  Appointment with Elle Zhang at [address] [place] [address] ([phone])  
  
 03/30/2018 11:30 AM  
  Appointment with Elle Zhang at [address] [place] [address] ([phone]) 04/02/2018 8:00 AM  
  Appointment with Lawanda Stockton PT at [address] [place] [address] ([phone]) 04/04/2018 9:00 AM  
  Appointment with Elle Zhang at [address] [place] [address] ([phone]) 04/06/2018 9:00 AM  
  Appointment with Elle Zhang at [address] [place] [address] ([phone]) 04/09/2018 9:00 AM  
  Appointment with Carina Miller PT at [address] [place] [address] ([phone]) 04/11/2018 9:00 AM  
  Appointment with Elle Zhang at [address] [place] [address] ([phone]) 04/13/2018 9:00 AM  
  Appointment with Carina Miller PT at [address] [place] [address] ([phone]) [facility]! Dear Johnathon Ulloa: Thank you for requesting a [employer] account. Our records indicate that you already have an active [employer] account. You can access your account anytime at [email or web address] Did you know that you can access your hospital and ER discharge instructions at any time in [employer]? You can also review all of your test results from your hospital stay or ER visit. Additional Information If you have questions, please visit the Frequently Asked Questions section of the [email or web address] website at [email or web address] Remember, [employer] is NOT to be used for urgent needs. For medical emergencies, dial 911. Now available from your iPhone and Android! Please provide this summary of care documentation to your next provider. Your primary care clinician is listed as Zoe Lo. If you have any questions after today's visit, please call [phone].

## 2018-03-20 NOTE — PROGRESS NOTES
PT DAILY TREATMENT NOTE - [facility] 3-16    Patient Name: Teena Ayala  Date:3/20/2018  : 1945  [x]  Patient  Verified  [place]or: [place] MED[place] / Plan: [place] MEDICARE PART A & B / Product Type: Medicare /    In time:9:00  Out time:9:35  Total Treatment Time (min): 35  Total Timed Codes (min): 8  1:1 Treatment Time ( [address] only): 35   Visit #: 1 of 12    Treatment Area: Right knee pain [M25.561]    SUBJECTIVE  Pain Level (0-10 scale): 410  Any medication changes, allergies to medications, adverse drug reactions, diagnosis change, or new procedure performed?: [x] No    [] Yes (see summary sheet for update)  Subjective functional status/changes:   [x] See paper initial evaluation form in patient chart      OBJECTIVE    27 min [x]Eval                  []Re-Eval       8 min Therapeutic Exercise:  [] See flow sheet : Reviewed current HEP with Pt and emphasized knee extension exercises of quad sets and prone knee hang, even adding plastic bag with small soup can   Rationale: increase ROM to improve the patients ability to drive and amb with normalized gait pattern          With   [x] TE   [] TA   [] neuro   [] other: Patient Education: [x] Review HEP    [] Progressed/Changed HEP based on:   [] positioning   [] body mechanics   [] transfers   [] heat/ice application    [] other:      Other Objective/Functional Measures: FOTO 44 pts     Pain Level (0-10 scale) post treatment: 4/10    ASSESSMENT/Changes in Function:     Patient will continue to benefit from skilled PT services to address functional mobility deficits, address ROM deficits, address strength deficits, analyze and address soft tissue restrictions, analyze and cue movement patterns, analyze and modify body mechanics/ergonomics, assess and modify postural abnormalities, address imbalance/dizziness and instruct in home and community integration to attain remaining goals.      [x]  See Plan of Care         PLAN  []  Upgrade activities as tolerated     [x]  Continue plan of care  []  Update interventions per flow sheet       []  Discharge due to:_  []  Other:_      Alexis Ahumada, PT 3/20/2018  9:43 AM

## 2018-03-20 NOTE — PROGRESS NOTES
[facility] - Keturah Emanuel  [facility] [address]  [phone] fax  Plan of Care/ Statement of Necessity for Physical Therapy Services    Patient name: Emeka Van Start of Care: 3/20/2018   Referral source: Shin Betancourt MD : 1945    Medical Diagnosis: Right knee pain [M25.561]   Onset Date:18 (surgery)    Treatment Diagnosis: Right Knee Pain   Prior Hospitalization: see medical history Provider#: [id number]   Medications: Verified on Patient summary List    Comorbidities: Arthritis; HTN; DM Type II; Gastrointestinal issues; hx of kidney stones; Depression; hx of Hepatitis A   Prior Level of Function: Retired; lives in [place] home with spouse; active individual      The Plan of Care and following information is based on the information from the initial evaluation. Assessment/ key information: Pt is a 74 y.o. male who presents with c/o right knee pain, decreased ROM and strength. Pt has 20+ year hx of right knee pain secondary to OA that did not resolve with conservative management, resulting in need for right TKR on 18. Pt was in the hospital x 3 days, then transferred to SNF x two weeks. Pt was then D/C to home. Pt navigates home without A.D but uses SPC in community. Pt is cleared to drive but has not done yet. Pt is unable to perform any household chores, yardwork, or play with grandchildren - all activities he would like to resume. Pt notes starting to sleep better at night. Pt has increased stiffness with sit to stand transfers and prolonged standing/amb. Upon exam, Pt exhibited palpable tenderness along medial/lateral jt lines, ITB, distal HS; right knee jt effusion as evidenced by increased girth measurements; decreased right knee AROM of -15-93 deg with pain; inflexibility in right quad/HS; pain and increased left LE WB with squatting; and inability to perform right SLS.   Pt takes stairs with step to pattern and amb with antalgic limp right LE. Pt would benefit from skilled PT to address above deficits to improve Pt's function and ability to return to prior activity level without pain or difficulty. Evaluation Complexity History MEDIUM  Complexity : 1-2 comorbidities / personal factors will impact the outcome/ POC ; Examination MEDIUM Complexity : 3 Standardized tests and measures addressing body structure, function, activity limitation and / or participation in recreation  ;Presentation MEDIUM Complexity : Evolving with changing characteristics  ; Clinical Decision Making MEDIUM Complexity : FOTO score of 26-74  Overall Complexity Rating: MEDIUM  Problem List: pain affecting function, decrease ROM, decrease strength, edema affecting function, impaired gait/ balance, decrease ADL/ functional abilitiies, decrease activity tolerance, decrease flexibility/ joint mobility and decrease transfer abilities   Treatment Plan may include any combination of the following: Therapeutic exercise, Therapeutic activities, Neuromuscular re-education, Physical agent/modality, Gait/balance training, Manual therapy, Patient education and Stair training  Patient / Family readiness to learn indicated by: asking questions, trying to perform skills and interest  Persons(s) to be included in education: patient (P)  Barriers to Learning/Limitations: None  Patient Goal (s): Straightening and strengthening leg; ability to drive and work pedals.   Patient Self Reported Health Status: good  Rehabilitation Potential: good    Short Term Goals: To be accomplished in 1 weeks:  Goal: Pt to continue with current HEP with focus on knee extension exercises to work towards full terminal knee extension. Status at last note/certification: Reviewed with Pt  Long Term Goals: To be accomplished in 4 weeks:  Goal: Pt to increase right knee AROM to -5-110 deg or > to normalize gait pattern for easier navigation in community.   Status at last note/certification: -23-00 deg right knee  Goal: Pt to perform right SLS x 30 seconds without LOB to work towards reciprocal pattern on porch steps. Status at last note/certification: unable to perform  Goal: Pt to perform at least 5 double leg squats without deviations to bend and  objects without difficulty. Status at last note/certification: pain and increased left LE WB  Goal: Pt to amb community distances without deviations, fatigue, or A.D for increased activity tolerance. Status at last note/certification: uses [facility] distances with antalgic limp  Goal: Pt to report FOTO score of 62 pts to show improved function. Status at last note/certification: FOTO 44 pts    Frequency / Duration: Patient to be seen 3 times per week for 4 weeks. Patient/ Caregiver education and instruction: Diagnosis, prognosis, exercises   [x]  Plan of care has been reviewed with PTA    G-Codes (GP)  Mobility   Current  CK= 40-59%   Goal  CJ= 20-39%    The severity rating is based on clinical judgment and the FOTO score. Certification Period: 3/20/18 - 4/18/18    Eve Ahumada, PT 3/20/2018 9:45 AM  _____________________________________________________________________  I certify that the above Therapy Services are being furnished while the patient is under my care. I agree with the treatment plan and certify that this therapy is necessary.     Physician's Signature:____________________  Date:__________Time:______    Please sign and return to [facility] - [address]  [phone] fax

## 2018-03-22 ENCOUNTER — HOSPITAL ENCOUNTER (OUTPATIENT)
Dept: PHYSICAL THERAPY | Age: 73
Discharge: HOME | End: 2018-03-22
Payer: MEDICARE

## 2018-03-22 PROCEDURE — 97110 THERAPEUTIC EXERCISES: CPT

## 2018-03-22 PROCEDURE — 97140 MANUAL THERAPY 1/> REGIONS: CPT

## 2018-03-22 NOTE — PROGRESS NOTES
PT DAILY TREATMENT NOTE - [facility]     Patient Name: Ghislaine Mike  Date:3/22/2018  : 1945  [x]  Patient  Verified  Payor: Jenni Landa / Plan: [place] MED[place] & B / Product Type: Medicare /    In time:10:30  Out time: 11;15  Total Treatment Time (min): 45  Total Timed Codes (min): 30  1:1 Treatment Time ( [address] only): 30   Visit #: 2 of 12    Treatment Area: Right knee pain [M25.561]    SUBJECTIVE  Pain Level (0-10 scale): 2  Any medication changes, allergies to medications, adverse drug reactions, diagnosis change, or new procedure performed?: [x] No    [] Yes (see summary sheet for update)  Subjective functional status/changes:   [] No changes reported  I'm better today    OBJECTIVE    30 min Therapeutic Exercise:  [] See flow sheet :   Rationale: increase ROM and increase strength to improve the patients ability to improve ease of gait, activity tolerance      15 min Manual Therapy:  STM to distal quad, ITB and HS. OP for flexion, manual stretching  TF and patellar mobs   Rationale: increase ROM, increase tissue extensibility and decrease edema  to normalize gait      With   [] TE   [] TA   [] neuro   [] other: Patient Education: [x] Review HEP    [] Progressed/Changed HEP based on:   [] positioning   [] body mechanics   [] transfers   [] heat/ice application    [] other:      Other Objective/Functional Measures:   initiated ex program     Pain Level (0-10 scale) post treatment:  3    ASSESSMENT/Changes in Function: First follow-up after eval.   Good tolerance to ex's.   Presents with extension deficit and edema as expected s/o surgery    Patient will continue to benefit from skilled PT services to modify and progress therapeutic interventions, address functional mobility deficits, address ROM deficits, address strength deficits, analyze and address soft tissue restrictions, analyze and cue movement patterns, analyze and modify body mechanics/ergonomics, assess and modify postural abnormalities and address imbalance/dizziness to attain remaining goals. []  See Plan of Care  []  See progress note/recertification  []  See Discharge Summary         Progress towards goals / Updated goals:  Goal: Pt to continue with current HEP with focus on knee extension exercises to work towards full terminal knee extension. Status at last note/certification: Reviewed with Pt  Long Term Goals: To be accomplished in 4 weeks:  Goal: Pt to increase right knee AROM to -5-110 deg or > to normalize gait pattern for easier navigation in community. Status at last note/certification: -18-64 deg right knee  Goal: Pt to perform right SLS x 30 seconds without LOB to work towards reciprocal pattern on porch steps. Status at last note/certification: unable to perform  Goal: Pt to perform at least 5 double leg squats without deviations to bend and  objects without difficulty. Status at last note/certification: pain and increased left LE WB  Goal: Pt to amb community distances without deviations, fatigue, or A.D for increased activity tolerance. Status at last note/certification: uses [facility] distances with antalgic limp  Goal: Pt to report FOTO score of 62 pts to show improved function.   Status at last note/certification: FOTO 44 pts    PLAN  [x]  Upgrade activities as tolerated     []  Continue plan of care  []  Update interventions per flow sheet       []  Discharge due to:_  []  Other:_      Berta Saba, TESS 3/22/2018  10:41 AM    Future Appointments  Date Time Provider Gisele Astorga   3/23/2018 4:00  [address] [facility]   3/27/2018 9:30  [address] [facility]   3/29/2018 3:30  [address] [facility]   3/30/2018 11:30 AM Perla Ge [facility]   4/2/2018 8:00 AM Eve Ahumada, PT Azucena Mcfarland   4/4/2018 9:00 AM Lolita Lou [facility]   4/6/2018 9:00 AM Perla Ge [facility]   4/9/2018 9:00 AM Germaine Sow, PT [facility]   4/11/2018 9:00 AM Pam Pearson Remi Avalos [facility]   4/13/2018 9:00 AM Carina Miller PT [facility]   6/20/2018 8:30 AM Aaliyah Selby MD [facility]   8/13/2018 8:00 AM Aaliyah Selby MD [facility] [address]

## 2018-03-23 ENCOUNTER — HOSPITAL ENCOUNTER (OUTPATIENT)
Dept: PHYSICAL THERAPY | Age: 73
Discharge: HOME | End: 2018-03-23
Payer: MEDICARE

## 2018-03-23 PROCEDURE — 97110 THERAPEUTIC EXERCISES: CPT

## 2018-03-23 PROCEDURE — 97140 MANUAL THERAPY 1/> REGIONS: CPT

## 2018-03-23 NOTE — PROGRESS NOTES
PT DAILY TREATMENT NOTE - [facility]     Patient Name: Tye Cavazos  Date:3/23/2018  : 1945  [x]  Patient  Verified  Payor: [place] MEDICARE / Plan: [place] MEDICARE PART A & B / Product Type: Medicare /    In time:2:00  Out time: 2:55  Total Treatment Time (min): 55  Total Timed Codes (min): 55  1:1 Treatment Time ( [address] only): 40   Visit #: 3 of 12    Treatment Area: Right knee pain [M25.561]    SUBJECTIVE  Pain Level (0-10 scale):  4  Any medication changes, allergies to medications, adverse drug reactions, diagnosis change, or new procedure performed?: [x] No    [] Yes (see summary sheet for update)  Subjective functional status/changes:   [] No changes reported  I'm still failrly lose from yesterdays stretching    OBJECTIVE    40 min Therapeutic Exercise:  [] See flow sheet :   Rationale: increase ROM and increase strength to improve the patients ability to improve gait, function and activity tolerance    15 min Manual Therapy:  Manual stretching, TF and patellar mobs, STM to HS and popliteal fossa   Rationale: decrease pain, increase ROM and increase tissue extensibility to normalize gait and ease of function          With   [] TE   [] TA   [] neuro   [] other: Patient Education: [x] Review HEP    [] Progressed/Changed HEP based on:   [] positioning   [] body mechanics   [] transfers   [] heat/ice application    [] other:      Other Objective/Functional Measures: Added stool push/pull     Pain Level (0-10 scale) post treatment: 3    ASSESSMENT/Changes in Function:  Ambulates with decreased right knee flexion compared to the left.   Challenged with stool pull and ease of transition between flexion and extension    Patient will continue to benefit from skilled PT services to modify and progress therapeutic interventions, address functional mobility deficits, address ROM deficits, address strength deficits, analyze and address soft tissue restrictions, analyze and cue movement patterns, analyze and modify body mechanics/ergonomics, assess and modify postural abnormalities, address imbalance/dizziness and instruct in home and community integration to attain remaining goals. []  See Plan of Care  []  See progress note/recertification  []  See Discharge Summary         Progress towards goals / Updated goals:  Goal: Pt to continue with current HEP with focus on knee extension exercises to work towards full terminal knee extension. Status at last note/certification: Reviewed with Pt  Met   Long Term Goals: To be accomplished in 4 weeks:  Goal: Pt to increase right knee AROM to -5-110 deg or > to normalize gait pattern for easier navigation in community. Status at last note/certification: -13-13 deg right knee  Goal: Pt to perform right SLS x 30 seconds without LOB to work towards reciprocal pattern on porch steps. Status at last note/certification: unable to perform  Goal: Pt to perform at least 5 double leg squats without deviations to bend and  objects without difficulty. Status at last note/certification: pain and increased left LE WB  Goal: Pt to amb community distances without deviations, fatigue, or A.D for increased activity tolerance. Status at last note/certification: uses [facility] community distances with antalgic limp  Goal: Pt to report FOTO score of 62 pts to show improved function.   Status at last note/certification: FOTO 44 pts    PLAN  [x]  Upgrade activities as tolerated     []  Continue plan of care  []  Update interventions per flow sheet       []  Discharge due to:_  []  Other:_      Yessenia Sorensen, PTA 3/23/2018  2:04 PM    Future Appointments  Date Time Provider Gisele Astorga   3/27/2018 9:30   [address] [facility]   3/29/2018 3:30   [address] [facility]   3/30/2018 11:30   [address] [facility]   4/2/2018 8:00 AM Florence Ahumada PT [facility]   4/4/2018 9:00 AM Ismael Braswell [facility]   4/6/2018 9:00  [address] [facility]   4/9/2018 9:00 AM Albertina Latham, PT [facility]   4/11/2018 9:00 AM Lu Chambers [facility]   4/13/2018 9:00 AM Albertina Latham, PT [facility]   6/20/2018 8:30 AM Aure Kendrick MD [address]   8/13/2018 8:00 AM Aure Kendrick MD [address]

## 2018-03-27 ENCOUNTER — HOSPITAL ENCOUNTER (OUTPATIENT)
Dept: PHYSICAL THERAPY | Age: 73
Discharge: HOME | End: 2018-03-27
Payer: MEDICARE

## 2018-03-27 PROCEDURE — 97140 MANUAL THERAPY 1/> REGIONS: CPT

## 2018-03-27 PROCEDURE — 97110 THERAPEUTIC EXERCISES: CPT

## 2018-03-27 NOTE — PROGRESS NOTES
PT DAILY TREATMENT NOTE - [facility]     Patient Name: Summer Palomo  Date:3/27/2018  : 1945  [x]  Patient  Verified  Payor: Dina Lab / Plan: [place] MEDICARE PART A & B / Product Type: Medicare /    In time:9;30  Out time:10;25  Total Treatment Time (min): 55  Total Timed Codes (min): 55  1:1 Treatment Time ( [address] only): 40   Visit #: 4 of 12    Treatment Area: Right knee pain [M25.561]    SUBJECTIVE  Pain Level (0-10 scale): 2  Any medication changes, allergies to medications, adverse drug reactions, diagnosis change, or new procedure performed?: [x] No    [] Yes (see summary sheet for update)  Subjective functional status/changes:   [] No changes reported  The pain is good today. OBJECTIVE    40 min Therapeutic Exercise:  [] See flow sheet :   Rationale: increase ROM and increase strength to improve the patients ability to more easily perform daily tasks, decreased dependence on [facility] for support     15 min Manual Therapy:  STM to quad, HS and ITB. Manual stretching and TF, Patellar mobs   Rationale: decrease pain, increase ROM and increase tissue extensibility to improve ease of gait          With   [] TE   [] TA   [] neuro   [] other: Patient Education: [x] Review HEP    [] Progressed/Changed HEP based on:   [] positioning   [] body mechanics   [] transfers   [] heat/ice application    [] other:      Other Objective/Functional Measures:      Pain Level (0-10 scale) post treatment: 4    ASSESSMENT/Changes in Function: demo increasing flexion AROM, however still has extension deficit.       Patient will continue to benefit from skilled PT services to modify and progress therapeutic interventions, address functional mobility deficits, address ROM deficits, address strength deficits, analyze and address soft tissue restrictions, analyze and cue movement patterns, analyze and modify body mechanics/ergonomics, assess and modify postural abnormalities and address imbalance/dizziness to attain remaining goals.     []  See Plan of Care  []  See progress note/recertification  []  See Discharge Summary         Progress towards goals / Updated goals:  Goal: Pt to continue with current HEP with focus on knee extension exercises to work towards full terminal knee extension. Status at last note/certification: Reviewed with Pt  Met   Long Term Goals: To be accomplished in 4 weeks:  Goal: Pt to increase right knee AROM to -5-110 deg or > to normalize gait pattern for easier navigation in community. Status at last note/certification: -44-52 deg right knee  14 to 104  Progress with flexion  Goal: Pt to perform right SLS x 30 seconds without LOB to work towards reciprocal pattern on porch steps. Status at last note/certification: unable to perform  Progressin seconds with intermittent use of finger tip touch  Goal: Pt to perform at least 5 double leg squats without deviations to bend and  objects without difficulty. Status at last note/certification: pain and increased left LE WB  Progressing  Improving but still has slight weight shift to the lift  Goal: Pt to amb community distances without deviations, fatigue, or A.D for increased activity tolerance. Status at last note/certification: uses [facility] community distances with antalgic limp  Goal: Pt to report FOTO score of 62 pts to show improved function.   Status at last note/certification: FOTO 44 pts  ASSESS NEXT SESSION      PLAN  [x]  Upgrade activities as tolerated     []  Continue plan of care  []  Update interventions per flow sheet       []  Discharge due to:_  []  Other:_      Pili Engel, PTA 3/27/2018  9:47 AM    Future Appointments  Date Time Provider Gisele Astorga   3/29/2018 3:30  [address] [facility]   3/30/2018 11:30  [address] [facility]   2018 8:00 AM Mahogany Ahumada, PT Viviana Cuba   2018 9:00  [address] [facility]   2018 9:00 AM Itz Velasquez [facility]   2018 9:00 AM Gita SPENCER Brett Hernandez [facility]   4/11/2018 9:00 AM [facility]   4/13/2018 9:00 AM Florentino Chaudhary [facility]   6/20/2018 8:30 AM Nohemy Michele MD [facility] [address]   8/13/2018 8:00 AM Nohemy Michele MD [facility] [address]

## 2018-03-29 ENCOUNTER — HOSPITAL ENCOUNTER (OUTPATIENT)
Dept: PHYSICAL THERAPY | Age: 73
Discharge: HOME | End: 2018-03-29
Payer: MEDICARE

## 2018-03-29 PROCEDURE — 97110 THERAPEUTIC EXERCISES: CPT

## 2018-03-29 PROCEDURE — 97140 MANUAL THERAPY 1/> REGIONS: CPT

## 2018-03-29 NOTE — PROGRESS NOTES
PT DAILY TREATMENT NOTE - [facility]     Patient Name: Halima Gaurav  Date:3/29/2018  : 1945  [x]  Patient  Verified  Payor: [place] MEDICARE / Plan: VA MEDICARE PART A & B / Product Type: Medicare /    In time:3;30  Out time: 4;15  Total Treatment Time (min): 45  Total Timed Codes (min): 45  1:1 Treatment Time ( [address] only): 39   Visit #: 5 of 12    Treatment Area: Right knee pain [M25.561]    SUBJECTIVE  Pain Level (0-10 scale): 3  Any medication changes, allergies to medications, adverse drug reactions, diagnosis change, or new procedure performed?: [x] No    [] Yes (see summary sheet for update)  Subjective functional status/changes:   [] No changes reported  I'm feeling good    OBJECTIVE    30 min Therapeutic Exercise:  [] See flow sheet :   Rationale: increase ROM and increase strength to improve the patients ability to walk and perform daily tasks    15 min Manual Therapy:  STM and retrograde massage, gentle mobs due to pain   Rationale: decrease pain, increase ROM and increase tissue extensibility to normalize gait, improve ease of stairs          With   [] TE   [] TA   [] neuro   [] other: Patient Education: [x] Review HEP    [] Progressed/Changed HEP based on:   [] positioning   [] body mechanics   [] transfers   [] heat/ice application    [] other:      Other Objective/Functional Measures:     Pain Level (0-10 scale) post treatment: 2    ASSESSMENT/Changes in Function: improved stair negotiation, but still step to pattern. Increasing pain today with MT although pt denied , visibly grimacing. States he had to drive to  today, and had been driving the last 2 days. Also forgot to take pain meds.       Patient will continue to benefit from skilled PT services to modify and progress therapeutic interventions, address functional mobility deficits, address ROM deficits, address strength deficits, analyze and address soft tissue restrictions, analyze and cue movement patterns, analyze and modify body mechanics/ergonomics, assess and modify postural abnormalities, address imbalance/dizziness and instruct in home and community integration to attain remaining goals. []  See Plan of Care  []  See progress note/recertification  []  See Discharge Summary         Progress towards goals / Updated goals:  Goal: Pt to continue with current HEP with focus on knee extension exercises to work towards full terminal knee extension. Status at last note/certification: Reviewed with Pt  Met   Long Term Goals: To be accomplished in 4 weeks:  Goal: Pt to increase right knee AROM to -5-110 deg or > to normalize gait pattern for easier navigation in community. Status at last note/certification: -28-65 deg right knee  14 to 104  Progress with flexion  Goal: Pt to perform right SLS x 30 seconds without LOB to work towards reciprocal pattern on porch steps. Status at last note/certification: unable to perform  Progressin seconds with intermittent use of finger tip touch  Goal: Pt to perform at least 5 double leg squats without deviations to bend and  objects without difficulty. Status at last note/certification: pain and increased left LE WB  Progressing  Improving but still has slight weight shift to the lift  Goal: Pt to amb community distances without deviations, fatigue, or A.D for increased activity tolerance. Status at last note/certification: uses [facility] community distances with antalgic limp  Not Met. Still using SPC for support  Goal: Pt to report FOTO score of 62 pts to show improved function.   Status at last note/certification: FOTO 44 pts  ASSESS NEXT SESSION as pt having increased pain today    PLAN  [x]  Upgrade activities as tolerated     []  Continue plan of care  []  Update interventions per flow sheet       []  Discharge due to:_  []  Other:_      Noé Cline PTA 3/29/2018  3:40 PM    Future Appointments  Date Time Provider Gisele Astorga   3/30/2018 11:30  [address] [facility] 4/2/2018 8:00 AM Roland Ahumada, [facility]   4/4/2018 9:00 AM [facility]   4/6/2018 9:00 AM Perla Oswald [facility]   4/9/2018 9:00 AM Gagandeep De Guzman [facility]   4/11/2018 9:00 AM [facility]   4/13/2018 9:00 AM Gagandeep De Guzman [facility]   6/20/2018 8:30 AM Elizabeth Nam MD [facility] [address]   8/13/2018 8:00 AM Elizabeth Nam MD [facility] [address]

## 2018-03-30 ENCOUNTER — HOSPITAL ENCOUNTER (OUTPATIENT)
Dept: PHYSICAL THERAPY | Age: 73
Discharge: HOME | End: 2018-03-30
Payer: MEDICARE

## 2018-03-30 PROCEDURE — 97110 THERAPEUTIC EXERCISES: CPT

## 2018-03-30 PROCEDURE — 97140 MANUAL THERAPY 1/> REGIONS: CPT

## 2018-03-30 NOTE — PROGRESS NOTES
PT DAILY TREATMENT NOTE - [facility]     Patient Name: Eitan Wilson  Date:3/30/2018  : 1945  [x]  Patient  Verified  Payor: Oh Gtz / Plan: [place] MED[place] & B / Product Type: Medicare /    In time:11:30  Out time: 12;15  Total Treatment Time (min): 45  Total Timed Codes (min): 45  1:1 Treatment Time ( [address] only): 45   Visit #: 6 of 12    Treatment Area: Right knee pain [M25.561]    SUBJECTIVE  Pain Level (0-10 scale): 2  Any medication changes, allergies to medications, adverse drug reactions, diagnosis change, or new procedure performed?: [x] No    [] Yes (see summary sheet for update)  Subjective functional status/changes:   [] No changes reported  Definitely feeling better today. I made sure to take my pain pill before coming here.     OBJECTIVE    30 min Therapeutic Exercise:  [] See flow sheet :   Rationale: increase ROM and increase strength to improve the patients ability to perform daily tasks, walking    15 min Manual Therapy:  Manual stretching for flexion and extension,  TF mobs   Rationale: increase ROM and increase tissue extensibility to normalized gait       With   [] TE   [] TA   [] neuro   [] other: Patient Education: [x] Review HEP    [] Progressed/Changed HEP based on:   [] positioning   [] body mechanics   [] transfers   [] heat/ice application    [] other:      Other Objective/Functional Measures:      Pain Level (0-10 scale) post treatment:  2    ASSESSMENT/Changes in Function: ambulates in clinic without SPC for support,but with slow and decreased Right knee flexion    Patient will continue to benefit from skilled PT services to modify and progress therapeutic interventions, address functional mobility deficits, address ROM deficits, address strength deficits, analyze and address soft tissue restrictions, analyze and cue movement patterns, analyze and modify body mechanics/ergonomics, assess and modify postural abnormalities and address imbalance/dizziness to attain remaining goals.     []  See Plan of Care  []  See progress note/recertification  []  See Discharge Summary         Progress towards goals / Updated goals:  Goal: Pt to continue with current HEP with focus on knee extension exercises to work towards full terminal knee extension. Status at last note/certification: Reviewed with Pt  Met   Long Term Goals: To be accomplished in 4 weeks:  Goal: Pt to increase right knee AROM to -5-110 deg or > to normalize gait pattern for easier navigation in community. Status at last note/certification: -00-52 deg right knee  14 to 104  Progress with flexion  Goal: Pt to perform right SLS x 30 seconds without LOB to work towards reciprocal pattern on porch steps. Status at last note/certification: unable to perform  Progressin seconds with intermittent use of finger tip touch  Goal: Pt to perform at least 5 double leg squats without deviations to bend and  objects without difficulty. Status at last note/certification: pain and increased left LE WB  Progressing  Improving but still has slight weight shift to the lift  Goal: Pt to amb community distances without deviations, fatigue, or A.D for increased activity tolerance. Status at last note/certification: uses [address] community distances with antalgic limp  Not Met. Still using SPC for support  Goal: Pt to report FOTO score of 62 pts to show improved function.   Status at last note/certification: FOTO 44 pts  ASSESS NEXT SESSION as pt having increased pain today    PLAN  [x]  Upgrade activities as tolerated     []  Continue plan of care  []  Update interventions per flow sheet       []  Discharge due to:_  []  Other:_      Derek Melchor, PTA 3/30/2018  11:40 AM    Future Appointments  Date Time Provider Gisele Astorga   2018 8:00 AM Eve Ahumada, PT [facility]   2018 9:00  [address] [facility]   2018 9:00  [address] [facility]   2018 9:00 AM Jose Aguilar, PT [facility] 4/11/2018 9:00 AM Gael Cote [facility]   4/13/2018 9:00 AM Sha Watts PT [facility]HAN [facility]   6/20/2018 8:30 AM Nino Car MD [facility] [address]   8/13/2018 8:00 AM Nino Car MD [facility] [address]

## 2018-04-02 ENCOUNTER — HOSPITAL ENCOUNTER (OUTPATIENT)
Dept: PHYSICAL THERAPY | Age: 73
Discharge: HOME | End: 2018-04-02
Payer: MEDICARE

## 2018-04-02 PROCEDURE — 97140 MANUAL THERAPY 1/> REGIONS: CPT

## 2018-04-02 PROCEDURE — 97110 THERAPEUTIC EXERCISES: CPT

## 2018-04-02 NOTE — PROGRESS NOTES
PT DAILY TREATMENT NOTE - [facility]     Patient Name: Daphane Spatz  Date:2018  : 1945  [x]  Patient  Verified  Payor: [place] MEDICARE / Plan: [place] MEDICARE PART A & B / Product Type: Medicare /    In time:8:00  Out time: 8:45  Total Treatment Time (min): 45  Total Timed Codes (min): 45  1:1 Treatment Time ( W To [address] only): 30   Visit #: 7 of 12    Treatment Area: Right knee pain [M25.561]    SUBJECTIVE  Pain Level (0-10 scale): 3/10  Any medication changes, allergies to medications, adverse drug reactions, diagnosis change, or new procedure performed?: [x] No    [] Yes (see summary sheet for update)  Subjective functional status/changes:   [] No changes reported  \"I am coming along. I did a lot of standing yesterday from helping my wife in the kitchen with cooking. \"    OBJECTIVE    30 min Therapeutic Exercise:  [] See flow sheet :   Rationale: increase ROM and increase strength to improve the patients ability to perform daily tasks, walking    15 min Manual Therapy: patellar mobs; ant/post tibiofemoral glides; manual stretching into knee flex/ext   Rationale: increase ROM and increase tissue extensibility to normalized gait       With   [] TE   [] TA   [] neuro   [] other: Patient Education: [x] Review HEP    [] Progressed/Changed HEP based on:   [] positioning   [] body mechanics   [] transfers   [] heat/ice application    [] other:      Other Objective/Functional Measures: FOTO 55 pts     Pain Level (0-10 scale) post treatment:  2/10    ASSESSMENT/Changes in Function: Pt continues to exhibit right knee extensor lag with gait and in lying supine. Pt amb with slowed reymundo and stiff right knee using SPC. Pt advised to continue with HEP with focus on knee ext exercises, knee hang. Pt in agreement.     Patient will continue to benefit from skilled PT services to modify and progress therapeutic interventions, address functional mobility deficits, address ROM deficits, address strength deficits, analyze and address soft tissue restrictions, analyze and cue movement patterns, analyze and modify body mechanics/ergonomics, assess and modify postural abnormalities and address imbalance/dizziness to attain remaining goals. []  See Plan of Care  []  See progress note/recertification  []  See Discharge Summary         Progress towards goals / Updated goals:  Goal: Pt to continue with current HEP with focus on knee extension exercises to work towards full terminal knee extension. Status at last note/certification: Reviewed with Pt  Current status: met - Pt reports compliance  Long Term Goals: To be accomplished in 4 weeks:  Goal: Pt to increase right knee AROM to -5-110 deg or > to normalize gait pattern for easier navigation in community. Status at last note/certification: -44-48 deg right knee  Current status: progressing - -14 to 104  Progress with flexion  Goal: Pt to perform right SLS x 30 seconds without LOB to work towards reciprocal pattern on porch steps. Status at last note/certification: unable to perform  Current status: progressing - 20 seconds with intermittent use of finger tip touch  Goal: Pt to perform at least 5 double leg squats without deviations to bend and  objects without difficulty. Status at last note/certification: pain and increased left LE WB  Current status: progressing - improving but still has slight weight shift to the left  Goal: Pt to amb community distances without deviations, fatigue, or A.D for increased activity tolerance. Status at last note/certification: uses [facility] distances with antalgic limp  Current status: not met - still using SPC for support  Goal: Pt to report FOTO score of 62 pts to show improved function.   Status at last note/certification: FOTO 44 pts  Current status: progressing - FOTO 55 pts    PLAN  [x]  Upgrade activities as tolerated     []  Continue plan of care  []  Update interventions per flow sheet       []  Discharge due to:_  []  Other:_ Candelario Ahumada, PT 4/2/2018  11:40 AM    Future Appointments  Date Time Provider Gisele Gauri   4/4/2018 9:00  [address] [facility]   [facility]2018 [facility]   4/9/2018 9:00 AM Bushra Leggett PT [facility]   4/11/2018 9:00 AM [facility]   4/13/2018 9:00 AM Bushra Leggett PT [facility]   6/20/2018 8:30 AM MD [address]   8/13/2018 8:00 AM MD [address]

## 2018-04-04 ENCOUNTER — HOSPITAL ENCOUNTER (OUTPATIENT)
Dept: PHYSICAL THERAPY | Age: 73
Discharge: HOME | End: 2018-04-04
Payer: MEDICARE

## 2018-04-04 PROCEDURE — 97140 MANUAL THERAPY 1/> REGIONS: CPT

## 2018-04-04 PROCEDURE — 97110 THERAPEUTIC EXERCISES: CPT

## 2018-04-04 NOTE — PROGRESS NOTES
PT DAILY TREATMENT NOTE - [facility]     Patient Name: Eitan Wilson  Date:2018  : 1945  [x]  Patient  Verified  Payor: Oh Gtz / Plan: [place] MEDICARE PART A & B / Product Type: Medicare /    In time:9;00  Out time: 9;45  Total Treatment Time (min): 45  Total Timed Codes (min): 45  1:1 Treatment Time ( W [address] only): 30   Visit #: 8 of 12    Treatment Area: Right knee pain [M25.561]    SUBJECTIVE  Pain Level (0-10 scale): 2  Any medication changes, allergies to medications, adverse drug reactions, diagnosis change, or new procedure performed?: [x] No    [] Yes (see summary sheet for update)  Subjective functional status/changes:   [] No changes reported  I did a lot of stretching last night    OBJECTIVE    30 min Therapeutic Exercise:  [] See flow sheet :   Rationale: increase ROM and increase strength to improve the patients ability to walk and perofrm daily tasks    15 min Manual Therapy:  STM to quads, HS. Adductors.   Manual stretching for flexion, extension   Rationale: increase ROM and increase tissue extensibility to normalize gait          With   [] TE   [] TA   [] neuro   [] other: Patient Education: [x] Review HEP    [] Progressed/Changed HEP based on:   [] positioning   [] body mechanics   [] transfers   [] heat/ice application    [] other:      Other Objective/Functional Measures:   MT emphasis on extension     Pain Level (0-10 scale) post treatment: 2    ASSESSMENT/Changes in Function: very tender along adductors with multiple trigger points throughout length of muscle belly      Patient will continue to benefit from skilled PT services to modify and progress therapeutic interventions, address functional mobility deficits, address ROM deficits, address strength deficits, analyze and address soft tissue restrictions, analyze and cue movement patterns, analyze and modify body mechanics/ergonomics, assess and modify postural abnormalities, address imbalance/dizziness and instruct in home and community integration to attain remaining goals. []  See Plan of Care  []  See progress note/recertification  []  See Discharge Summary         Progress towards goals / Updated goals:  Goal: Pt to continue with current HEP with focus on knee extension exercises to work towards full terminal knee extension. Status at last note/certification: Reviewed with Pt  Current status: met - Pt reports compliance  Long Term Goals: To be accomplished in 4 weeks:  Goal: Pt to increase right knee AROM to -5-110 deg or > to normalize gait pattern for easier navigation in community. Status at last note/certification: -55-23 deg right knee  Current status: progressing - -14 to 104  Progress with flexion  Goal: Pt to perform right SLS x 30 seconds without LOB to work towards reciprocal pattern on porch steps. Status at last note/certification: unable to perform  Current status: progressing - 20 seconds with intermittent use of finger tip touch  Goal: Pt to perform at least 5 double leg squats without deviations to bend and  objects without difficulty. Status at last note/certification: pain and increased left LE WB  Current status: progressing - improving but still has slight weight shift to the left  Goal: Pt to amb community distances without deviations, fatigue, or A.D for increased activity tolerance. Status at last note/certification: uses [facility] community distances with antalgic limp  Current status: not met - still using SPC for support  Goal: Pt to report FOTO score of 62 pts to show improved function.   Status at last note/certification: FOTO 44 pts  Current status: progressing - FOTO 55 pts    PLAN  [x]  Upgrade activities as tolerated     []  Continue plan of care  []  Update interventions per flow sheet       []  Discharge due to:_  []  Other:_      Antoine Sotelo, TESS 4/4/2018  9:09 AM    Future Appointments  Date Time Provider Gisele Astorga   4/6/2018 9:00  [address] [facility]   4/9/2018 9:00 AM Bushra Leggett, PT [facility]   4/11/2018 9:00 AM David Genao [facility] [address] [facility]   4/13/2018 9:00 AM Bushra Leggett, PT [facility] [address] [facility]   6/20/2018 8:30 AM Jaylyn Murcia MD [facility]   8/13/2018 8:00 AM Jaylyn Murcia MD [facility]

## 2018-04-06 ENCOUNTER — HOSPITAL ENCOUNTER (OUTPATIENT)
Dept: PHYSICAL THERAPY | Age: 73
Discharge: HOME | End: 2018-04-06
Payer: MEDICARE

## 2018-04-06 PROCEDURE — 97140 MANUAL THERAPY 1/> REGIONS: CPT

## 2018-04-06 PROCEDURE — 97110 THERAPEUTIC EXERCISES: CPT

## 2018-04-06 NOTE — PROGRESS NOTES
PT DAILY TREATMENT NOTE - [facility]     Patient Name: Carlos Márquez  Date:2018  : 1945  [x]  Patient  Verified  Payor: Kedar French / Plan: [place] MEDICARE PART A & B / Product Type: Medicare /    In time:8;50  Out time: 9;50  Total Treatment Time (min): 60  Total Timed Codes (min): 60  1:1 Treatment Time ( W [address] only): 45   Visit #: 9 of 12    Treatment Area: Right knee pain [M25.561]    SUBJECTIVE  Pain Level (0-10 scale): 2  Any medication changes, allergies to medications, adverse drug reactions, diagnosis change, or new procedure performed?: [x] No    [] Yes (see summary sheet for update)  Subjective functional status/changes:   [] No changes reported  I feel pretty good. I only took a Tylenol last night instead of Percoset and I felt good, slept through the night. OBJECTIVE    45 min Therapeutic Exercise:  [] See flow sheet :   Rationale: increase ROM and increase strength to improve the patients ability to perform daily tasks, walking    15 min Manual Therapy:  Manual stretching for flexion, extension. STM to quad, HS   Rationale: decrease pain, increase ROM and increase tissue extensibility to normalize gait, function          With   [] TE   [] TA   [] neuro   [] other: Patient Education: [x] Review HEP    [] Progressed/Changed HEP based on:   [] positioning   [] body mechanics   [] transfers   [] heat/ice application    [] other:      Other Objective/Functional Measures:   Pt Goal: get mobility back   Gains: rarely using cane for support in community. Improved sleep. Deficits: limited ROM, step to pattern on steps  60% improved    Added ambulation march and sidestep to address gait, knee flexion    ain Level (0-10 scale) post treatment:  3    ASSESSMENT/Changes in Function: Continues to ambulate with minimal right knee flexion despite having adequate ROM. after cues, pt able to improve, however not confident in gait and has long standing habitual pattern from prior to surgery.     Patient will continue to benefit from skilled PT services to modify and progress therapeutic interventions, address functional mobility deficits, address ROM deficits, address strength deficits, analyze and address soft tissue restrictions, analyze and cue movement patterns, analyze and modify body mechanics/ergonomics, assess and modify postural abnormalities, address imbalance/dizziness and instruct in home and community integration to attain remaining goals. []  See Plan of Care  []  See progress note/recertification  []  See Discharge Summary         Progress towards goals / Updated goals:  Goal: Pt to continue with current HEP with focus on knee extension exercises to work towards full terminal knee extension. Status at last note/certification: Reviewed with Pt  Current status: met - Pt reports compliance  Long Term Goals: To be accomplished in 4 weeks:  Goal: Pt to increase right knee AROM to -5-110 deg or > to normalize gait pattern for easier navigation in community. Status at last note/certification: -60-26 deg right knee  Current status: progressing - -14 to 104  Progress with flexion  Goal: Pt to perform right SLS x 30 seconds without LOB to work towards reciprocal pattern on porch steps. Status at last note/certification: unable to perform  Current status: progressing - 25 seconds without LOB or balance checks  Goal: Pt to perform at least 5 double leg squats without deviations to bend and  objects without difficulty. Status at last note/certification: pain and increased left LE WB  Current status: progressing - improving but still has slight weight shift to the left  Goal: Pt to amb community distances without deviations, fatigue, or A.D for increased activity tolerance. Status at last note/certification: uses [facility] community distances with antalgic limp  Current status: not met - still using [facility] for support  Goal: Pt to report FOTO score of 62 pts to show improved function.   Status at last note/certification: FOTO 44 pts  Current status: progressing - FOTO 55 pts    PLAN  [x]  Upgrade activities as tolerated     []  Continue plan of care  []  Update interventions per flow sheet       []  Discharge due to:_  [x]  Other:_  Reassess for continuation     Stacia Alfred PTA 4/6/2018  9:06 AM    Future Appointments  Date Time Provider Gisele Astorga   4/9/2018 9:00 AM Nile Reese PT [facility]   4/11/2018 9:00 AM Abdulaziz Allen [facility]   4/13/2018 9:00 AM Nile Reese PT [facility]   6/20/2018 8:30 AM Peterson Gunter MD [facility]   8/13/2018 8:00 AM Peterson Gunter MD [facility]

## 2018-04-09 ENCOUNTER — HOSPITAL ENCOUNTER (OUTPATIENT)
Dept: PHYSICAL THERAPY | Age: 73
Discharge: HOME | End: 2018-04-09
Payer: MEDICARE

## 2018-04-09 PROCEDURE — 97140 MANUAL THERAPY 1/> REGIONS: CPT

## 2018-04-09 PROCEDURE — 97110 THERAPEUTIC EXERCISES: CPT

## 2018-04-09 PROCEDURE — G8979 MOBILITY GOAL STATUS: HCPCS

## 2018-04-09 PROCEDURE — G8978 MOBILITY CURRENT STATUS: HCPCS

## 2018-04-09 NOTE — PROGRESS NOTES
[facility] - [facility] Emanuel Salcedo Reyes Awad [address]  [phone] fax    Continued Plan of Care/ Re-certification for Physical Therapy Services      Patient name: Martina Stein Start of Care: 3/20/2018   Referral source: Zeynep Hinojosa MD : 1945                         Medical Diagnosis: Right knee pain [M25.561] Onset Date:18 (surgery)                         Treatment Diagnosis: Right Knee Pain   Prior Hospitalization: see medical history Provider#: [id number]   Medications: Verified on Patient summary List    Comorbidities: Arthritis; HTN; DM Type II; Gastrointestinal issues; hx of kidney stones; Depression; hx of Hepatitis A   Prior Level of Function: Retired; lives in [place] home with spouse; active individual    Visits from [facility]: 10    Missed Visits: 0    The Plan of Care and following information is based on the patient's current status:  Goal: Pt to continue with current HEP with focus on knee extension exercises to work towards full terminal knee extension. Status at last note/certification: Reviewed with Pt  Current status: met - Pt reports compliance  Long Term Goals: To be accomplished in 4 weeks:  Goal: Pt to increase right knee AROM to -5-110 deg or > to normalize gait pattern for easier navigation in community. Status at last note/certification: -57-48 deg right knee  Current status: progressing - -13 to 115  Progress with flexion  Goal: Pt to perform right SLS x 30 seconds without LOB to work towards reciprocal pattern on porch steps. Status at last note/certification: unable to perform  Current status: progressing - 25 seconds without LOB or balance checks  Goal: Pt to perform at least 5 double leg squats without deviations to bend and  objects without difficulty.   Status at last note/certification: pain and increased left LE WB  Current status: progressing - improving but still has slight weight shift to the left  Goal: Pt to amb community distances without deviations, fatigue, or A.D for increased activity tolerance. Status at last note/certification: uses [facility] distances with antalgic limp  Current status: progressing - not using AD but gait remains somewhat slowed and slight deviations persist  Goal: Pt to report FOTO score of 62 pts to show improved function. Status at last note/certification: FOTO 44 pts  Current status: progressing - FOTO 55 pts    Key functional changes:   Pt Goal: get mobility back               Gains: not using cane for support in community. Improved sleep. Deficits: limited ROM, step to pattern on steps, slight gait deviations  60% improved    Problems/ barriers to goal attainment: none     Problem List: pain affecting function, decrease ROM, decrease strength, edema affecting function, impaired gait/ balance, decrease ADL/ functional abilitiies, decrease activity tolerance, decrease flexibility/ joint mobility and decrease transfer abilities    Treatment Plan: Therapeutic exercise, Therapeutic activities, Neuromuscular re-education, Physical agent/modality, Gait/balance training, Manual therapy, Aquatic therapy, Patient education, Self Care training, Functional mobility training, Home safety training and Stair training     Goals for this certification period to be accomplished in 12 treatments:  Long term goals remain appropriate     Frequency / Duration: Patient to be seen 3 times per week for 4 treatments:    Assessment / Recommendations:Patient is making good progress with therapy. His flexion AROM has recently increased by more than 10 degrees, but extension remains impaired. Slight deviations persist with ambulation (toe walking) but he is able to get around as desired without AD. Recommend continued skilled physical therapy in order to further progress knee extension and stability.      G-Codes (GP)  Mobility  [id number] Current  CK= 40-59%  [id number] Goal  CJ= 20-39%    The severity rating is based on clinical judgment and the FOTO score. Certification Period: 4/9/18 to 5/7/18    Louis Graves, PT 4/9/2018 8:38 AM    ________________________________________________________________________  I certify that the above Therapy Services are being furnished while the patient is under my care. I agree with the treatment plan and certify that this therapy is necessary. [] I have read the above and request that my patient continue as recommended.   [] I have read the above report and request that my patient continue therapy with the following changes/special instructions: ______________________________________  [] I have read the above report and request that my patient be discharged from therapy    Physician's Signature:_______________________________Date:___________Time:__________  Please sign and return to [facility] - [address]  [phone] fax

## 2018-04-09 NOTE — PROGRESS NOTES
PT DAILY TREATMENT NOTE - [facility]     Patient Name: Jose Martinez  Date:2018  : 1945  [x]  Patient  Verified  Payor: Pooja Denny / Plan: [place] & [place] / Product Type: Medicare /    In time:854  Out time:945  Total Treatment Time (min): 51  Total Timed Codes (min): 51  1:1 Treatment Time ([facility] only): 38   Visit #: 10 of 12    Treatment Area: Right knee pain [M25.561]    SUBJECTIVE  Pain Level (0-10 scale): 3  Any medication changes, allergies to medications, adverse drug reactions, diagnosis change, or new procedure performed?: [x] No    [] Yes (see summary sheet for update)  Subjective functional status/changes:   [] No changes reported  It's going pretty well. I haven't used the cane in weeks.      OBJECTIVE    Modality rationale: Patient declined   Min Type Additional Details    [] Estim:  []Unatt       []IFC  []Premod                        []Other:  []w/ice   []w/heat  Position:  Location:    [] Estim: []Att    []TENS instruct  []NMES                    []Other:  []w/US   []w/ice   []w/heat  Position:  Location:    []  Traction: [] Cervical       []Lumbar                       [] Prone          []Supine                       []Intermittent   []Continuous Lbs:  [] before manual  [] after manual    []  Ultrasound: []Continuous   [] Pulsed                           []1MHz   []3MHz W/cm2:  Location:    []  Iontophoresis with dexamethasone         Location: [] Take home patch   [] In clinic    []  Ice     []  heat  []  Ice massage  []  Laser   []  Anodyne Position:  Location:    []  Laser with stim  []  Other:  Position:  Location:    []  Vasopneumatic Device Pressure:       [] lo [] med [] hi   Temperature: [] lo [] med [] hi   [] Skin assessment post-treatment:  []intact []redness- no adverse reaction    []redness - adverse reaction:     41 min Therapeutic Exercise:  [x] See flow sheet :   Rationale: increase ROM and increase strength to improve the patients ability to improve ease of mobility     10 min Manual Therapy:  Manual stretching Right knee, tibiofemoral joint mobs/patellar mobs for flexion/extension, overpressure into extension, STM distal quads/HS   Rationale: decrease pain, increase ROM, increase tissue extensibility and decrease trigger points to normalize gait        With   [] TE   [] TA   [] neuro   [] other: Patient Education: [x] Review HEP    [] Progressed/Changed HEP based on:   [] positioning   [] body mechanics   [] transfers   [] heat/ice application    [] other:      Other Objective/Functional Measures: see recert     Pain Level (0-10 scale) post treatment: 2    ASSESSMENT/Changes in Function: Some palpable scar tissue popping during manual techniques for increasing flexion, no pain. Good improvement in flexion AROM noted today, extension remains impaired. Ambulating desired distances without AD. Patient will continue to benefit from skilled PT services to modify and progress therapeutic interventions, address functional mobility deficits, address ROM deficits, address strength deficits, analyze and address soft tissue restrictions, analyze and cue movement patterns, analyze and modify body mechanics/ergonomics, assess and modify postural abnormalities, address imbalance/dizziness and instruct in home and community integration to attain remaining goals. []  See Plan of Care  [x]  See progress note/recertification  []  See Discharge Summary         Progress towards goals / Updated goals:  Goal: Pt to continue with current HEP with focus on knee extension exercises to work towards full terminal knee extension. Status at last note/certification: Reviewed with Pt  Current status: met - Pt reports compliance  Long Term Goals: To be accomplished in 4 weeks:  Goal: Pt to increase right knee AROM to -5-110 deg or > to normalize gait pattern for easier navigation in community.   Status at last note/certification: -05-25 deg right knee  Current status: progressing - -13 to 115  Progress with flexion  Goal: Pt to perform right SLS x 30 seconds without LOB to work towards reciprocal pattern on porch steps. Status at last note/certification: unable to perform  Current status: progressing - 25 seconds without LOB or balance checks  Goal: Pt to perform at least 5 double leg squats without deviations to bend and  objects without difficulty. Status at last note/certification: pain and increased left LE WB  Current status: progressing - improving but still has slight weight shift to the left  Goal: Pt to amb [facility] distances without deviations, fatigue, or A.D for increased activity tolerance. Status at last note/certification: uses [email or web address] with antalgic limp  Current status: progressing - not using AD but gait remains somewhat slowed and slight deviations persist  Goal: Pt to report FOTO score of 62 pts to show improved function.   Status at last note/certification: FOTO 44 pts  Current status: progressing - FOTO 55 pts    PLAN  [x]  Upgrade activities as tolerated     [x]  Continue plan of care  []  Update interventions per flow sheet       []  Discharge due to:_  []  Other:_      Champ Hernández, PT 4/9/2018  8:36 AM    Future Appointments  Date Time Provider Gisele Astorga   4/9/2018 9:00 AM Champ Hernández, PT [facility]   4/11/2018 9:00 AM Padimni Mahoney [facility]   4/13/2018 9:00 AM Champ Hernández PT [facility]   6/20/2018 8:30 AM Laquita Diallo MD [facility]   8/13/2018 8:00 AM Laquita Diallo MD [facility]

## 2018-04-11 ENCOUNTER — HOSPITAL ENCOUNTER (OUTPATIENT)
Dept: PHYSICAL THERAPY | Age: 73
Discharge: HOME | End: 2018-04-11
Payer: MEDICARE

## 2018-04-11 PROCEDURE — 97140 MANUAL THERAPY 1/> REGIONS: CPT

## 2018-04-11 PROCEDURE — 97110 THERAPEUTIC EXERCISES: CPT

## 2018-04-11 NOTE — PROGRESS NOTES
PT DAILY TREATMENT NOTE - [facility]     Patient Name: Frederick Maritn  Date:2018  : 1945  [x]  Patient  Verified  Payor: Vilma Deshpande / Plan: [place] MEDICARE PART A & B / Product Type: Medicare /    In time:9:00  Out time: 9;45  Total Treatment Time (min): 45  Total Timed Codes (min): 45  1:1 Treatment Time ( [address] only): 35   Visit #: 1 of 12    Treatment Area: Right knee pain [M25.561]    SUBJECTIVE  Pain Level (0-10 scale): 3  Any medication changes, allergies to medications, adverse drug reactions, diagnosis change, or new procedure performed?: [x] No    [] Yes (see summary sheet for update)  Subjective functional status/changes:   [] No changes reported  I did a workout in the pool yesterday and it felt really good. OBJECTIVE      35 min Therapeutic Exercise:  [] See flow sheet :   Rationale: increase ROM and increase strength to improve the patients ability to improve ease of mobility, gait    10 min Manual Therapy:  STM and manual stretching to left knee. OP to increase flexion and extension   Rationale: decrease pain, increase ROM and increase tissue extensibility to normalize gait          With   [] TE   [] TA   [] neuro   [] other: Patient Education: [x] Review HEP    [] Progressed/Changed HEP based on:   [] positioning   [] body mechanics   [] transfers   [] heat/ice application    [] other:      Other Objective/Functional Measures:      Pain Level (0-10 scale) post treatment:  2    ASSESSMENT/Changes in Function: Pt was able to return to pool activity yesterday with no reports of difficulty    Patient will continue to benefit from skilled PT services to modify and progress therapeutic interventions, address functional mobility deficits, address ROM deficits, address strength deficits, analyze and address soft tissue restrictions, analyze and cue movement patterns and analyze and modify body mechanics/ergonomics to attain remaining goals.      []  See Plan of Care  []  See progress note/recertification  []  See Discharge Summary         Progress towards goals / Updated goals:  Goal: Pt to continue with current HEP with focus on knee extension exercises to work towards full terminal knee extension. Status at last note/certification: Reviewed with Pt  Current status: met - Pt reports compliance  Long Term Goals: To be accomplished in 4 weeks:  Goal: Pt to increase right knee AROM to -5-110 deg or > to normalize gait pattern for easier navigation in community. Status at last note/certification: -05-87 deg right knee  Current status: progressing - -13 to 115  Progress with flexion  Goal: Pt to perform right SLS x 30 seconds without LOB to work towards reciprocal pattern on porch steps. Status at last note/certification: unable to perform  Current status: progressing - 25 seconds without LOB or balance checks  Goal: Pt to perform at least 5 double leg squats without deviations to bend and  objects without difficulty. Status at last note/certification: pain and increased left LE WB  Current status: progressing - improving but still has slight weight shift to the left  Goal: Pt to amb community distances without deviations, fatigue, or A.D for increased activity tolerance. Status at last note/certification: uses [facility] community distances with antalgic limp  Current status: progressing - not using AD but gait remains somewhat slowed and slight deviations persist  Goal: Pt to report FOTO score of 62 pts to show improved function.   Status at last note/certification: FOTO 44 pts  Current status: progressing - FOTO 55 pts    PLAN  [x]  Upgrade activities as tolerated     []  Continue plan of care  []  Update interventions per flow sheet       []  Discharge due to:_  []  Other:_      Urmila Lozano PTA 4/11/2018  9:02 AM    Future Appointments  Date Time Provider Gisele Astorga   4/13/2018 9:00 AM Lio Fontanez, PT [facility]   6/20/2018 8:30 AM Yudi Clemons MD [address] 8/13/2018 8:00 AM Beverly Harris MD [facility]

## 2018-04-13 ENCOUNTER — HOSPITAL ENCOUNTER (OUTPATIENT)
Dept: PHYSICAL THERAPY | Age: 73
Discharge: HOME | End: 2018-04-13
Payer: MEDICARE

## 2018-04-13 PROCEDURE — 97110 THERAPEUTIC EXERCISES: CPT

## 2018-04-13 PROCEDURE — 97140 MANUAL THERAPY 1/> REGIONS: CPT

## 2018-04-13 NOTE — PROGRESS NOTES
PT DAILY TREATMENT NOTE - [facility]     Patient Name: Angel Perez  Date:2018  : 1945  [x]  Patient  Verified  Payor: Leo Elenat / Plan: [place] MEDICARE PART A & B / Product Type: Medicare /    In time:855  Out time:946  Total Treatment Time (min): 51  Total Timed Codes (min): 51  1:1 Treatment Time ( [address] only): 35   Visit #: 2 of 12    Treatment Area: Right knee pain [M25.561]    SUBJECTIVE  Pain Level (0-10 scale): 3  Any medication changes, allergies to medications, adverse drug reactions, diagnosis change, or new procedure performed?: [x] No    [] Yes (see summary sheet for update)  Subjective functional status/changes:   [] No changes reported  This isn't changing. Yesterday I felt like I never had a knee replacement, it was achy all day.      OBJECTIVE    Modality rationale:    Min Type Additional Details    [] Estim:  []Unatt       []IFC  []Premod                        []Other:  []w/ice   []w/heat  Position:  Location:    [] Estim: []Att    []TENS instruct  []NMES                    []Other:  []w/US   []w/ice   []w/heat  Position:  Location:    []  Traction: [] Cervical       []Lumbar                       [] Prone          []Supine                       []Intermittent   []Continuous Lbs:  [] before manual  [] after manual    []  Ultrasound: []Continuous   [] Pulsed                           []1MHz   []3MHz W/cm2:  Location:    []  Iontophoresis with dexamethasone         Location: [] Take home patch   [] In clinic    []  Ice     []  heat  []  Ice massage  []  Laser   []  Anodyne Position:  Location:    []  Laser with stim  []  Other:  Position:  Location:    []  Vasopneumatic Device Pressure:       [] lo [] med [] hi   Temperature: [] lo [] med [] hi   [] Skin assessment post-treatment:  []intact []redness- no adverse reaction    []redness - adverse reaction:     31 min Therapeutic Exercise:  [x] See flow sheet :   Rationale: increase ROM and increase strength to improve the patients ability to improve ease of mobility    8 min Neuromuscular Re-education:  [x]  See flow sheet :Pilates Chair, amb   Rationale: increase strength and improve coordination  to improve the patients ability to improve knee stability     12 min Manual Therapy:  STM distal Right quads/hamstrings, tibiofemoral joint/patellar mobs for flexion/extension, manual stretching   Rationale: decrease pain, increase ROM, increase tissue extensibility and decrease trigger points to normalize gait      With   [] TE   [] TA   [] neuro   [] other: Patient Education: [x] Review HEP    [] Progressed/Changed HEP based on:   [] positioning   [] body mechanics   [] transfers   [] heat/ice application    [] other:      Other Objective/Functional Measures: completed exercises per flow sheet     Pain Level (0-10 scale) post treatment: 2    ASSESSMENT/Changes in Function: Decreased hamstring tension noted after manual techniques. Verbal cues to keep knees straight during Hip x 4. Patient will continue to benefit from skilled PT services to modify and progress therapeutic interventions, address functional mobility deficits, address ROM deficits, address strength deficits, analyze and address soft tissue restrictions, analyze and cue movement patterns, analyze and modify body mechanics/ergonomics, assess and modify postural abnormalities, address imbalance/dizziness and instruct in home and community integration to attain remaining goals. []  See Plan of Care  []  See progress note/recertification  []  See Discharge Summary         Progress towards goals / Updated goals:  Goal: Pt to increase right knee AROM to -5-110 deg or > to normalize gait pattern for easier navigation in community. Status at last note/certification: -64-24 deg right knee  Current status: progressing - -13 to 115  Progress with flexion   Goal: Pt to perform right SLS x 30 seconds without LOB to work towards reciprocal pattern on porch steps.   Status at last note/certification: unable to perform  Current status: progressing - only slight LOB  Goal: Pt to perform at least 5 double leg squats without deviations to bend and  objects without difficulty. Status at last note/certification: pain and increased left LE WB  Current status: progressing - appears symmetrical with assistance at trapeze  Goal: Pt to amb community distances without deviations, fatigue, or A.D for increased activity tolerance. Status at last note/certification: uses [facility] with antalgic limp  Current status: progressing - not using AD but gait remains somewhat slowed and slight deviations persist  Goal: Pt to report FOTO score of 62 pts to show improved function.   Status at last note/certification: FOTO 44 pts  Current status: progressing - FOTO 55 pts    PLAN  [x]  Upgrade activities as tolerated     [x]  Continue plan of care  []  Update interventions per flow sheet       []  Discharge due to:_  []  Other:_      Bushra Leggett, PT 4/13/2018  7:07 AM    Future Appointments  Date Time Provider Gisele Astorga   4/13/2018 9:00 AM Bushra Leggett, PT [facility]   4/16/2018 10:00 AM Candelario Ahumada, PT [facility]   4/18/2018 11:00  [address] [facility]   4/20/2018 10:30  [address] [facility]   4/23/2018 10:00 AM Milena Buchanan [facility]   4/25/2018 9:30 AM Milena Buchanan [facility]   4/27/2018 10:30 AM [facility] PT [facility]   4/30/2018 10:30 AM David Genao [facility]   6/20/2018 8:30 AM Jaylyn Murcia MD [address]   8/13/2018 8:00 AM Jaylyn Murcia MD [address]

## 2018-04-16 ENCOUNTER — HOSPITAL ENCOUNTER (OUTPATIENT)
Dept: PHYSICAL THERAPY | Age: 73
Discharge: HOME | End: 2018-04-16
Payer: MEDICARE

## 2018-04-16 PROCEDURE — 97110 THERAPEUTIC EXERCISES: CPT

## 2018-04-16 PROCEDURE — 97112 NEUROMUSCULAR REEDUCATION: CPT

## 2018-04-16 PROCEDURE — 97140 MANUAL THERAPY 1/> REGIONS: CPT

## 2018-04-16 NOTE — PROGRESS NOTES
PT DAILY TREATMENT NOTE - [facility]     Patient Name: Ira Mean  Date:2018  : 1945  [x]  Patient  Verified  Payor: [place] MEDICARE / Plan: [place] MEDICARE PART A & B / Product Type: Medicare /    In time:10:03  Out time:10:53  Total Treatment Time (min): 53  Total Timed Codes (min):53   1:1 Treatment Time [facility] only):53   Visit #: 3 of 12    Treatment Area: Right knee pain [M25.561]    SUBJECTIVE  Pain Level (0-10 scale): 210  Any medication changes, allergies to medications, adverse drug reactions, diagnosis change, or new procedure performed?: [x] No    [] Yes (see summary sheet for update)  Subjective functional status/changes:   [] No changes reported  \"I feel good today. I did a little yard work over the weekend and it flared up my back. I took a Percocet this morning but it hasn't kicked in yet. \"     OBJECTIVE    30 min Therapeutic Exercise:  [x] See flow sheet :   Rationale: increase ROM and increase strength to improve the patients ability to improve ease of mobility    8 min Neuromuscular Re-education:  [x]  See flow sheet : gait activities   Rationale: increase strength and improve coordination  to improve the patients ability to improve knee stability     15 min Manual Therapy:  STM distal right quad, HS, ITB; ant/post tibiofemoral glides, patellar mobs   Rationale: decrease pain, increase ROM, increase tissue extensibility and decrease trigger points to normalize gait      With   [] TE   [] TA   [] neuro   [] other: Patient Education: [x] Review HEP    [] Progressed/Changed HEP based on:   [] positioning   [] body mechanics   [] transfers   [] heat/ice application    [] other:      Other Objective/Functional Measures: Progressed pilates chair exercise to standing leg pumps.     Pain Level (0-10 scale) post treatment: 2/10    ASSESSMENT/Changes in Function: Slight regression in right knee flexion today, citing tightness in the knee but improvement noted in right knee ext after manual techniques. Patient will continue to benefit from skilled PT services to modify and progress therapeutic interventions, address functional mobility deficits, address ROM deficits, address strength deficits, analyze and address soft tissue restrictions, analyze and cue movement patterns, analyze and modify body mechanics/ergonomics, assess and modify postural abnormalities, address imbalance/dizziness and instruct in home and community integration to attain remaining goals. []  See Plan of Care  []  See progress note/recertification  []  See Discharge Summary         Progress towards goals / Updated goals:  Goal: Pt to increase right knee AROM to -5-110 deg or > to normalize gait pattern for easier navigation in community. Status at last note/certification: -76-08 deg right knee  Current status: progressing - -7 to 107 deg (progress in ext)  Goal: Pt to perform right SLS x 30 seconds without LOB to work towards reciprocal pattern on porch steps. Status at last note/certification: unable to perform  Current status: progressing - only slight LOB  Goal: Pt to perform at least 5 double leg squats without deviations to bend and  objects without difficulty. Status at last note/certification: pain and increased left LE WB  Current status: progressing - appears symmetrical with assistance at trapeze  Goal: Pt to amb community distances without deviations, fatigue, or A.D for increased activity tolerance. Status at last note/certification: uses [facility] community distances with antalgic limp  Current status: progressing - not using AD but gait remains somewhat slowed and slight deviations persist  Goal: Pt to report FOTO score of 62 pts to show improved function.   Status at last note/certification: FOTO 44 pts  Current status: progressing - FOTO 56 pts    PLAN  [x]  Upgrade activities as tolerated     [x]  Continue plan of care  []  Update interventions per flow sheet       []  Discharge due to:_  [x] Other:_Updated HEP next visit      Alverto Ahumada, PT 4/16/2018  7:07 AM    Future Appointments  Date Time Provider Gisele Astorga   4/18/2018 11:00  [address] [facility]   4/20/2018 10:30 AM Raynald Sanger [facility]   4/23/2018 10:00 AM Stephen Mendez PTA [facility]   4/25/2018 9:30 AM Stephen Mendez PTA [facility]   4/27/2018 10:30 AM [facility] PT [employer] [facility] [employer] [facility]   4/30/2018 10:30 AM Raynald Sanger [facility]   6/20/2018 8:30 AM Sherman Agosto MD [facility]   8/13/2018 8:00 AM Shermna Agosto MD [facility]

## 2018-04-18 ENCOUNTER — HOSPITAL ENCOUNTER (OUTPATIENT)
Dept: PHYSICAL THERAPY | Age: 73
Discharge: HOME | End: 2018-04-18
Payer: MEDICARE

## 2018-04-18 PROCEDURE — 97110 THERAPEUTIC EXERCISES: CPT

## 2018-04-18 PROCEDURE — 97140 MANUAL THERAPY 1/> REGIONS: CPT

## 2018-04-18 NOTE — PROGRESS NOTES
PT DAILY TREATMENT NOTE - [facility]     Patient Name: Ramy Trejo  Date:2018  : 1945  [x]  Patient  Verified  Payor: [place] MEDICARE / Plan: [place] MEDICARE PART A & B / Product Type: Medicare /    In time:11:00  Out time:11:53  Total Treatment Time (min): 53  Total Timed Codes (min):53   1:1 Treatment Time ( [address] only): 33   Visit #: 4 of 12    Treatment Area: Right knee pain [M25.561]    SUBJECTIVE  Pain Level (0-10 scale): 3/10  Any medication changes, allergies to medications, adverse drug reactions, diagnosis change, or new procedure performed?: [x] No    [] Yes (see summary sheet for update)  Subjective functional status/changes:   [] No changes reported  \"I feel okay today. I went back to yoga yesterday. It helped my back. The back has been getting progressively better. \"     OBJECTIVE    30 min Therapeutic Exercise:  [x] See flow sheet :   Rationale: increase ROM and increase strength to improve the patients ability to improve ease of mobility    8 min Neuromuscular Re-education:  [x]  See flow sheet : gait activities   Rationale: increase strength and improve coordination  to improve the patients ability to improve knee stability     15 min Manual Therapy:  STM distal right quad, HS, ITB; ant/post tibiofemoral glides, patellar mobs   Rationale: decrease pain, increase ROM, increase tissue extensibility and decrease trigger points to normalize gait      With   [] TE   [] TA   [] neuro   [] other: Patient Education: [x] Review HEP    [] Progressed/Changed HEP based on:   [] positioning   [] body mechanics   [] transfers   [] heat/ice application    [] other:      Other Objective/Functional Measures: Continued with exercises per flow sheet. No increased pain noted. Pt exhibited increased trigger points in right ITB. Pain Level (0-10 scale) post treatment: 2/10    ASSESSMENT/Changes in Function: Pt able to report improved mobility and pain after session today.   Pt progressing towards ROM goals. Patient will continue to benefit from skilled PT services to modify and progress therapeutic interventions, address functional mobility deficits, address ROM deficits, address strength deficits, analyze and address soft tissue restrictions, analyze and cue movement patterns, analyze and modify body mechanics/ergonomics, assess and modify postural abnormalities, address imbalance/dizziness and instruct in home and community integration to attain remaining goals. []  See Plan of Care  []  See progress note/recertification  []  See Discharge Summary         Progress towards goals / Updated goals:  Goal: Pt to increase right knee AROM to -5-110 deg or > to normalize gait pattern for easier navigation in community. Status at last note/certification: -52-99 deg right knee  Current status: progressing - -7 to 107 deg (progress in ext)  Goal: Pt to perform right SLS x 30 seconds without LOB to work towards reciprocal pattern on porch steps. Status at last note/certification: unable to perform  Current status: progressing - only slight LOB  Goal: Pt to perform at least 5 double leg squats without deviations to bend and  objects without difficulty. Status at last note/certification: pain and increased left LE WB  Current status: progressing - appears symmetrical with assistance at trapeze  Goal: Pt to amb community distances without deviations, fatigue, or A.D for increased activity tolerance. Status at last note/certification: uses [facility] community distances with antalgic limp  Current status: progressing - not using AD but gait remains somewhat slowed and slight deviations persist  Goal: Pt to report FOTO score of 62 pts to show improved function.   Status at last note/certification: FOTO 44 pts  Current status: progressing - FOTO 56 pts    PLAN  [x]  Upgrade activities as tolerated     [x]  Continue plan of care  []  Update interventions per flow sheet       []  Discharge due to:_  [x]  Other:_Updated HEP next visit      Salas Batool Ahumada, PT 4/18/2018  7:07 AM    Future Appointments  Date Time Provider Gisele Gauri   4/20/2018 10:30  [address] [facility]   4/23[facility]2018 10:00 [facility]   4/25/2018 9:30 AM Cory Silva PTA [facility]   4/27/2018 10:30 AM Terry Maxwell, PT [facility]   4/30/2018 10:30 AM Padmini Mahoney [facility]   6/20/2018 8:30 AM Laquita Diallo MD [facility] [address]   8/13/2018 8:00 AM Laquita Diallo MD [facility] [address]

## 2018-04-20 ENCOUNTER — HOSPITAL ENCOUNTER (OUTPATIENT)
Dept: PHYSICAL THERAPY | Age: 73
Discharge: HOME | End: 2018-04-20
Payer: MEDICARE

## 2018-04-20 PROCEDURE — 97110 THERAPEUTIC EXERCISES: CPT

## 2018-04-20 PROCEDURE — 97140 MANUAL THERAPY 1/> REGIONS: CPT

## 2018-04-20 NOTE — PROGRESS NOTES
PT DAILY TREATMENT NOTE - [facility]     Patient Name: Frederick Martin  Date:2018  : 1945  [x]  Patient  Verified  Payor: VA MEDICARE / Plan: VA MEDICARE PART A & B / Product Type: Medicare /    In time:10;30  Out time:11:20  Total Treatment Time (min): 50  Total Timed Codes (min): 50  1:1 Treatment Time ( [address] only): 25   Visit #: 5 of 12    Treatment Area: Right knee pain [M25.561]    SUBJECTIVE  Pain Level (0-10 scale): 4  Any medication changes, allergies to medications, adverse drug reactions, diagnosis change, or new procedure performed?: [x] No    [] Yes (see summary sheet for update)  Subjective functional status/changes:   [] No changes reported  I feel good, but still struggling to get full motion    OBJECTIVE    30 min Therapeutic Exercise:  [] See flow sheet :   Rationale: increase ROM and increase strength to improve the patients ability to improve ease of mobility    10 min Neuromuscular Re-education:  []  See flow sheet :   Rationale: increase strength and improve coordination  to improve the patients ability to increase stability    10 min Manual Therapy:  Manual stretching to right knee,. OP for extension.  TF mobs   Rationale: increase ROM and increase tissue extensibility to normalize gait, function          With   [] TE   [] TA   [] neuro   [] other: Patient Education: [x] Review HEP    [] Progressed/Changed HEP based on:   [] positioning   [] body mechanics   [] transfers   [] heat/ice application    [] other:      Other Objective/Functional Measures:      Pain Level (0-10 scale) post treatment: 2    ASSESSMENT/Changes in Function: slower right LE advancement compared to the left due to stiffness in knee    Patient will continue to benefit from skilled PT services to modify and progress therapeutic interventions, address functional mobility deficits, address ROM deficits, address strength deficits, analyze and address soft tissue restrictions, analyze and cue movement patterns, analyze and modify body mechanics/ergonomics, assess and modify postural abnormalities, address imbalance/dizziness and instruct in home and community integration to attain remaining goals. []  See Plan of Care  []  See progress note/recertification  []  See Discharge Summary         Progress towards goals / Updated goals:  Goal: Pt to increase right knee AROM to -5-110 deg or > to normalize gait pattern for easier navigation in community. Status at last note/certification: -28-40 deg right knee  Current status: progressing - -7 to 107 deg (progress in ext)  Goal: Pt to perform right SLS x 30 seconds without LOB to work towards reciprocal pattern on porch steps. Status at last note/certification: unable to perform  Current status: progressing - only slight LOB  Goal: Pt to perform at least 5 double leg squats without deviations to bend and  objects without difficulty. Status at last note/certification: pain and increased left LE WB  Current status: progressing - appears symmetrical with assistance at trapeze  Goal: Pt to amb community distances without deviations, fatigue, or A.D for increased activity tolerance. Status at last note/certification: uses [facility] community distances with antalgic limp  Current status: progressing - not using AD but gait remains somewhat slowed and slight deviations persist  Goal: Pt to report FOTO score of 62 pts to show improved function.   Status at last note/certification: FOTO 44 pts  Current status: progressing - FOTO 56 pts    PLAN  [x]  Upgrade activities as tolerated     []  Continue plan of care  []  Update interventions per flow sheet       []  Discharge due to:_  []  Other:_      Sally Omalley PTA 4/20/2018  10:47 AM    Future Appointments  Date Time Provider Gisele Astorga   4/23/2018 10:00 AM Cory Silva [facility]   4/25/2018 9:30 AM Cory Silva [facility]   4/27/2018 10:30 AM Terry Maxwell, PT [facility]   4/30/2018 10:30 AM Mila Owen Deysi [facility]   6/20/2018 8:30 AM MD Mehnaz Douglas   8/13/2018 8:00 AM MD Mehnaz Douglas

## 2018-04-23 ENCOUNTER — HOSPITAL ENCOUNTER (OUTPATIENT)
Dept: PHYSICAL THERAPY | Age: 73
Discharge: HOME | End: 2018-04-23
Payer: MEDICARE

## 2018-04-23 PROCEDURE — 97140 MANUAL THERAPY 1/> REGIONS: CPT

## 2018-04-23 PROCEDURE — 97110 THERAPEUTIC EXERCISES: CPT

## 2018-04-23 PROCEDURE — 97112 NEUROMUSCULAR REEDUCATION: CPT

## 2018-04-23 NOTE — PROGRESS NOTES
PT DAILY TREATMENT NOTE - [facility]     Patient Name: Lefty Snowball  Date:2018  : 1945  [x]  Patient  Verified  Payor: VA MEDICARE / Plan: VA MEDICARE PART A & B / Product Type: Medicare /    In time:8:00  Out time:8:45  Total Treatment Time (min): 45  Total Timed Codes (min):45   1:1 Treatment Time ([facility] only): 40   Visit #: 6 of 12    Treatment Area: Right knee pain [M25.561]    SUBJECTIVE  Pain Level (0-10 scale): 2/10  Any medication changes, allergies to medications, adverse drug reactions, diagnosis change, or new procedure performed?: [x] No    [] Yes (see summary sheet for update)  Subjective functional status/changes:   [] No changes reported  \"I had no issues over the weekend. \"    OBJECTIVE    25 min Therapeutic Exercise:  [] See flow sheet :   Rationale: increase ROM and increase strength to improve the patients ability to improve ease of mobility    10 min Neuromuscular Re-education:  []  See flow sheet :   Rationale: increase strength and improve coordination  to improve the patients ability to increase stability    10 min Manual Therapy: ant/post tibiofemoral glides with overpressure; manual stretching into knee flex   Rationale: increase ROM and increase tissue extensibility to normalize gait, function          With   [] TE   [] TA   [] neuro   [] other: Patient Education: [x] Review HEP    [] Progressed/Changed HEP based on:   [] positioning   [] body mechanics   [] transfers   [] heat/ice application    [] other:      Other Objective/Functional Measures: Increased hold time for stretches and static balance. Pain Level (0-10 scale) post treatment: 0/10    ASSESSMENT/Changes in Function: Pt slowly progressing with right knee extension ROM. Pt working to increased reymundo with amb and amb with heel to toe gait pattern to assist with improving knee extension.     Patient will continue to benefit from skilled PT services to modify and progress therapeutic interventions, address functional mobility deficits, address ROM deficits, address strength deficits, analyze and address soft tissue restrictions, analyze and cue movement patterns, analyze and modify body mechanics/ergonomics, assess and modify postural abnormalities, address imbalance/dizziness and instruct in home and community integration to attain remaining goals. []  See Plan of Care  []  See progress note/recertification  []  See Discharge Summary         Progress towards goals / Updated goals:  Goal: Pt to increase right knee AROM to -5-110 deg or > to normalize gait pattern for easier navigation in community. Status at last note/certification: -48-38 deg right knee  Current status: progressing - -7 to 107 deg (progress in ext)  Goal: Pt to perform right SLS x 30 seconds without LOB to work towards reciprocal pattern on porch steps. Status at last note/certification: unable to perform  Current status: progressing - only slight LOB  Goal: Pt to perform at least 5 double leg squats without deviations to bend and  objects without difficulty. Status at last note/certification: pain and increased left LE WB  Current status: progressing - appears symmetrical with assistance at trapeze  Goal: Pt to amb community distances without deviations, fatigue, or A.D for increased activity tolerance. Status at last note/certification: uses [facility] community distances with antalgic limp  Current status: progressing - not using AD but gait remains somewhat slowed and slight deviations persist  Goal: Pt to report FOTO score of 62 pts to show improved function.   Status at last note/certification: FOTO 44 pts  Current status: progressing - FOTO 56 pts    PLAN  [x]  Upgrade activities as tolerated     []  Continue plan of care  []  Update interventions per flow sheet       []  Discharge due to:_  []  Other:_      Jovanny Ahumada, PT 4/23/2018  10:47 AM    Future Appointments  Date Time Provider Gisele Astorga   4/25/2018 9:30 AM J C Ladsle Mary Foreman [facility]   4/27/2018 10:30 AM Hanh Valdes PT [facility]   4/30/2018 10:30 AM Paul Ham [facility]   6/20/2018 8:30 AM Jignesh Valdivia MD [facility] [address]   8/13/2018 8:00 AM Jignesh Valdivia MD [facility] [address]

## 2018-04-25 ENCOUNTER — HOSPITAL ENCOUNTER (OUTPATIENT)
Dept: PHYSICAL THERAPY | Age: 73
Discharge: HOME | End: 2018-04-25
Payer: MEDICARE

## 2018-04-25 PROCEDURE — 97140 MANUAL THERAPY 1/> REGIONS: CPT

## 2018-04-25 NOTE — PROGRESS NOTES
PT DAILY TREATMENT NOTE - [facility]     Patient Name: Eitan Wilson  Date:2018  : 1945  [x]  Patient  Verified  Payor: VA MEDICARE / Plan: VA MEDICARE PART A & B / Product Type: Medicare /    In time:9:25  Out time:10:05  Total Treatment Time (min): 40  Total Timed Codes (min): 40  1:1 Treatment Time ( [address] only): 15   Visit #: 7 of 12    Treatment Area: Right knee pain [M25.561]    SUBJECTIVE  Pain Level (0-10 scale): 3/10  Any medication changes, allergies to medications, adverse drug reactions, diagnosis change, or new procedure performed?: [x] No    [] Yes (see summary sheet for update)  Subjective functional status/changes:   [] No changes reported  \"Doing good. \"    OBJECTIVE  30 min Therapeutic Exercise:  [] See flow sheet :   Rationale: increase ROM and increase strength to improve the patients ability to perform ADls with less difficulty. 10 min Manual Therapy:  Ant/post tibiofemoral grade 2 joint mobs. Rationale: decrease pain, increase ROM and increase tissue extensibility to perform ADls with less difficulty. With   [] TE   [] TA   [] neuro   [] other: Patient Education: [x] Review HEP    [] Progressed/Changed HEP based on:   [] positioning   [] body mechanics   [] transfers   [] heat/ice application    [] other:      Other Objective/Functional Measures: right knee AROM 5-120     Pain Level (0-10 scale) post treatment: 2/10    ASSESSMENT/Changes in Function: Continued with current ex. Per flow sheet. Will give pt.updated HEP next visit to prepare for D/C within the next couple of visit. Patient will continue to benefit from skilled PT services to modify and progress therapeutic interventions, address functional mobility deficits, address ROM deficits, address strength deficits, analyze and address soft tissue restrictions and analyze and cue movement patterns to attain remaining goals.      []  See Plan of Care  []  See progress note/recertification  []  See Discharge Summary         Progress towards goals / Updated goals:    Goal: Pt to increase right knee AROM to -5-110 deg or > to normalize gait pattern for easier navigation in community. Status at last note/certification: -12-63 deg right knee  Current status: progressing - -7 to 107 deg (progress in ext)  Goal: Pt to perform right SLS x 30 seconds without LOB to work towards reciprocal pattern on porch steps. Status at last note/certification: unable to perform  Current status: progressing - only slight LOB  Goal: Pt to perform at least 5 double leg squats without deviations to bend and  objects without difficulty. Status at last note/certification: pain and increased left LE WB  Current status: progressing - appears symmetrical with assistance at trapeze  Goal: Pt to amb community distances without deviations, fatigue, or A.D for increased activity tolerance. Status at last note/certification: uses [facility] distances with antalgic limp  Current status: progressing - not using AD but gait remains somewhat slowed and slight deviations persist  Goal: Pt to report FOTO score of 62 pts to show improved function.   Status at last note/certification: FOTO 44 pts  Current status: progressing - FOTO 56 pts     PLAN  [x]  Upgrade activities as tolerated     [x]  Continue plan of care  []  Update interventions per flow sheet       []  Discharge due to:_  []  Other:_      Mitra Mercado, PTA 4/25/2018  10:27 AM    Future Appointments  Date Time Provider Gisele Astorga   4/27/2018 10:30 AM Curtis Gallegos, [facility]   4/30/2018 10:30  [address] [facility]   5/2/2018 9:00 AM Cherrie Grimaldo [facility]   5/4/2018 7:30 AM Cherrie Grimaldo, [facility]   5/7/2018 8:30 AM Aislinn Ahumada, [facility]   5/9/2018 10:30 AM Cherrie Grimaldo [facility]   5/11/2018 9:00  [address] [facility]   5/14/2018 9:00  [address] [facility]   5/16/2018 9:00  Lacie Lewis [facility]   5/18/2018 9:00 AM Wicho Jansen, [facility]   5/21/2018 9:00 AM [facility]   5/23/2018 9:00 AM [facility]   5/25/2018 9:00 AM Wicho Jansen, [facility]   5/29/2018 8:00 AM Wicho Jansen, [facility]   5/30/2018 7:30 AM Wicho Jansen, [facility]   6/20/2018 8:30 AM Laisha Machado MD [facility] [address]   8/13/2018 8:00 AM Laisha Machado MD [facility] [address]

## 2018-04-27 ENCOUNTER — HOSPITAL ENCOUNTER (OUTPATIENT)
Dept: PHYSICAL THERAPY | Age: 73
Discharge: HOME | End: 2018-04-27
Payer: MEDICARE

## 2018-04-27 PROCEDURE — 97110 THERAPEUTIC EXERCISES: CPT

## 2018-04-27 PROCEDURE — 97140 MANUAL THERAPY 1/> REGIONS: CPT

## 2018-04-27 NOTE — PROGRESS NOTES
PT DAILY TREATMENT NOTE - [facility]     Patient Name: Beto Bravo  Date:2018  : 1945  [x]  Patient  Verified  Payor: VA MEDICARE / Plan: VA MEDICARE PART A & B / Product Type: Medicare /    In time: 10:30  Out time: 11:24  Total Treatment Time (min): 54  Total Timed Codes (min): 54  1:1 Treatment Time ([facility] only): 35   Visit #: 8 of 12    Treatment Area: Right knee pain [M25.561]    SUBJECTIVE  Pain Level (0-10 scale): 4  Any medication changes, allergies to medications, adverse drug reactions, diagnosis change, or new procedure performed?: [x] No    [] Yes (see summary sheet for update)  Subjective functional status/changes:   [] No changes reported  No changes reported today. OBJECTIVE    34 min Therapeutic Exercise:  [x] See flow sheet :   Rationale: increase ROM and increase strength to improve the patients ability to perform ADls    10 min Neuromuscular Re-education:  [x]  See flow sheet :   Rationale: increase strength and improve coordination  to improve the patients ability to tolerate functional tasks    10 min Manual Therapy: right knee EXT>flex mobs grade 2, right patellar mobs EXT>flex, DTM right HS, manual stretch into right knee EXT    Rationale: decrease pain, increase ROM and increase tissue extensibility to improve activity tolerance. With   [] TE   [] TA   [] neuro   [] other: Patient Education: [x] Review HEP    [] Progressed/Changed HEP based on:   [] positioning   [] body mechanics   [] transfers   [] heat/ice application    [] other:      Other Objective/Functional Measures: Continues to have limited EXT in the right knee as noted with manual interventions. Pt continues to have EXT lag with SLR on the right LE secondary to this limited EXT. Pain Level (0-10 scale) post treatment: 3    ASSESSMENT/Changes in Function: Educated pt to use a cold/ice pack at home over the right knee for 15-20 mins to help with the swelling/inflammation in the right knee.  Gave pt updated HEP handout to perform at home. Continue POC as tolerated. Patient will continue to benefit from skilled PT services to modify and progress therapeutic interventions, address functional mobility deficits, address ROM deficits, address strength deficits, analyze and address soft tissue restrictions and analyze and cue movement patterns to attain remaining goals. []  See Plan of Care  []  See progress note/recertification  []  See Discharge Summary         Progress towards goals / Updated goals:  Goal: Pt to increase right knee AROM to -5-110 deg or > to normalize gait pattern for easier navigation in community. Status at last note/certification: -55-84 deg right knee  Current status: right knee AROM 5-120            4/25/2018  Goal: Pt to perform right SLS x 30 seconds without LOB to work towards reciprocal pattern on porch steps. Status at last note/certification: unable to perform  Current status: progressing - only slight LOB  Goal: Pt to perform at least 5 double leg squats without deviations to bend and  objects without difficulty. Status at last note/certification: pain and increased left LE WB  Current status: progressing - appears symmetrical with assistance at trapeze  Goal: Pt to amb community distances without deviations, fatigue, or A.D for increased activity tolerance. Status at last note/certification: uses [facility] community distances with antalgic limp  Current status: progressing - not using AD but gait remains somewhat slowed and slight deviations persist  Goal: Pt to report FOTO score of 62 pts to show improved function.   Status at last note/certification: FOTO 44 pts  Current status: progressing - FOTO 56 pts     PLAN  [x]  Upgrade activities as tolerated     [x]  Continue plan of care  [x]  Update interventions per flow sheet       []  Discharge due to:_  []  Other:_      John Cohn, PT 4/27/2018  10:54 AM    Future Appointments  Date Time Provider Gisele Astorga 4/27/2018 10:30 AM Chris Srinathdre, PT [facility] [address]   4/30/2018 10:30 AM Katarzyna Crown [facility] [address]   5/2/2018 9:00 AM Loi Fontanez, PT [facility] [address]   5/4/2018 7:30 AM [facility] [address]   5/7/2018 8:30 AM Ariadna Ahumada, PT [facility] [address]   5/9/2018 10:30 AM Loi Fontanez, PT [facility] [address]   5/11/2018 9:00 AM [facility] [address]   5/14/2018 9:00 AM Perlamarge Aiken [facility] [address]   5/16/2018 9:00 AM Perla Antolinca [facility] [address]   5/18/2018 9:00 AM Loi Fontanez, PT [facility] [address]   5/21/2018 9:00 AM [facility] [address]   5/23/2018 9:00 AM Perla Aiken [facility] [address]   5/25/2018 9:00 AM Loi Fontanez, PT [facility] [address]   5/29/2018 8:00 AM Loi Fontanez, PT [facility] [address]   5/30/2018 7:30 AM Loi Fontanez, [facility] [address]   6/20/2018 8:30 AM Yudi Clemons MD [address]   8/13/2018 8:00 AM Yudi Clemons MD [facility] [address]

## 2018-04-30 ENCOUNTER — HOSPITAL ENCOUNTER (OUTPATIENT)
Dept: PHYSICAL THERAPY | Age: 73
Discharge: HOME | End: 2018-04-30
Payer: MEDICARE

## 2018-04-30 PROCEDURE — 97140 MANUAL THERAPY 1/> REGIONS: CPT

## 2018-04-30 PROCEDURE — G8979 MOBILITY GOAL STATUS: HCPCS

## 2018-04-30 PROCEDURE — 97110 THERAPEUTIC EXERCISES: CPT

## 2018-04-30 PROCEDURE — G8978 MOBILITY CURRENT STATUS: HCPCS

## 2018-04-30 NOTE — PROGRESS NOTES
[facility] - Keturah Salcedo Reyes [address]  [phone] fax    Continued Plan of Care/ Re-certification for Physical Therapy Services      Patient name: Jake Louise Start of Care: 3/20/2018   Referral source: Elizabeth Wood MD : 1945                         Medical Diagnosis: Right knee pain [M25.561] Onset Date:18 (surgery)                         Treatment Diagnosis: Right Knee Pain   Prior Hospitalization: see medical history Provider#: [id number]   Medications: Verified on Patient summary List   Comorbidities: Arthritis; HTN; DM Type II; Gastrointestinal issues; hx of kidney stones; Depression; hx of Hepatitis A   Prior Level of Function: Retired; lives in [place] home with spouse; active individual    Visits from [place] of Care: 19    Missed Visits: 0    The Plan of Care and following information is based on the patient's current status:  Goal: Pt to increase right knee AROM to -5-110 deg or > to normalize gait pattern for easier navigation in community. Status at last note/certification: -74-13 deg right knee  Current status: progressing - right knee AROM -7-121 deg         Goal: Pt to perform right SLS x 30 seconds without LOB to work towards reciprocal pattern on porch steps. Status at last note/certification: unable to perform  Current status: progressing - only slight LOB  Goal: Pt to perform at least 5 double leg squats without deviations to bend and  objects without difficulty. Status at last note/certification: pain and increased left LE WB  Current status: met - appears symmetrical with assistance at trapeze  Goal: Pt to amb community distances without deviations, fatigue, or A.D for increased activity tolerance.   Status at last note/certification: uses [facility] community distances with antalgic limp  Current status: progressing - not using AD but gait remains somewhat slowed and slight deviations persist  Goal: Pt to report FOTO score of 62 pts to show improved function. Status at last note/certification: FOTO 44 pts  Current status: progressing - FOTO 58 pts    Key functional changes:   Functional Gains: improved stair negotiation, not yet consistent with reciprocal pattern                        Functional Deficits: Functionally pt states he is not limited in his daily activities    % Improvement: 75% improved since start of care    Problems/ barriers to goal attainment: None     Problem List: pain affecting function, decrease ROM, decrease strength, edema affecting function, impaired gait/ balance, decrease ADL/ functional abilitiies, decrease activity tolerance and decrease flexibility/ joint mobility    Treatment Plan: Therapeutic exercise, Therapeutic activities, Neuromuscular re-education, Physical agent/modality, Gait/balance training, Manual therapy and Patient education     Patient Goal (s) has been updated and includes: \"Get mobility back. \"     Goals for this certification period to be accomplished in 3 treatments:  Long term goals above remain appropriate. Frequency / Duration: Patient to be seen 3 times per week for 3 treatments:    Assessment / Recommendations: Pt would benefit from continued skilled PT to finish remaining visits and be given updated HEP to ensure independence and readiness for D/C. G-Codes (GP)  Mobility  [id number] Current  CK= 40-59%  [id number] Goal  CJ= 20-39%    The severity rating is based on clinical judgment and the FOTO score. Certification Period: 5/1/18 - 5/30/18    Eve Ahumada, PT 4/30/2018 12:50 PM    ________________________________________________________________________  I certify that the above Therapy Services are being furnished while the patient is under my care. I agree with the treatment plan and certify that this therapy is necessary. [] I have read the above and request that my patient continue as recommended.   [] I have read the above report and request that my patient continue therapy with the following changes/special instructions: ______________________________________  [] I have read the above report and request that my patient be discharged from therapy    Physician's Signature:_______________________________Date:___________Time:__________  Please sign and return to [facility] - [address]  [phone] fax

## 2018-04-30 NOTE — PROGRESS NOTES
PT DAILY TREATMENT NOTE - [facility]     Patient Name: Eitan Wilson  Date:2018  : 1945  [x]  Patient  Verified  Payor: Oh Gtz / Plan: [place] MED[place] PART A & B / Product Type: Medicare /    In time:10;30  Out time:11;25  Total Treatment Time (min): 55  Total Timed Codes (min): 55  1:1 Treatment Time ( [address] only): 30   Visit #: 9 of 12    Treatment Area: Right knee pain [M25.561]    SUBJECTIVE  Pain Level (0-10 scale): 2  Any medication changes, allergies to medications, adverse drug reactions, diagnosis change, or new procedure performed?: [x] No    [] Yes (see summary sheet for update)  Subjective functional status/changes:   [] No changes reported  I see the P.A. Tomorrow and I think Ill be OK to finish PT.    OBJECTIVE    35 min Therapeutic Exercise:  [] See flow sheet :   Rationale: increase ROM and increase strength to improve the patients ability to perform ADL's, function     10 min Neuromuscular Re-education:  []  See flow sheet :   Rationale: increase strength  to improve the patients ability to increase knee stability    10 min Manual Therapy:  STM to quad, HS and TF mobs.  OP for extension   Rationale: increase ROM and increase tissue extensibility to normalize gait apttern         With   [] TE   [] TA   [] neuro   [] other: Patient Education: [x] Review HEP    [] Progressed/Changed HEP based on:   [] positioning   [] body mechanics   [] transfers   [] heat/ice application    [] other:      Other Objective/Functional Measures:   75% improved  Gains: improved stair negotiation, not yet consistent with reciprocal pattern   Functionally pt states he is not limited in his daily activities    Pain Level (0-10 scale) post treatment: 2    ASSESSMENT/Changes in Function: see goals    Patient will continue to benefit from skilled PT services to modify and progress therapeutic interventions, address functional mobility deficits, address ROM deficits, address strength deficits, analyze and address soft tissue restrictions, analyze and cue movement patterns, analyze and modify body mechanics/ergonomics, assess and modify postural abnormalities, address imbalance/dizziness and instruct in home and community integration to attain remaining goals. []  See Plan of Care  []  See progress note/recertification  []  See Discharge Summary         Progress towards goals / Updated goals:  Goal: Pt to increase right knee AROM to -5-110 deg or > to normalize gait pattern for easier navigation in community. Status at last note/certification: -01-32 deg right knee  Current status: right knee AROM 7-121          Goal: Pt to perform right SLS x 30 seconds without LOB to work towards reciprocal pattern on porch steps. Status at last note/certification: unable to perform  Current status: progressing - only slight LOB  Goal: Pt to perform at least 5 double leg squats without deviations to bend and  objects without difficulty. Status at last note/certification: pain and increased left LE WB  Current status: progressing - appears symmetrical with assistance at trapeze  Goal: Pt to amb community distances without deviations, fatigue, or A.D for increased activity tolerance. Status at last note/certification: uses [facility] community distances with antalgic limp  Current status: progressing - not using AD but gait remains somewhat slowed and slight deviations persist  Goal: Pt to report FOTO score of 62 pts to show improved function.   Status at last note/certification: FOTO 44 pts  Current status: progressing - FOTO 58 pts    PLAN  [x]  Upgrade activities as tolerated     []  Continue plan of care  []  Update interventions per flow sheet       []  Discharge due to:_  []  Other:_      Floy Goodpasture, PTA 4/30/2018  10:40 AM    Future Appointments  Date Time Provider Gisele Astorga   5/2/2018 9:00 AM Booker Nazario PT [facility]   5/4/2018 7:30 AM Booker Nazario PT [facility]   5/7/2018 8:30 AM Mc Ahumada, PT [facility]   5/9/2018 10:30 AM Booker Nazario, PT [facility]   5/11/2018 9:00 AM [facility]   5/14/2018 9:00 AM Perla Ymca [place] [facility]   5/16/2018 9:00 AM Perla Y[place]ca [place] [facility]   5/18/2018 9:00 AM Booker Nazario, PT [facility]   5/21/2018 9:00 AM [facility]   5/23/2018 9:00 AM Perla [place] [facility]   5/25/2018 9:00 AM Booker Nazario PT [facility]   5/29/2018 8:00 AM Booker Nazario, PT [facility]   5/30/2018 7:30 AM Booker Nazario PT [facility]   6/20/2018 8:30 AM Rosita Osler, MD [facility]   8/13/2018 8:00 AM Rosita Osler, MD  [address]

## 2018-05-02 ENCOUNTER — HOSPITAL ENCOUNTER (OUTPATIENT)
Dept: PHYSICAL THERAPY | Age: 73
Discharge: HOME | End: 2018-05-02
Payer: MEDICARE

## 2018-05-02 PROCEDURE — 97112 NEUROMUSCULAR REEDUCATION: CPT

## 2018-05-02 PROCEDURE — 97140 MANUAL THERAPY 1/> REGIONS: CPT

## 2018-05-02 NOTE — PROGRESS NOTES
PT DAILY TREATMENT NOTE - [facility]     Patient Name: Halima Gaurav  Date:2018  : 1945  [x]  Patient  Verified  Payor: Iris Milton / Plan: [place] MEDICARE PART A & B / Product Type: Medicare /    In time:858  Out time:943  Total Treatment Time (min): 45  Total Timed Codes (min): 45  1:1 Treatment Time ( [address] only): 37   Visit #: 1 of 3    Treatment Area: Right knee pain [M25.561]    SUBJECTIVE  Pain Level (0-10 scale): 2  Any medication changes, allergies to medications, adverse drug reactions, diagnosis change, or new procedure performed?: [x] No    [] Yes (see summary sheet for update)  Subjective functional status/changes:   [] No changes reported  It's still the extension.      OBJECTIVE    Modality rationale:    Min Type Additional Details    [] Estim:  []Unatt       []IFC  []Premod                        []Other:  []w/ice   []w/heat  Position:  Location:    [] Estim: []Att    []TENS instruct  []NMES                    []Other:  []w/US   []w/ice   []w/heat  Position:  Location:    []  Traction: [] Cervical       []Lumbar                       [] Prone          []Supine                       []Intermittent   []Continuous Lbs:  [] before manual  [] after manual    []  Ultrasound: []Continuous   [] Pulsed                           []1MHz   []3MHz W/cm2:  Location:    []  Iontophoresis with dexamethasone         Location: [] Take home patch   [] In clinic    []  Ice     []  heat  []  Ice massage  []  Laser   []  Anodyne Position:  Location:    []  Laser with stim  []  Other:  Position:  Location:    []  Vasopneumatic Device Pressure:       [] lo [] med [] hi   Temperature: [] lo [] med [] hi   [] Skin assessment post-treatment:  []intact []redness- no adverse reaction    []redness - adverse reaction:     25 min Therapeutic Exercise:  [x] See flow sheet :   Rationale: increase ROM and increase strength to improve the patients ability to improve ease of stand/amb    10 min Neuromuscular Re-education:  [x] See flow sheet :Pilates activities   Rationale: increase strength and improve coordination  to improve the patients ability to improve knee stability     10 min Manual Therapy:  STM distal quads/hamstrings Right, manual Right knee stretching, overpressure into extension, contract relax for extension    Rationale: decrease pain, increase ROM, increase tissue extensibility and decrease trigger points to normalize gait       With   [] TE   [] TA   [] neuro   [] other: Patient Education: [x] Review HEP    [] Progressed/Changed HEP based on:   [] positioning   [] body mechanics   [] transfers   [] heat/ice application    [] other:      Other Objective/Functional Measures: performed heel raises at stairs to increased extension     Pain Level (0-10 scale) post treatment: 3    ASSESSMENT/Changes in Function: Discussed Patient returning to [facility] with gentle cardio (ie bike, TM, elliptical) to continue to improve Right LE function as able. Informed him that even though PA recommended continued therapy we will work toward discharge due to plateau in gains and ability to continue on his own. Patient will continue to benefit from skilled PT services to modify and progress therapeutic interventions, address functional mobility deficits, address ROM deficits, address strength deficits, analyze and address soft tissue restrictions, analyze and cue movement patterns, analyze and modify body mechanics/ergonomics, assess and modify postural abnormalities, address imbalance/dizziness and instruct in home and community integration to attain remaining goals. []  See Plan of Care  []  See progress note/recertification  []  See Discharge Summary         Progress towards goals / Updated goals:  Goal: Pt to increase right knee AROM to -5-110 deg or > to normalize gait pattern for easier navigation in community.   Status at last note/certification: -83-62 deg right knee  Current status: progressing - right knee AROM -7-121 deg         Goal: Pt to perform right SLS x 30 seconds without LOB to work towards reciprocal pattern on porch steps. Status at last note/certification: unable to perform  Current status: progressing - only slight LOB  Goal: Pt to perform at least 5 double leg squats without deviations to bend and  objects without difficulty. Status at last note/certification: pain and increased left LE WB  Current status: met - appears symmetrical with assistance at trapeze  Goal: Pt to amb community distances without deviations, fatigue, or A.D for increased activity tolerance. Status at last note/certification: uses [facility] with antalgic limp  Current status: progressing - not using AD but gait remains somewhat slowed and slight deviations persist  Goal: Pt to report FOTO score of 62 pts to show improved function.   Status at last note/certification: FOTO 44 pts  Current status: progressing - FOTO 58 pts    PLAN  [x]  Upgrade activities as tolerated     [x]  Continue plan of care  []  Update interventions per flow sheet       []  Discharge due to:_  []  Other:_      Pooja Mike PT 5/2/2018  7:07 AM    Future Appointments  Date Time Provider Gisele Astorga   5/2/2018 9:00 AM Pooja Mike PT [facility]   5/4/2018 7:30 [facility]   5/7/2018 8:30 AM Austin Ahumada, PT [facility]   5/9/2018 10:30 AM Pooja Mike PT [facility]   5/11/2018 9:00 AM Perla Aiken [place] [facility]   5/14/2018 9:00  [address] [facility]   5/16/2018 9:00 AM Perla Aiken O[place] [facility]   5/18/2018 9:00 AM Pooja Mike PT [facility]   5/21/2018 9:00 AM Perla Olivia [place] [facility]   5/23/2018 9:00 AM Perla Olivia [place] [facility]   5/25/2018 9:00 AM Pooja Mike [facility]   5/29/2018 8:00 AM Pooja Mike [facility]   5/30/2018 7:30 AM Pooja Mike [facility]   6/20/2018 8:30 AM Meghan Rios MD [facility]   8/13/2018 8:00 AM Yudi Clemons MD [facility]

## 2018-05-04 ENCOUNTER — APPOINTMENT (OUTPATIENT)
Dept: PHYSICAL THERAPY | Age: 73
End: 2018-05-04
Payer: MEDICARE

## 2018-05-07 ENCOUNTER — HOSPITAL ENCOUNTER (OUTPATIENT)
Dept: PHYSICAL THERAPY | Age: 73
Discharge: HOME | End: 2018-05-07
Payer: MEDICARE

## 2018-05-07 PROCEDURE — 97110 THERAPEUTIC EXERCISES: CPT

## 2018-05-07 PROCEDURE — 97140 MANUAL THERAPY 1/> REGIONS: CPT

## 2018-05-07 NOTE — PROGRESS NOTES
PT DAILY TREATMENT NOTE - [facility]     Patient Name: Bisi Albert  Date:2018  : 1945  [x]  Patient  Verified  Payor: [place] MEDICARE / Plan: [place] MEDICARE PART A & B / Product Type: Medicare /    In time:8:30  Out time:9:10  Total Treatment Time (min):40   Total Timed Codes (min): 40  1:1 Treatment Time ( only): 25  Visit #: 2 of 3    Treatment Area: Right knee pain [M25.561]    SUBJECTIVE  Pain Level (0-10 scale): 3/10  Any medication changes, allergies to medications, adverse drug reactions, diagnosis change, or new procedure performed?: [x] No    [] Yes (see summary sheet for update)  Subjective functional status/changes:   [] No changes reported  \"I had a good but busy weekend. I feel okay today. \"    OBJECTIVE    22 min Therapeutic Exercise:  [x] See flow sheet :   Rationale: increase ROM and increase strength to improve the patients ability to improve ease of stand/amb    8 min Neuromuscular Re-education:  [x]  See flow sheet :Pilates activities   Rationale: increase strength and improve coordination  to improve the patients ability to improve knee stability     10 min Manual Therapy:  STM distal quad/HS, ITB; tibiofemoral glides ant/post, focus on extension   Rationale: decrease pain, increase ROM, increase tissue extensibility and decrease trigger points to normalize gait       With   [] TE   [] TA   [] neuro   [] other: Patient Education: [x] Review HEP    [] Progressed/Changed HEP based on:   [] positioning   [] body mechanics   [] transfers   [] heat/ice application    [] other:      Other Objective/Functional Measures: Continued with progressed Rx program per flow sheet. No increased pain or difficulty with exercises performed. Pain Level (0-10 scale) post treatment: 3/10    ASSESSMENT/Changes in Function: Pt progressing towards D/C next visit with updated HEP to continue to work on strength and knee extension ROM independently with independent program at [place]. Pt in agreement. Patient will continue to benefit from skilled PT services to modify and progress therapeutic interventions, address functional mobility deficits, address ROM deficits, address strength deficits, analyze and address soft tissue restrictions, analyze and cue movement patterns, analyze and modify body mechanics/ergonomics, assess and modify postural abnormalities, address imbalance/dizziness and instruct in home and community integration to attain remaining goals. []  See Plan of Care  []  See progress note/recertification  []  See Discharge Summary         Progress towards goals / Updated goals:  Goal: Pt to increase right knee AROM to -5-110 deg or > to normalize gait pattern for easier navigation in community. Status at last note/certification: -20-14 deg right knee  Current status: progressing - right knee AROM -7-121 deg         Goal: Pt to perform right SLS x 30 seconds without LOB to work towards reciprocal pattern on porch steps. Status at last note/certification: unable to perform  Current status: progressing - only slight LOB  Goal: Pt to perform at least 5 double leg squats without deviations to bend and  objects without difficulty. Status at last note/certification: pain and increased left LE WB  Current status: met - appears symmetrical with assistance at trapeze  Goal: Pt to amb community distances without deviations, fatigue, or A.D for increased activity tolerance. Status at last note/certification: uses [facility] community distances with antalgic limp  Current status: progressing - not using AD but gait remains somewhat slowed and slight deviations persist  Goal: Pt to report FOTO score of 62 pts to show improved function.   Status at last note/certification: FOTO 44 pts  Current status: progressing - FOTO 58 pts    PLAN  [x]  Upgrade activities as tolerated     [x]  Continue plan of care  []  Update interventions per flow sheet       []  Discharge due to:_  [x]  Other:_goals and D/C next visit      Mc Ahumada, PT 5/7/2018  7:07 AM    Future Appointments  Date Time Provider Gisele Gauri   5/9/2018 10:30 AM Booker Nazario [facility]   5/11/2018 9:00 AM [facility]   5/14/2018 9:00 AM [facility]   5[facility]16/2018 [facility]   5/18/2018 9:00 AM Booker Nazario [facility]   5/21/2018 9:00 AM [facility]   5/23/2018 9:00 AM Perla Monteiro [facility]   5/25/2018 9:00 AM Booker Nazairo [facility]   5/29/2018 8:00 AM Booker Nazario [facility]   5/30/2018 7:30 AM Booker Nazario [facility]   6/20/2018 8:30 AM Rosita Osler, MD [facility]   8/13/2018 8:00 AM Rosita Osler, MD [facility]

## 2018-05-09 ENCOUNTER — HOSPITAL ENCOUNTER (OUTPATIENT)
Dept: PHYSICAL THERAPY | Age: 73
Discharge: HOME | End: 2018-05-09
Payer: MEDICARE

## 2018-05-09 PROCEDURE — G8979 MOBILITY GOAL STATUS: HCPCS

## 2018-05-09 PROCEDURE — 97140 MANUAL THERAPY 1/> REGIONS: CPT

## 2018-05-09 PROCEDURE — 97112 NEUROMUSCULAR REEDUCATION: CPT

## 2018-05-09 PROCEDURE — G8980 MOBILITY D/C STATUS: HCPCS

## 2018-05-10 ENCOUNTER — OFFICE VISIT (OUTPATIENT)
Dept: INTERNAL MEDICINE CLINIC | Age: 73
End: 2018-05-10

## 2018-05-10 VITALS
RESPIRATION RATE: 16 BRPM | DIASTOLIC BLOOD PRESSURE: 87 MMHG | OXYGEN SATURATION: 99 % | BODY MASS INDEX: 30.07 KG/M2 | HEIGHT: 69 IN | SYSTOLIC BLOOD PRESSURE: 137 MMHG | WEIGHT: 203 LBS | TEMPERATURE: 96.5 F | HEART RATE: 87 BPM

## 2018-05-10 DIAGNOSIS — K40.90 RIGHT INGUINAL HERNIA: Primary | ICD-10-CM

## 2018-05-10 NOTE — PATIENT INSTRUCTIONS
Inguinal Hernia: Care Instructions  Your Care Instructions    An inguinal hernia occurs when tissue bulges through a weak spot in your groin area. You may see or feel a tender bulge in the groin or scrotum. You may also have pain, pressure or burning, or a feeling that something has \"given way. \"  Hernias are caused by a weakness in the belly wall. The bulge or discomfort may occur after heavy lifting, straining, or coughing. Hernias do not heal on their own, and they tend to get worse over time. If your hernia does not bother you, you most likely can wait to have surgery. Your hernia may get worse, but it may not. In some cases, hernias that are small and painless may never need to be repaired. Follow-up care is a key part of your treatment and safety. Be sure to make and go to all appointments, and call your doctor if you are having problems. It's also a good idea to know your test results and keep a list of the medicines you take. How can you care for yourself at home? · Take pain medicines exactly as directed. ¨ If the doctor gave you a prescription medicine for pain, take it as prescribed. ¨ If you are not taking a prescription pain medicine, ask your doctor if you can take an over-the-counter medicine. · Use proper lifting techniques, and avoid heavy lifting if you can. To lift things more safely, bend your knees and let your arms and legs do the work. Keep your back straight, and do not bend over at the waist. Keep the load as close to your body as you can. Move your feet instead of turning or twisting your body. · Lose weight if you are overweight. · Include fruits, vegetables, legumes, and whole grains in your diet each day. These foods are high in fiber and will make it easier to avoid straining during bowel movements. · Do not smoke. Smoking can cause coughing, which can cause your hernia to bulge. If you need help quitting, talk to your doctor about stop-smoking programs and medicines. These can increase your chances of quitting for good. When should you call for help? Call your doctor now or seek immediate medical care if:  ? · You have new or worse belly pain. ? · You are vomiting. ? · You cannot pass stools or gas. ? · You cannot push the hernia back into place with gentle pressure when you are lying down. ? · The area over the hernia turns red or becomes tender. ? Watch closely for changes in your health, and be sure to contact your doctor if you have any problems. Where can you learn more? Go to http://sapna-araseli.info/. Enter N868 in the search box to learn more about \"Inguinal Hernia: Care Instructions. \"  Current as of: May 12, 2017  Content Version: 11.4  © 8968-6015 Healthwise, Incorporated. Care instructions adapted under license by [employer] (which disclaims liability or warranty for this information). If you have questions about a medical condition or this instruction, always ask your healthcare professional. [place] [address] any warranty or liability for your use of this information.

## 2018-05-10 NOTE — PROGRESS NOTES
INTERNISTS OF [facility]:  5/10/2018, MRN: [id number]      Selam Vincent is a 74 y.o. male and presents to clinic for Groin Pain (right side)    Subjective:   Pt is a 66yo male with h/o ED, melanoma in situ on left dorsal forearm s/p removal, MDD, rectal polyp, diverticulosis, CKD stage 3, HTN, type 2 DM (foot exams are done by Winifred Ozuna), hypothyroidism, DJD, gout, vocal cord nodule, vitamin D deficiency, RBBB (s/p normal stress test 2018), and HLD. Right groin pain: Present intermittently for 15 days. Carrying heavy objects or squatting will trigger pain sx. No alleviating factors are known other than Tylenol. No dysuria. No hematuria, nausea, vomiting, or fever/chills. The patient is concerned that he may have a hernia. Pain was very severe within the past 24 hours. He is presently asymptomatic. Pain is localized to his right groin. It does not radiate. Patient Active Problem List    Diagnosis Date Noted    Osteoarthritis of right knee 02/19/2018    Erectile dysfunction 01/23/2017    Melanoma in situ - on left dorsal forearm 2016 09/16/2016    Major depressive disorder in remission 08/03/2016    Rectal polyp -  seen on colonoscopy from 8/20/15 08/02/2016    Diverticulosis of intestine without bleeding - seen on colonoscopy from 8/20/15 08/02/2016    CKD (chronic kidney disease) stage 3, GFR 30-59 ml/min 11/09/2015    Essential hypertension 06/24/2015    Type 2 diabetes mellitus without complication ([facility] [address] 95/57/5385    Hypothyroidism due to acquired atrophy of thyroid 06/24/2015    Vocal cord nodule 10/21/2013    Gout 10/21/2011    Family history of colon cancer. personal hx colon polyps 10/21/2011    Hyperlipidemia     Hypovitaminosis D        Current Outpatient Prescriptions   Medication Sig Dispense Refill    lisinopril (PRINIVIL, ZESTRIL) 5 mg tablet Take 1 Tab by mouth daily.  (Patient taking differently: Take 5 mg by mouth nightly.) 90 Tab 3    atorvastatin (LIPITOR) 40 mg tablet Take 1 Tab by mouth daily. (Patient taking differently: Take 40 mg by mouth nightly.) 90 Tab 3    allopurinol (ZYLOPRIM) 100 mg tablet Take 2 Tabs by mouth daily. 180 Tab 3    levothyroxine (SYNTHROID) 50 mcg tablet Take 1 Tab by mouth Daily (before breakfast). 90 Tab 4    carvedilol (COREG) 6.25 mg tablet Take 1 Tab by mouth two (2) times daily (with meals). 30 Tab 11    terbinafine HCl (LAMISIL) 250 mg tablet Take 250 mg by mouth daily. Indications: Taken for 1 full week a month; and then not for another 3 weeks      venlafaxine-SR (EFFEXOR-XR) 150 mg capsule Take 150 mg by mouth daily.  sildenafil (REVATIO) 20 mg tablet Take po 40 mg once daily 1 hour (range: 30 minutes to 4 hours) before sexual activity as needed; maximum dose: 100 mg once daily 30 Tab 11    multivitamin (ONE A DAY) tablet Take 1 Tab by mouth daily.  Cholecalciferol, Vitamin D3, (VITAMIN D) 2,000 unit Cap Take 4,000 Units by mouth daily.       oxyCODONE-acetaminophen (PERCOCET) 5-325 mg per tablet Take 1 to 2 tab PO q 4-6 hrs prn pain 60 Tab 0       Allergies   Allergen Reactions    Amlodipine Other (comments)     BLE edema       Past Medical History:   Diagnosis Date    Arthritis     hip, knee    Chronic kidney disease     had previous reaction with kidneys after a bp med, no issues now    Colon polyps     dysplastic    Depression     Diabetes mellitus ([facility] [address] 2017    no meds    Diverticulosis     seen on colonoscopy from 8/20/15    Diverticulosis of sigmoid colon 01/27/10    GERD (gastroesophageal reflux disease)     no meds    Gout     Hepatitis     Hyperlipidemia     Hypertension 15 years    Hypovitaminosis D     Nephrolithiasis     Osteoarthritis of right knee 2/19/2018    Plantar wart     Rectal polyp     seen on colonoscopy from 8/20/15    Thyroid disease     hypothyroidism       Past Surgical History:   Procedure Laterality Date    ENDOSCOPY, COLON, DIAGNOSTIC  01/27/2010    polyp distal rectum, removed; diverticulosis of sigmoid    HX CATARACT REMOVAL Bilateral     HX HEENT  2008    vocal cord polyp removed    HX POLYPECTOMY  01/27/2010    Distal rectum    HX TONSILLECTOMY      LITHOTRIPSY      TOTAL KNEE ARTHROPLASTY Left 1/2014       Family History   Problem Relation Age of Onset    Arthritis-rheumatoid Mother     Hypertension Mother     Elevated Lipids Mother     Thyroid Disease Sister     Cancer Father      colon    Heart Disease Other     Hypertension Other     Cancer Other      breast    Heart Disease Maternal Grandmother     Dementia Maternal Grandfather     Cancer Paternal Grandmother      breast    No Known Problems Paternal Grandfather        Social History   Substance Use Topics    Smoking status: Former Smoker     Packs/day: 1.00     Years: 6.00     Types: Cigarettes     Quit date: 1/1/1992    Smokeless tobacco: Never Used    Alcohol use No      Comment: wine with dinner sometimes       ROS   Review of Systems   Constitutional: Negative for chills and fever. HENT: Negative for ear pain and sore throat. Eyes: Negative for blurred vision and pain. Respiratory: Negative for cough and shortness of breath. Cardiovascular: Negative for chest pain. Gastrointestinal: Negative for blood in stool, melena and vomiting. Genitourinary: Negative for dysuria and hematuria. Musculoskeletal: Positive for joint pain (Right knee pain, improving still s/p knee replacement surgery in 2/18). Negative for myalgias. Skin: Negative for rash. Neurological: Negative for tingling, focal weakness and headaches. Endo/Heme/Allergies: Does not bruise/bleed easily. Psychiatric/Behavioral: Negative for substance abuse.        Objective     Vitals:    05/10/18 1346   BP: 137/87   Pulse: 87   Resp: 16   Temp: 96.5 °F (35.8 °C)   SpO2: 99%   Weight: 203 lb (92.1 kg)   Height: 5' 9\" (1.753 m)   PainSc:   4   PainLoc: Groin       Physical Exam   Constitutional: He is well-developed, well-nourished, and in no distress. HENT:   Head: Normocephalic and atraumatic. Right Ear: External ear normal.   Left Ear: External ear normal.   Nose: Nose normal.   Mouth/Throat: Oropharynx is clear and moist. No oropharyngeal exudate. Eyes: Conjunctivae and EOM are normal. Pupils are equal, round, and reactive to light. Right eye exhibits no discharge. Left eye exhibits no discharge. No scleral icterus. Neck: Neck supple. Cardiovascular: Normal rate, regular rhythm, normal heart sounds and intact distal pulses. Pulmonary/Chest: Effort normal and breath sounds normal. No respiratory distress. He has no wheezes. He has no rales. Abdominal: Soft. Bowel sounds are normal. He exhibits no distension. There is tenderness (TTP along his right inguinal crease). There is no rebound and no guarding. +Hernia palpated along his right groin, with coughing. Lymphadenopathy:     He has no cervical adenopathy. Nursing note and vitals reviewed.       LABS   Data Review:   Lab Results   Component Value Date/Time    WBC 7.9 03/01/2018 02:38 AM    HGB 11.8 (L) 03/01/2018 02:38 AM    HCT 35.3 (L) 03/01/2018 02:38 AM    PLATELET 512 94/75/8422 02:38 AM    MCV 92.2 03/01/2018 02:38 AM       Lab Results   Component Value Date/Time    Sodium 146 (H) 03/01/2018 02:38 AM    Potassium 5.1 03/01/2018 02:38 AM    Chloride 109 (H) 03/01/2018 02:38 AM    CO2 26 03/01/2018 02:38 AM    Anion gap 11 03/01/2018 02:38 AM    Glucose 133 (H) 03/01/2018 02:38 AM    BUN 15 03/01/2018 02:38 AM    Creatinine 1.37 (H) 03/01/2018 02:38 AM    BUN/Creatinine ratio 11 (L) 03/01/2018 02:38 AM    GFR est AA >60 03/01/2018 02:38 AM    GFR est non-AA 51 (L) 03/01/2018 02:38 AM    Calcium 9.3 03/01/2018 02:38 AM       Lab Results   Component Value Date/Time    Cholesterol, total 146 12/06/2017 09:19 AM    HDL Cholesterol 39 (L) 12/06/2017 09:19 AM    LDL, calculated 89.4 12/06/2017 09:19 AM    VLDL, calculated 17.6 12/06/2017 09:19 AM    Triglyceride 88 12/06/2017 09:19 AM    CHOL/HDL Ratio 3.7 12/06/2017 09:19 AM       Lab Results   Component Value Date/Time    Hemoglobin A1c 6.0 (H) 01/30/2018 08:17 AM    Hemoglobin A1c (POC) 6.3 08/03/2016 09:37 AM       Assessment/Plan:   Right inguinal hernia: Per PE findings. He is presently asymptomatic.  -I instructed him to go to the emergency department if he has persistent pain symptoms despite attempts to reduce the hernia. I discussed the importance of reducing his hernia if he develops any pain. I also discussed what a right inguinal hernia is and how it is treated.  -Placing a referral to general surgery to discuss elective treatment options. ORDERS:  - REFERRAL TO [facility] Due   Topic Date Due    DTaP/Tdap/Td series (1 - Tdap) 01/02/2007     Lab review: labs are reviewed in the EHR    I have discussed the diagnosis with the patient and the intended plan as seen in the above orders. The patient has received an after-visit summary and questions were answered concerning future plans. I have discussed medication side effects and warnings with the patient as well. I have reviewed the plan of care with the patient, accepted their input and they are in agreement with the treatment goals. All questions were answered. The patient understands the plan of care. Handouts provided today with above information. Pt instructed if symptoms worsen to call the office or report to the ED for continued care. Greater than 50% of the visit time was spent in counseling and/or coordination of care. Follow-up Disposition:  Return if symptoms worsen or fail to improve, for right inguinal hernia.     Arben Celeste MD

## 2018-05-10 NOTE — MR AVS SNAPSHOT
Shubham Awad 
 
 
 [address] [facility], [facility] [address] [facility] [address] 
[phone] Patient: Jacinda Villatoro MRN: [id number] Visit Information Date & Time Provider Department Dept. Phone Encounter #  
 5/10/2018  1:45 PM Cleo Pryor MD [facility] [address] [place] [address] [id number] Follow-up Instructions Return if symptoms worsen or fail to improve, for right inguinal hernia. Your Appointments 6/20/2018  8:30 AM  
Office Visit with Cleo Pryor MD  
[facility] of [address] [place] [address]) Appt Note: for BP check, DM check. [address] [facility] [address] [address] [place]  
  
   
 [address] [facility] [address]  
  
    
 8/13/2018  8:00 AM  
Office Visit with Cleo Pryor MD  
[facility] of [address] [place] [address]) Appt Note: 6 month f/u  
 [address] [facility], [place] [address] [address] [facility] [address]  
[phone] Upcoming Health Maintenance Date Due DTaP/Tdap/Td series (1 - Tdap) 1/2/2007 HEMOGLOBIN A1C Q6M 7/30/2018 Influenza Age 5 to Adult 8/1/2018 MICROALBUMIN Q1 8/10/2018 MEDICARE YEARLY EXAM 8/11/2018 EYE EXAM RETINAL OR DILATED Q1 11/28/2018 LIPID PANEL Q1 12/6/2018 FOOT EXAM Q1 2/20/2019 GLAUCOMA SCREENING Q2Y 11/28/2019 COLONOSCOPY 8/20/2020 Allergies as of 5/10/2018  Review Complete On: 5/10/2018 By: Liza Bob LPN Severity Noted Reaction Type Reactions Amlodipine  08/23/2016   Intolerance Other (comments) BLE edema Current Immunizations  Reviewed on 6/24/2015 Name Date Influenza High Dose Vaccine PF 10/5/2017, 10/24/2016, 10/2/2015 Influenza Vaccine 11/20/2014, 10/15/2013 Influenza Vaccine Split 11/9/2012  2:07 PM, 10/21/2011  Pneumococcal Conjugate (PCV-13) 6/24/2015 10:06 AM  
 Pneumococcal Polysaccharide (PPSV-23) 8/10/2017  8:43 AM  
 Pneumococcal Vaccine (Unspecified Type) 1/1/2007 Td 1/1/2007 Zoster Vaccine, Live 2/1/2013  2:19 PM  
  
 Not reviewed this visit You Were Diagnosed With   
  
 Codes Comments Right inguinal hernia    -  Primary ICD-10-CM: K40.90 ICD-9-CM: 550.90 Vitals BP Pulse Temp Resp Height(growth percentile) Weight(growth percentile) 137/87 87 96.5 °F (35.8 °C) 16 5' 9\" (1.753 m) 203 lb (92.1 kg) SpO2 BMI Smoking Status 99% 29.98 kg/m2 Former Smoker Vitals History BMI and BSA Data Body Mass Index Body Surface Area  
 29.98 kg/m 2 2.12 m 2 [facility] [address], [address] [phone] Your Updated Medication List  
  
   
This list is accurate as of 5/10/18  2:27 PM.  Always use your most recent med list.  
  
  
  
  
 allopurinol 100 mg tablet Commonly known as:  Magnolia Tera Take 2 Tabs by mouth daily. atorvastatin 40 mg tablet Commonly known as:  LIPITOR Take 1 Tab by mouth daily. carvedilol 6.25 mg tablet Commonly known as:  Raynette Hy Take 1 Tab by mouth two (2) times daily (with meals). levothyroxine 50 mcg tablet Commonly known as:  SYNTHROID Take 1 Tab by mouth Daily (before breakfast). lisinopril 5 mg tablet Commonly known as:  Marianela Elsy Take 1 Tab by mouth daily. multivitamin tablet Commonly known as:  ONE A DAY Take 1 Tab by mouth daily. oxyCODONE-acetaminophen 5-325 mg per tablet Commonly known as:  PERCOCET Take 1 to 2 tab PO q 4-6 hrs prn pain  
  
 sildenafil (antihypertensive) 20 mg tablet Commonly known as:  REVATIO Take po 40 mg once daily 1 hour (range: 30 minutes to 4 hours) before sexual activity as needed; maximum dose: 100 mg once daily  
  
 terbinafine HCl 250 mg tablet Commonly known as:  LAMISIL Take 250 mg by mouth daily.  Indications: Taken for 1 full week a month; and then not for another 3 weeks  
  
 venlafaxine- mg capsule Commonly known as:  EFFEXOR-XR Take 150 mg by mouth daily. VITAMIN D 2,000 unit Cap capsule Generic drug:  Cholecalciferol (Vitamin D3) Take 4,000 Units by mouth daily. We Performed the Following REFERRAL TO GENERAL SURGERY [REF27 Custom] Follow-up Instructions Return if symptoms worsen or fail to improve, for right inguinal hernia. Referral Information Referral ID Referred By Referred To  
  
 [id number] Anson Guido Not Available Visits Status Start Date End Date 1 New Request 5/10/18 5/10/19 If your referral has a status of pending review or denied, additional information will be sent to support the outcome of this decision. Patient Instructions Inguinal Hernia: Care Instructions Your Care Instructions An inguinal hernia occurs when tissue bulges through a weak spot in your groin area. You may see or feel a tender bulge in the groin or scrotum. You may also have pain, pressure or burning, or a feeling that something has \"given way. \" Hernias are caused by a weakness in the belly wall. The bulge or discomfort may occur after heavy lifting, straining, or coughing. Hernias do not heal on their own, and they tend to get worse over time. If your hernia does not bother you, you most likely can wait to have surgery. Your hernia may get worse, but it may not. In some cases, hernias that are small and painless may never need to be repaired. Follow-up care is a key part of your treatment and safety. Be sure to make and go to all appointments, and call your doctor if you are having problems. It's also a good idea to know your test results and keep a list of the medicines you take. How can you care for yourself at home? · Take pain medicines exactly as directed. ¨ If the doctor gave you a prescription medicine for pain, take it as prescribed. ¨ If you are not taking a prescription pain medicine, ask your doctor if you can take an over-the-counter medicine. · Use proper lifting techniques, and avoid heavy lifting if you can. To lift things more safely, bend your knees and let your arms and legs do the work. Keep your back straight, and do not bend over at the waist. Keep the load as close to your body as you can. Move your feet instead of turning or twisting your body. · Lose weight if you are overweight. · Include fruits, vegetables, legumes, and whole grains in your diet each day. These foods are high in fiber and will make it easier to avoid straining during bowel movements. · Do not smoke. Smoking can cause coughing, which can cause your hernia to bulge. If you need help quitting, talk to your doctor about stop-smoking programs and medicines. These can increase your chances of quitting for good. When should you call for help? Call your doctor now or seek immediate medical care if: 
? · You have new or worse belly pain. ? · You are vomiting. ? · You cannot pass stools or gas. ? · You cannot push the hernia back into place with gentle pressure when you are lying down. ? · The area over the hernia turns red or becomes tender. ? Watch closely for changes in your health, and be sure to contact your doctor if you have any problems. Where can you learn more? Go to http://sapna-araseli.info/. Enter N016 in the search box to learn more about \"Inguinal Hernia: Care Instructions. \" Current as of: May 12, 2017 Content Version: 11.4 © 4594-2421 [employer]. Care instructions adapted under license by [employer] (which disclaims liability or warranty for this information). If you have questions about a medical condition or this instruction, always ask your healthcare professional. [address] any warranty or liability for your use of this information. Introducing [facility]! Dear Eda Simms: Thank you for requesting a Critique^It account. Our records indicate that you already have an active Critique^It account. You can access your account anytime at [email or web address] Did you know that you can access your hospital and ER discharge instructions at any time in Critique^It? You can also review all of your test results from your hospital stay or ER visit. Additional Information If you have questions, please visit the Frequently Asked Questions section of the Critique^It website at [email or web address] Remember, Critique^It is NOT to be used for urgent needs. For medical emergencies, dial 911. Now available from your iPhone and Android! Please provide this summary of care documentation to your next provider. Your primary care clinician is listed as Paul Horvath. If you have any questions after today's visit, please call [phone].

## 2018-05-10 NOTE — PROGRESS NOTES
1. Have you been to the ER, urgent care clinic since your last visit? Hospitalized since your last visit? No    2. Have you seen or consulted any other health care providers outside of the [facility] since your last visit? Include any pap smears or colon screening.  Yes physical therapy

## 2018-05-11 ENCOUNTER — APPOINTMENT (OUTPATIENT)
Dept: PHYSICAL THERAPY | Age: 73
End: 2018-05-11
Payer: MEDICARE

## 2018-05-14 ENCOUNTER — APPOINTMENT (OUTPATIENT)
Dept: PHYSICAL THERAPY | Age: 73
End: 2018-05-14
Payer: MEDICARE

## 2018-05-16 ENCOUNTER — APPOINTMENT (OUTPATIENT)
Dept: PHYSICAL THERAPY | Age: 73
End: 2018-05-16
Payer: MEDICARE

## 2018-05-17 PROBLEM — K40.90 RIGHT INGUINAL HERNIA: Status: ACTIVE | Noted: 2018-05-17

## 2018-05-18 ENCOUNTER — APPOINTMENT (OUTPATIENT)
Dept: PHYSICAL THERAPY | Age: 73
End: 2018-05-18
Payer: MEDICARE

## 2018-05-21 ENCOUNTER — APPOINTMENT (OUTPATIENT)
Dept: PHYSICAL THERAPY | Age: 73
End: 2018-05-21
Payer: MEDICARE

## 2018-05-23 ENCOUNTER — HOSPITAL ENCOUNTER (OUTPATIENT)
Dept: LAB | Age: 73
Discharge: HOME | End: 2018-05-23
Payer: MEDICARE

## 2018-05-23 ENCOUNTER — APPOINTMENT (OUTPATIENT)
Dept: PHYSICAL THERAPY | Age: 73
End: 2018-05-23
Payer: MEDICARE

## 2018-05-23 ENCOUNTER — OFFICE VISIT (OUTPATIENT)
Dept: SURGERY | Age: 73
End: 2018-05-23

## 2018-05-23 ENCOUNTER — HOSPITAL ENCOUNTER (OUTPATIENT)
Dept: GENERAL RADIOLOGY | Age: 73
Discharge: HOME | End: 2018-05-23
Payer: MEDICARE

## 2018-05-23 VITALS
RESPIRATION RATE: 18 BRPM | SYSTOLIC BLOOD PRESSURE: 104 MMHG | DIASTOLIC BLOOD PRESSURE: 70 MMHG | TEMPERATURE: 98 F | HEART RATE: 57 BPM | BODY MASS INDEX: 29.98 KG/M2 | WEIGHT: 203 LBS

## 2018-05-23 DIAGNOSIS — K40.90 RIGHT INGUINAL HERNIA: Primary | ICD-10-CM

## 2018-05-23 DIAGNOSIS — K40.90 RIGHT INGUINAL HERNIA: ICD-10-CM

## 2018-05-23 PROBLEM — E11.21 TYPE 2 DIABETES WITH NEPHROPATHY (HCC): Status: ACTIVE | Noted: 2018-05-23

## 2018-05-23 LAB
ANION GAP SERPL CALC-SCNC: 5 MMOL/L (ref 3–18)
BASOPHILS # BLD: 0 K/UL (ref 0–0.06)
BASOPHILS NFR BLD: 0 % (ref 0–2)
BUN SERPL-MCNC: 27 MG/DL (ref 7–18)
BUN/CREAT SERPL: 23 (ref 12–20)
CALCIUM SERPL-MCNC: 9.5 MG/DL (ref 8.5–10.1)
CHLORIDE SERPL-SCNC: 104 MMOL/L (ref 100–108)
CO2 SERPL-SCNC: 30 MMOL/L (ref 21–32)
CREAT SERPL-MCNC: 1.2 MG/DL (ref 0.6–1.3)
DIFFERENTIAL METHOD BLD: NORMAL
EOSINOPHIL # BLD: 0.1 K/UL (ref 0–0.4)
EOSINOPHIL NFR BLD: 2 % (ref 0–5)
ERYTHROCYTE [DISTWIDTH] IN BLOOD BY AUTOMATED COUNT: 14 % (ref 11.6–14.5)
GLUCOSE SERPL-MCNC: 136 MG/DL (ref 74–99)
HCT VFR BLD AUTO: 42.4 % (ref 36–48)
HGB BLD-MCNC: 14.4 G/DL (ref 13–16)
LYMPHOCYTES # BLD: 1.7 K/UL (ref 0.9–3.6)
LYMPHOCYTES NFR BLD: 33 % (ref 21–52)
MCH RBC QN AUTO: 30.6 PG (ref 24–34)
MCHC RBC AUTO-ENTMCNC: 34 G/DL (ref 31–37)
MCV RBC AUTO: 90.2 FL (ref 74–97)
MONOCYTES # BLD: 0.4 K/UL (ref 0.05–1.2)
MONOCYTES NFR BLD: 8 % (ref 3–10)
NEUTS SEG # BLD: 2.9 K/UL (ref 1.8–8)
NEUTS SEG NFR BLD: 57 % (ref 40–73)
PLATELET # BLD AUTO: 208 K/UL (ref 135–420)
PMV BLD AUTO: 10 FL (ref 9.2–11.8)
POTASSIUM SERPL-SCNC: 4.7 MMOL/L (ref 3.5–5.5)
RBC # BLD AUTO: 4.7 M/UL (ref 4.7–5.5)
SODIUM SERPL-SCNC: 139 MMOL/L (ref 136–145)
WBC # BLD AUTO: 5.2 K/UL (ref 4.6–13.2)

## 2018-05-23 PROCEDURE — 71046 X-RAY EXAM CHEST 2 VIEWS: CPT

## 2018-05-23 PROCEDURE — 80048 BASIC METABOLIC PNL TOTAL CA: CPT | Performed by: SURGERY

## 2018-05-23 PROCEDURE — 36415 COLL VENOUS BLD VENIPUNCTURE: CPT | Performed by: SURGERY

## 2018-05-23 PROCEDURE — 85025 COMPLETE CBC W/AUTO DIFF WBC: CPT | Performed by: SURGERY

## 2018-05-23 RX ORDER — SODIUM CHLORIDE 0.9 % (FLUSH) 0.9 %
5-10 SYRINGE (ML) INJECTION AS NEEDED
Status: CANCELLED | OUTPATIENT
Start: 2018-05-23

## 2018-05-23 RX ORDER — SODIUM CHLORIDE 0.9 % (FLUSH) 0.9 %
5-10 SYRINGE (ML) INJECTION EVERY 8 HOURS
Status: CANCELLED | OUTPATIENT
Start: 2018-05-23

## 2018-05-23 RX ORDER — ASPIRIN 81 MG/1
81 TABLET ORAL DAILY
COMMUNITY

## 2018-05-23 NOTE — PROGRESS NOTES
[facility]  General Surgery    Subjective:      HPI: Patient is a very pleasant 78-year-old male with a past medical history remarkable for hypertension, hyperlipidemia, type 2 diabetes mellitus with nephropathy, stage III chronic kidney disease, major depressive disorder in remission, erectile dysfunction, hypovitaminosis D and history of vocal cord nodule removal.  He is referred by Dr. Padilla Rutherford for evaluation and management of a right inguinal hernia. The patient states that he had knee replacement at the end of February of this year. He felt strain in the right groin when lifting both legs at the same time. Since that time he has developed 6 out of 10 intermittent dull pain which is mostly present with standing. The pain is relieved by sitting down and also by using Tylenol. Patient Active Problem List    Diagnosis Date Noted    Type 2 diabetes with nephropathy ([facility] [address] 05/23/2018    Right inguinal hernia 05/17/2018    Osteoarthritis of right knee 02/19/2018    Erectile dysfunction 01/23/2017    Melanoma in situ - on left dorsal forearm 2016 09/16/2016    Major depressive disorder in remission 08/03/2016    Rectal polyp -  seen on colonoscopy from 8/20/15 08/02/2016    Diverticulosis of intestine without bleeding - seen on colonoscopy from 8/20/15 08/02/2016    CKD (chronic kidney disease) stage 3, GFR 30-59 ml/min 11/09/2015    Essential hypertension 06/24/2015    Type 2 diabetes mellitus without complication ([facility] [address] 24/23/9435    Hypothyroidism due to acquired atrophy of thyroid 06/24/2015    Vocal cord nodule 10/21/2013    Gout 10/21/2011    Family history of colon cancer.   personal hx colon polyps 10/21/2011    Hyperlipidemia     Hypovitaminosis D      Past Medical History:   Diagnosis Date    Arthritis     hip, knee    Chronic kidney disease     had previous reaction with kidneys after a bp med, no issues now    Colon polyps     dysplastic    Depression     Diabetes mellitus ([facility] [address] 2017    no meds    Diverticulosis     seen on colonoscopy from 8/20/15    Diverticulosis of sigmoid colon 01/27/10    GERD (gastroesophageal reflux disease)     no meds    Gout     Hepatitis     Hyperlipidemia     Hypertension 15 years    Hypovitaminosis D     Nephrolithiasis     Osteoarthritis of right knee 2/19/2018    Plantar wart     Rectal polyp     seen on colonoscopy from 8/20/15    Thyroid disease     hypothyroidism      Past Surgical History:   Procedure Laterality Date    ENDOSCOPY, COLON, DIAGNOSTIC  01/27/2010    polyp distal rectum, removed; diverticulosis of sigmoid    HX CATARACT REMOVAL Bilateral     HX HEENT  2008    vocal cord polyp removed    HX POLYPECTOMY  01/27/2010    Distal rectum    HX TONSILLECTOMY      LITHOTRIPSY      TOTAL KNEE ARTHROPLASTY Left 1/2014    TOTAL KNEE ARTHROPLASTY Right 02/2018      Family History   Problem Relation Age of Onset    Arthritis-rheumatoid Mother     Hypertension Mother     Elevated Lipids Mother     Thyroid Disease Sister     Cancer Father      colon    Heart Disease Other     Hypertension Other     Cancer Other      breast    Heart Disease Maternal Grandmother     Dementia Maternal Grandfather     Cancer Paternal Grandmother      breast    No Known Problems Paternal Grandfather       Social History   Substance Use Topics    Smoking status: Former Smoker     Packs/day: 1.00     Years: 6.00     Types: Cigarettes     Quit date: 1/1/1992    Smokeless tobacco: Never Used    Alcohol use No      Comment: wine with dinner sometimes      Allergies   Allergen Reactions    Amlodipine Other (comments)     BLE edema       Prior to Admission medications    Medication Sig Start Date End Date Taking? Authorizing Provider   ubidecarenone (COQ-10 PO) Take  by mouth. Yes Historical Provider   aspirin delayed-release 81 mg tablet Take  by mouth daily.    Yes Historical Provider   lisinopril (PRINIVIL, ZESTRIL) 5 mg tablet Take 1 Tab by mouth daily. Patient taking differently: Take 5 mg by mouth nightly. 1/24/18  Yes Matt Ortiz MD   atorvastatin (LIPITOR) 40 mg tablet Take 1 Tab by mouth daily. Patient taking differently: Take 40 mg by mouth nightly. 1/12/18  Yes Matt Ortiz MD   allopurinol (ZYLOPRIM) 100 mg tablet Take 2 Tabs by mouth daily. 11/8/17  Yes Matt Ortiz MD   levothyroxine (SYNTHROID) 50 mcg tablet Take 1 Tab by mouth Daily (before breakfast). 10/23/17  Yes Matt Ortiz MD   carvedilol (COREG) 6.25 mg tablet Take 1 Tab by mouth two (2) times daily (with meals). 10/3/17  Yes Matt Ortiz MD   venlafaxine-SR [facility] 150 mg capsule Take 150 mg by mouth daily. 7/17/17  Yes Historical Provider   multivitamin (ONE A DAY) tablet Take 1 Tab by mouth daily. Yes Historical Provider   Cholecalciferol, Vitamin D3, (VITAMIN D) 2,000 unit Cap Take 4,000 Units by mouth daily. Yes Historical Provider   oxyCODONE-acetaminophen (PERCOCET) 5-325 mg per tablet Take 1 to 2 tab PO q 4-6 hrs prn pain 2/26/18   JOHN Clifford   terbinafine HCl (LAMISIL) 250 mg tablet Take 250 mg by mouth daily. Indications: Taken for 1 full week a month; and then not for another 3 weeks    Historical Provider   sildenafil (REVATIO) 20 mg tablet Take po 40 mg once daily 1 hour (range: 30 minutes to 4 hours) before sexual activity as needed; maximum dose: 100 mg once daily 5/22/17   Matt Ortiz MD       Review of Systems:    14 systems were reviewed. The results are as above in the HPI and otherwise negative. Objective:     Vitals:    05/23/18 0855   BP: 104/70   Pulse: (!) 57   Resp: 18   Temp: 98 °F (36.7 °C)   Weight: 92.1 kg (203 lb)       Physical Exam:  GENERAL: alert, cooperative, no distress, appears stated age,   EYE: conjunctivae/corneas clear. PERRL, EOM's intact.   THROAT & NECK: normal and no erythema or exudates noted. ,    LYMPHATIC: Cervical, supraclavicular, and axillary nodes normal. , LUNG: clear to auscultation bilaterally,   HEART: regular rate and rhythm, S1, S2 normal, no murmur, click, rub or gallop,   ABDOMEN: soft, non-tender. Reducible right inguinal hernia. No evidence of hernia on the left. Bowel sounds normal. No masses,  no organomegaly,   EXTREMITIES:  extremities normal, atraumatic, no cyanosis or edema,   SKIN: Normal.,   NEUROLOGIC: AOx3. Cranial nerves 2-12 and sensation grossly intact. ,     Data Review:  to be done    Mr. Lauren Baez has a reminder for a \"due or due soon\" health maintenance. I have asked that he contact his primary care provider for follow-up on this health maintenance. Impression:     · Patient with a symptomatic right inguinal hernia. .    Plan:     · Robot-assisted laparoscopic right inguinal hernia repair with mesh  · Consent on chart  · Preoperative orders written    Signed By: Christie Keane MD     May 23, 2018

## 2018-05-23 NOTE — PROGRESS NOTES
Review of Systems   Constitutional: Negative. HENT: Negative. Eyes: Negative. Respiratory: Negative. Cardiovascular: Negative. Gastrointestinal: Negative. Genitourinary: Negative. Musculoskeletal: Negative. Skin: Negative. Neurological: Negative. Endo/Heme/Allergies: Negative for environmental allergies and polydipsia. Bruises/bleeds easily. Psychiatric/Behavioral: Positive for depression. Negative for hallucinations, memory loss, substance abuse and suicidal ideas. The patient is not nervous/anxious and does not have insomnia.

## 2018-05-25 ENCOUNTER — APPOINTMENT (OUTPATIENT)
Dept: PHYSICAL THERAPY | Age: 73
End: 2018-05-25
Payer: MEDICARE

## 2018-05-29 ENCOUNTER — APPOINTMENT (OUTPATIENT)
Dept: PHYSICAL THERAPY | Age: 73
End: 2018-05-29
Payer: MEDICARE

## 2018-05-30 ENCOUNTER — APPOINTMENT (OUTPATIENT)
Dept: PHYSICAL THERAPY | Age: 73
End: 2018-05-30
Payer: MEDICARE

## 2018-05-30 ENCOUNTER — ANESTHESIA EVENT (OUTPATIENT)
Dept: SURGERY | Age: 73
End: 2018-05-30
Payer: MEDICARE

## 2018-05-30 ENCOUNTER — TELEPHONE (OUTPATIENT)
Dept: SURGERY | Age: 73
End: 2018-05-30

## 2018-05-30 NOTE — TELEPHONE ENCOUNTER
I called [phone] at 2:58pm on 05/30/2018 and spoke to Mr. Ramy Trejo and confirmed his surgery time at 07:30am with an arrival time of 05:30am. Mr. Eveline Correa knows that he is not suppose to eat or drink anything after midnight. ...  Debby Mcgovern

## 2018-05-31 ENCOUNTER — ANESTHESIA (OUTPATIENT)
Dept: SURGERY | Age: 73
End: 2018-05-31
Payer: MEDICARE

## 2018-05-31 ENCOUNTER — HOSPITAL ENCOUNTER (OUTPATIENT)
Age: 73
Setting detail: OUTPATIENT SURGERY
Discharge: HOME | End: 2018-05-31
Attending: SURGERY | Admitting: SURGERY
Payer: MEDICARE

## 2018-05-31 VITALS
DIASTOLIC BLOOD PRESSURE: 76 MMHG | HEIGHT: 69 IN | RESPIRATION RATE: 14 BRPM | HEART RATE: 70 BPM | BODY MASS INDEX: 29.98 KG/M2 | TEMPERATURE: 97 F | WEIGHT: 202.38 LBS | SYSTOLIC BLOOD PRESSURE: 123 MMHG | OXYGEN SATURATION: 97 %

## 2018-05-31 DIAGNOSIS — K40.90 RIGHT INGUINAL HERNIA: ICD-10-CM

## 2018-05-31 DIAGNOSIS — G89.18 POSTOPERATIVE PAIN: Primary | ICD-10-CM

## 2018-05-31 LAB
GLUCOSE BLD STRIP.AUTO-MCNC: 140 MG/DL (ref 70–110)
GLUCOSE BLD STRIP.AUTO-MCNC: 164 MG/DL (ref 70–110)
GLUCOSE BLD STRIP.AUTO-MCNC: 170 MG/DL (ref 70–110)

## 2018-05-31 PROCEDURE — 77030034850: Performed by: SURGERY

## 2018-05-31 PROCEDURE — 74011000250 HC RX REV CODE- 250: Performed by: SURGERY

## 2018-05-31 PROCEDURE — 77030020788: Performed by: SURGERY

## 2018-05-31 PROCEDURE — 82962 GLUCOSE BLOOD TEST: CPT

## 2018-05-31 PROCEDURE — 77030019605: Performed by: SURGERY

## 2018-05-31 PROCEDURE — 77030012012 HC AIRWY OP SFT TELE -A: Performed by: NURSE ANESTHETIST, CERTIFIED REGISTERED

## 2018-05-31 PROCEDURE — 77030002933 HC SUT MCRYL J&J -A: Performed by: SURGERY

## 2018-05-31 PROCEDURE — 76210000006 HC OR PH I REC 0.5 TO 1 HR: Performed by: SURGERY

## 2018-05-31 PROCEDURE — 74011250636 HC RX REV CODE- 250/636

## 2018-05-31 PROCEDURE — 76210000024 HC REC RM PH II 2.5 TO 3 HR: Performed by: SURGERY

## 2018-05-31 PROCEDURE — 74011250636 HC RX REV CODE- 250/636: Performed by: NURSE ANESTHETIST, CERTIFIED REGISTERED

## 2018-05-31 PROCEDURE — 77030026438 HC STYL ET INTUB CARD -A: Performed by: NURSE ANESTHETIST, CERTIFIED REGISTERED

## 2018-05-31 PROCEDURE — C1781 MESH (IMPLANTABLE): HCPCS | Performed by: SURGERY

## 2018-05-31 PROCEDURE — 77030020782 HC GWN BAIR PAWS FLX 3M -B: Performed by: SURGERY

## 2018-05-31 PROCEDURE — 77030022704 HC SUT VLOC COVD -B: Performed by: SURGERY

## 2018-05-31 PROCEDURE — 77030011640 HC PAD GRND REM COVD -A: Performed by: SURGERY

## 2018-05-31 PROCEDURE — 74011636637 HC RX REV CODE- 636/637: Performed by: NURSE ANESTHETIST, CERTIFIED REGISTERED

## 2018-05-31 PROCEDURE — 77030032490 HC SLV COMPR SCD KNE COVD -B: Performed by: SURGERY

## 2018-05-31 PROCEDURE — 77030008683 HC TU ET CUF COVD -A: Performed by: NURSE ANESTHETIST, CERTIFIED REGISTERED

## 2018-05-31 PROCEDURE — 74011250637 HC RX REV CODE- 250/637: Performed by: NURSE ANESTHETIST, CERTIFIED REGISTERED

## 2018-05-31 PROCEDURE — 77030008756 HC TU IRR SUC STRY -B: Performed by: SURGERY

## 2018-05-31 PROCEDURE — 77030035277 HC OBTRTR BLDELSS DISP INTU -B: Performed by: SURGERY

## 2018-05-31 PROCEDURE — 77030010507 HC ADH SKN DERMBND J&J -B: Performed by: SURGERY

## 2018-05-31 PROCEDURE — 74011000250 HC RX REV CODE- 250

## 2018-05-31 PROCEDURE — 76010000934 HC OR TIME 1 TO 1.5HR INTENSV - TIER 2: Performed by: SURGERY

## 2018-05-31 PROCEDURE — 77030020703 HC SEAL CANN DISP INTU -B: Performed by: SURGERY

## 2018-05-31 PROCEDURE — 74011250636 HC RX REV CODE- 250/636: Performed by: SURGERY

## 2018-05-31 PROCEDURE — 77030031139 HC SUT VCRL2 J&J -A: Performed by: SURGERY

## 2018-05-31 PROCEDURE — 77030018836 HC SOL IRR NACL ICUM -A: Performed by: SURGERY

## 2018-05-31 PROCEDURE — 76060000033 HC ANESTHESIA 1 TO 1.5 HR: Performed by: SURGERY

## 2018-05-31 DEVICE — MESH HERN W10XL15CM POLY POLYLACTIC ACID 70% CLLGN 30% GLYC: Type: IMPLANTABLE DEVICE | Site: GROIN | Status: FUNCTIONAL

## 2018-05-31 RX ORDER — ONDANSETRON 2 MG/ML
4 INJECTION INTRAMUSCULAR; INTRAVENOUS ONCE
Status: COMPLETED | OUTPATIENT
Start: 2018-05-31 | End: 2018-05-31

## 2018-05-31 RX ORDER — KETOROLAC TROMETHAMINE 30 MG/ML
INJECTION, SOLUTION INTRAMUSCULAR; INTRAVENOUS
Status: COMPLETED
Start: 2018-05-31 | End: 2018-05-31

## 2018-05-31 RX ORDER — OXYCODONE AND ACETAMINOPHEN 5; 325 MG/1; MG/1
1-2 TABLET ORAL
Qty: 40 TAB | Refills: 0 | Status: SHIPPED | OUTPATIENT
Start: 2018-05-31 | End: 2018-11-15 | Stop reason: ALTCHOICE

## 2018-05-31 RX ORDER — IBUPROFEN 800 MG/1
800 TABLET ORAL
Qty: 40 TAB | Refills: 0 | Status: SHIPPED | OUTPATIENT
Start: 2018-05-31

## 2018-05-31 RX ORDER — EPHEDRINE SULFATE/0.9% NACL/PF 25 MG/5 ML
SYRINGE (ML) INTRAVENOUS AS NEEDED
Status: DISCONTINUED | OUTPATIENT
Start: 2018-05-31 | End: 2018-05-31 | Stop reason: HOSPADM

## 2018-05-31 RX ORDER — OXYCODONE AND ACETAMINOPHEN 5; 325 MG/1; MG/1
1 TABLET ORAL
Status: DISCONTINUED | OUTPATIENT
Start: 2018-05-31 | End: 2018-05-31 | Stop reason: HOSPADM

## 2018-05-31 RX ORDER — ONDANSETRON 2 MG/ML
INJECTION INTRAMUSCULAR; INTRAVENOUS AS NEEDED
Status: DISCONTINUED | OUTPATIENT
Start: 2018-05-31 | End: 2018-05-31 | Stop reason: HOSPADM

## 2018-05-31 RX ORDER — INSULIN LISPRO 100 [IU]/ML
INJECTION, SOLUTION INTRAVENOUS; SUBCUTANEOUS ONCE
Status: DISCONTINUED | OUTPATIENT
Start: 2018-05-31 | End: 2018-05-31 | Stop reason: HOSPADM

## 2018-05-31 RX ORDER — FENTANYL CITRATE 50 UG/ML
25 INJECTION, SOLUTION INTRAMUSCULAR; INTRAVENOUS
Status: DISCONTINUED | OUTPATIENT
Start: 2018-05-31 | End: 2018-05-31 | Stop reason: HOSPADM

## 2018-05-31 RX ORDER — NEOSTIGMINE METHYLSULFATE 5 MG/5 ML
SYRINGE (ML) INTRAVENOUS AS NEEDED
Status: DISCONTINUED | OUTPATIENT
Start: 2018-05-31 | End: 2018-05-31 | Stop reason: HOSPADM

## 2018-05-31 RX ORDER — FAMOTIDINE 10 MG/ML
INJECTION INTRAVENOUS
Status: DISCONTINUED
Start: 2018-05-31 | End: 2018-05-31 | Stop reason: HOSPADM

## 2018-05-31 RX ORDER — INSULIN LISPRO 100 [IU]/ML
INJECTION, SOLUTION INTRAVENOUS; SUBCUTANEOUS ONCE
Status: COMPLETED | OUTPATIENT
Start: 2018-05-31 | End: 2018-05-31

## 2018-05-31 RX ORDER — FENTANYL CITRATE 50 UG/ML
INJECTION, SOLUTION INTRAMUSCULAR; INTRAVENOUS AS NEEDED
Status: DISCONTINUED | OUTPATIENT
Start: 2018-05-31 | End: 2018-05-31 | Stop reason: HOSPADM

## 2018-05-31 RX ORDER — FAMOTIDINE 20 MG/1
20 TABLET, FILM COATED ORAL ONCE
Status: COMPLETED | OUTPATIENT
Start: 2018-05-31 | End: 2018-05-31

## 2018-05-31 RX ORDER — SUCCINYLCHOLINE CHLORIDE 20 MG/ML
INJECTION INTRAMUSCULAR; INTRAVENOUS AS NEEDED
Status: DISCONTINUED | OUTPATIENT
Start: 2018-05-31 | End: 2018-05-31 | Stop reason: HOSPADM

## 2018-05-31 RX ORDER — CEFAZOLIN SODIUM 2 G/50ML
2 SOLUTION INTRAVENOUS ONCE
Status: COMPLETED | OUTPATIENT
Start: 2018-05-31 | End: 2018-05-31

## 2018-05-31 RX ORDER — SODIUM CHLORIDE 0.9 % (FLUSH) 0.9 %
5-10 SYRINGE (ML) INJECTION EVERY 8 HOURS
Status: DISCONTINUED | OUTPATIENT
Start: 2018-05-31 | End: 2018-05-31 | Stop reason: HOSPADM

## 2018-05-31 RX ORDER — MORPHINE SULFATE 10 MG/ML
2 INJECTION, SOLUTION INTRAMUSCULAR; INTRAVENOUS AS NEEDED
Status: DISCONTINUED | OUTPATIENT
Start: 2018-05-31 | End: 2018-05-31 | Stop reason: HOSPADM

## 2018-05-31 RX ORDER — BUPIVACAINE HYDROCHLORIDE AND EPINEPHRINE 2.5; 5 MG/ML; UG/ML
INJECTION, SOLUTION EPIDURAL; INFILTRATION; INTRACAUDAL; PERINEURAL AS NEEDED
Status: DISCONTINUED | OUTPATIENT
Start: 2018-05-31 | End: 2018-05-31 | Stop reason: HOSPADM

## 2018-05-31 RX ORDER — KETOROLAC TROMETHAMINE 30 MG/ML
15 INJECTION, SOLUTION INTRAMUSCULAR; INTRAVENOUS
Status: COMPLETED | OUTPATIENT
Start: 2018-05-31 | End: 2018-05-31

## 2018-05-31 RX ORDER — GLYCOPYRROLATE 0.2 MG/ML
INJECTION INTRAMUSCULAR; INTRAVENOUS AS NEEDED
Status: DISCONTINUED | OUTPATIENT
Start: 2018-05-31 | End: 2018-05-31 | Stop reason: HOSPADM

## 2018-05-31 RX ORDER — MAGNESIUM SULFATE 100 %
4 CRYSTALS MISCELLANEOUS AS NEEDED
Status: DISCONTINUED | OUTPATIENT
Start: 2018-05-31 | End: 2018-05-31 | Stop reason: HOSPADM

## 2018-05-31 RX ORDER — LIDOCAINE HYDROCHLORIDE 10 MG/ML
0.1 INJECTION, SOLUTION EPIDURAL; INFILTRATION; INTRACAUDAL; PERINEURAL AS NEEDED
Status: DISCONTINUED | OUTPATIENT
Start: 2018-05-31 | End: 2018-05-31 | Stop reason: HOSPADM

## 2018-05-31 RX ORDER — SODIUM CHLORIDE 0.9 % (FLUSH) 0.9 %
5-10 SYRINGE (ML) INJECTION AS NEEDED
Status: DISCONTINUED | OUTPATIENT
Start: 2018-05-31 | End: 2018-05-31 | Stop reason: HOSPADM

## 2018-05-31 RX ORDER — ROCURONIUM BROMIDE 10 MG/ML
INJECTION, SOLUTION INTRAVENOUS AS NEEDED
Status: DISCONTINUED | OUTPATIENT
Start: 2018-05-31 | End: 2018-05-31 | Stop reason: HOSPADM

## 2018-05-31 RX ORDER — MIDAZOLAM HYDROCHLORIDE 1 MG/ML
INJECTION, SOLUTION INTRAMUSCULAR; INTRAVENOUS AS NEEDED
Status: DISCONTINUED | OUTPATIENT
Start: 2018-05-31 | End: 2018-05-31 | Stop reason: HOSPADM

## 2018-05-31 RX ORDER — LIDOCAINE HYDROCHLORIDE 20 MG/ML
INJECTION, SOLUTION EPIDURAL; INFILTRATION; INTRACAUDAL; PERINEURAL AS NEEDED
Status: DISCONTINUED | OUTPATIENT
Start: 2018-05-31 | End: 2018-05-31 | Stop reason: HOSPADM

## 2018-05-31 RX ORDER — SODIUM CHLORIDE, SODIUM LACTATE, POTASSIUM CHLORIDE, CALCIUM CHLORIDE 600; 310; 30; 20 MG/100ML; MG/100ML; MG/100ML; MG/100ML
75 INJECTION, SOLUTION INTRAVENOUS CONTINUOUS
Status: DISCONTINUED | OUTPATIENT
Start: 2018-05-31 | End: 2018-05-31 | Stop reason: HOSPADM

## 2018-05-31 RX ORDER — DEXTROSE 50 % IN WATER (D50W) INTRAVENOUS SYRINGE
25-50 AS NEEDED
Status: DISCONTINUED | OUTPATIENT
Start: 2018-05-31 | End: 2018-05-31 | Stop reason: HOSPADM

## 2018-05-31 RX ORDER — PROPOFOL 10 MG/ML
INJECTION, EMULSION INTRAVENOUS AS NEEDED
Status: DISCONTINUED | OUTPATIENT
Start: 2018-05-31 | End: 2018-05-31 | Stop reason: HOSPADM

## 2018-05-31 RX ADMIN — ONDANSETRON 4 MG: 2 SOLUTION INTRAMUSCULAR; INTRAVENOUS at 09:30

## 2018-05-31 RX ADMIN — Medication 3 MG: at 08:41

## 2018-05-31 RX ADMIN — FENTANYL CITRATE 25 MCG: 50 INJECTION INTRAMUSCULAR; INTRAVENOUS at 09:14

## 2018-05-31 RX ADMIN — GLYCOPYRROLATE 0.6 MG: 0.2 INJECTION INTRAMUSCULAR; INTRAVENOUS at 08:41

## 2018-05-31 RX ADMIN — PROPOFOL 30 MG: 10 INJECTION, EMULSION INTRAVENOUS at 07:37

## 2018-05-31 RX ADMIN — CEFAZOLIN SODIUM 2 G: 2 SOLUTION INTRAVENOUS at 07:29

## 2018-05-31 RX ADMIN — KETOROLAC TROMETHAMINE 15 MG: 30 INJECTION, SOLUTION INTRAMUSCULAR; INTRAVENOUS at 09:20

## 2018-05-31 RX ADMIN — Medication 5 MG: at 07:57

## 2018-05-31 RX ADMIN — SODIUM CHLORIDE, SODIUM LACTATE, POTASSIUM CHLORIDE, AND CALCIUM CHLORIDE 75 ML/HR: 600; 310; 30; 20 INJECTION, SOLUTION INTRAVENOUS at 07:15

## 2018-05-31 RX ADMIN — FENTANYL CITRATE 25 MCG: 50 INJECTION INTRAMUSCULAR; INTRAVENOUS at 09:05

## 2018-05-31 RX ADMIN — MIDAZOLAM HYDROCHLORIDE 2 MG: 1 INJECTION, SOLUTION INTRAMUSCULAR; INTRAVENOUS at 07:29

## 2018-05-31 RX ADMIN — ONDANSETRON 4 MG: 2 INJECTION INTRAMUSCULAR; INTRAVENOUS at 08:37

## 2018-05-31 RX ADMIN — PROPOFOL 170 MG: 10 INJECTION, EMULSION INTRAVENOUS at 07:33

## 2018-05-31 RX ADMIN — ROCURONIUM BROMIDE 5 MG: 10 INJECTION, SOLUTION INTRAVENOUS at 07:33

## 2018-05-31 RX ADMIN — Medication 5 MG: at 08:00

## 2018-05-31 RX ADMIN — FENTANYL CITRATE 50 MCG: 50 INJECTION, SOLUTION INTRAMUSCULAR; INTRAVENOUS at 07:33

## 2018-05-31 RX ADMIN — FAMOTIDINE 20 MG: 20 TABLET ORAL at 07:16

## 2018-05-31 RX ADMIN — MORPHINE SULFATE 2 MG: 10 INJECTION INTRAMUSCULAR; INTRAVENOUS; SUBCUTANEOUS at 09:35

## 2018-05-31 RX ADMIN — Medication 10 ML: at 07:16

## 2018-05-31 RX ADMIN — SUCCINYLCHOLINE CHLORIDE 140 MG: 20 INJECTION INTRAMUSCULAR; INTRAVENOUS at 07:33

## 2018-05-31 RX ADMIN — FENTANYL CITRATE 25 MCG: 50 INJECTION INTRAMUSCULAR; INTRAVENOUS at 09:25

## 2018-05-31 RX ADMIN — ROCURONIUM BROMIDE 5 MG: 10 INJECTION, SOLUTION INTRAVENOUS at 08:27

## 2018-05-31 RX ADMIN — MORPHINE SULFATE 2 MG: 10 INJECTION INTRAMUSCULAR; INTRAVENOUS; SUBCUTANEOUS at 09:41

## 2018-05-31 RX ADMIN — INSULIN LISPRO 3 UNITS: 100 INJECTION, SOLUTION INTRAVENOUS; SUBCUTANEOUS at 09:09

## 2018-05-31 RX ADMIN — FENTANYL CITRATE 50 MCG: 50 INJECTION, SOLUTION INTRAMUSCULAR; INTRAVENOUS at 08:12

## 2018-05-31 RX ADMIN — ROCURONIUM BROMIDE 25 MG: 10 INJECTION, SOLUTION INTRAVENOUS at 07:53

## 2018-05-31 RX ADMIN — LIDOCAINE HYDROCHLORIDE 60 MG: 20 INJECTION, SOLUTION EPIDURAL; INFILTRATION; INTRACAUDAL; PERINEURAL at 07:33

## 2018-05-31 RX ADMIN — Medication 5 MG: at 08:02

## 2018-05-31 NOTE — OP NOTES
[address] [place] [address] [facility], [address]                                 OPERATIVE REPORT    PATIENT:    Uzair Schaffer  MRN:            [id number]      DATE:  2018  BILLIN  ROOM:        @BED@  ATTENDING:   David Goddard MD  DICTATING:   David Goddard MD    PREOPERATIVE DIAGNOSIS : Right inguinal hernia  POSTOPERATIVE DIAGNOSIS:  indirect right inguinal hernia  PROCEDURES PERFORMED: Robot-assisted laparoscopic right inguinal hernia  repair with mesh. ESTIMATED BLOOD LOSS: 10 mL. FLUIDS GIVEN:  1000 mL. URINE OUTPUT: 100 mL. SPECIMENS REMOVED: None. SURGEON: Ciaran Manjarrez MD   ASSISTANT: Bindu Keen SA  ANESTHESIA: General and local (0.25% Marcaine with epinephrine). FINDINGS: right inguinal hernia   OPERATIVE INDICATION: Patient with symptomatic right inguinal hernias  DESCRIPTION OF PROCEDURE: The patient was identified in the holding area, where consent for robot-assisted laparoscopic right inguinal   hernia repair with mesh was verified. In the operating room, the patient   was placed under general anesthesia. Self catheter was inserted. The   abdomen was prepped and draped in sterile fashion using chlorhexidine   solution and sterile drapes. The time-out was performed to ensure correct   procedure. The local anesthetic was infiltrated into the skin and deep   dermal tissues at each proposed trocar site prior to the incision. The   initial incision was placed above the umbilicus. This was a 9 mm incision   to accommodate the 8 mm trocar and scope. The Visiport system was used to   safely enter the peritoneal cavity. Pneumoperitoneum was obtained using   carbon dioxide gas to 15 mmHg. The trocar in the left mid clavicular line   was then placed safely into the peritoneal cavity. This was an 8 mm robotic   trocar.  The second 8 mm trocar was placed slightly cephalad and in the mid   clavicular line on the right. Safe entry into the peritoneal cavity was visualized. The patient was placed in Trendelenburg. The robot was then docked and the robotic instruments were safely inserted   into place under direct visualization. The peritoneum was then incised   using electrocautery at the right anterior superior iliac spine. This dissection was carried laterally from the ASIS, medially to the midline. The peritoneum was then reflected downward. The hernia sac was bluntly dissected away from the cord and cord structures. The vasculature of the cord was densely adherent to the testicle. The hernia sac was then dissected away from the vasculature. The 0- Vicryl and 2 - 0 V-Loc suture were also inserted through this trocar. The Progrip mesh was inserted through the right-sided trocar. The SutureCut needle    was then inserted into the right trocar. The mesh was positioned and   then sutured with a stitch in Jaxon's ligament and medial in the upper   center of the mesh for fixation. The  peritoneum was closed in a running fashion. The mesh set nicely against the abdominal wall. The peritoneal closure was tension free. The 2 needles were removed from the peritoneal cavity. The trocars were removed under direct visualization. Pneumoperitoneum was allowed to deflate. A 4-0 Monocryl   suture was used to close the skin at each trocar site. Dermabond was used   as a wound sealant. The patient tolerated the procedure very well. DISPOSITION: He was stable with the Self removed, extubated upon transport   to the recovery room.

## 2018-05-31 NOTE — DISCHARGE INSTRUCTIONS
[facility]  Grant Monge MD, FACS  General Surgery    Pt may shower. Allow soap and water to run over the incision. No driving or operating heavy machinery while on narcotic pain medications. No strenuous activity or contact sports for two weeks. No lifting greater than 15 lbs for 2 weeks. Call MD for any redness, swelling, bleeding or pus at the incision. Also call for any nausea, vomiting, increased pain or pain uncontrolled by pain medicine. Hernia Repair: What to Expect at [address] are likely to have pain for the next few days. You may also feel like you have the flu, and you may have a low fever and feel tired and nauseated. This is common. You should feel better after a few days and will probably feel much better in 7 days. For several weeks you may feel twinges or pulling in the hernia repair when you move. You may have some bruising on the scrotum and along the penis. This is normal. Men will need to wear a jockstrap or briefs, not boxers, for scrotal support for several days after a groin (inguinal) hernia repair. [employer] bicycle shorts may provide good support. This care sheet gives you a general idea about how long it will take for you to recover. But each person recovers at a different pace. Follow the steps below to get better as quickly as possible. How can you care for yourself at home? Activity  ? · Rest when you feel tired. Getting enough sleep will help you recover. ? · Try to walk each day. Start by walking a little more than you did the day before. Bit by bit, increase the amount you walk. Walking boosts blood flow and helps prevent pneumonia and constipation. ? · Avoid strenuous activities, such as biking, jogging, weight lifting, or aerobic exercise, until your doctor says it is okay. ? · Avoid lifting anything that would make you strain.  This may include heavy grocery bags and milk containers, a heavy briefcase or backpack, cat litter or dog food bags, a vacuum , or a child. ? · You may drive when you are no longer taking pain medicine and can quickly move your foot from the gas pedal to the brake. You must also be able to sit comfortably for a long period of time, even if you do not plan to go far. You might get caught in traffic. ? · Most people are able to return to work within 1 to 2 weeks after surgery. ? · You may shower 24 to 48 hours after surgery, if your doctor okays it. Pat the cut (incision) dry. Do not take a bath for the first 2 weeks, or until your doctor tells you it is okay. ? · Your doctor will tell you when you can have sex again. Diet  ? · You can eat your normal diet. If your stomach is upset, try bland, low-fat foods like plain rice, broiled chicken, toast, and yogurt. ? · Drink plenty of fluids (unless your doctor tells you not to). ? · You may notice that your bowel movements are not regular right after your surgery. This is common. Avoid constipation and straining with bowel movements. You may want to take a fiber supplement every day. If you have not had a bowel movement after a couple of days, ask your doctor about taking a mild laxative. Medicines  ? · Your doctor will tell you if and when you can restart your medicines. He or she will also give you instructions about taking any new medicines. ? · If you take blood thinners, such as warfarin (Coumadin), clopidogrel (Plavix), or aspirin, be sure to talk to your doctor. He or she will tell you if and when to start taking those medicines again. Make sure that you understand exactly what your doctor wants you to do. ? · Be safe with medicines. Take pain medicines exactly as directed. ¨ If the doctor gave you a prescription medicine for pain, take it as prescribed.   ¨ If you are not taking a prescription pain medicine, take an over-the-counter medicine such as acetaminophen (Tylenol), ibuprofen (Advil, Motrin), or naproxen (Jitendra). Read and follow all instructions on the label. ¨ Do not take two or more pain medicines at the same time unless the doctor told you to. Many pain medicines have acetaminophen, which is Tylenol. Too much acetaminophen (Tylenol) can be harmful. ? · If your doctor prescribed antibiotics, take them as directed. Do not stop taking them just because you feel better. You need to take the full course of antibiotics. ? · If you think your pain medicine is making you sick to your stomach:  ¨ Take your medicine after meals (unless your doctor has told you not to). ¨ Ask your doctor for a different pain medicine. Incision care  ? · If you have strips of tape on the cut (incision) the doctor made, leave the tape on for a week or until it falls off.   ? · If you have staples closing the cut, you will need to visit your doctor in 1 to 2 weeks to have them removed. ? · Wash the area daily with warm, soapy water and pat it dry. Follow-up care is a key part of your treatment and safety. Be sure to make and go to all appointments, and call your doctor if you are having problems. It's also a good idea to know your test results and keep a list of the medicines you take. When should you call for help? Call 911 anytime you think you may need emergency care. For example, call if:  ? · You passed out (lost consciousness). ? · You are short of breath. ?Call your doctor now or seek immediate medical care if:  ? · You have pain that does not get better after you take pain medicine. ? · You are sick to your stomach and cannot keep fluids down. ? · You have signs of a blood clot in your leg (called a deep vein thrombosis), such as:  ¨ Pain in your calf, back of the knee, thigh, or groin. ¨ Redness and swelling in your leg or groin. ? · You cannot pass stools or gas. ? · Bright red blood has soaked through the bandage over your incision. ? · You have loose stitches, or your incision comes open.    ? · You have signs of infection, such as:  ¨ Increased pain, swelling, warmth, or redness. ¨ Red streaks leading from the incision. ¨ Pus draining from the incision. ¨ A fever. ? Watch closely for any changes in your health, and be sure to contact your doctor if you have any problems. Where can you learn more? Go to http://sapna-araseli.info/. Enter O494 in the search box to learn more about \"Hernia Repair: What to Expect at Home. \"  Current as of: May 12, 2017  Content Version: 11.4  © 3211-2087 [employer]. Care instructions adapted under license by [employer] (which disclaims liability or warranty for this information). If you have questions about a medical condition or this instruction, always ask your healthcare professional. [address] any warranty or liability for your use of this information. DISCHARGE SUMMARY from Nurse    PATIENT INSTRUCTIONS:    After general anesthesia or intravenous sedation, for 24 hours or while taking prescription Narcotics:  · Limit your activities  · Do not drive and operate hazardous machinery  · Do not make important personal or business decisions  · Do  not drink alcoholic beverages  · If you have not urinated within 8 hours after discharge, please contact your surgeon on call. *  Please give a list of your current medications to your Primary Care Provider. *  Please update this list whenever your medications are discontinued, doses are      changed, or new medications (including over-the-counter products) are added. *  Please carry medication information at all times in case of emergency situations. These are general instructions for a healthy lifestyle:    No smoking/ No tobacco products/ Avoid exposure to second hand smoke  Surgeon General's Warning:  Quitting smoking now greatly reduces serious risk to your health.     Obesity, smoking, and sedentary lifestyle greatly increases your risk for illness    A healthy diet, regular physical exercise & weight monitoring are important for maintaining a healthy lifestyle    You may be retaining fluid if you have a history of heart failure or if you experience any of the following symptoms:  Weight gain of 3 pounds or more overnight or 5 pounds in a week, increased swelling in our hands or feet or shortness of breath while lying flat in bed. Please call your doctor as soon as you notice any of these symptoms; do not wait until your next office visit. Recognize signs and symptoms of STROKE:    F-face looks uneven    A-arms unable to move or move unevenly    S-speech slurred or non-existent    T-time-call 911 as soon as signs and symptoms begin-DO NOT go       Back to bed or wait to see if you get better-TIME IS BRAIN. Warning Signs of HEART ATTACK     Call 911 if you have these symptoms:   Chest discomfort. Most heart attacks involve discomfort in the center of the chest that lasts more than a few minutes, or that goes away and comes back. It can feel like uncomfortable pressure, squeezing, fullness, or pain.  Discomfort in other areas of the upper body. Symptoms can include pain or discomfort in one or both arms, the back, neck, jaw, or stomach.  Shortness of breath with or without chest discomfort.  Other signs may include breaking out in a cold sweat, nausea, or lightheadedness. Don't wait more than five minutes to call 911 - MINUTES MATTER! Fast action can save your life. Calling 911 is almost always the fastest way to get lifesaving treatment. Emergency Medical Services staff can begin treatment when they arrive -- up to an hour sooner than if someone gets to the hospital by car. The discharge information has been reviewed with the patient and spouse. The patient and spouse verbalized understanding.   Discharge medications reviewed with the patient and spouse and appropriate educational materials and side effects teaching were provided. ___________________________________________________________________________________________________________________________________   Oxycodone/Acetaminophen (Percocet, Roxicet) - (By mouth)   Why this medicine is used:   Treats pain. This medicine contains a narcotic pain reliever. Contact a nurse or doctor right away if you have:  · Extreme weakness, shallow breathing, slow heartbeat  · Sweating or cold, clammy skin  · Skin blisters, rash, or peeling     Common side effects:  · Constipation  · Nausea, vomiting  · Tiredness  © 2017 [facility] Information is for End User's use only and may not be sold, redistributed or otherwise used for commercial purposes. Ibuprofen (Advil, Advil Children's, Motrin, Children's Ibuprofen) - (By mouth)   Why this medicine is used:   Treats pain and fever. This medicine is an NSAID. Contact a nurse or doctor right away if you have:  · Change in how much or how often you urinate  · Severe stomach pain, vomiting blood, bloody or black tarry stools  · Swelling in your hands, ankles, or feet; rapid weight gain     Common side effects:  · Constipation, diarrhea, gas, mild upset stomach  · Ringing in your ears, dizziness, headache  © 2017 [address] is for End User's use only and may not be sold, redistributed or otherwise used for commercial purposes.

## 2018-05-31 NOTE — IP AVS SNAPSHOT
[address] [address] 
[phone] Patient: Kwame Chaudhary MRN: [id number] GMA:[id number][id number] About your hospitalization You were admitted on:  May 31, 2018 You last received care in the:  [facility] PACU You were discharged on:  May 31, 2018 Why you were hospitalized Your primary diagnosis was:  Not on File Your diagnoses also included:  Right Inguinal Hernia Follow-up Information Follow up With Details Comments Contact Info Kathie Price MD Follow up in 2 week(s)  [address] [facility] 
[phone] [address] [facility] 
[phone] Your Scheduled Appointments Thursday June 14, 2018 10:30 AM EDT  
POST OP with MICHAEL Moe 33 ([facility]) [address] [facility]  
[phone] Wednesday June 20, 2018  8:30 AM EDT Office Visit with Kalee Almazan MD  
[facility] [address] [place] [address]) [address] [facility] [address] [facility]  
[phone] Discharge Orders None A check johanny indicates which time of day the medication should be taken. My Medications START taking these medications Instructions Each Dose to Equal  
 Morning Noon Evening Bedtime  
 ibuprofen 800 mg tablet Commonly known as:  MOTRIN Your last dose was: Your next dose is: Take 1 Tab by mouth three (3) times daily as needed for Pain. 800 mg  
    
   
   
   
  
 oxyCODONE-acetaminophen 5-325 mg per tablet Commonly known as:  PERCOCET Your last dose was: Your next dose is: Take 1-2 Tabs by mouth every four (4) hours as needed for Pain. Max Daily Amount: 12 Tabs. 1-2 Tab CHANGE how you take these medications Instructions Each Dose to Equal  
 Morning Noon Evening Bedtime  
 atorvastatin 40 mg tablet Commonly known as:  LIPITOR What changed:  when to take this Your last dose was: Your next dose is: Take 1 Tab by mouth daily. 40 mg  
    
   
   
   
  
 lisinopril 5 mg tablet Commonly known as:  Marianelaprasanth Chin What changed:  when to take this Your last dose was: Your next dose is: Take 1 Tab by mouth daily. 5 mg CONTINUE taking these medications Instructions Each Dose to Equal  
 Morning Noon Evening Bedtime  
 allopurinol 100 mg tablet Commonly known as:  Magnolia Tera Your last dose was: Your next dose is: Take 2 Tabs by mouth daily. 200 mg  
    
   
   
   
  
 aspirin delayed-release 81 mg tablet Your last dose was: Your next dose is: Take  by mouth daily. carvedilol 6.25 mg tablet Commonly known as:  Leonardo Hawthorne Your last dose was: Your next dose is: Take 1 Tab by mouth two (2) times daily (with meals). 6.25 mg  
    
   
   
   
  
 COQ-10 PO Your last dose was: Your next dose is: Take  by mouth.  
     
   
   
   
  
 levothyroxine 50 mcg tablet Commonly known as:  SYNTHROID Your last dose was: Your next dose is: Take 1 Tab by mouth Daily (before breakfast). 50 mcg  
    
   
   
   
  
 multivitamin tablet Commonly known as:  ONE A DAY Your last dose was: Your next dose is: Take 1 Tab by mouth daily. 1 Tab  
    
   
   
   
  
 sildenafil (antihypertensive) 20 mg tablet Commonly known as:  REVATIO Your last dose was: Your next dose is:     
   
   
 Take po 40 mg once daily 1 hour (range: 30 minutes to 4 hours) before sexual activity as needed; maximum dose: 100 mg once daily  
     
   
   
   
  
 terbinafine HCl 250 mg tablet Commonly known as:  LAMISIL Your last dose was: Your next dose is: Take 250 mg by mouth daily. Indications: Taken for 1 full week a month; and then not for another 3 weeks 250 mg  
    
   
   
   
  
 venlafaxine- mg capsule Commonly known as:  EFFEXOR-XR Your last dose was: Your next dose is: Take 150 mg by mouth daily. 150 mg  
    
   
   
   
  
 VITAMIN D 2,000 unit Cap capsule Generic drug:  Cholecalciferol (Vitamin D3) Your last dose was: Your next dose is: Take 4,000 Units by mouth daily. 4000 Units Where to Get Your Medications These medications were sent to [facility] [address] [facility], [address]  [address] [facility], [address] [facility] Phone:  [phone]  
  ibuprofen 800 mg tablet Information on where to get these meds will be given to you by the nurse or doctor. ! Ask your nurse or doctor about these medications  
  oxyCODONE-acetaminophen 5-325 mg per tablet Opioid Education Prescription Opioids: What You Need to Know: 
 
Prescription opioids can be used to help relieve moderate-to-severe pain and are often prescribed following a surgery or injury, or for certain health conditions. These medications can be an important part of treatment but also come with serious risks. Opioids are strong pain medicines. Examples include hydrocodone, oxycodone, fentanyl, and morphine. Heroin is an example of an illegal opioid. It is important to work with your health care provider to make sure you are getting the safest, most effective care. WHAT ARE THE RISKS AND SIDE EFFECTS OF OPIOID USE?  
Prescription opioids carry serious risks of addiction and overdose, especially with prolonged use. An opioid overdose, often marked by slow breathing, can cause sudden death. The use of prescription opioids can have a number of side effects as well, even when taken as directed. · Tolerance-meaning you might need to take more of a medication for the same pain relief · Physical dependence-meaning you have symptoms of withdrawal when the medication is stopped. Withdrawal symptoms can include nausea, sweating, chills, diarrhea, stomach cramps, and muscle aches. Withdrawal can last up to several weeks, depending on which drug you took and how long you took it. · Increased sensitivity to pain · Constipation · Nausea, vomiting, and dry mouth · Sleepiness and dizziness · Confusion · Depression · Low levels of testosterone that can result in lower sex drive, energy, and strength · Itching and sweating RISKS ARE GREATER WITH:      
· History of drug misuse, substance use disorder, or overdose · Mental health conditions (such as depression or anxiety) · Sleep apnea · Older age (72 years or older) · Pregnancy Avoid alcohol while taking prescription opioids. Also, unless specifically advised by your health care provider, medications to avoid include: · Benzodiazepines (such as Xanax or Valium) · Muscle relaxants (such as Soma or Flexeril) · Hypnotics (such as Ambien or Lunesta) · Other prescription opioids KNOW YOUR OPTIONS Talk to your health care provider about ways to manage your pain that don't involve prescription opioids. Some of these options may actually work better and have fewer risks and side effects. Options may include: 
· Pain relievers such as acetaminophen, ibuprofen, and naproxen · Some medications that are also used for depression or seizures · Physical therapy and exercise · Counseling to help patients learn how to cope better with triggers of pain and stress. · Application of heat or cold compress · Massage therapy · Relaxation techniques Be Informed Make sure you know the name of your medication, how much and how often to take it, and its potential risks & side effects. IF YOU ARE PRESCRIBED OPIOIDS FOR PAIN: 
· Never take opioids in greater amounts or more often than prescribed. Remember the goal is not to be pain-free but to manage your pain at a tolerable level. · Follow up with your primary care provider to: · Work together to create a plan on how to manage your pain. · Talk about ways to help manage your pain that don't involve prescription opioids. · Talk about any and all concerns and side effects. · Help prevent misuse and abuse. · Never sell or share prescription opioids · Help prevent misuse and abuse. · Store prescription opioids in a secure place and out of reach of others (this may include visitors, children, friends, and family). · Safely dispose of unused/unwanted prescription opioids: Find your community drug take-back program or your pharmacy mail-back program, or flush them down the toilet, following guidance from the Food and Drug Administration (www.fda.gov/Drugs/ResourcesForYou). · Visit www.cdc.gov/drugoverdose to learn about the risks of opioid abuse and overdose. · If you believe you may be struggling with addiction, tell your health care provider and ask for guidance or call [employer] at 8-763-484-OKVY. Discharge Instructions [facility] Bebeto Monzon MD, FACS General Surgery Pt may shower. Allow soap and water to run over the incision. No driving or operating heavy machinery while on narcotic pain medications. No strenuous activity or contact sports for two weeks. No lifting greater than 15 lbs for 2 weeks. Call MD for any redness, swelling, bleeding or pus at the incision.   Also call for any nausea, vomiting, increased pain or pain uncontrolled by pain medicine. Hernia Repair: What to Expect at Home Your Recovery You are likely to have pain for the next few days. You may also feel like you have the flu, and you may have a low fever and feel tired and nauseated. This is common. You should feel better after a few days and will probably feel much better in 7 days. For several weeks you may feel twinges or pulling in the hernia repair when you move. You may have some bruising on the scrotum and along the penis. This is normal. Men will need to wear a jockstrap or briefs, not boxers, for scrotal support for several days after a groin (inguinal) hernia repair. iPourit bicycle shorts may provide good support. This care sheet gives you a general idea about how long it will take for you to recover. But each person recovers at a different pace. Follow the steps below to get better as quickly as possible. How can you care for yourself at home? Activity ? · Rest when you feel tired. Getting enough sleep will help you recover. ? · Try to walk each day. Start by walking a little more than you did the day before. Bit by bit, increase the amount you walk. Walking boosts blood flow and helps prevent pneumonia and constipation. ? · Avoid strenuous activities, such as biking, jogging, weight lifting, or aerobic exercise, until your doctor says it is okay. ? · Avoid lifting anything that would make you strain. This may include heavy grocery bags and milk containers, a heavy briefcase or backpack, cat litter or dog food bags, a vacuum , or a child. ? · You may drive when you are no longer taking pain medicine and can quickly move your foot from the gas pedal to the brake. You must also be able to sit comfortably for a long period of time, even if you do not plan to go far. You might get caught in traffic.   
? · Most people are able to return to work within 1 to 2 weeks after surgery. ? · You may shower 24 to 48 hours after surgery, if your doctor okays it. Pat the cut (incision) dry. Do not take a bath for the first 2 weeks, or until your doctor tells you it is okay. ? · Your doctor will tell you when you can have sex again. Diet ? · You can eat your normal diet. If your stomach is upset, try bland, low-fat foods like plain rice, broiled chicken, toast, and yogurt. ? · Drink plenty of fluids (unless your doctor tells you not to). ? · You may notice that your bowel movements are not regular right after your surgery. This is common. Avoid constipation and straining with bowel movements. You may want to take a fiber supplement every day. If you have not had a bowel movement after a couple of days, ask your doctor about taking a mild laxative. Medicines ? · Your doctor will tell you if and when you can restart your medicines. He or she will also give you instructions about taking any new medicines. ? · If you take blood thinners, such as warfarin (Coumadin), clopidogrel (Plavix), or aspirin, be sure to talk to your doctor. He or she will tell you if and when to start taking those medicines again. Make sure that you understand exactly what your doctor wants you to do. ? · Be safe with medicines. Take pain medicines exactly as directed. ¨ If the doctor gave you a prescription medicine for pain, take it as prescribed. ¨ If you are not taking a prescription pain medicine, take an over-the-counter medicine such as acetaminophen (Tylenol), ibuprofen (Advil, Motrin), or naproxen (Aleve). Read and follow all instructions on the label. ¨ Do not take two or more pain medicines at the same time unless the doctor told you to. Many pain medicines have acetaminophen, which is Tylenol. Too much acetaminophen (Tylenol) can be harmful. ? · If your doctor prescribed antibiotics, take them as directed. Do not stop taking them just because you feel better.  You need to take the full course of antibiotics. ? · If you think your pain medicine is making you sick to your stomach: 
¨ Take your medicine after meals (unless your doctor has told you not to). ¨ Ask your doctor for a different pain medicine. Incision care ? · If you have strips of tape on the cut (incision) the doctor made, leave the tape on for a week or until it falls off.  
? · If you have staples closing the cut, you will need to visit your doctor in 1 to 2 weeks to have them removed. ? · Wash the area daily with warm, soapy water and pat it dry. Follow-up care is a key part of your treatment and safety. Be sure to make and go to all appointments, and call your doctor if you are having problems. It's also a good idea to know your test results and keep a list of the medicines you take. When should you call for help? Call 911 anytime you think you may need emergency care. For example, call if: 
? · You passed out (lost consciousness). ? · You are short of breath. ?Call your doctor now or seek immediate medical care if: 
? · You have pain that does not get better after you take pain medicine. ? · You are sick to your stomach and cannot keep fluids down. ? · You have signs of a blood clot in your leg (called a deep vein thrombosis), such as: 
¨ Pain in your calf, back of the knee, thigh, or groin. ¨ Redness and swelling in your leg or groin. ? · You cannot pass stools or gas. ? · Bright red blood has soaked through the bandage over your incision. ? · You have loose stitches, or your incision comes open. ? · You have signs of infection, such as: 
¨ Increased pain, swelling, warmth, or redness. ¨ Red streaks leading from the incision. ¨ Pus draining from the incision. ¨ A fever. ? Watch closely for any changes in your health, and be sure to contact your doctor if you have any problems. Where can you learn more? Go to http://sapna-araseli.info/. Enter W157 in the search box to learn more about \"Hernia Repair: What to Expect at Home. \" Current as of: May 12, 2017 Content Version: 11.4 © 2033-4359 [employer]. Care instructions adapted under license by [employer] (which disclaims liability or warranty for this information). If you have questions about a medical condition or this instruction, always ask your healthcare professional. [address] any warranty or liability for your use of this information. DISCHARGE SUMMARY from Nurse PATIENT INSTRUCTIONS: 
 
After general anesthesia or intravenous sedation, for 24 hours or while taking prescription Narcotics: · Limit your activities · Do not drive and operate hazardous machinery · Do not make important personal or business decisions · Do  not drink alcoholic beverages · If you have not urinated within 8 hours after discharge, please contact your surgeon on call. *  Please give a list of your current medications to your Primary Care Provider. *  Please update this list whenever your medications are discontinued, doses are 
    changed, or new medications (including over-the-counter products) are added. *  Please carry medication information at all times in case of emergency situations. These are general instructions for a healthy lifestyle: No smoking/ No tobacco products/ Avoid exposure to second hand smoke Surgeon General's Warning:  Quitting smoking now greatly reduces serious risk to your health. Obesity, smoking, and sedentary lifestyle greatly increases your risk for illness A healthy diet, regular physical exercise & weight monitoring are important for maintaining a healthy lifestyle You may be retaining fluid if you have a history of heart failure or if you experience any of the following symptoms:  Weight gain of 3 pounds or more overnight or 5 pounds in a week, increased swelling in our hands or feet or shortness of breath while lying flat in bed. Please call your doctor as soon as you notice any of these symptoms; do not wait until your next office visit. Recognize signs and symptoms of STROKE: 
 
F-face looks uneven A-arms unable to move or move unevenly S-speech slurred or non-existent T-time-call 911 as soon as signs and symptoms begin-DO NOT go Back to bed or wait to see if you get better-TIME IS BRAIN. Warning Signs of HEART ATTACK Call 911 if you have these symptoms: 
? Chest discomfort. Most heart attacks involve discomfort in the center of the chest that lasts more than a few minutes, or that goes away and comes back. It can feel like uncomfortable pressure, squeezing, fullness, or pain. ? Discomfort in other areas of the upper body. Symptoms can include pain or discomfort in one or both arms, the back, neck, jaw, or stomach. ? Shortness of breath with or without chest discomfort. ? Other signs may include breaking out in a cold sweat, nausea, or lightheadedness. Don't wait more than five minutes to call [address]! Fast action can save your life. Calling 911 is almost always the fastest way to get lifesaving treatment. Emergency Medical Services staff can begin treatment when they arrive  up to an hour sooner than if someone gets to the hospital by car. The discharge information has been reviewed with the patient and spouse. The patient and spouse verbalized understanding. Discharge medications reviewed with the patient and spouse and appropriate educational materials and side effects teaching were provided. ___________________________________________________________________________________________________________________________________ Oxycodone/Acetaminophen (Percocet, Roxicet) - (By mouth) Why this medicine is used:  
Treats pain. This medicine contains a narcotic pain reliever. Contact a nurse or doctor right away if you have: · Extreme weakness, shallow breathing, slow heartbeat · Sweating or cold, clammy skin · Skin blisters, rash, or peeling Common side effects: 
· Constipation · Nausea, vomiting · Tiredness © 2017 [address] Information is for End User's use only and may not be sold, redistributed or otherwise used for commercial purposes. Ibuprofen (Advil, Advil Children's, Motrin, Children's Ibuprofen) - (By mouth) Why this medicine is used:  
Treats pain and fever. This medicine is an NSAID. Contact a nurse or doctor right away if you have: 
· Change in how much or how often you urinate · Severe stomach pain, vomiting blood, bloody or black tarry stools · Swelling in your hands, ankles, or feet; rapid weight gain Common side effects: 
· Constipation, diarrhea, gas, mild upset stomach · Ringing in your ears, dizziness, headache © 2017 [address] Information is for End User's use only and may not be sold, redistributed or otherwise used for commercial purposes. ACO Transitions of Care [place] [address] Ella Roberto offers a voluntary care coordination program to provide high quality service and care to [facility] fee-for-service beneficiaries. [place] was designed to help you enhance your health and well-being through the following services: ? Transitions of Care  support for individuals who are transitioning from one care setting to another (example: Hospital to home). ? Chronic and Complex Care Coordination  support for individuals and caregivers of those with serious or chronic illnesses or with more than one chronic (ongoing) condition and those who take a number of different medications. If you meet specific medical criteria, a [address] [place] [address] Rd may call you directly to coordinate your care with your primary care physician and your other care providers. For questions about the [facility] programs, please, contact your physicians office. For general questions or additional information about Accountable Care Organizations: 
Please visit www.medicare.gov/acos. html or call 1-800-MEDICARE (6-738.467.6300) TTY users should call 4-868.570.8894. Micro[employer]mis Announcement We are excited to announce that we are making your provider's discharge notes available to you in Microdermis. You will see these notes when they are completed and signed by the physician that discharged you from your recent hospital stay. If you have any questions or concerns about any information you see in Microdermis, please call the Health Information Department where you were seen or reach out to your Primary Care Provider for more information about your plan of care. Introducing [facility]! Dear Rizwan Bell: Thank you for requesting a Microdermis account. Our records indicate that you already have an active Microdermis account. You can access your account anytime at [email or web address] Did you know that you can access your hospital and ER discharge instructions at any time in Microdermis? You can also review all of your test results from your hospital stay or ER visit. Additional Information If you have questions, please visit the Frequently Asked Questions section of the [employer]mis website at [email or web address] Remember, Microdermis is NOT to be used for urgent needs. For medical emergencies, dial 911. Now available from your iPhone and Android! Introducing Angelo Francisco As a [facility] patient, I wanted to make you aware of our electronic visit tool called Angelo Francisco. [facility] 24/7 allows you to connect within minutes with a medical provider 24 hours a day, seven days a week via a mobile device or tablet or logging into a secure website from your computer.   You can access [facility] [employer] 24/7 from anywhere in the [place]. A virtual visit might be right for you when you have a simple condition and feel like you just dont want to get out of bed, or cant get away from work for an appointment, when your regular [employer] provider is not available (evenings, weekends or holidays), or when youre out of town and need minor care. Electronic visits cost only $49 and if the [employer] 24/7 provider determines a prescription is needed to treat your condition, one can be electronically transmitted to a nearby pharmacy*. Please take a moment to enroll today if you have not already done so. The enrollment process is free and takes just a few minutes. To enroll, please download the [employer] 24/7 dione to your tablet or phone, or visit [email or web address] to enroll on your computer. And, as an [address] [place] [address] patient with a [employer] account, the results of your visits will be scanned into your electronic medical record and your primary care provider will be able to view the scanned results. We urge you to continue to see your regular [employer] provider for your ongoing medical care. And while your primary care provider may not be the one available when you seek a Angelo Francisco virtual visit, the peace of mind you get from getting a real diagnosis real time can be priceless. For more information on Angelo Francisco, view our Frequently Asked Questions (FAQs) at [email or web address]."[email or web address]. Sincerely, 
 
Suni Esteban MD 
Chief Medical Officer Margaret[address] Ella Mejia *:  certain medications cannot be prescribed via Angelo Francisco Providers Seen During Your Hospitalization Provider Specialty Primary office phone [place], [address] [facility] [phone] Your Primary Care Physician (PCP) Primary Care Physician Office Phone Office Fax Johnnie Weldon [phone] You are allergic to the following Allergen Reactions Amlodipine Other (comments) BLE edema Recent Documentation Height Weight BMI Smoking Status 1.753 m 91.8 kg 29.89 kg/m2 Former Smoker Emergency Contacts Name Discharge Info Relation Home Work Mobile [address] [facility] CAREGIVER [3] Spouse [3] [phone] Patient Belongings The following personal items are in your possession at time of discharge: 
  Dental Appliances: None  Visual Aid: None      Home Medications: None   Jewelry: None  Clothing: Pants, Shirt, Undergarments, Footwear, Socks (given to Anniestarr Justice)    Other Valuables: None Please provide this summary of care documentation to your next provider. Signatures-by signing, you are acknowledging that this After Visit Summary has been reviewed with you and you have received a copy. Patient Signature:  ____________________________________________________________ Date:  ____________________________________________________________  
  
Braulio Triana Provider Signature:  ____________________________________________________________ Date:  ____________________________________________________________

## 2018-05-31 NOTE — PERIOP NOTES
I have reviewed discharge instructions with the patient and spouse. The patient and spouse verbalized understanding. 1237 - Patient armband removed and shredded.

## 2018-05-31 NOTE — H&P (VIEW-ONLY)
[facility]  General Surgery    Subjective:      HPI: Patient is a very pleasant 73-year-old male with a past medical history remarkable for hypertension, hyperlipidemia, type 2 diabetes mellitus with nephropathy, stage III chronic kidney disease, major depressive disorder in remission, erectile dysfunction, hypovitaminosis D and history of vocal cord nodule removal.  He is referred by Dr. James Beth for evaluation and management of a right inguinal hernia. The patient states that he had knee replacement at the end of February of this year. He felt strain in the right groin when lifting both legs at the same time. Since that time he has developed 6 out of 10 intermittent dull pain which is mostly present with standing. The pain is relieved by sitting down and also by using Tylenol. Patient Active Problem List    Diagnosis Date Noted    Type 2 diabetes with nephropathy ([facility] 05/23/2018    Right inguinal hernia 05/17/2018    Osteoarthritis of right knee 02/19/2018    Erectile dysfunction 01/23/2017    Melanoma in situ - on left dorsal forearm 2016 09/16/2016    Major depressive disorder in remission 08/03/2016    Rectal polyp -  seen on colonoscopy from 8/20/15 08/02/2016    Diverticulosis of intestine without bleeding - seen on colonoscopy from 8/20/15 08/02/2016    CKD (chronic kidney disease) stage 3, GFR 30-59 ml/min 11/09/2015    Essential hypertension 06/24/2015    Type 2 diabetes mellitus without complication ([facility] 11/90/4717    Hypothyroidism due to acquired atrophy of thyroid 06/24/2015    Vocal cord nodule 10/21/2013    Gout 10/21/2011    Family history of colon cancer.   personal hx colon polyps 10/21/2011    Hyperlipidemia     Hypovitaminosis D      Past Medical History:   Diagnosis Date    Arthritis     hip, knee    Chronic kidney disease     had previous reaction with kidneys after a bp med, no issues now    Colon polyps     dysplastic    Depression     Diabetes mellitus ([facility] 2017    no meds    Diverticulosis     seen on colonoscopy from 8/20/15    Diverticulosis of sigmoid colon 01/27/10    GERD (gastroesophageal reflux disease)     no meds    Gout     Hepatitis     Hyperlipidemia     Hypertension 15 years    Hypovitaminosis D     Nephrolithiasis     Osteoarthritis of right knee 2/19/2018    Plantar wart     Rectal polyp     seen on colonoscopy from 8/20/15    Thyroid disease     hypothyroidism      Past Surgical History:   Procedure Laterality Date    ENDOSCOPY, COLON, DIAGNOSTIC  01/27/2010    polyp distal rectum, removed; diverticulosis of sigmoid    HX CATARACT REMOVAL Bilateral     HX HEENT  2008    vocal cord polyp removed    HX POLYPECTOMY  01/27/2010    Distal rectum    HX TONSILLECTOMY      LITHOTRIPSY      TOTAL KNEE ARTHROPLASTY Left 1/2014    TOTAL KNEE ARTHROPLASTY Right 02/2018      Family History   Problem Relation Age of Onset    Arthritis-rheumatoid Mother     Hypertension Mother     Elevated Lipids Mother     Thyroid Disease Sister     Cancer Father      colon    Heart Disease Other     Hypertension Other     Cancer Other      breast    Heart Disease Maternal Grandmother     Dementia Maternal Grandfather     Cancer Paternal Grandmother      breast    No Known Problems Paternal Grandfather       Social History   Substance Use Topics    Smoking status: Former Smoker     Packs/day: 1.00     Years: 6.00     Types: Cigarettes     Quit date: 1/1/1992    Smokeless tobacco: Never Used    Alcohol use No      Comment: wine with dinner sometimes      Allergies   Allergen Reactions    Amlodipine Other (comments)     BLE edema       Prior to Admission medications    Medication Sig Start Date End Date Taking? Authorizing Provider   ubidecarenone (COQ-10 PO) Take  by mouth. Yes Historical Provider   aspirin delayed-release 81 mg tablet Take  by mouth daily.    Yes Historical Provider   lisinopril (PRINIVIL, ZESTRIL) 5 mg tablet Take 1 Tab by mouth daily. Patient taking differently: Take 5 mg by mouth nightly. 1/24/18  Yes Shruti Sims MD   atorvastatin (LIPITOR) 40 mg tablet Take 1 Tab by mouth daily. Patient taking differently: Take 40 mg by mouth nightly. 1/12/18  Yes Shruti Sims MD   allopurinol (ZYLOPRIM) 100 mg tablet Take 2 Tabs by mouth daily. 11/8/17  Yes Shruti Sims MD   levothyroxine (SYNTHROID) 50 mcg tablet Take 1 Tab by mouth Daily (before breakfast). 10/23/17  Yes Shruti Sims MD   carvedilol (COREG) 6.25 mg tablet Take 1 Tab by mouth two (2) times daily (with meals). 10/3/17  Yes Shruti Sims MD   venlafaxine-SR [facility] 150 mg capsule Take 150 mg by mouth daily. 7/17/17  Yes Historical Provider   multivitamin (ONE A DAY) tablet Take 1 Tab by mouth daily. Yes Historical Provider   Cholecalciferol, Vitamin D3, (VITAMIN D) 2,000 unit Cap Take 4,000 Units by mouth daily. Yes Historical Provider   oxyCODONE-acetaminophen (PERCOCET) 5-325 mg per tablet Take 1 to 2 tab PO q 4-6 hrs prn pain 2/26/18   JOHN Vazquez   terbinafine HCl (LAMISIL) 250 mg tablet Take 250 mg by mouth daily. Indications: Taken for 1 full week a month; and then not for another 3 weeks    Historical Provider   sildenafil (REVATIO) 20 mg tablet Take po 40 mg once daily 1 hour (range: 30 minutes to 4 hours) before sexual activity as needed; maximum dose: 100 mg once daily 5/22/17   Shruti Sims MD       Review of Systems:    14 systems were reviewed. The results are as above in the HPI and otherwise negative. Objective:     Vitals:    05/23/18 0855   BP: 104/70   Pulse: (!) 57   Resp: 18   Temp: 98 °F (36.7 °C)   Weight: 92.1 kg (203 lb)       Physical Exam:  GENERAL: alert, cooperative, no distress, appears stated age,   EYE: conjunctivae/corneas clear. PERRL, EOM's intact.   THROAT & NECK: normal and no erythema or exudates noted. ,    LYMPHATIC: Cervical, supraclavicular, and axillary nodes normal. , LUNG: clear to auscultation bilaterally,   HEART: regular rate and rhythm, S1, S2 normal, no murmur, click, rub or gallop,   ABDOMEN: soft, non-tender. Reducible right inguinal hernia. No evidence of hernia on the left. Bowel sounds normal. No masses,  no organomegaly,   EXTREMITIES:  extremities normal, atraumatic, no cyanosis or edema,   SKIN: Normal.,   NEUROLOGIC: AOx3. Cranial nerves 2-12 and sensation grossly intact. ,     Data Review:  to be done    Mr. Rohit Zamarripa has a reminder for a \"due or due soon\" health maintenance. I have asked that he contact his primary care provider for follow-up on this health maintenance. Impression:     · Patient with a symptomatic right inguinal hernia. .    Plan:     · Robot-assisted laparoscopic right inguinal hernia repair with mesh  · Consent on chart  · Preoperative orders written    Signed By: Maldonado Cahpin MD     May 23, 2018

## 2018-05-31 NOTE — DISCHARGE SUMMARY
4 Mia Lewis MD, [address]  General Surgery  Discharge Summary     Patient ID:  Arleth Ravi  [id number]  89 y.o.  1945    Admit Date: 5/31/2018    Discharge Date: 5/31/2018    Admission Diagnoses: Right inguinal hernia [K40.90]    Discharge Diagnoses:    Problem List as of 5/31/2018  Date Reviewed: 5/23/2018          Codes Class Noted - Resolved    Type 2 diabetes with nephropathy ([facility] ICD-10-CM: E11.21  ICD-9-CM: 250.40, 583.81  5/23/2018 - Present        Right inguinal hernia ICD-10-CM: K40.90  ICD-9-CM: 550.90  5/17/2018 - Present        Osteoarthritis of right knee (Chronic) ICD-10-CM: M17.11  ICD-9-CM: 715.96  2/19/2018 - Present        Erectile dysfunction ICD-10-CM: N52.9  ICD-9-CM: 607.84  1/23/2017 - Present        Melanoma in situ - on left dorsal forearm 2016 ICD-10-CM: D03.9  ICD-9-CM: 172.9  9/16/2016 - Present        Major depressive disorder in remission ICD-10-CM: F32.5  ICD-9-CM: 296.25  8/3/2016 - Present        Rectal polyp -  seen on colonoscopy from 8/20/15 ICD-10-CM: K62.1  ICD-9-CM: 569.0  8/2/2016 - Present        Diverticulosis of intestine without bleeding - seen on colonoscopy from 8/20/15 ICD-10-CM: K57.90  ICD-9-CM: 562.10  8/2/2016 - Present        CKD (chronic kidney disease) stage 3, GFR 30-59 ml/min ICD-10-CM: N18.3  ICD-9-CM: 585.3  11/9/2015 - Present        Essential hypertension ICD-10-CM: I10  ICD-9-CM: 401.9  6/24/2015 - Present        Type 2 diabetes mellitus without complication ([facility]-53-YE: E11.9  ICD-9-CM: 250.00  6/24/2015 - Present        Hypothyroidism due to acquired atrophy of thyroid ICD-10-CM: E03.4  ICD-9-CM: 244.8, 246.8  6/24/2015 - Present        Vocal cord nodule ICD-10-CM: J38.2  ICD-9-CM: 478.5  10/21/2013 - Present        Gout ICD-10-CM: M10.9  ICD-9-CM: 274.9  10/21/2011 - Present        Family history of colon cancer.   personal hx colon polyps ICD-10-CM: Z80.0  ICD-9-CM: V16.0  10/21/2011 - Present Hyperlipidemia ICD-10-CM: E78.5  ICD-9-CM: 272.4  Unknown - Present        Hypovitaminosis D ICD-10-CM: E55.9  ICD-9-CM: 268.9  Unknown - Present        RESOLVED: [place] ICD-10-CM: D22.9  ICD-9-CM: 216.9  8/23/2016 - 8/10/2017        RESOLVED: [place] ICD-10-CM: B02.9  ICD-9-CM: 053.9  7/21/2015 - 3/20/2018        RESOLVED: Creatinine elevation ICD-10-CM: R79.89  ICD-9-CM: 790.99  4/27/2014 - 12/23/2014        RESOLVED: Osteoarthritis, Status post left total knee replacement ICD-10-CM: M19.90  ICD-9-CM: 715.90  4/23/2014 - 3/20/2018               Admission Condition: Good    Discharge Condition: Good    Last Procedure: Procedure(s):  ROBOTIC ASSISTED LAPAROSCOPIC RIGHT INGUINAL HERNIA REPAIR WITH [address] Course:   Normal hospital course for this procedure. Consults: None    Significant Diagnostic Studies: None    Disposition: home    Patient Instructions:   Current Discharge Medication List      START taking these medications    Details   oxyCODONE-acetaminophen (PERCOCET) 5-325 mg per tablet Take 1-2 Tabs by mouth every four (4) hours as needed for Pain. Max Daily Amount: 12 Tabs. Qty: 40 Tab, Refills: 0    Associated Diagnoses: Postoperative pain      ibuprofen (MOTRIN) 800 mg tablet Take 1 Tab by mouth three (3) times daily as needed for Pain. Qty: 40 Tab, Refills: 0    Associated Diagnoses: Postoperative pain         CONTINUE these medications which have NOT CHANGED    Details   ubidecarenone (COQ-10 PO) Take  by mouth.      lisinopril (PRINIVIL, ZESTRIL) 5 mg tablet Take 1 Tab by mouth daily. Qty: 90 Tab, Refills: 3    Associated Diagnoses: Essential hypertension; Type 2 diabetes mellitus without complication, without long-term current use of insulin ([address] Renal failure, chronic, stage 2 (mild)      atorvastatin (LIPITOR) 40 mg tablet Take 1 Tab by mouth daily.   Qty: 90 Tab, Refills: 3    Associated Diagnoses: Mixed hyperlipidemia      allopurinol (ZYLOPRIM) 100 mg tablet Take 2 Tabs by mouth daily.  Qty: 180 Tab, Refills: 3    Associated Diagnoses: Type 2 diabetes mellitus without complication ([facility] Renal failure, chronic, stage 2 (mild); Acute gout, unspecified cause, unspecified site      levothyroxine (SYNTHROID) 50 mcg tablet Take 1 Tab by mouth Daily (before breakfast). Qty: 90 Tab, Refills: 4    Associated Diagnoses: Hypothyroidism due to acquired atrophy of thyroid      carvedilol (COREG) 6.25 mg tablet Take 1 Tab by mouth two (2) times daily (with meals). Qty: 30 Tab, Refills: 11      venlafaxine-SR (EFFEXOR-XR) 150 mg capsule Take 150 mg by mouth daily. sildenafil (REVATIO) 20 mg tablet Take po 40 mg once daily 1 hour (range: 30 minutes to 4 hours) before sexual activity as needed; maximum dose: 100 mg once daily  Qty: 30 Tab, Refills: 11    Associated Diagnoses: Erectile dysfunction, unspecified erectile dysfunction type      multivitamin (ONE A DAY) tablet Take 1 Tab by mouth daily. Cholecalciferol, Vitamin D3, (VITAMIN D) 2,000 unit Cap Take 4,000 Units by mouth daily. aspirin delayed-release 81 mg tablet Take  by mouth daily. terbinafine HCl (LAMISIL) 250 mg tablet Take 250 mg by mouth daily. Indications: Taken for 1 full week a month; and then not for another 3 weeks    Associated Diagnoses: Left otitis media, unspecified chronicity, unspecified otitis media type           Activity: Activity as tolerated  Diet: Low fat, Low cholesterol  Wound Care: As directed    Follow-up with Dr. Tomas Portillo in 2 weeks.   Follow-up tests/labs None    Signed:  Kita Hodgkin, MD  5/31/2018  7:38 AM

## 2018-05-31 NOTE — ANESTHESIA POSTPROCEDURE EVALUATION
Post-Anesthesia Evaluation and Assessment    Patient: Frederick Martin MRN: [id number]  SSN: [id number]    YOB: 1945  Age: 74 y.o. Sex: male       Cardiovascular Function/Vital Signs  Visit Vitals    /76 (BP 1 Location: Right arm, BP Patient Position: At rest)    Pulse 70    Temp 36.1 °C (97 °F)    Resp 14    Ht 5' 9\" (1.753 m)    Wt 91.8 kg (202 lb 6 oz)    SpO2 97%    BMI 29.89 kg/m2       Patient is status post general anesthesia for Procedure(s):  ROBOTIC ASSISTED LAPAROSCOPIC RIGHT INGUINAL HERNIA REPAIR WITH MESH. Nausea/Vomiting: None    Postoperative hydration reviewed and adequate. Pain:  Pain Scale 1: Numeric (0 - 10) (05/31/18 1002)  Pain Intensity 1: 3 (05/31/18 1002)   Managed    Neurological Status:   Neuro (WDL): Within Defined Limits (05/31/18 0857)   At baseline    Mental Status and Level of Consciousness: Arousable    Pulmonary Status:   O2 Device: Room air (05/31/18 1002)   Adequate oxygenation and airway patent    Complications related to anesthesia: None    Post-anesthesia assessment completed.  No concerns    Signed By: Lane Marcelo MD     May 31, 2018

## 2018-06-01 ENCOUNTER — PATIENT OUTREACH (OUTPATIENT)
Dept: INTERNAL MEDICINE CLINIC | Age: 73
End: 2018-06-01

## 2018-06-01 ENCOUNTER — TELEPHONE (OUTPATIENT)
Dept: SURGERY | Age: 73
End: 2018-06-01

## 2018-06-01 NOTE — PROGRESS NOTES
Hospital Discharge Follow-Up      Date/Time:  2018 10:32 AM    Patient was admitted to [facility] on 18 and discharged on 18 for robotic assisted laparoscopic right inguinal hernia repair. The physician discharge summary was available at the time of outreach. Patient was contacted within 1 business days of discharge. Top Challenges reviewed with the provider   None at this time         Method of communication with provider :none    Inpatient RRAT score: N/A  Was this a readmission? no   Patient stated reason for the readmission: N/A    Nurse Navigator (NN) contacted the patient by telephone to perform post hospital discharge assessment. Verified name and  with patient as identifiers. Provided introduction to self, and explanation of the Nurse Navigator role. Reviewed discharge instructions and red flags with patient who verbalized understanding. Patient given an opportunity to ask questions and does not have any further questions or concerns at this time. The patient agrees to contact the PCP office for questions related to their healthcare. NN provided contact information for future reference. Summary of patient's top problems: None at this time     Home Health orders at discharge: [address]: N/A  Date of initial visit: N/A    Durable Medical Equipment ordered/company: N/A  Durable Medical Equipment received: N/A    Barriers to care? No apparent barriers to care noted at this time. Advance Care Planning:   Does patient have an Advance Directive:  reviewed and needs to be updated; AMD does not have required 2 person witness signatures    Medication(s):   New Medications at Discharge: Percocet, Motrin  Changed Medications at Discharge: None  Discontinued Medications at Discharge: None    New medications reconciled. Patient not taking Motrin. Is taking Percocet for pain.       Current Outpatient Prescriptions   Medication Sig    oxyCODONE-acetaminophen (PERCOCET) 5-325 mg per tablet Take 1-2 Tabs by mouth every four (4) hours as needed for Pain. Max Daily Amount: 12 Tabs.  ibuprofen (MOTRIN) 800 mg tablet Take 1 Tab by mouth three (3) times daily as needed for Pain.  ubidecarenone (COQ-10 PO) Take  by mouth.  aspirin delayed-release 81 mg tablet Take  by mouth daily.  lisinopril (PRINIVIL, ZESTRIL) 5 mg tablet Take 1 Tab by mouth daily. (Patient taking differently: Take 5 mg by mouth nightly.)    atorvastatin (LIPITOR) 40 mg tablet Take 1 Tab by mouth daily. (Patient taking differently: Take 40 mg by mouth nightly.)    allopurinol (ZYLOPRIM) 100 mg tablet Take 2 Tabs by mouth daily.  levothyroxine (SYNTHROID) 50 mcg tablet Take 1 Tab by mouth Daily (before breakfast).  carvedilol (COREG) 6.25 mg tablet Take 1 Tab by mouth two (2) times daily (with meals).  terbinafine HCl (LAMISIL) 250 mg tablet Take 250 mg by mouth daily. Indications: Taken for 1 full week a month; and then not for another 3 weeks    venlafaxine-SR (EFFEXOR-XR) 150 mg capsule Take 150 mg by mouth daily.  sildenafil (REVATIO) 20 mg tablet Take po 40 mg once daily 1 hour (range: 30 minutes to 4 hours) before sexual activity as needed; maximum dose: 100 mg once daily    multivitamin (ONE A DAY) tablet Take 1 Tab by mouth daily.  Cholecalciferol, Vitamin D3, (VITAMIN D) 2,000 unit Cap Take 4,000 Units by mouth daily. No current facility-administered medications for this visit. There are no discontinued medications. PCP/Specialist follow up:   Future Appointments  Date Time Provider Gisele Astorga   6/14/2018 10:30 AM Helena Morris NP [facility]   6/20/2018 8:30 AM Jignesh Valdivia MD [facility]   8/13/2018 8:00 AM Jignesh Valdivia MD [facility]     Attends follow-up appointments as directed. Plan: Notify of upcoming appts with surgeon and PCP.  6/1/18: Patient aware.        Understands red flags post discharge. Plan: Educate on red flags to monitor and report. 6/1/18: Patient verbalized understanding.

## 2018-06-01 NOTE — TELEPHONE ENCOUNTER
Received message from patient's PCP that patient had some concerns. I was able to speak with him personally and he reports very normal POD 1 pain and discomfort. He was under the impression that he would be going to his grandson's baseball game tonight. He seems to have some unrealistic expectations for his post op course. I reviewed his limitations and activity restrictions and educated him about signs of infection to report to either office or ED if outside office hours. He thanked me for the education and admitted his uncertainty about the surgery itself.

## 2018-06-01 NOTE — Clinical Note
Patient called with several concerns. Verbalized he also placed a call to your office.  Please see note for further information

## 2018-06-01 NOTE — TELEPHONE ENCOUNTER
Pt had left nurse message with his name  and phone number attempted to call back and no answer lm for him to call us back

## 2018-06-01 NOTE — PROGRESS NOTES
Patient called to report that his wife noticed that his gums were white,reported bilateral rib cage soreness; R>L and shallow breathing after taking dose of Percocet this AM on an empty stomach. Patient verbalized he was seen by his dentist 2 days ago for a cleaning. Patient verbalized after taking Percocet on an empty stomach he ate some yogurt. Patient verbalized he thinks he was intubated. Patient communicated he also placed a call to Dr. Emory Livingston office and is awaiting a call from the NP. Informed patient writer will also forward note to Enrike Kaminski NP at Dr. Emory Livingston office.

## 2018-06-14 ENCOUNTER — PATIENT OUTREACH (OUTPATIENT)
Dept: INTERNAL MEDICINE CLINIC | Age: 73
End: 2018-06-14

## 2018-06-14 ENCOUNTER — OFFICE VISIT (OUTPATIENT)
Dept: SURGERY | Age: 73
End: 2018-06-14

## 2018-06-14 VITALS
SYSTOLIC BLOOD PRESSURE: 128 MMHG | RESPIRATION RATE: 18 BRPM | WEIGHT: 204 LBS | DIASTOLIC BLOOD PRESSURE: 86 MMHG | BODY MASS INDEX: 30.13 KG/M2 | TEMPERATURE: 98.2 F | HEART RATE: 78 BPM

## 2018-06-14 DIAGNOSIS — Z09 POSTOPERATIVE EXAMINATION: Primary | ICD-10-CM

## 2018-06-14 DIAGNOSIS — K40.90 RIGHT INGUINAL HERNIA: ICD-10-CM

## 2018-06-14 NOTE — PATIENT INSTRUCTIONS
Hernia: Care Instructions  Your Care Instructions    A hernia develops when tissue bulges through a weak spot in the wall of your belly. The groin area and the navel are common areas for a hernia. A hernia can also develop near the area of a surgery you had before. Pressure from lifting, straining, or coughing can tear the weak area, causing the hernia to bulge and be painful. If you cannot push a hernia back into place, the tissue may become trapped outside the belly wall. If the hernia gets twisted and loses its blood supply, it will swell and die. This is called a strangulated hernia. It usually causes a lot of pain. It needs treatment right away. Some hernias need to be repaired to prevent a strangulated hernia. If your hernia causes symptoms or is large, you may need surgery. Follow-up care is a key part of your treatment and safety. Be sure to make and go to all appointments, and call your doctor if you are having problems. It's also a good idea to know your test results and keep a list of the medicines you take. How can you care for yourself at home? · Take care when lifting heavy objects. · Stay at a healthy weight. · Do not smoke. Smoking can cause coughing, which can cause your hernia to bulge. If you need help quitting, talk to your doctor about stop-smoking programs and medicines. These can increase your chances of quitting for good. · Talk with your doctor before wearing a corset or truss for a hernia. These devices are not recommended for treating hernias and sometimes can do more harm than good. There may be certain situations when your doctor thinks a truss would work, but these are rare. When should you call for help? Call your doctor now or seek immediate medical care if:  ? · You have new or worse belly pain. ? · You are vomiting. ? · You cannot pass stools or gas. ? · You cannot push the hernia back into place with gentle pressure when you are lying down.    ? · The area over the hernia turns red or becomes tender. ? Watch closely for changes in your health, and be sure to contact your doctor if you have any problems. Where can you learn more? Go to http://sapna-araseli.info/. Enter C129 in the search box to learn more about \"Hernia: Care Instructions. \"  Current as of: May 12, 2017  Content Version: 11.4  © 8591-7111 [employer]. Care instructions adapted under license by [employer] (which disclaims liability or warranty for this information). If you have questions about a medical condition or this instruction, always ask your healthcare professional. [address] any warranty or liability for your use of this information.

## 2018-06-14 NOTE — PROGRESS NOTES
Kena Mclaughlin. GELY Enriquez-HELDER  PROGRESS NOTE    Name: Ira Beckwith MRN: [id number]   : 1945 Hospital: [facility]   Date: 2018 Admission Date: No admission date for patient encounter. Subjective:  Mr. Annika Becerra is a very pleasant 77-year-old white male who presents today not quite 2 weeks out from robot-assisted laparoscopic right inguinal hernia repair with mesh. He feels he is doing much better than right after surgery and that every day is better than the day before. He has finally accepted that he needs to slow down and is just unable to be as active as he thought he was going to be before surgery. He stopped taking Motrin last . He does complain of some tenderness to right testicle, but denies bruising or swelling. He is eating well, having normal bowel movements and reports no issues with voiding. He denies fevers or chills and states there has been no draining from his incision. Objective:  Vitals:    18 1312   BP: 128/86   Pulse: 78   Resp: 18   Temp: 98.2 °F (36.8 °C)   Weight: 92.5 kg (204 lb)        Physical Exam:   General:  Well developed well nourished male in no apparent distress   Abdomen: abdomen is soft with no tenderness. No masses, organomegaly or guarding. Incisions are clean, dry, intact. No edema, no erythema, no seroma, no drainage. Glue still intact. Labs:  No results found for this or any previous visit (from the past 24 hour(s)). Current Medications:  Current Outpatient Prescriptions   Medication Sig Dispense Refill    ibuprofen (MOTRIN) 800 mg tablet Take 1 Tab by mouth three (3) times daily as needed for Pain. 40 Tab 0    ubidecarenone (COQ-10 PO) Take  by mouth.  aspirin delayed-release 81 mg tablet Take  by mouth daily.  lisinopril (PRINIVIL, ZESTRIL) 5 mg tablet Take 1 Tab by mouth daily. (Patient taking differently: Take 5 mg by mouth nightly.) 90 Tab 3    atorvastatin (LIPITOR) 40 mg tablet Take 1 Tab by mouth daily. (Patient taking differently: Take 40 mg by mouth nightly.) 90 Tab 3    allopurinol (ZYLOPRIM) 100 mg tablet Take 2 Tabs by mouth daily. 180 Tab 3    levothyroxine (SYNTHROID) 50 mcg tablet Take 1 Tab by mouth Daily (before breakfast). 90 Tab 4    carvedilol (COREG) 6.25 mg tablet Take 1 Tab by mouth two (2) times daily (with meals). 30 Tab 11    terbinafine HCl (LAMISIL) 250 mg tablet Take 250 mg by mouth daily. Indications: Taken for 1 full week a month; and then not for another 3 weeks      venlafaxine-SR (EFFEXOR-XR) 150 mg capsule Take 150 mg by mouth daily.  sildenafil (REVATIO) 20 mg tablet Take po 40 mg once daily 1 hour (range: 30 minutes to 4 hours) before sexual activity as needed; maximum dose: 100 mg once daily 30 Tab 11    multivitamin (ONE A DAY) tablet Take 1 Tab by mouth daily.  Cholecalciferol, Vitamin D3, (VITAMIN D) 2,000 unit Cap Take 4,000 Units by mouth daily.  oxyCODONE-acetaminophen (PERCOCET) 5-325 mg per tablet Take 1-2 Tabs by mouth every four (4) hours as needed for Pain. Max Daily Amount: 12 Tabs. 40 Tab 0       Chart and notes reviewed. Data reviewed. I have evaluated and examined the patient. IMPRESSION:   · Patient not quite 2 weeks out from robot-assisted laparoscopic right inguinal hernia repair with mesh doing well overall. PLAN:/DISCUSION:   · Activity restrictions for another month  · May use ice, Motrin, compression briefs for comfort  · Follow-up as needed     Mr. Ulysses Mings has a reminder for a \"due or due soon\" health maintenance. I have asked that he contact his primary care provider for follow-up on this health maintenance. Juani Spear.  GELY Irby-HELDER

## 2018-06-14 NOTE — PROGRESS NOTES
Goals Addressed             Most Recent       Post Hospitalization     COMPLETED: Attends follow-up appointments as directed. On track (6/14/2018)             Plan: Notify of upcoming appts with surgeon and PCP.  6/1/18: Patient aware. 6/14/2018: Patient attended their post discharge follow up appointment with Shella Aschoff, NP as scheduled.

## 2018-06-20 ENCOUNTER — OFFICE VISIT (OUTPATIENT)
Dept: INTERNAL MEDICINE CLINIC | Age: 73
End: 2018-06-20

## 2018-06-20 VITALS
WEIGHT: 206 LBS | HEART RATE: 65 BPM | OXYGEN SATURATION: 98 % | BODY MASS INDEX: 30.51 KG/M2 | RESPIRATION RATE: 18 BRPM | DIASTOLIC BLOOD PRESSURE: 95 MMHG | SYSTOLIC BLOOD PRESSURE: 167 MMHG | HEIGHT: 69 IN | TEMPERATURE: 97.2 F

## 2018-06-20 DIAGNOSIS — G89.29 CHRONIC PAIN OF RIGHT KNEE: ICD-10-CM

## 2018-06-20 DIAGNOSIS — K40.90 RIGHT INGUINAL HERNIA: ICD-10-CM

## 2018-06-20 DIAGNOSIS — E03.4 HYPOTHYROIDISM DUE TO ACQUIRED ATROPHY OF THYROID: ICD-10-CM

## 2018-06-20 DIAGNOSIS — M25.562 CHRONIC PAIN OF LEFT KNEE: ICD-10-CM

## 2018-06-20 DIAGNOSIS — I10 ESSENTIAL HYPERTENSION: ICD-10-CM

## 2018-06-20 DIAGNOSIS — E78.2 MIXED HYPERLIPIDEMIA: ICD-10-CM

## 2018-06-20 DIAGNOSIS — G89.29 CHRONIC PAIN OF LEFT KNEE: ICD-10-CM

## 2018-06-20 DIAGNOSIS — M25.561 CHRONIC PAIN OF RIGHT KNEE: ICD-10-CM

## 2018-06-20 DIAGNOSIS — E11.9 TYPE 2 DIABETES MELLITUS WITHOUT COMPLICATION, UNSPECIFIED WHETHER LONG TERM INSULIN USE (HCC): Primary | ICD-10-CM

## 2018-06-20 PROBLEM — E11.21 TYPE 2 DIABETES WITH NEPHROPATHY (HCC): Status: RESOLVED | Noted: 2018-05-23 | Resolved: 2018-06-20

## 2018-06-20 LAB — HBA1C MFR BLD HPLC: 6.4 %

## 2018-06-20 NOTE — MR AVS SNAPSHOT
[facility] 
 
 
 [address] [facility] [address], [address] [facility] 
[phone] Patient: Ene Killian MRN: [id number] ARPIT:[birth year] Visit Information Date & Time Provider Department Dept. Phone Encounter #  
 6/20/2018  8:30 AM Caridad Madrigal MD [facility] [phone] [id number] Follow-up Instructions Return in about 5 months (around 12/3/2018) for BP check, DM check. Your Appointments 8/13/2018  8:00 AM  
Office Visit with Caridad Madrigal MD  
[facility]) Appt Note: 6 month f/u  
 [address] [address] [facility], [address] [address] [address]  
  
   
 [address], [address]  
  
    
 11/15/2018  8:00 AM  
Office Visit with Caridad Madrigal MD  
[facility]) Appt Note: 5 month f/u  
 [address] [facility], [address] [facility] [address]  
[phone] Upcoming Health Maintenance Date Due DTaP/Tdap/Td series (1 - Tdap) 1/2/2007 HEMOGLOBIN A1C Q6M 7/30/2018 Influenza Age 5 to Adult 8/1/2018 MICROALBUMIN Q1 8/10/2018 MEDICARE YEARLY EXAM 8/11/2018 EYE EXAM RETINAL OR DILATED Q1 11/28/2018 LIPID PANEL Q1 12/6/2018 FOOT EXAM Q1 5/22/2019 GLAUCOMA SCREENING Q2Y 11/28/2019 COLONOSCOPY 8/20/2020 Allergies as of 6/20/2018  Review Complete On: 6/20/2018 By: Pushpa Dinh LPN Severity Noted Reaction Type Reactions Amlodipine  08/23/2016   Intolerance Other (comments) BLE edema Current Immunizations  Reviewed on 6/24/2015 Name Date Influenza High Dose Vaccine PF 10/5/2017, 10/24/2016, 10/2/2015 Influenza Vaccine 11/20/2014, 10/15/2013 Influenza Vaccine Split 11/9/2012  2:07 PM, 10/21/2011 Pneumococcal Conjugate (PCV-13) 6/24/2015 10:06 AM  
 Pneumococcal Polysaccharide (PPSV-23) 8/10/2017  8:43 AM  
 Pneumococcal Vaccine (Unspecified Type) 1/1/2007 Td 1/1/2007 Zoster Vaccine, Live 2/1/2013  2:19 PM  
  
 Not reviewed this visit You Were Diagnosed With   
  
 Codes Comments Type 2 diabetes mellitus without complication, unspecified whether long term insulin use (HCC)    -  Primary ICD-10-CM: E11.9 ICD-9-CM: 250.00 Mixed hyperlipidemia     ICD-10-CM: E78.2 ICD-9-CM: 272.2 Essential hypertension     ICD-10-CM: I10 
ICD-9-CM: 401.9 Hypothyroidism due to acquired atrophy of thyroid     ICD-10-CM: E03.4 ICD-9-CM: 244.8, 246.8 Right inguinal hernia     ICD-10-CM: K40.90 ICD-9-CM: 550.90 Vitals BP Pulse Temp Resp Height(growth percentile) Weight(growth percentile) (!) 167/95 65 97.2 °F (36.2 °C) 18 5' 9\" (1.753 m) 206 lb (93.4 kg) SpO2 BMI Smoking Status 98% 30.42 kg/m2 Former Smoker Vitals History BMI and BSA Data Body Mass Index Body Surface Area  
 30.42 kg/m 2 2.13 m 2 Preferred Pharmacy Pharmacy Name Phone [facility], [address] [place] [address] [phone] Your Updated Medication List  
  
   
This list is accurate as of 6/20/18  9:08 AM.  Always use your most recent med list.  
  
  
  
  
 allopurinol 100 mg tablet Commonly known as:  Zuleyma Nip Take 2 Tabs by mouth daily. aspirin delayed-release 81 mg tablet Take  by mouth daily. atorvastatin 40 mg tablet Commonly known as:  LIPITOR Take 1 Tab by mouth daily. carvedilol 6.25 mg tablet Commonly known as:  Maybelle Pallas Take 1 Tab by mouth two (2) times daily (with meals). COQ-10 PO Take  by mouth. ibuprofen 800 mg tablet Commonly known as:  MOTRIN Take 1 Tab by mouth three (3) times daily as needed for Pain.  
  
 levothyroxine 50 mcg tablet Commonly known as:  SYNTHROID Take 1 Tab by mouth Daily (before breakfast). lisinopril 5 mg tablet Commonly known as:  Mechele Livings Take 1 Tab by mouth daily. multivitamin tablet Commonly known as:  ONE A DAY Take 1 Tab by mouth daily. oxyCODONE-acetaminophen 5-325 mg per tablet Commonly known as:  PERCOCET Take 1-2 Tabs by mouth every four (4) hours as needed for Pain. Max Daily Amount: 12 Tabs.  
  
 sildenafil (antihypertensive) 20 mg tablet Commonly known as:  REVATIO Take po 40 mg once daily 1 hour (range: 30 minutes to 4 hours) before sexual activity as needed; maximum dose: 100 mg once daily  
  
 terbinafine HCl 250 mg tablet Commonly known as:  LAMISIL Take 250 mg by mouth daily. Indications: Taken for 1 full week a month; and then not for another 3 weeks  
  
 venlafaxine- mg capsule Commonly known as:  EFFEXOR-XR Take 150 mg by mouth daily. VITAMIN D 2,000 unit Cap capsule Generic drug:  Cholecalciferol (Vitamin D3) Take 4,000 Units by mouth daily. We Performed the Following AMB POC HEMOGLOBIN A1C [71922 CPT(R)] Follow-up Instructions Return in about 5 months (around 12/3/2018) for BP check, DM check. To-Do List   
 11/20/2018 Lab:  CBC WITH AUTOMATED DIFF Around 11/20/2018 Lab:  HEMOGLOBIN A1C W/O EAG   
  
 11/20/2018 Lab:  LIPID PANEL Around 11/20/2018 Lab:  METABOLIC PANEL, BASIC   
  
 11/20/2018 Lab:  MICROALBUMIN, UR, RAND W/ MICROALB/CREAT RATIO Around 11/20/2018 Lab:  TSH AND FREE T4 Introducing [facility] [place] [facility]! Dear Ash Ricardo: Thank you for requesting a [employer] account. Our records indicate that you already have an active [employer] account. You can access your account anytime at [email or web address]). [email or web address]/Vitals ([email or web address]) Did you know that you can access your hospital and ER discharge instructions at any time in [employer]? You can also review all of your test results from your hospital stay or ER visit. Additional Information If you have questions, please visit the Frequently Asked Questions section of the [employer] website at [email or web address] Remember, [employer] is NOT to be used for urgent needs. For medical emergencies, dial 911. Now available from your iPhone and Android! Please provide this summary of care documentation to your next provider. Your primary care clinician is listed as Paresh Morton. If you have any questions after today's visit, please call [phone].

## 2018-06-20 NOTE — PROGRESS NOTES
[facility]:  6/20/2018, MRN: [id number]      Arleth Ravi is a 74 y.o. male and presents to clinic for DM and Surgical Follow-up (hernia surgery)    Subjective:   Pt is a 66yo male with h/o ED, melanoma in situ on left dorsal forearm s/p removal, MDD, rectal polyp, diverticulosis, CKD stage 3, HTN, type 2 DM (foot exams are done by Murel Officer), hypothyroidism, DJD, gout, vocal cord nodule, vitamin D deficiency, RBBB (s/p normal stress test 2018), and HLD. 1.  Right Groin Inguinal Hernia: Status post surgery. No fever or chills. Pain was improving but he carried something heavy (about a wk ago) and his pain worsened thereafter. Pain is off/on and along his right groin. He did not disclose what happened a week ago to his general surgery team.    2. Type 2 DM: He is presently diet-controlled. He has been 6 months roughly since he has had his A1c checked. He is on atorvastatin for CVD risk reduction. He is on ACE inhibitor for renal prophylaxis. He will be due for a follow-up lipid panel in December. A1C today is 6.4. He is not dieting. He is not regularly exercising secondary to #1 and persistent b/l knee pain despite orthopedic surgeries. Pain is associated with decreased ROM. 3.  Hypertension: Present for at least 6 months. His blood pressure is elevated today at 167/95. He has whitecoat hypertension. His blood pressure earlier this month was less than 140/90. His weight is 206 pounds. He is on lisinopril and carvedilol. He reports no adverse effects of taking his medications. Wt Readings from Last 3 Encounters:   06/20/18 206 lb (93.4 kg)   06/14/18 204 lb (92.5 kg)   05/31/18 202 lb 6 oz (91.8 kg)     BP Readings from Last 3 Encounters:   06/20/18 (!) 167/95   06/14/18 128/86   05/31/18 123/76     4. Hypothyroidism: Present for at least 6 months. On Synthroid.     5.  Depression: He is followed by psychiatry and therapist.  He is on Effexor and reports no adverse side effects of taking this medication. He admits that his alcohol there is consumption has increased appointments. As result, his weight today is increased to 206 pounds. His A1c increased to 6.4. He will continue half a bottle of wine at least each night. He states that his wife is very concerned and called him an alcoholic. No drug use. No tobacco use. No energy drink consumption. He identifies as a Pentecostalism but is not regularly attending [employer]. Patient Active Problem List    Diagnosis Date Noted    Right inguinal hernia 05/17/2018    Osteoarthritis of right knee 02/19/2018    Erectile dysfunction 01/23/2017    Melanoma in situ - on left dorsal forearm 2016 09/16/2016    Major depressive disorder in remission 08/03/2016    Rectal polyp -  seen on colonoscopy from 8/20/15 08/02/2016    Diverticulosis of intestine without bleeding - seen on colonoscopy from 8/20/15 08/02/2016    CKD (chronic kidney disease) stage 3, GFR 30-59 ml/min 11/09/2015    Essential hypertension 06/24/2015    Type 2 diabetes mellitus without complication ([facility] [address] 25/18/3373    Hypothyroidism due to acquired atrophy of thyroid 06/24/2015    Vocal cord nodule 10/21/2013    Gout 10/21/2011    Family history of colon cancer. personal hx colon polyps 10/21/2011    Hyperlipidemia     Hypovitaminosis D        Current Outpatient Prescriptions   Medication Sig Dispense Refill    ibuprofen (MOTRIN) 800 mg tablet Take 1 Tab by mouth three (3) times daily as needed for Pain. 40 Tab 0    ubidecarenone (COQ-10 PO) Take  by mouth.  aspirin delayed-release 81 mg tablet Take  by mouth daily.  lisinopril (PRINIVIL, ZESTRIL) 5 mg tablet Take 1 Tab by mouth daily. (Patient taking differently: Take 5 mg by mouth nightly.) 90 Tab 3    atorvastatin (LIPITOR) 40 mg tablet Take 1 Tab by mouth daily. (Patient taking differently: Take 40 mg by mouth nightly.) 90 Tab 3    allopurinol (ZYLOPRIM) 100 mg tablet Take 2 Tabs by mouth daily.  [address] [address] Tab 3    levothyroxine (SYNTHROID) 50 mcg tablet Take 1 Tab by mouth Daily (before breakfast). 90 Tab 4    carvedilol (COREG) 6.25 mg tablet Take 1 Tab by mouth two (2) times daily (with meals). 30 Tab 11    terbinafine HCl (LAMISIL) 250 mg tablet Take 250 mg by mouth daily. Indications: Taken for 1 full week a month; and then not for another 3 weeks      venlafaxine-SR (EFFEXOR-XR) 150 mg capsule Take 150 mg by mouth daily.  sildenafil (REVATIO) 20 mg tablet Take po 40 mg once daily 1 hour (range: 30 minutes to 4 hours) before sexual activity as needed; maximum dose: 100 mg once daily 30 Tab 11    multivitamin (ONE A DAY) tablet Take 1 Tab by mouth daily.  Cholecalciferol, Vitamin D3, (VITAMIN D) 2,000 unit Cap Take 4,000 Units by mouth daily.  oxyCODONE-acetaminophen (PERCOCET) 5-325 mg per tablet Take 1-2 Tabs by mouth every four (4) hours as needed for Pain. Max Daily Amount: 12 Tabs.  40 Tab 0       Allergies   Allergen Reactions    Amlodipine Other (comments)     BLE edema       Past Medical History:   Diagnosis Date    Adverse effect of anesthesia     Violent dreams with Ether    Arthritis     hip, knee    Chronic kidney disease     had previous reaction with kidneys after a bp med, no issues now    Colon polyps     dysplastic    Depression     Diabetes mellitus ([facility] [address] 2017    no meds    Diverticulosis     seen on colonoscopy from 8/20/15    Diverticulosis of sigmoid colon 01/27/10    GERD (gastroesophageal reflux disease)     no meds    Gout     Hepatitis     Hepatitis A - while he was young    Hyperlipidemia     Hypertension 15 years    Hypovitaminosis D     Nephrolithiasis     Osteoarthritis of right knee 2/19/2018    Plantar wart     Rectal polyp     seen on colonoscopy from 8/20/15    Thyroid disease     hypothyroidism       Past Surgical History:   Procedure Laterality Date    ENDOSCOPY, COLON, DIAGNOSTIC  01/27/2010    polyp distal rectum, removed; diverticulosis of sigmoid    HX CATARACT REMOVAL Bilateral     HX COLONOSCOPY      HX HEENT  2008    vocal cord polyp removed    HX POLYPECTOMY  01/27/2010    Distal rectum    HX TONSILLECTOMY      Dmitri Mccall Right 05/31/2018    Dr. Alyse Bailey Left 1/2014    TOTAL KNEE ARTHROPLASTY Right 02/2018       Family History   Problem Relation Age of Onset   [address] Arthritis-rheumatoid Mother     Hypertension Mother     Elevated Lipids Mother     Thyroid Disease Sister     Cancer Father      colon    Heart Disease Other     Hypertension Other     Cancer Other      breast    Heart Disease Maternal Grandmother     Dementia Maternal Grandfather     Cancer Paternal Grandmother      breast    No Known Problems Paternal Grandfather        Social History   Substance Use Topics    Smoking status: Former Smoker     Packs/day: 1.00     Years: 6.00     Types: Cigarettes     Quit date: 1/1/1992    Smokeless tobacco: Never Used    Alcohol use No      Comment: wine with dinner sometimes       ROS   Review of Systems   Constitutional: Negative for chills and fever. HENT: Negative for ear pain and sore throat. Eyes: Negative for blurred vision and pain. Respiratory: Negative for cough and shortness of breath. Cardiovascular: Negative for chest pain. Gastrointestinal: Positive for abdominal pain. Negative for blood in stool and melena. Genitourinary: Negative for dysuria and hematuria. Musculoskeletal: Positive for joint pain (knee pain - right more than left). Negative for myalgias. Skin: Negative for rash. Neurological: Negative for tingling, focal weakness and headaches. Endo/Heme/Allergies: Does not bruise/bleed easily. Psychiatric/Behavioral: Positive for depression. Negative for substance abuse and suicidal ideas.        Objective     Vitals:    06/20/18 0825   BP: (!) 167/95   Pulse: 65   Resp: 18   Temp: 97.2 °F (36.2 °C)   SpO2: 98%   Weight: 206 lb (93.4 kg)   Height: 5' 9\" (1.753 m)   PainSc:   5   PainLoc: Abdomen       Physical Exam   Constitutional: He is oriented to person, place, and time and well-developed, well-nourished, and in no distress. HENT:   Head: Normocephalic and atraumatic. Right Ear: External ear normal.   Left Ear: External ear normal.   Nose: Nose normal.   Mouth/Throat: Oropharynx is clear and moist. No oropharyngeal exudate. Clear TMs   Eyes: Conjunctivae and EOM are normal. Pupils are equal, round, and reactive to light. Right eye exhibits no discharge. Left eye exhibits no discharge. No scleral icterus. Neck: Neck supple. Cardiovascular: Normal rate, regular rhythm, normal heart sounds and intact distal pulses. Pulmonary/Chest: Effort normal and breath sounds normal. No respiratory distress. He has no wheezes. He has no rales. Abdominal: Soft. Bowel sounds are normal. He exhibits no distension. There is no tenderness. There is no rebound and no guarding. Right inguinal hernia is palpated with the pt coughing today. Musculoskeletal: He exhibits no edema or tenderness (Bue). Decrease ROM along his right knee vs left. He has discomfort with flexion/extension along his right knee jt. His right knee jt is larger than his left knee jt. He has good ROM along his left knee. Lymphadenopathy:     He has no cervical adenopathy. Neurological: He is alert and oriented to person, place, and time. He exhibits normal muscle tone. Gait normal.   Skin: Skin is warm and dry. No erythema. Psychiatric: Affect normal.   Nursing note and vitals reviewed.       LABS   Data Review:   Lab Results   Component Value Date/Time    WBC 5.2 05/23/2018 10:26 AM    HGB 14.4 05/23/2018 10:26 AM    HCT 42.4 05/23/2018 10:26 AM    PLATELET 926 41/39/6478 10:26 AM    MCV 90.2 05/23/2018 10:26 AM       Lab Results   Component Value Date/Time    Sodium 139 05/23/2018 10:26 AM    Potassium 4.7 05/23/2018 10:26 AM    Chloride 104 05/23/2018 10:26 AM    CO2 30 05/23/2018 10:26 AM    Anion gap 5 05/23/2018 10:26 AM    Glucose 136 (H) 05/23/2018 10:26 AM    BUN 27 (H) 05/23/2018 10:26 AM    Creatinine 1.20 05/23/2018 10:26 AM    BUN/Creatinine ratio 23 (H) 05/23/2018 10:26 AM    GFR est AA >60 05/23/2018 10:26 AM    GFR est non-AA 59 (L) 05/23/2018 10:26 AM    Calcium 9.5 05/23/2018 10:26 AM       Lab Results   Component Value Date/Time    Cholesterol, total 146 12/06/2017 09:19 AM    HDL Cholesterol 39 (L) 12/06/2017 09:19 AM    LDL, calculated 89.4 12/06/2017 09:19 AM    VLDL, calculated 17.6 12/06/2017 09:19 AM    Triglyceride 88 12/06/2017 09:19 AM    CHOL/HDL Ratio 3.7 12/06/2017 09:19 AM       Lab Results   Component Value Date/Time    Hemoglobin A1c 6.0 (H) 01/30/2018 08:17 AM    Hemoglobin A1c (POC) 6.4 06/20/2018 08:40 AM       Assessment/Plan:   1. Right Inguinal Hernia: S/p surgery. Recovering well but may have caused further injury with an incident of carrying a heavy object 1 wk ago. - I stressed the importance of following up with the General Surgery team. I instructed him to notify them of what happened last wk. 2. Type 2 DM: +HLD and HTN. +White coat hypertension. +Diet controlled for DM.  - C/w lifestyle modification measures  - Checking routine labs in 6 months.  -I encouraged him to stop all alcoholic beverage consumption. I encouraged him to replace this behavior with a healthier alternative behavior. I encouraged him to pray in accordance with his underlying system as a means to reduce stress. We discussed the importance of weight reduction and maintaining a diabetic diet as a means to control his diabetes. I will recheck his weight at his follow-up appointment. 3. Hypothyroidism:   - C/w Synthroid. - Checking TFTs and a CBC just before his f/u apt.    4.  Chronic Knee Pain: Worse along the right knee versus left.   Status post surgeries.  -I encouraged him to follow-up with orthopedics.  -Placing a referral to physical therapy. 5.  Depression: Continue to much alcohol beverages.  -Please see the alcoholic beverage cessation counseling mentioned above.  -Continue with Effexor for now. I encouraged him to follow-up with psychiatry and with talk therapy team.      Health Maintenance Due   Topic Date Due    DTaP/Tdap/Td series (1 - Tdap) 01/02/2007     Lab review: labs are reviewed in the EHR and ordered as mentioned above    I have discussed the diagnosis with the patient and the intended plan as seen in the above orders. The patient has received an after-visit summary and questions were answered concerning future plans. I have discussed medication side effects and warnings with the patient as well. I have reviewed the plan of care with the patient, accepted their input and they are in agreement with the treatment goals. All questions were answered. The patient understands the plan of care. Handouts provided today with above information. Pt instructed if symptoms worsen to call the office or report to the ED for continued care. Greater than 50% of the visit time was spent in counseling and/or coordination of care. Voice recognition was used to generate this report, which may have resulted in some phonetic based errors in grammar and contents. Even though attempts were made to correct all the mistakes, some may have been missed, and remained in the body of the document. Follow-up Disposition:  Return in about 6 months (around 12/20/2018) for BP check, DM check.     Adan Sarkar MD

## 2018-06-20 NOTE — PROGRESS NOTES
1. Have you been to the ER, urgent care clinic since your last visit? Hospitalized since your last visit? No    2. Have you seen or consulted any other health care providers outside of the [address] [place] [address] since your last visit? Include any pap smears or colon screening.  Yes Reason for visit: general surgery

## 2018-06-28 ENCOUNTER — HOSPITAL ENCOUNTER (OUTPATIENT)
Dept: PHYSICAL THERAPY | Age: 73
Discharge: HOME | End: 2018-06-28
Payer: MEDICARE

## 2018-06-28 PROCEDURE — G8978 MOBILITY CURRENT STATUS: HCPCS

## 2018-06-28 PROCEDURE — 97110 THERAPEUTIC EXERCISES: CPT

## 2018-06-28 PROCEDURE — 97162 PT EVAL MOD COMPLEX 30 MIN: CPT

## 2018-06-28 PROCEDURE — G8979 MOBILITY GOAL STATUS: HCPCS

## 2018-06-28 NOTE — PROGRESS NOTES
[facility] - [facility] Emanuel Salcedo Reyes Awad [address]  [phone] fax  Plan of Care/ Statement of Necessity for Physical Therapy Services    Patient name: Aj Hazel Start of Care: 2018   Referral source: Radha Arredondo MD : 1945    Medical Diagnosis: Bilateral knee pain [M25.561, M25.562]   Onset Date:2018    Treatment Diagnosis: Bilateral knee pain   Prior Hospitalization: see medical history Provider#: [id number]   Medications: Verified on Patient summary List    Comorbidities: arthritis, HTN, depression, diabetes, thyroid problems   Prior Level of Function: Functionally independent, lives with wife in one story home with 4 steps to enter. Retired      Svitlana Grigsby 16 and following information is based on the information from the initial evaluation. Assessment/ key information: Patient is a pleasant 74year old male presenting with bilateral knee pain. Patient was being seen following right TKA on 2018, and also reports having left knee replaced in , but he returns for therapy because of lack of full Right knee extension and continued bilateral knee soreness. Additional he reports difficulty with squatting and walking on an incline. At evaluation, patient demonstrated the following knee AROM: Extension Left grossly full, Right -10, Flexion Left 113, Right 105. Patient also demonstrated 5/5 LE strength bilaterally throughout, but reported Right hamstring tightness with knee flexion. Right knee was also a little warm to touch. Overall patient is good candidate for skilled therapy due to his PLOF and will benefit from therapy to address the above deficits.       Evaluation Complexity History MEDIUM  Complexity : 1-2 comorbidities / personal factors will impact the outcome/ POC ; Examination LOW Complexity : 1-2 Standardized tests and measures addressing body structure, function, activity limitation and / or participation in recreation  ;Presentation LOW Complexity : Stable, uncomplicated  ;Clinical Decision Making MEDIUM Complexity : FOTO score of 26-74  Overall Complexity Rating: MEDIUM  Problem List: pain affecting function, decrease ROM, decrease strength, edema affecting function, impaired gait/ balance, decrease ADL/ functional abilitiies, decrease activity tolerance, decrease flexibility/ joint mobility and decrease transfer abilities   Treatment Plan may include any combination of the following: Therapeutic exercise, Therapeutic activities, Neuromuscular re-education, Physical agent/modality, Gait/balance training, Manual therapy, Aquatic therapy, Patient education, Self Care training, Functional mobility training, Home safety training and Stair training  Patient / Family readiness to learn indicated by: asking questions and interest  Persons(s) to be included in education: patient (P)  Barriers to Learning/Limitations: None  Patient Goal (s): full extension to my satisfaction\"  Patient Self Reported Health Status: good  Rehabilitation Potential: good    Short Term Goals: To be accomplished in 1 weeks:  Goal: Patient will initiate and be compliant with HEP in order to progress toward long term goals  Status at last note/certification: issued and review HEP  Long Term Goals: To be accomplished in 10 treatments:  Goal: Patient will improve FOTO assessment score to 62 in order to demonstrate improved functional ability  Status at last note/certification: 55  Goal: Patient will improve Right knee AROM to 5-113 deg in order to progress toward personal goals. Status at last note/certification:  deg  Goal: Patient will report little to no difficulty with squatting in order to improve ease of daily tasks  Status at last note/certification: Reports quite a bit of difficulty    Frequency / Duration: Patient to be seen 2 times per week for 10 treatments.     Patient/ Caregiver education and instruction: Diagnosis, prognosis, exercises   [x]  Plan of care has been reviewed with PTA    G-Codes (GP)  Mobility   Current  CK= 40-59%   Goal  CJ= 20-39%    The severity rating is based on clinical judgment and the FOTO score. Certification Period: 6/28/2018-7/27/2018    Ning Martinez, FRANCY  Alcides Kali, PT 6/28/2018 9:39 AM  _____________________________________________________________________  I certify that the above Therapy Services are being furnished while the patient is under my care. I agree with the treatment plan and certify that this therapy is necessary.     Physician's Signature:____________________  Date:__________Time:______    Please sign and return to [facility] - [address]  [phone] fax

## 2018-06-28 NOTE — PROGRESS NOTES
PT DAILY TREATMENT NOTE - [facility]     Patient Name: Gina Yuan  Date:2018  : 1945  [x]  Patient  Verified  Payor: Jluis Borne / Plan: [place] & B / Product Type: Medicare /    In time:905  Out time:940  Total Treatment Time (min): 35  Total Timed Codes (min): 8  1:1 Treatment Time ( [address] only): 35   Visit #: 1 of 10    Treatment Area: Bilateral knee pain [M25.561, M25.562]    SUBJECTIVE  Pain Level (0-10 scale):  2  Any medication changes, allergies to medications, adverse drug reactions, diagnosis change, or new procedure performed?: [x] No    [] Yes (see summary sheet for update)  Subjective functional status/changes:   [] No changes reported      OBJECTIVE    Modality rationale:    Min Type Additional Details    [] Estim:  []Unatt       []IFC  []Premod                        []Other:  []w/ice   []w/heat  Position:  Location:    [] Estim: []Att    []TENS instruct  []NMES                    []Other:  []w/US   []w/ice   []w/heat  Position:  Location:    []  Traction: [] Cervical       []Lumbar                       [] Prone          []Supine                       []Intermittent   []Continuous Lbs:  [] before manual  [] after manual    []  Ultrasound: []Continuous   [] Pulsed                           []1MHz   []3MHz W/cm2:  Location:    []  Iontophoresis with dexamethasone         Location: [] Take home patch   [] In clinic    []  Ice     []  heat  []  Ice massage  []  Laser   []  Anodyne Position:  Location:    []  Laser with stim  []  Other:  Position:  Location:    []  Vasopneumatic Device Pressure:       [] lo [] med [] hi   Temperature: [] lo [] med [] hi   [] Skin assessment post-treatment:  []intact []redness- no adverse reaction    []redness - adverse reaction:     27 min [x]Eval                  []Re-Eval       8 min Therapeutic Exercise:  [] See flow sheet : HEP   Rationale: increase ROM and increase strength to improve the patients ability to perform daily tasks With   [] TE   [] TA   [] neuro   [] other: Patient Education: [x] Review HEP    [] Progressed/Changed HEP based on:   [] positioning   [] body mechanics   [] transfers   [] heat/ice application    [] other:      Other Objective/Functional Measures:      Pain Level (0-10 scale) post treatment: 2    ASSESSMENT/Changes in Function:     Patient will continue to benefit from skilled PT services to modify and progress therapeutic interventions, address functional mobility deficits, address ROM deficits, address strength deficits, analyze and address soft tissue restrictions, analyze and cue movement patterns, analyze and modify body mechanics/ergonomics, assess and modify postural abnormalities, address imbalance/dizziness and instruct in home and community integration to attain remaining goals.      [x]  See Plan of Care  []  See progress note/recertification  []  See Discharge Summary         Progress towards goals / Updated goals:  See POC    PLAN  []  Upgrade activities as tolerated     [x]  Continue plan of care  []  Update interventions per flow sheet       []  Discharge due to:_  []  Other:_      Frances Vela, [facility]  Chrisandra Phalen, PT 6/28/2018  10:01 AM    Future Appointments  Date Time Provider Gisele Astorga   7/6/2018 7:30 AM Chrisandra Phalen, PT [facility]   7/9/2018 8:30 AM [facility]   7/13/2018 7:30 AM Chrisandra Phalen, PT [facility]   7/16/2018 8:30 AM [facility]   7/20/2018 8:30 AM Perla Ymca Olsen [facility]   7/23/2018 8:30 AM Perla Ymca Olsen [facility]   7/27/2018 7:30 AM Chrisandra Phalen, PT [facility]   7/30/2018 8:30 AM [facility]   8/13/2018 8:00 AM Pato Matamoros MD [address]   11/9/2018 9:05 AM IOC NURSE VISIT [facility]   11/15/2018 8:00 AM Pato Matamoros MD [address]

## 2018-07-06 ENCOUNTER — HOSPITAL ENCOUNTER (OUTPATIENT)
Dept: PHYSICAL THERAPY | Age: 73
Discharge: HOME | End: 2018-07-06
Payer: MEDICARE

## 2018-07-06 PROCEDURE — 97110 THERAPEUTIC EXERCISES: CPT

## 2018-07-06 PROCEDURE — 97140 MANUAL THERAPY 1/> REGIONS: CPT

## 2018-07-06 NOTE — PROGRESS NOTES
PT DAILY TREATMENT NOTE - [facility]     Patient Name: Taylor Mccarthy  Date:2018  : 1945  [x]  Patient  Verified  Payor: [place] MEDICARE / Plan: [place] MEDICARE PART A & B / Product Type: Medicare /    In time:730  Out time:806  Total Treatment Time (min): 36  Total Timed Codes (min): 36  1:1 Treatment Time ( [address] only): 36   Visit #: 2 of 10    Treatment Area: Bilateral knee pain [M25.561, M25.562]    SUBJECTIVE  Pain Level (0-10 scale): 0  Any medication changes, allergies to medications, adverse drug reactions, diagnosis change, or new procedure performed?: [x] No    [] Yes (see summary sheet for update)  Subjective functional status/changes:   [] No changes reported  I'm good.      OBJECTIVE    Modality rationale: Patient declined   Min Type Additional Details    [] Estim:  []Unatt       []IFC  []Premod                        []Other:  []w/ice   []w/heat  Position:  Location:    [] Estim: []Att    []TENS instruct  []NMES                    []Other:  []w/US   []w/ice   []w/heat  Position:  Location:    []  Traction: [] Cervical       []Lumbar                       [] Prone          []Supine                       []Intermittent   []Continuous Lbs:  [] before manual  [] after manual    []  Ultrasound: []Continuous   [] Pulsed                           []1MHz   []3MHz W/cm2:  Location:    []  Iontophoresis with dexamethasone         Location: [] Take home patch   [] In clinic    []  Ice     []  heat  []  Ice massage  []  Laser   []  Anodyne Position:  Location:    []  Laser with stim  []  Other:  Position:  Location:    []  Vasopneumatic Device Pressure:       [] lo [] med [] hi   Temperature: [] lo [] med [] hi   [] Skin assessment post-treatment:  []intact []redness- no adverse reaction    []redness - adverse reaction:     26 min Therapeutic Exercise:  [x] See flow sheet :   Rationale: increase ROM and increase strength to improve the patients ability to improve ease of mobility     10 min Manual Therapy:  Right knee manual stretching, Right patellar and tibiofemoral joint mobs/stretching for flexion/extension, STM distal hamstrings   Rationale: decrease pain, increase ROM, increase tissue extensibility and decrease trigger points to normalize gait      With   [] TE   [] TA   [] neuro   [] other: Patient Education: [x] Review HEP    [] Progressed/Changed HEP based on:   [] positioning   [] body mechanics   [] transfers   [] heat/ice application    [] other:      Other Objective/Functional Measures: initiated treatment per flow sheet     Pain Level (0-10 scale) post treatment: 2    ASSESSMENT/Changes in Function: Patient reports that over the past week his Right knee has felt as though it has tightened up. He reports decreased tightness and improved mobility following manual techniques despite increase in pain after session. We will progress toward more Pilates based LE strengthening next week once abdominal restrictions following hernia repair are lifted. Patient will continue to benefit from skilled PT services to modify and progress therapeutic interventions, address functional mobility deficits, address ROM deficits, address strength deficits, analyze and address soft tissue restrictions, analyze and cue movement patterns, analyze and modify body mechanics/ergonomics, assess and modify postural abnormalities, address imbalance/dizziness and instruct in home and community integration to attain remaining goals. []  See Plan of Care  []  See progress note/recertification  []  See Discharge Summary         Progress towards goals / Updated goals:  Short Term Goals: To be accomplished in 1 weeks:  Goal: Patient will initiate and be compliant with HEP in order to progress toward long term goals  Status at last note/certification: issued and review HEP  Current status: Met  Long Term Goals:  To be accomplished in 10 treatments:  Goal: Patient will improve FOTO assessment score to 62 in order to demonstrate improved functional ability  Status at last note/certification: 55  Current status:   Goal: Patient will improve Right knee AROM to 5-113 deg in order to progress toward personal goals.    Status at last note/certification:  deg  Current status:   Goal: Patient will report little to no difficulty with squatting in order to improve ease of daily tasks  Status at last note/certification: Reports quite a bit of difficulty  Current status:     PLAN  []  Upgrade activities as tolerated     [x]  Continue plan of care  []  Update interventions per flow sheet       []  Discharge due to:_  []  Other:_      Boni Spatz, PT 7/6/2018  7:07 AM    Future Appointments  Date Time Provider Gisele Astorga   7/6/2018 7:30 AM Boni Spatz, PT [facility]   7/9/2018 8:30  [address] [facility]   7/13/2018 7:30 AM Boni Spatz, PT [facility]   7/16/2018 8:30 AM [facility]   7/20/2018 8:30  [address] [facility]   7/23/2018 8:30 AM Perla Hardwick Deputy [facility]   7/27/2018 7:30 AM Boni Spatz, PT [facility]   7/30/2018 8:30 AM Cordella Lord [facility]   8/13/2018 8:00 AM Jimbo Hdez MD [facility] [place]   11/9/2018 9:05 AM IOC NURSE VISIT [facility]   11/15/2018 8:00 AM Jimbo Hdez MD [facility]

## 2018-07-09 ENCOUNTER — APPOINTMENT (OUTPATIENT)
Dept: PHYSICAL THERAPY | Age: 73
End: 2018-07-09
Payer: MEDICARE

## 2018-07-09 ENCOUNTER — PATIENT OUTREACH (OUTPATIENT)
Dept: INTERNAL MEDICINE CLINIC | Age: 73
End: 2018-07-09

## 2018-07-09 NOTE — PROGRESS NOTES
Patient has graduated from the Transitions of Care Coordination  program on 7/9/18. Patient's symptoms are stable at this time. Patient/family has the ability to self-manage. Care management goals have been completed at this time. No further nurse navigator follow up scheduled. Goals Addressed             Most Recent     COMPLETED: Understands red flags post discharge. On track (7/9/2018)             Plan: Educate on red flags to monitor and report. 6/1/18: Patient verbalized understanding. Pt has nurse navigator's contact information for any further questions, concerns, or needs.   Patients upcoming visits:  Future Appointments  Date Time Provider Gisele Astorga   7/10/2018 9:00 AM Timo Levine [facility]   7/13/2018 7:30 AM Maycol Rodrigues, PT [facility]   7/16/2018 8:30 AM [facility]   7/20/2018 8:30 AM [facility]   7/23/2018 8:30 AM Perla Kelly [facility]   7/27/2018 7:30 AM Maycol Rodrigues PT [facility]   7/30/2018 8:30 AM [facility]   8/13/2018 8:00 AM Belgica Estrella MD [facility]   11/9/2018 9:05 AM IO NURSE VISIT [facility] LAKHWINDER MELLO   11/15/2018 8:00 AM Belgica Estrella MD [facility]

## 2018-07-10 ENCOUNTER — HOSPITAL ENCOUNTER (OUTPATIENT)
Dept: PHYSICAL THERAPY | Age: 73
Discharge: HOME | End: 2018-07-10
Payer: MEDICARE

## 2018-07-10 PROCEDURE — 97110 THERAPEUTIC EXERCISES: CPT

## 2018-07-10 PROCEDURE — 97140 MANUAL THERAPY 1/> REGIONS: CPT

## 2018-07-10 NOTE — PROGRESS NOTES
PT DAILY TREATMENT NOTE - [facility]     Patient Name: Bela Laboy  Date:7/10/2018  : 1945  [x]  Patient  Verified  Payor: [place] MEDICARE / Plan: [place] MEDICARE PART A & B / Product Type: Medicare /    In time:10:30  Out time:11;10  Total Treatment Time (min): 40  Total Timed Codes (min): 40  1:1 Treatment Time ( [address] only): 40   Visit #: 3 of 10    Treatment Area: Bilateral knee pain [M25.561, M25.562]    SUBJECTIVE  Pain Level (0-10 scale): 0  Any medication changes, allergies to medications, adverse drug reactions, diagnosis change, or new procedure performed?: [x] No    [] Yes (see summary sheet for update)  Subjective functional status/changes:   [] No changes reported  Just stiff    OBJECTIVE      20 min Therapeutic Exercise:  [] See flow sheet :   Rationale: increase ROM and increase strength to improve the patients ability to normalize gait     20 min Manual Therapy:  Trigger point release to adductors, STM to quad, HS an  gastroc. TF mobs. Manual stretching   Rationale: increase ROM and increase tissue extensibility to improve ease of gait, functional mobility          With   [] TE   [] TA   [] neuro   [] other: Patient Education: [x] Review HEP    [] Progressed/Changed HEP based on:   [] positioning   [] body mechanics   [] transfers   [] heat/ice application    [] other:      Other Objective/Functional Measures:      Pain Level (0-10 scale) post treatment:  0    ASSESSMENT/Changes in Function: Tender along adductors on Right LE.    Decreased c/o tightness after MT     Patient will continue to benefit from skilled PT services to modify and progress therapeutic interventions, address functional mobility deficits, address ROM deficits, address strength deficits, analyze and address soft tissue restrictions, analyze and cue movement patterns, analyze and modify body mechanics/ergonomics, assess and modify postural abnormalities, address imbalance/dizziness and instruct in home and community integration to attain remaining goals. []  See Plan of Care  []  See progress note/recertification  []  See Discharge Summary         Progress towards goals / Updated goals:  Goal: Patient will initiate and be compliant with HEP in order to progress toward long term goals  Status at last note/certification: issued and review HEP  Current status: Met  Long Term Goals: To be accomplished in 10 treatments:  Goal: Patient will improve FOTO assessment score to 62 in order to demonstrate improved functional ability  Status at last note/certification: 55  Current status:   Goal: Patient will improve Right knee AROM to 5-113 deg in order to progress toward personal goals.    Status at last note/certification:  deg  Current status:   Goal: Patient will report little to no difficulty with squatting in order to improve ease of daily tasks  Status at last note/certification: Reports quite a bit of difficulty  Current status:     PLAN  [x]  Upgrade activities as tolerated     []  Continue plan of care  []  Update interventions per flow sheet       []  Discharge due to:_  []  Other:_      Mick Romeo, TESS 7/10/2018  10:46 AM    Future Appointments  Date Time Provider Gisele Astorga   7/13/2018 7:30 AM Diana Miranda, PT [facility]   7/16/2018 8:30  [address] [facility]   7/20/2018 8:30  [address] [facility]   7/23/2018 8:30 AM [facility]   7/27/2018 7:30 AM Diana Miranda, PT [facility]   [facility]   8/13/2018 8:00 AM Artur Puri MD [address] [facility] [address]   11/9/2018 9:05 AM IOC NURSE VISIT [facility]   11/15/2018 8:00 AM Artur Puri MD [facility]

## 2018-07-13 ENCOUNTER — APPOINTMENT (OUTPATIENT)
Dept: PHYSICAL THERAPY | Age: 73
End: 2018-07-13
Payer: MEDICARE

## 2018-07-16 ENCOUNTER — HOSPITAL ENCOUNTER (OUTPATIENT)
Dept: PHYSICAL THERAPY | Age: 73
Discharge: HOME | End: 2018-07-16
Payer: MEDICARE

## 2018-07-16 PROCEDURE — 97140 MANUAL THERAPY 1/> REGIONS: CPT

## 2018-07-16 NOTE — PROGRESS NOTES
PT DAILY TREATMENT NOTE - [facility]     Patient Name: Alber Rock  Date:2018  : 1945  [x]  Patient  Verified  Payor: [place] MEDICARE / Plan: [place] MEDICARE PART A & B / Product Type: Medicare /    In time:8:30  Out time:9:15  Total Treatment Time (min): 45  Total Timed Codes (min): 45  1:1 Treatment Time ( [address] only): 15   Visit #: 4 of 10    Treatment Area: Bilateral knee pain [M25.561, M25.562]    SUBJECTIVE  Pain Level (0-10 scale): 4/10  Any medication changes, allergies to medications, adverse drug reactions, diagnosis change, or new procedure performed?: [x] No    [] Yes (see summary sheet for update)  Subjective functional status/changes:   [] No changes reported  \"I have some pain today in the knee. It's really stiff. \"    OBJECTIVE      30 min Therapeutic Exercise:  [] See flow sheet :   Rationale: increase ROM and increase strength to improve the patients ability to normalize gait     15 min Manual Therapy: STM to distal right quad, ITB, distal HS; patellar mobs; ant/post tibiofemoral glides; manual stretching   Rationale: increase ROM and increase tissue extensibility to improve ease of gait, functional mobility          With   [] TE   [] TA   [] neuro   [] other: Patient Education: [x] Review HEP    [] Progressed/Changed HEP based on:   [] positioning   [] body mechanics   [] transfers   [] heat/ice application    [] other:      Other Objective/Functional Measures: Continued with Rx program per flow sheet. Pt exhibited increased tightness in right HS that was addressed with manual therapy. Pain Level (0-10 scale) post treatment: 1/10    ASSESSMENT/Changes in Function: Pt able to report decrease in overall pain and improved mobility of right LE after manual therapy. Pt able to perform assisted squats with springs at trapeze table without increased pain but continued stiffness noted upon completion.     Patient will continue to benefit from skilled PT services to modify and progress therapeutic interventions, address functional mobility deficits, address ROM deficits, address strength deficits, analyze and address soft tissue restrictions, analyze and cue movement patterns, analyze and modify body mechanics/ergonomics, assess and modify postural abnormalities, address imbalance/dizziness and instruct in home and community integration to attain remaining goals. []  See Plan of Care  []  See progress note/recertification  []  See Discharge Summary         Progress towards goals / Updated goals:  Short Term Goals: To be accomplished in 1 week:  Goal: Patient will initiate and be compliant with HEP in order to progress toward long term goals  Status at last note/certification: issued and review HEP  Current status: met - Pt reports compliance  Long Term Goals: To be accomplished in 10 treatments:  Goal: Patient will improve FOTO assessment score to 62 in order to demonstrate improved functional ability  Status at last note/certification: 55  Current status: reassess visit 5  Goal: Patient will improve Right knee AROM to 5-113 deg in order to progress toward personal goals.    Status at last note/certification:  deg  Current status: reassess next visit  Goal: Patient will report little to no difficulty with squatting in order to improve ease of daily tasks  Status at last note/certification: Reports quite a bit of difficulty  Current status: reassess next visit    PLAN  [x]  Upgrade activities as tolerated     []  Continue plan of care  []  Update interventions per flow sheet       []  Discharge due to:_  [x]  Other:_goals next visit      Ivan Ahumada, PT 7/16/2018  10:46 AM    Future Appointments  Date Time Provider Gisele Astorga   7/20/2018 8:30  [address] [facility]   7/23/2018 8:30  [address] [facility]   7/27/2018 7:30 AM Hay Torres, PT [facility]   7/30/2018 8:30 AM Gloria Michelle [facility]   8/13/2018 8:00 AM Krista Graf  [address] SCHED   11/9/2018 9:05 AM [facility] NURSE VISIT [facility]ED   11/15/2018 8:00 AM Erendira Ramos MD [facility]

## 2018-07-20 ENCOUNTER — HOSPITAL ENCOUNTER (OUTPATIENT)
Dept: PHYSICAL THERAPY | Age: 73
Discharge: HOME | End: 2018-07-20
Payer: MEDICARE

## 2018-07-20 PROCEDURE — 97140 MANUAL THERAPY 1/> REGIONS: CPT

## 2018-07-20 PROCEDURE — 97110 THERAPEUTIC EXERCISES: CPT

## 2018-07-20 NOTE — PROGRESS NOTES
PT DAILY TREATMENT NOTE - [facility]     Patient Name: Fairy Harada  Date:2018  : 1945  [x]  Patient  Verified  Payor: [place] MEDICARE / Plan: [place] MEDICARE PART A & B / Product Type: Medicare /    In time:8:30  Out time:9;20  Total Treatment Time (min): 50  Total Timed Codes (min): 50  1:1 Treatment Time ( [address] only): 50   Visit #: 5 of 10    Treatment Area: Bilateral knee pain [M25.561, M25.562]    SUBJECTIVE  Pain Level (0-10 scale):  0  Any medication changes, allergies to medications, adverse drug reactions, diagnosis change, or new procedure performed?: [x] No    [] Yes (see summary sheet for update)  Subjective functional status/changes:   [] No changes reported  [facility] OBJECTIVE    35 min Therapeutic Exercise:  - See flow sheet :   Rationale: increase ROM and increase strength to improve the patients ability to increase stability for gait    15 min Manual Therapy:  STM to adductors, distal quad. TF mobs   Rationale: increase ROM and increase tissue extensibility to improve ease of function          With   [] TE   [] TA   [] neuro   [] other: Patient Education: [x] Review HEP    [] Progressed/Changed HEP based on:   [] positioning   [] body mechanics   [] transfers   [] heat/ice application    [] other:      Other Objective/Functional Measures:      Pain Level (0-10 scale) post treatment: 0    ASSESSMENT/Changes in Function: Demo improved ease with squats and no c/o pain    Patient will continue to benefit from skilled PT services to modify and progress therapeutic interventions, address functional mobility deficits, address ROM deficits, address strength deficits, analyze and address soft tissue restrictions, analyze and cue movement patterns, analyze and modify body mechanics/ergonomics, assess and modify postural abnormalities, address imbalance/dizziness and instruct in home and community integration to attain remaining goals.      []  See Plan of Care  []  See progress note/recertification  []  See Discharge Summary         Progress towards goals / Updated goals:  Goal: Patient will initiate and be compliant with HEP in order to progress toward long term goals  Status at last note/certification: issued and review HEP  Current status: met - Pt reports compliance  Long Term Goals: To be accomplished in 10 treatments:  Goal: Patient will improve FOTO assessment score to 62 in order to demonstrate improved functional ability  Status at last note/certification: 55  Current status: FOTO 47. Not met, no decline in function noted  Goal: Patient will improve Right knee AROM to 5-113 deg in order to progress toward personal goals.    Status at last note/certification:  deg  Current status:  10 - 117 AROM  Goal: Patient will report little to no difficulty with squatting in order to improve ease of daily tasks  Status at last note/certification: Reports quite a bit of difficulty  Current status:   Met  With ease to retrieve objects from floor    PLAN  [x]  Upgrade activities as tolerated     []  Continue plan of care  []  Update interventions per flow sheet       []  Discharge due to:_  []  Other:_      Mohit Byrne, TESS 7/20/2018  8:38 AM    Future Appointments  Date Time Provider Gisele Astorga   7/23/2018 8:30 AM Nik Ahumada, PT [facility]   7/27/2018 7:30 AM Charlie Wong, PT [facility]   7/30/2018 8:30 AM Valentina Scott [facility]   8/13/2018 8:00 AM Valentina Nuñez MD [facility]   11/9/2018 9:05 AM [facility] NURSE VISIT [facility]   11/15/2018 8:00 AM Valentina Nuñez MD [facility]

## 2018-07-23 ENCOUNTER — HOSPITAL ENCOUNTER (OUTPATIENT)
Dept: PHYSICAL THERAPY | Age: 73
Discharge: HOME | End: 2018-07-23
Payer: MEDICARE

## 2018-07-23 PROCEDURE — 97140 MANUAL THERAPY 1/> REGIONS: CPT

## 2018-07-23 PROCEDURE — 97110 THERAPEUTIC EXERCISES: CPT

## 2018-07-23 NOTE — PROGRESS NOTES
PT DAILY TREATMENT NOTE - [facility]     Patient Name: Ham Bacon  Date:2018  : 1945  [x]  Patient  Verified  Payor: VA MEDICARE / Plan: VA MEDICARE PART A & B / Product Type: Medicare /    In time:8:30  Out time:9:08  Total Treatment Time (min): 38  Total Timed Codes (min): 38  1:1 Treatment Time ( [address] only): 30   Visit #: 6 of 10    Treatment Area: Bilateral knee pain [M25.561, M25.562]    SUBJECTIVE  Pain Level (0-10 scale):  0/10  Any medication changes, allergies to medications, adverse drug reactions, diagnosis change, or new procedure performed?: [x] No    [] Yes (see summary sheet for update)  Subjective functional status/changes:   [] No changes reported  \"No pain right now but yesterday I had pain of 6/10. It hurt side to side. I could move it and make it hurt. I don't know why. \"     OBJECTIVE    23 min Therapeutic Exercise:  - See flow sheet :   Rationale: increase ROM and increase strength to improve the patients ability to increase stability for gait    15 min Manual Therapy:  STM right distal quad, ITB; patellar mobs; ant tibiofemoral glides   Rationale: increase ROM and increase tissue extensibility to improve ease of function          With   [] TE   [] TA   [] neuro   [] other: Patient Education: [x] Review HEP    [] Progressed/Changed HEP based on:   [] positioning   [] body mechanics   [] transfers   [] heat/ice application    [] other:      Other Objective/Functional Measures: Continued with Rx program per flow sheet. Pain Level (0-10 scale) post treatment: 0/10    ASSESSMENT/Changes in Function: Pt continues to exhibit right knee extensor lag and intermittent pain in right knee without pattern. Pt is able to perform squatting without increased pain and exhibits improved B LE strength. Pt would benefit from initiating treadmill next session as Pt reported difficulty walking on an incline, to test this out and see if this has improved.     Patient will continue to benefit from skilled PT services to modify and progress therapeutic interventions, address functional mobility deficits, address ROM deficits, address strength deficits, analyze and address soft tissue restrictions, analyze and cue movement patterns, analyze and modify body mechanics/ergonomics, assess and modify postural abnormalities, address imbalance/dizziness and instruct in home and community integration to attain remaining goals. []  See Plan of Care  []  See progress note/recertification  []  See Discharge Summary         Progress towards goals / Updated goals:  Goal: Patient will initiate and be compliant with HEP in order to progress toward long term goals  Status at last note/certification: issued and review HEP  Current status: met - Pt reports compliance  Long Term Goals: To be accomplished in 10 treatments:  Goal: Patient will improve FOTO assessment score to 62 in order to demonstrate improved functional ability  Status at last note/certification: 55  Current status: not met - FOTO 47 pts (no decline in function noted)  Goal: Patient will improve Right knee AROM to 5-113 deg in order to progress toward personal goals.    Status at last note/certification:  deg  Current status: progressing - -10 - 117 deg AROM  Goal: Patient will report little to no difficulty with squatting in order to improve ease of daily tasks  Status at last note/certification: Reports quite a bit of difficulty  Current status: met - ease with retrieving objects from floor    PLAN  [x]  Upgrade activities as tolerated     []  Continue plan of care  []  Update interventions per flow sheet       []  Discharge due to:_  [x]  Other:_ progress note next visit     Kerry Ahumada PT 7/23/2018  8:38 AM    Future Appointments  Date Time Provider Gisele Astorga   7/27/2018 7:30 AM Diana Miranda PT [facility]   7/30/2018 8:30 AM Sarah Haile [facility]   8/13/2018 8:00 AM Artur Puri MD [facility]   11/9/2018 9:05 AM [facility] NURSE VISIT [facility]   11/15/2018 8:00 AM Roma Willett MD [facility]

## 2018-07-27 ENCOUNTER — HOSPITAL ENCOUNTER (OUTPATIENT)
Dept: PHYSICAL THERAPY | Age: 73
Discharge: HOME | End: 2018-07-27
Payer: MEDICARE

## 2018-07-27 PROCEDURE — 97140 MANUAL THERAPY 1/> REGIONS: CPT

## 2018-07-27 PROCEDURE — G8978 MOBILITY CURRENT STATUS: HCPCS

## 2018-07-27 PROCEDURE — 97110 THERAPEUTIC EXERCISES: CPT

## 2018-07-27 PROCEDURE — G8979 MOBILITY GOAL STATUS: HCPCS

## 2018-07-27 NOTE — PROGRESS NOTES
PT DAILY TREATMENT NOTE - [facility]     Patient Name: Jamila Traylor  Date:2018  : 1945  [x]  Patient  Verified  Payor: [place] MEDICARE / Plan: [place] MEDICARE PART A & B / Product Type: Medicare /    In time:730  Out time:810  Total Treatment Time (min): 40  Total Timed Codes (min): 40  1:1 Treatment Time ( [address] only): 30   Visit #: 7 of 10    Treatment Area: Bilateral knee pain [M25.561, M25.562]    SUBJECTIVE  Pain Level (0-10 scale): 0  Any medication changes, allergies to medications, adverse drug reactions, diagnosis change, or new procedure performed?: [x] No    [] Yes (see summary sheet for update)  Subjective functional status/changes:   [] No changes reported  I'm feeling good today    OBJECTIVE    Modality rationale:    Min Type Additional Details    [] Estim:  []Unatt       []IFC  []Premod                        []Other:  []w/ice   []w/heat  Position:  Location:    [] Estim: []Att    []TENS instruct  []NMES                    []Other:  []w/US   []w/ice   []w/heat  Position:  Location:    []  Traction: [] Cervical       []Lumbar                       [] Prone          []Supine                       []Intermittent   []Continuous Lbs:  [] before manual  [] after manual    []  Ultrasound: []Continuous   [] Pulsed                           []1MHz   []3MHz W/cm2:  Location:    []  Iontophoresis with dexamethasone         Location: [] Take home patch   [] In clinic    []  Ice     []  heat  []  Ice massage  []  Laser   []  Anodyne Position:  Location:    []  Laser with stim  []  Other:  Position:  Location:    []  Vasopneumatic Device Pressure:       [] lo [] med [] hi   Temperature: [] lo [] med [] hi   [] Skin assessment post-treatment:  []intact []redness- no adverse reaction    []redness - adverse reaction:     8 min Therapeutic Exercise:  [x] See flow sheet :   Rationale: increase ROM and increase strength to improve the patients ability to improve ease of mobility     17 min Neuromuscular Re-education:  [x]  See flow sheet :   Rationale: increase strength, improve coordination and increase proprioception  to improve the patients ability to improve knee stability with mobility     15 min Manual Therapy:  STM distal Right vastus medialis/adductor verena, distal hamstrings, superior patellar mobs for extension, tibiofemoral joint mobs for extension, manual Right knee stretching   Rationale: decrease pain, increase ROM, increase tissue extensibility and decrease trigger points to improve Right knee function/mobility       With   [] TE   [] TA   [] neuro   [] other: Patient Education: [x] Review HEP    [] Progressed/Changed HEP based on:   [] positioning   [] body mechanics   [] transfers   [] heat/ice application    [] other:      Other Objective/Functional Measures: Added inclined treadmill walking warm up     Pain Level (0-10 scale) post treatment: 0    ASSESSMENT/Changes in Function: Slight improvement noted in Right knee active and passive extension. Continues to have no pain reported with exercises. Patient will continue to benefit from skilled PT services to modify and progress therapeutic interventions, address functional mobility deficits, address ROM deficits, address strength deficits, analyze and address soft tissue restrictions, analyze and cue movement patterns, analyze and modify body mechanics/ergonomics, assess and modify postural abnormalities, address imbalance/dizziness and instruct in home and community integration to attain remaining goals.      []  See Plan of Care  [x]  See progress note/recertification  []  See Discharge Summary         Progress towards goals / Updated goals:  Goal: Patient will initiate and be compliant with HEP in order to progress toward long term goals  Status at last note/certification: issued and review HEP  Current status: met - Pt reports compliance  Long Term Goals: To be accomplished in 10 treatments:  Goal: Patient will improve FOTO assessment score to 62 in order to demonstrate improved functional ability  Status at last note/certification: 55  Current status: not met - FOTO 47 pts (no decline in function noted)  Goal: Patient will improve Right knee AROM to 5-113 deg in order to progress toward personal goals.    Status at last note/certification:  deg  Current status: progressing - 8 - 117 deg AROM  Goal: Patient will report little to no difficulty with squatting in order to improve ease of daily tasks  Status at last note/certification: Reports quite a bit of difficulty  Current status: met - ease with retrieving objects from floor    PLAN  [x]  Upgrade activities as tolerated     [x]  Continue plan of care  []  Update interventions per flow sheet       []  Discharge due to:_  []  Other:_      Madisyn Berger, FRANCY Fraser, PT 7/27/2018  7:08 AM    Future Appointments  Date Time Provider Gisele Astorga   7/27/2018 7:30 AM Heather Fraser, MICHELLE [facility]   7/30/2018 8:30 AM Umer Salmon [facility]   8/13/2018 8:00 AM Nuvia Honeycutt MD [address] [place] [address]   11/9/2018 9:05 AM [facility] NURSE VISIT [address] [place] [address]   11/15/2018 8:00 AM Nuvia Honeycutt MD [address] [place] [address]

## 2018-07-27 NOTE — PROGRESS NOTES
[facility] - [place] [address] [address]  [phone] fax    Continued Plan of Care/ Re-certification for Physical Therapy Services      Patient name: Mariana Apley Start of Care: 2018   Referral source: Ashia Bergeron MD : 1945                         Medical Diagnosis: Bilateral knee pain [M25.561, M25.562] Onset Date:2018                         Treatment Diagnosis: Bilateral knee pain   Prior Hospitalization: see medical history Provider#: [id number]   Medications: Verified on Patient summary List    Comorbidities: arthritis, HTN, depression, diabetes, thyroid problems   Prior Level of Function: Functionally independent, lives with wife in one story home with 4 steps to enter. Retired    Visits from [facility] Care: 7    Missed Visits: 0    The Plan of Care and following information is based on the patient's current status:  Goal: Patient will initiate and be compliant with HEP in order to progress toward long term goals  Status at last note/certification: issued and review HEP  Current status: met - Pt reports compliance  Long Term Goals: To be accomplished in 10 treatments:  Goal: Patient will improve FOTO assessment score to 62 in order to demonstrate improved functional ability  Status at last note/certification: 55  Current status: not met - FOTO 47 pts (no decline in function noted)  Goal: Patient will improve Right knee AROM to 5-113 deg in order to progress toward personal goals.    Status at last note/certification:  deg  Current status: progressing - 8 - 117 deg AROM  Goal: Patient will report little to no difficulty with squatting in order to improve ease of daily tasks  Status at last note/certification: Reports quite a bit of difficulty  Current status: met - ease with retrieving objects from floor  Key functional changes: Patient demonstrating improved Right knee flexion AROM over the past month, and reports improved ease of squatting tasks. Right knee extension AROM/PROM continue to be his biggest complaint. Intermittent periods of sharp joint line pain up to 7/10, weekly not daily. Good tolerance to stairs/stand/amb. Problems/ barriers to goal attainment: none     Problem List: pain affecting function, decrease ROM, decrease strength, edema affecting function, impaired gait/ balance, decrease ADL/ functional abilitiies, decrease activity tolerance, decrease flexibility/ joint mobility and decrease transfer abilities    Treatment Plan: Therapeutic exercise, Therapeutic activities, Neuromuscular re-education, Physical agent/modality, Gait/balance training, Manual therapy, Aquatic therapy, Patient education, Self Care training, Functional mobility training, Home safety training and Stair training     Patient Goal (s) has been updated and includes: \"Straighten my knee\"     Goals for this certification period to be accomplished in 10 treatments:  Goal: Patient will improve FOTO assessment score to 62 in order to demonstrate improved functional ability  Status at last note/certification: FOTO 47 pts   Goal: Patient will improve Right knee AROM to 5-113 deg in order to progress toward personal goals. Status at last note/certification: 8 - 050 deg AROM  Goal: Patient will report Right knee pain of 4/10 or less in order to improve activity tolerance  Status at last note/certification: 4/03     Frequency / Duration: Patient to be seen 2 times per week for 5 weeks:    Assessment / Recommendations: Patient has consistently attended therapy for Right knee stiffness follow Right TKA on 2/28/18. Patient has made some gains in knee mobility, but continues to be limited in Right knee extension and reports intermittent sharp Right knee pain. Patient remains appropriate for therapy to address these deficits. If extension continues to be inadequate for Patient, recommend follow up with MD for further options.      G-Codes (GP)  Mobility  [id number] Current  CK= 40-59%   Goal  CJ= 20-39%    The severity rating is based on clinical judgment and the FOTO score. Certification Period: 7/27/18 to 8/25/18    FRANCY Aleman, PT 7/27/2018 7:09 AM    ________________________________________________________________________  I certify that the above Therapy Services are being furnished while the patient is under my care. I agree with the treatment plan and certify that this therapy is necessary. [] I have read the above and request that my patient continue as recommended.   [] I have read the above report and request that my patient continue therapy with the following changes/special instructions: ______________________________________  [] I have read the above report and request that my patient be discharged from therapy    Physician's Signature:_______________________________Date:___________Time:__________  Please sign and return to [facility] - [address]  [phone] fax

## 2018-07-30 ENCOUNTER — HOSPITAL ENCOUNTER (OUTPATIENT)
Dept: PHYSICAL THERAPY | Age: 73
Discharge: HOME | End: 2018-07-30
Payer: MEDICARE

## 2018-07-30 PROCEDURE — 97110 THERAPEUTIC EXERCISES: CPT

## 2018-07-30 PROCEDURE — 97140 MANUAL THERAPY 1/> REGIONS: CPT

## 2018-07-30 NOTE — PROGRESS NOTES
PT DAILY TREATMENT NOTE - [facility]     Patient Name: Iman Lai  Date:2018  : 1945  [x]  Patient  Verified  Payor: Sruthi Connelly / Plan: [place] MED[place] & B / Product Type: Medicare /    In time:8;30  Out time:9:20  Total Treatment Time (min): 50  Total Timed Codes (min): 50  1:1 Treatment Time ( [address] only): 30   Visit #: 1 of 10    Treatment Area: Bilateral knee pain [M25.561, M25.562]    SUBJECTIVE  Pain Level (0-10 scale): 3  Any medication changes, allergies to medications, adverse drug reactions, diagnosis change, or new procedure performed?: [x] No    [] Yes (see summary sheet for update)  Subjective functional status/changes:   [] No changes reported  I guess its the weather and humidity that has made my knee sore. OBJECTIVE    10 min Therapeutic Exercise:  [] See flow sheet :   Rationale: increase ROM and increase strength to improve the patients ability to improve ease of mobility    25 min Neuromuscular Re-education:  []  See flow sheet :   Rationale: increase strength and improve coordination  to improve the patients ability to increase knee stability    15 min Manual Therapy:  TF mobs to right knee, manual stretching for extension. Trigger point release to adductors   Rationale: decrease pain, increase ROM and increase tissue extensibility to normalize gait             With   [] TE   [] TA   [] neuro   [] other: Patient Education: [x] Review HEP    [] Progressed/Changed HEP based on:   [] positioning   [] body mechanics   [] transfers   [] heat/ice application    [] other:      Other Objective/Functional Measures:      Pain Level (0-10 scale) post treatment:  1    ASSESSMENT/Changes in Function:   Continues to presents with extension defiits and mild pain at times . Limits his ROM with LE advancement despite having adequate Right knee flexion.     Patient will continue to benefit from skilled PT services to modify and progress therapeutic interventions, address functional mobility deficits, address ROM deficits, address strength deficits, analyze and address soft tissue restrictions, analyze and cue movement patterns, analyze and modify body mechanics/ergonomics, assess and modify postural abnormalities, address imbalance/dizziness and instruct in home and community integration to attain remaining goals. []  See Plan of Care  []  See progress note/recertification  []  See Discharge Summary         Progress towards goals / Updated goals:  Goal: Patient will improve FOTO assessment score to 62 in order to demonstrate improved functional ability  Status at last note/certification: FOTO 47 pts   Goal: Patient will improve Right knee AROM to 5-113 deg in order to progress toward personal goals.    Status at last note/certification: 8 - 911 deg AROM  Goal: Patient will report Right knee pain of 4/10 or less in order to improve activity tolerance  Status at last note/certification: 5/66     PLAN  [x]  Upgrade activities as tolerated     []  Continue plan of care  []  Update interventions per flow sheet       []  Discharge due to:_  []  Other:_      Naresh Bhatt, PTA 7/30/2018  8:37 AM    Future Appointments  Date Time Provider Gisele Astorga   8/3/2018 7:30 AM Fabby Dec, PT [facility]   8/6/2018 8:00 AM Sandi Ahumada, PT [facility]   8/10/2018 8:30 AM Virgia Modena [facility]   8/13/2018 8:00 AM Nilsa Khan MD [address]   8/13/2018 10:30  [address] [facility]   8/17/2018 8:30  [address] [facility]   8/20/2018 8:30 AM Virgia Modena [facility]   8/24/2018 11:00 AM Fabby Dec, PT [facility]   11/9/2018 9:05 AM IO NURSE VISIT [facility]   11/15/2018 8:00 AM Nilsa Khan MD [address]

## 2018-08-02 DIAGNOSIS — N52.9 ERECTILE DYSFUNCTION, UNSPECIFIED ERECTILE DYSFUNCTION TYPE: ICD-10-CM

## 2018-08-02 RX ORDER — SILDENAFIL CITRATE 20 MG/1
TABLET ORAL
Qty: 30 TAB | Refills: 10 | Status: SHIPPED | OUTPATIENT
Start: 2018-08-02 | End: 2019-08-01 | Stop reason: SDUPTHER

## 2018-08-03 ENCOUNTER — HOSPITAL ENCOUNTER (OUTPATIENT)
Dept: PHYSICAL THERAPY | Age: 73
Discharge: HOME | End: 2018-08-03
Payer: MEDICARE

## 2018-08-03 PROCEDURE — 97140 MANUAL THERAPY 1/> REGIONS: CPT

## 2018-08-03 PROCEDURE — 97112 NEUROMUSCULAR REEDUCATION: CPT

## 2018-08-03 NOTE — PROGRESS NOTES
PT DAILY TREATMENT NOTE - [facility]     Patient Name: Ewelina Amador  Date:8/3/2018  : 1945  [x]  Patient  Verified  Payor: Haroldo Landers / Plan: [place] MEDICARE PART A & B / Product Type: Medicare /    In time:730  Out time:805  Total Treatment Time (min): 35  Total Timed Codes (min): 35  1:1 Treatment Time ( [address] only): 30   Visit #: 2 of 10    Treatment Area: Bilateral knee pain [M25.561, M25.562]    SUBJECTIVE  Pain Level (0-10 scale): 3  Any medication changes, allergies to medications, adverse drug reactions, diagnosis change, or new procedure performed?: [x] No    [] Yes (see summary sheet for update)  Subjective functional status/changes:   [] No changes reported  A little stiff.      OBJECTIVE     Patient declined   Min Type Additional Details    [] Estim:  []Unatt       []IFC  []Premod                        []Other:  []w/ice   []w/heat  Position:  Location:    [] Estim: []Att    []TENS instruct  []NMES                    []Other:  []w/US   []w/ice   []w/heat  Position:  Location:    []  Traction: [] Cervical       []Lumbar                       [] Prone          []Supine                       []Intermittent   []Continuous Lbs:  [] before manual  [] after manual    []  Ultrasound: []Continuous   [] Pulsed                           []1MHz   []3MHz W/cm2:  Location:    []  Iontophoresis with dexamethasone         Location: [] Take home patch   [] In clinic    []  Ice     []  heat  []  Ice massage  []  Laser   []  Anodyne Position:  Location:    []  Laser with stim  []  Other:  Position:  Location:    []  Vasopneumatic Device Pressure:       [] lo [] med [] hi   Temperature: [] lo [] med [] hi   [] Skin assessment post-treatment:  []intact []redness- no adverse reaction    []redness - adverse reaction:     8 min Therapeutic Exercise:  [x] See flow sheet :   Rationale: increase ROM and increase strength to improve the patients ability to improve ease of mobility    17 min Neuromuscular Re-education:  [x]  See flow sheet :   Rationale: increase strength and improve coordination  to improve the patients ability to improve knee stability    10 min Manual Therapy:  Right knee manual stretching, tibiofemoral/patellar mobs for flexion/extension, STM distal quads   Rationale: increase ROM and increase tissue extensibility to normalize gait      With   [] TE   [] TA   [] neuro   [] other: Patient Education: [x] Review HEP    [] Progressed/Changed HEP based on:   [] positioning   [] body mechanics   [] transfers   [] heat/ice application    [] other:      Other Objective/Functional Measures: completed exercises per flow sheet     Pain Level (0-10 scale) post treatment: 3    ASSESSMENT/Changes in Function: Patient continues to report intermittent Right knee pain and has some deficits in extension. Good progression with knee flexion. Patient will continue to benefit from skilled PT services to modify and progress therapeutic interventions, address functional mobility deficits, address ROM deficits, address strength deficits, analyze and address soft tissue restrictions, analyze and cue movement patterns, analyze and modify body mechanics/ergonomics, assess and modify postural abnormalities, address imbalance/dizziness and instruct in home and community integration to attain remaining goals. []  See Plan of Care  []  See progress note/recertification  []  See Discharge Summary         Progress towards goals / Updated goals:  Goal: Patient will improve FOTO assessment score to 62 in order to demonstrate improved functional ability  Status at last note/certification: FOTO 47 pts   Current status: Assess next visit  Goal: Patient will improve Right knee AROM to 5-113 deg in order to progress toward personal goals.    Status at last note/certification: 8 - 311 deg AROM  Current status: Progressing, knee flexion grossly full but extension remains about the same  Goal: Patient will report Right knee pain of 4/10 or less in order to improve activity tolerance  Status at last note/certification: 1/37   Current status: Progressing, 5/10    PLAN  [x]  Upgrade activities as tolerated     [x]  Continue plan of care  []  Update interventions per flow sheet       []  Discharge due to:_  []  Other:_      Myesha Chaudhary, FRANCY Eugene, PT 8/3/2018  7:18 AM    Future Appointments  Date Time Provider Gisele Astorga   8/3/2018 7:30 AM Sahil Eugene, [facility]   [facility]2018 [facility]   8/10/2018 8:30 AM [facility]   8/13/2018 8:00 AM Rogerio White MD [facility]   8/13/2018 10:30 AM [facility]   8/17/2018 8:30 AM [facility]   8/20/2018 8:30 AM [facility]   8/24/2018 11:00 AM Sahil Eugene [facility]   11/9/2018 9:05 AM IO NURSE VISIT [facility]   11/15/2018 8:00 AM Rogerio White MD [facility]

## 2018-08-06 ENCOUNTER — HOSPITAL ENCOUNTER (OUTPATIENT)
Dept: PHYSICAL THERAPY | Age: 73
Discharge: HOME | End: 2018-08-06
Payer: MEDICARE

## 2018-08-06 PROCEDURE — 97112 NEUROMUSCULAR REEDUCATION: CPT

## 2018-08-06 PROCEDURE — 97140 MANUAL THERAPY 1/> REGIONS: CPT

## 2018-08-06 NOTE — PROGRESS NOTES
PT DAILY TREATMENT NOTE - [facility]     Patient Name: Jami Martinez  Date:2018  : 1945  [x]  Patient  Verified  Payor: Fiorella Lobo / Plan: [place] MEDICARE PART A & B / Product Type: Medicare /    In time:8:05  Out time:8:35  Total Treatment Time (min): 30  Total Timed Codes (min): 30  1:1 Treatment Time ( [address] only): 30   Visit #: 3 of 10    Treatment Area: Bilateral knee pain [M25.561, M25.562]    SUBJECTIVE  Pain Level (0-10 scale): 0/10  Any medication changes, allergies to medications, adverse drug reactions, diagnosis change, or new procedure performed?: [x] No    [] Yes (see summary sheet for update)  Subjective functional status/changes:   [] No changes reported  \"I feel great. I had a good weekend. I can bend my knee more which I didn't think I would be able to do and I have no pain today. I did take some Ibuprofen for my hip so that also may be contributing to the good feeling. \"    OBJECTIVE    8 min Therapeutic Exercise:  [x] See flow sheet :   Rationale: increase ROM and increase strength to improve the patients ability to improve ease of mobility    12 min Neuromuscular Re-education:  [x]  See flow sheet :   Rationale: increase strength and improve coordination  to improve the patients ability to improve knee stability    10 min Manual Therapy:  STM right distal quad, ITB; ant/post tibiofemoral glides; manual stretching into knee flex   Rationale: increase ROM and increase tissue extensibility to normalize gait      With   [] TE   [] TA   [] neuro   [] other: Patient Education: [x] Review HEP    [] Progressed/Changed HEP based on:   [] positioning   [] body mechanics   [] transfers   [] heat/ice application    [] other:      Other Objective/Functional Measures: Abbreviated session today as PT had to go into eval and had no tech available to finish exercises with Pt.     Pain Level (0-10 scale) post treatment: 0/10    ASSESSMENT/Changes in Function: Pt reports intermittent right knee pain but is steadily making gains in right knee AROM. Pt would benefit from continued progression of exercises to improve B knee mobility and LE strength to return to prior functional status without increased pain or difficulty. Patient will continue to benefit from skilled PT services to modify and progress therapeutic interventions, address functional mobility deficits, address ROM deficits, address strength deficits, analyze and address soft tissue restrictions, analyze and cue movement patterns, analyze and modify body mechanics/ergonomics, assess and modify postural abnormalities, address imbalance/dizziness and instruct in home and community integration to attain remaining goals. []  See Plan of Care  []  See progress note/recertification  []  See Discharge Summary         Progress towards goals / Updated goals:  Goal: Patient will improve FOTO assessment score to 62 in order to demonstrate improved functional ability  Status at last note/certification: FOTO 47 pts   Current status: Assess next visit  Goal: Patient will improve Right knee AROM to 5-113 deg in order to progress toward personal goals.    Status at last note/certification: 8 - 306 deg AROM  Current status: Progressing, knee flexion grossly full but extension remains about the same  Goal: Patient will report Right knee pain of 4/10 or less in order to improve activity tolerance  Status at last note/certification: 9/38   Current status: Progressing, 5/10    PLAN  [x]  Upgrade activities as tolerated     [x]  Continue plan of care  []  Update interventions per flow sheet       []  Discharge due to:_  []  Other:_      Yaritza Arce, [facility]  Kaitlin Ahumada, PT 8/6/2018  7:18 AM    Future Appointments  Date Time Provider Gisele Astorga   8/10/2018 8:30  [address] [facility]   8/13/2018 8:00 AM Ania Barraza MD [facility]   8/13/2018 10:30  [address] [facility]   8/17/2018 8:30  [address] [facility]/20[facility]2018 8:30 [facility]   8/24/2018 11:00 AM Kandice Bernal PT [facility]   11/9/2018 9:05 AM [facility]   11/15/2018 8:00 AM Renata Alfaro MD [facility]

## 2018-08-10 ENCOUNTER — HOSPITAL ENCOUNTER (OUTPATIENT)
Dept: PHYSICAL THERAPY | Age: 73
Discharge: HOME | End: 2018-08-10
Payer: MEDICARE

## 2018-08-10 PROCEDURE — 97140 MANUAL THERAPY 1/> REGIONS: CPT

## 2018-08-10 PROCEDURE — 97110 THERAPEUTIC EXERCISES: CPT

## 2018-08-10 NOTE — PROGRESS NOTES
PT DAILY TREATMENT NOTE - [facility]     Patient Name: Justice Gage  Date:8/10/2018  : 1945  [x]  Patient  Verified  Payor: Lacie Alford / Plan: [place] MEDICARE PART A & B / Product Type: Medicare /    In time:8;30  Out time: 9;15  Total Treatment Time (min): 45  Total Timed Codes (min): 45  1:1 Treatment Time ( [address] only): 30   Visit #: 4 of 10    Treatment Area: Bilateral knee pain [M25.561, M25.562]    SUBJECTIVE  Pain Level (0-10 scale): 0  Any medication changes, allergies to medications, adverse drug reactions, diagnosis change, or new procedure performed?: [x] No    [] Yes (see summary sheet for update)  Subjective functional status/changes:   [] No changes reported  I mowed the yard yesterday for the first time in years. Push mowed, self propelled but I did it it with no problem. OBJECTIVE    10 min Therapeutic Exercise:  [] See flow sheet :   Rationale: increase ROM and increase strength to improve the patients ability to improve ease of mobility    25 min Neuromuscular Re-education:  []  See flow sheet :   Rationale: increase strength and improve coordination  to improve the patients ability to increase knee stability    10 min Manual Therapy:  STM to right quad, adductors, TF mobs. manual stretching   Rationale: increase ROM and increase tissue extensibility to improve ease of gait          With   [] TE   [] TA   [] neuro   [] other: Patient Education: [x] Review HEP    [] Progressed/Changed HEP based on:   [] positioning   [] body mechanics   [] transfers   [] heat/ice application    [] other:      Other Objective/Functional Measures:      Pain Level (0-10 scale) post treatment: 0    ASSESSMENT/Changes in Function: improving function as pt was able to mow lawn without difficulty. Pt reported having \"shin splints: during the night on the Right that interrupted his sleep. Palpable tenderness in Tibialis anterior and peroneals as a result.     Patient will continue to benefit from skilled PT services to modify and progress therapeutic interventions, address functional mobility deficits, address ROM deficits, address strength deficits, analyze and address soft tissue restrictions, analyze and cue movement patterns, analyze and modify body mechanics/ergonomics, assess and modify postural abnormalities and address imbalance/dizziness to attain remaining goals. []  See Plan of Care  []  See progress note/recertification  []  See Discharge Summary         Progress towards goals / Updated goals:  Goal: Patient will improve FOTO assessment score to 62 in order to demonstrate improved functional ability  Status at last note/certification: FOTO 47 pts   Current status: FOTO 60  progressing  Goal: Patient will improve Right knee AROM to 5-113 deg in order to progress toward personal goals.    Status at last note/certification: 8 - 086 deg AROM  Current status: Progressing, knee flexion grossly full but extension remains about the same  Goal: Patient will report Right knee pain of 4/10 or less in order to improve activity tolerance  Status at last note/certification: 7/41   Current status: Progressing, 5/10    PLAN  [x]  Upgrade activities as tolerated     []  Continue plan of care  []  Update interventions per flow sheet       []  Discharge due to:_  []  Other:_      Essence Pritchett, PTA 8/10/2018  8:33 AM    Future Appointments  Date Time Provider Gisele Astorga   8/13/2018 10:30  [address] [facility]   8/17/2018 8:30  [address] [facility]   8/20/2018 8:30 AM Loura Low [facility]   8/24/2018 11:00 AM Birdie Rutherford PT [facility]   11/9/2018 9:05 AM IO NURSE VISIT [facility]   11/15/2018 8:00 AM Adwoa Arcos MD [facility]

## 2018-08-13 ENCOUNTER — APPOINTMENT (OUTPATIENT)
Dept: PHYSICAL THERAPY | Age: 73
End: 2018-08-13
Payer: MEDICARE

## 2018-08-17 ENCOUNTER — APPOINTMENT (OUTPATIENT)
Dept: PHYSICAL THERAPY | Age: 73
End: 2018-08-17
Payer: MEDICARE

## 2018-08-20 ENCOUNTER — APPOINTMENT (OUTPATIENT)
Dept: PHYSICAL THERAPY | Age: 73
End: 2018-08-20
Payer: MEDICARE

## 2018-08-24 ENCOUNTER — HOSPITAL ENCOUNTER (OUTPATIENT)
Dept: PHYSICAL THERAPY | Age: 73
Discharge: HOME | End: 2018-08-24
Payer: MEDICARE

## 2018-08-24 PROCEDURE — G8980 MOBILITY D/C STATUS: HCPCS

## 2018-08-24 PROCEDURE — 97110 THERAPEUTIC EXERCISES: CPT

## 2018-08-24 PROCEDURE — 97140 MANUAL THERAPY 1/> REGIONS: CPT

## 2018-08-24 PROCEDURE — G8979 MOBILITY GOAL STATUS: HCPCS

## 2018-08-24 PROCEDURE — 97112 NEUROMUSCULAR REEDUCATION: CPT

## 2018-08-24 NOTE — PROGRESS NOTES
PT DISCHARGE DAILY NOTE AND [id number]    Date:2018  Patient name: Kirk Young Start of Care: 2018   Referral source: Lexy Monte MD : 1945                         Medical Diagnosis: Bilateral knee pain [M25.561, M25.562] Onset Date:2018                         Treatment Diagnosis: Bilateral knee pain   Prior Hospitalization: see medical history Provider#: [id number]   Medications: Verified on Patient summary List    Comorbidities: arthritis, HTN, depression, diabetes, thyroid problems   Prior Level of Function: Functionally independent, lives with wife in one story home with 4 steps to enter. Retired    [facility]: 12    Missed Visits: 0    Reporting Period : 18 to 18    [x]  Patient  Verified  Payor: VA MEDICARE / Plan: VA MEDICARE PART A & B / Product Type: Medicare /    In time:1102  Out time:1148  Total Treatment Time (min): 46  Total Timed Codes (min): 46  1:1 Treatment Time ( [address] only): 44   Visit #: 5 of 10    SUBJECTIVE  Pain Level (0-10 scale): 0  Any medication changes, allergies to medications, adverse drug reactions, diagnosis change, or new procedure performed?: [x] No    [] Yes (see summary sheet for update)  Subjective functional status/changes:   [] No changes reported  My knees are good but my hips are sore.      OBJECTIVE    Modality rationale:    Min Type Additional Details    [] Estim:  []Unatt       []IFC  []Premod                        []Other:  []w/ice   []w/heat  Position:  Location:    [] Estim: []Att    []TENS instruct  []NMES                    []Other:  []w/US   []w/ice   []w/heat  Position:  Location:    []  Traction: [] Cervical       []Lumbar                       [] Prone          []Supine                       []Intermittent   []Continuous Lbs:  [] before manual  [] after manual    []  Ultrasound: []Continuous   [] Pulsed                           []1MHz   []3MHz W/cm2:  Location:    []  Iontophoresis with dexamethasone Location: [] Take home patch   [] In clinic    []  Ice     []  heat  []  Ice massage  []  Laser   []  Anodyne Position:  Location:    []  Laser with stim  []  Other:  Position:  Location:    []  Vasopneumatic Device Pressure:       [] lo [] med [] hi   Temperature: [] lo [] med [] hi   [] Skin assessment post-treatment:  []intact []redness- no adverse reaction    []redness - adverse reaction:     10 min Therapeutic Exercise:  [x] See flow sheet :   Rationale: increase ROM and increase strength to improve the patients ability to improve stand/amb tolerance    26 min Neuromuscular Re-education:  [x]  See flow sheet :   Rationale: increase strength, improve coordination and increase proprioception  to improve the patients ability to improve LE stability     10 min Manual Therapy:  STM distal and lateral Right quads, manual overpressure into extension, manual Right knee stretching    Rationale: decrease pain, increase ROM, increase tissue extensibility and decrease trigger points to normalize gait       With   [] TE   [] TA   [] neuro   [] other: Patient Education: [x] Review HEP    [] Progressed/Changed HEP based on:   [] positioning   [] body mechanics   [] transfers   [] heat/ice application    [] other:      Other Objective/Functional Measures: reviewed HEP     Pain Level (0-10 scale) post treatment: 0    Summary of Care:  Goal: Patient will improve FOTO assessment score to 62 in order to demonstrate improved functional ability  Status at last note/certification: FOTO 47 pts   Current status: Progressing, FOTO 60    Goal: Patient will improve Right knee AROM to 5-113 deg in order to progress toward personal goals.    Status at last note/certification: 8 - 537 deg AROM  Current status: Progressing, knee flexion grossly full, extension not yet full  Goal: Patient will report Right knee pain of 4/10 or less in order to improve activity tolerance  Status at last note/certification: 7/35   Current status: Met    ASSESSMENT/Changes in Function: Patient has attended therapy for a second episode following Right knee TKA this past February. He currently reports limited knee pain and good mobility, good tolerance to daily tasks. Due to ability to continue with strengthening and stretching on his own he requests discharge. G-Codes (GP)  Mobility   [id number] Goal  CJ= 20-39%  D/C  CK= 40-59%    The severity rating is based on clinical judgment and the FOTO score.       Thank you for this referral!     PLAN  [x]Discontinue therapy: [x]Patient has reached or is progressing toward set goals      []Patient is non-compliant or has abdicated      []Due to lack of appreciable progress towards set goals    Kal Justice, PT 8/24/2018  11:20 AM

## 2018-10-26 RX ORDER — CARVEDILOL 6.25 MG/1
TABLET ORAL
Qty: 60 TAB | Refills: 10 | Status: SHIPPED | OUTPATIENT
Start: 2018-10-26 | End: 2019-11-20 | Stop reason: SDUPTHER

## 2018-11-06 DIAGNOSIS — E03.4 HYPOTHYROIDISM DUE TO ACQUIRED ATROPHY OF THYROID: ICD-10-CM

## 2018-11-06 RX ORDER — LEVOTHYROXINE SODIUM 50 UG/1
TABLET ORAL
Qty: 90 TAB | Refills: 3 | Status: SHIPPED | OUTPATIENT
Start: 2018-11-06 | End: 2019-11-20 | Stop reason: SDUPTHER

## 2018-11-09 ENCOUNTER — APPOINTMENT (OUTPATIENT)
Dept: INTERNAL MEDICINE CLINIC | Age: 73
End: 2018-11-09

## 2018-11-09 ENCOUNTER — HOSPITAL ENCOUNTER (OUTPATIENT)
Dept: LAB | Age: 73
Discharge: HOME | End: 2018-11-09
Payer: MEDICARE

## 2018-11-09 DIAGNOSIS — I10 ESSENTIAL HYPERTENSION: ICD-10-CM

## 2018-11-09 DIAGNOSIS — E11.9 TYPE 2 DIABETES MELLITUS WITHOUT COMPLICATION, UNSPECIFIED WHETHER LONG TERM INSULIN USE (HCC): ICD-10-CM

## 2018-11-09 DIAGNOSIS — E03.4 HYPOTHYROIDISM DUE TO ACQUIRED ATROPHY OF THYROID: ICD-10-CM

## 2018-11-09 DIAGNOSIS — E78.2 MIXED HYPERLIPIDEMIA: ICD-10-CM

## 2018-11-09 LAB
ANION GAP SERPL CALC-SCNC: 7 MMOL/L (ref 3–18)
BASOPHILS # BLD: 0 K/UL (ref 0–0.1)
BASOPHILS NFR BLD: 0 % (ref 0–2)
BUN SERPL-MCNC: 26 MG/DL (ref 7–18)
BUN/CREAT SERPL: 22 (ref 12–20)
CALCIUM SERPL-MCNC: 8.4 MG/DL (ref 8.5–10.1)
CHLORIDE SERPL-SCNC: 104 MMOL/L (ref 100–108)
CHOLEST SERPL-MCNC: 161 MG/DL
CO2 SERPL-SCNC: 27 MMOL/L (ref 21–32)
CREAT SERPL-MCNC: 1.19 MG/DL (ref 0.6–1.3)
DIFFERENTIAL METHOD BLD: NORMAL
EOSINOPHIL # BLD: 0.1 K/UL (ref 0–0.4)
EOSINOPHIL NFR BLD: 3 % (ref 0–5)
ERYTHROCYTE [DISTWIDTH] IN BLOOD BY AUTOMATED COUNT: 13 % (ref 11.6–14.5)
GLUCOSE SERPL-MCNC: 138 MG/DL (ref 74–99)
HBA1C MFR BLD: 7.1 % (ref 4.2–5.6)
HCT VFR BLD AUTO: 44.3 % (ref 36–48)
HDLC SERPL-MCNC: 44 MG/DL (ref 40–60)
HDLC SERPL: 3.7 {RATIO} (ref 0–5)
HGB BLD-MCNC: 14.6 G/DL (ref 13–16)
LDLC SERPL CALC-MCNC: 97.8 MG/DL (ref 0–100)
LIPID PROFILE,FLP: NORMAL
LYMPHOCYTES # BLD: 1.4 K/UL (ref 0.9–3.6)
LYMPHOCYTES NFR BLD: 30 % (ref 21–52)
MCH RBC QN AUTO: 31.1 PG (ref 24–34)
MCHC RBC AUTO-ENTMCNC: 33 G/DL (ref 31–37)
MCV RBC AUTO: 94.3 FL (ref 74–97)
MONOCYTES # BLD: 0.4 K/UL (ref 0.05–1.2)
MONOCYTES NFR BLD: 8 % (ref 3–10)
NEUTS SEG # BLD: 2.8 K/UL (ref 1.8–8)
NEUTS SEG NFR BLD: 59 % (ref 40–73)
PLATELET # BLD AUTO: 197 K/UL (ref 135–420)
PMV BLD AUTO: 10.3 FL (ref 9.2–11.8)
POTASSIUM SERPL-SCNC: 4.3 MMOL/L (ref 3.5–5.5)
RBC # BLD AUTO: 4.7 M/UL (ref 4.7–5.5)
SODIUM SERPL-SCNC: 138 MMOL/L (ref 136–145)
T4 FREE SERPL-MCNC: 1.1 NG/DL (ref 0.7–1.5)
TRIGL SERPL-MCNC: 96 MG/DL (ref ?–150)
TSH SERPL DL<=0.05 MIU/L-ACNC: 2.6 UIU/ML (ref 0.36–3.74)
VLDLC SERPL CALC-MCNC: 19.2 MG/DL
WBC # BLD AUTO: 4.8 K/UL (ref 4.6–13.2)

## 2018-11-09 PROCEDURE — 80061 LIPID PANEL: CPT

## 2018-11-09 PROCEDURE — 82043 UR ALBUMIN QUANTITATIVE: CPT

## 2018-11-09 PROCEDURE — 36415 COLL VENOUS BLD VENIPUNCTURE: CPT

## 2018-11-09 PROCEDURE — 83036 HEMOGLOBIN GLYCOSYLATED A1C: CPT

## 2018-11-09 PROCEDURE — 80048 BASIC METABOLIC PNL TOTAL CA: CPT

## 2018-11-09 PROCEDURE — 84439 ASSAY OF FREE THYROXINE: CPT

## 2018-11-09 PROCEDURE — 85025 COMPLETE CBC W/AUTO DIFF WBC: CPT

## 2018-11-10 LAB
CREAT UR-MCNC: 49 MG/DL (ref 30–125)
MICROALBUMIN UR-MCNC: 1.56 MG/DL (ref 0–3)
MICROALBUMIN/CREAT UR-RTO: 32 MG/G (ref 0–30)

## 2018-11-12 NOTE — PROGRESS NOTES
I will let him know at his f/u apt that his diabetes worsened. His A1C jumped from 6 to 7.1. His urine has very mild microalbuminuria. His blood counts are normal. TFTs are normal. His cholesterol is 161. Triglycerides are 96. HDL is 44. LDL is 97.8. His renal function is unchanged but given his BUN, he should consume more water - drinking eight 8 ounce cups of water per day. His calcium is mildly low at 8.4. He should take a MV daily.     Dr. Rhea Ignacio  [facility], [address] [place] [address], [address] [place] [address] Str.  Phone: [phone]  Fax: [phone]

## 2018-11-15 ENCOUNTER — OFFICE VISIT (OUTPATIENT)
Dept: INTERNAL MEDICINE CLINIC | Age: 73
End: 2018-11-15

## 2018-11-15 VITALS
SYSTOLIC BLOOD PRESSURE: 135 MMHG | BODY MASS INDEX: 31.73 KG/M2 | HEIGHT: 69 IN | DIASTOLIC BLOOD PRESSURE: 74 MMHG | TEMPERATURE: 97.6 F | HEART RATE: 61 BPM | WEIGHT: 214.2 LBS | RESPIRATION RATE: 12 BRPM | OXYGEN SATURATION: 99 %

## 2018-11-15 DIAGNOSIS — E03.4 HYPOTHYROIDISM DUE TO ACQUIRED ATROPHY OF THYROID: ICD-10-CM

## 2018-11-15 DIAGNOSIS — E11.9 TYPE 2 DIABETES MELLITUS WITHOUT COMPLICATION, UNSPECIFIED WHETHER LONG TERM INSULIN USE (HCC): Primary | ICD-10-CM

## 2018-11-15 DIAGNOSIS — F33.40 RECURRENT MAJOR DEPRESSIVE DISORDER, IN REMISSION (HCC): ICD-10-CM

## 2018-11-15 DIAGNOSIS — Z00.00 MEDICARE ANNUAL WELLNESS VISIT, SUBSEQUENT: ICD-10-CM

## 2018-11-15 DIAGNOSIS — E83.51 HYPOCALCEMIA: ICD-10-CM

## 2018-11-15 DIAGNOSIS — I10 ESSENTIAL HYPERTENSION: ICD-10-CM

## 2018-11-15 DIAGNOSIS — Z78.9 ALCOHOL USE: ICD-10-CM

## 2018-11-15 DIAGNOSIS — E78.2 MIXED HYPERLIPIDEMIA: ICD-10-CM

## 2018-11-15 NOTE — PROGRESS NOTES
[facility]NISTS OF [facility]:  11/15/2018, MRN: [id number]      Ozzy Ennis is a 74 y.o. male and presents to clinic for Type 2 DM, HLD, Hypothyroidism, and  Annual Wellness Visit    Subjective:   Pt is a 75yo male with h/o ED, melanoma in situ on left dorsal forearm s/p removal, MDD, rectal polyp, diverticulosis, CKD stage 3, HTN, type 2 DM (foot exams are done by Naye Friend), hypothyroidism, DJD, gout, vocal cord nodule, depression/anxiety (followed by Psychiatry), vitamin D deficiency, RBBB (s/p normal stress test 2018), and HLD. 1.  Type 2 Diabetes and ETOH Intake: His most recent lab results show that his A1c jumped 6 to 7.1. He is presently not on any medication for type 2 diabetes as he was well controlled with dietary modification measures alone until recently. He is not adhering to a diabetic diet. At his last appointment, he reported excessive alcoholic beverage intake. He stopped drinking after his last apt x 3 months but restarted drinking, \"because I could. \" He is now drinking 10 glasses of wine a wk - he will consume 1/2 bottle of wine per day. His weight is 214lbs today. He is not exercising regularly - maybe biking once a wk if that. 2.  Hypertension: Present for over 3 months. He is taking carvedilol and lisinopril. He reports no adverse side effects of taking his medications. His BP is 135/74 today. His most recent lab results show microalbuminuria. 3.  Hyperlipidemia: Present for over 3 months. Taking Lipitor. He reports no adverse side effects of taking this medication. His most recent labs show:  His cholesterol is 161. Triglycerides are 96. HDL is 44. LDL is 97.8. 4.  Hypothyroidism and Mild Hypocalcemia (per recent labs): Present for over 6 months. On Synthroid. His most recent TFTs are unremarkable. His calcium is mildly low at 8.4.         Patient Active Problem List    Diagnosis Date Noted    Right inguinal hernia 05/17/2018    Osteoarthritis of right knee 02/19/2018    Erectile dysfunction 01/23/2017    Melanoma in situ - on left dorsal forearm 2016 09/16/2016    Major depressive disorder in remission 08/03/2016    Rectal polyp -  seen on colonoscopy from 8/20/15 08/02/2016    Diverticulosis of intestine without bleeding - seen on colonoscopy from 8/20/15 08/02/2016    CKD (chronic kidney disease) stage 3, GFR 30-59 ml/min ([facility] 11/09/2015    Essential hypertension 06/24/2015    Type 2 diabetes mellitus without complication ([facility] 05/85/0892    Hypothyroidism due to acquired atrophy of thyroid 06/24/2015    Vocal cord nodule 10/21/2013    Gout 10/21/2011    Family history of colon cancer. personal hx colon polyps 10/21/2011    Hyperlipidemia     Hypovitaminosis D        Current Outpatient Medications   Medication Sig Dispense Refill    diphtheria-pertussis, acellular,-tetanus (ACELL) 2.5-8-5 Lf-mcg-Lf/0.5mL susp susp 0.5 mL by IntraMUSCular route once for 1 dose. 0.5 mL 0    varicella-zoster recombinant, PF, (SHINGRIX) 50 mcg/0.5 mL susr injection 0.5 mL by IntraMUSCular route every two (2) months. 0.5 mL 1    levothyroxine (SYNTHROID) 50 mcg tablet TAKE ONE TABLET BY MOUTH DAILY BEFORE BREAKFAST 90 Tab 3    carvedilol (COREG) 6.25 mg tablet TAKE ONE TABLET BY MOUTH TWICE A DAY WITH MEALS 60 Tab 10    sildenafil, antihypertensive, (REVATIO) 20 mg tablet TAKE TWO TABLETS BY MOUTH DAILY 1 HOUR (RANGE: 30 MINUTES TO 4 HOURS) BEFORE SEXUAL ACTIVITY AS NEEDED. MAXIMUM DOSE IS 5 TABLETS A DAY 30 Tab 10    ibuprofen (MOTRIN) 800 mg tablet Take 1 Tab by mouth three (3) times daily as needed for Pain. 40 Tab 0    ubidecarenone (COQ-10 PO) Take 200 mEq by mouth daily.  aspirin delayed-release 81 mg tablet Take 81 mg by mouth daily.  lisinopril (PRINIVIL, ZESTRIL) 5 mg tablet Take 1 Tab by mouth daily. (Patient taking differently: Take 5 mg by mouth nightly.) 90 Tab 3    atorvastatin (LIPITOR) 40 mg tablet Take 1 Tab by mouth daily.  (Patient taking differently: Take 40 mg by mouth nightly.) 90 Tab 3    allopurinol (ZYLOPRIM) 100 mg tablet Take 2 Tabs by mouth daily. 180 Tab 3    terbinafine HCl (LAMISIL) 250 mg tablet Take 250 mg by mouth daily. Indications: Taken for 1 full week a month; and then not for another 3 weeks      venlafaxine-SR (EFFEXOR-XR) 150 mg capsule Take 150 mg by mouth daily.  multivitamin (ONE A DAY) tablet Take 1 Tab by mouth daily.  Cholecalciferol, Vitamin D3, (VITAMIN D) 2,000 unit Cap Take 4,000 Units by mouth daily.          Allergies   Allergen Reactions    Amlodipine Other (comments)     BLE edema       Past Medical History:   Diagnosis Date    Adverse effect of anesthesia     Violent dreams with Ether    Arthritis     hip, knee    Chronic kidney disease     had previous reaction with kidneys after a bp med, no issues now    Colon polyps     dysplastic    Depression     Diabetes mellitus ([facility] 2017    no meds    Diverticulosis     seen on colonoscopy from 8/20/15    Diverticulosis of sigmoid colon 01/27/10    GERD (gastroesophageal reflux disease)     no meds    Gout     Hepatitis     Hepatitis A - while he was young    Hyperlipidemia     Hypertension 15 years    Hypovitaminosis D     Nephrolithiasis     Osteoarthritis of right knee 2/19/2018    Plantar wart     Rectal polyp     seen on colonoscopy from 8/20/15    Thyroid disease     hypothyroidism       Past Surgical History:   Procedure Laterality Date    ENDOSCOPY, COLON, DIAGNOSTIC  01/27/2010    polyp distal rectum, removed; diverticulosis of sigmoid    HX CATARACT REMOVAL Bilateral     HX COLONOSCOPY      HX HEENT  2008    vocal cord polyp removed    HX POLYPECTOMY  01/27/2010    Distal rectum    HX TONSILLECTOMY      [address] Right 05/31/2018    Dr. Unknown Hamman Left 1/2014    TOTAL KNEE ARTHROPLASTY Right 02/2018       Family History   Problem Relation Age of Onset    Arthritis-rheumatoid Mother     Hypertension Mother    [address] [facility] Elevated Lipids Mother     Thyroid Disease Sister     Cancer Father         colon    Heart Disease Other     Hypertension Other     Cancer Other         breast    Heart Disease Maternal Grandmother     Dementia Maternal Grandfather     Cancer Paternal Grandmother         breast    No Known Problems Paternal Grandfather        Social History     Tobacco Use    Smoking status: Former Smoker     Packs/day: 1.00     Years: 6.00     Pack years: 6.00     Types: Cigarettes     Last attempt to quit: 1992     Years since quittin.8    Smokeless tobacco: Never Used   Substance Use Topics    Alcohol use: Yes     Alcohol/week: 6.0 oz     Types: 10 Glasses of wine per week     Comment: wine with dinner sometimes       ROS   Review of Systems   Constitutional: Negative for chills and fever. HENT: Negative for ear pain and sore throat. Eyes: Positive for blurred vision (b/l blurry vision). Negative for pain. Respiratory: Negative for cough and shortness of breath. Cardiovascular: Negative for chest pain. Gastrointestinal: Negative for abdominal pain, blood in stool and melena. Genitourinary: Negative for dysuria and hematuria. Musculoskeletal: Negative for joint pain and myalgias. Skin: Negative for rash. Neurological: Negative for tingling, focal weakness and headaches. Endo/Heme/Allergies: Does not bruise/bleed easily. Psychiatric/Behavioral: Negative for substance abuse. Objective     Vitals:    11/15/18 0808   BP: 135/74   Pulse: 61   Resp: 12   Temp: 97.6 °F (36.4 °C)   TempSrc: Oral   SpO2: 99%   Weight: 214 lb 3.2 oz (97.2 kg)   Height: 5' 9\" (1.753 m)   PainSc:   0 - No pain       Physical Exam   Constitutional: He is oriented to person, place, and time and well-developed, well-nourished, and in no distress. HENT:   Head: Normocephalic and atraumatic.    Right Ear: External ear normal.   Left Ear: External ear normal.   Nose: Nose normal.   Mouth/Throat: Oropharynx is clear and moist. No oropharyngeal exudate. Eyes: Conjunctivae and EOM are normal. Pupils are equal, round, and reactive to light. Right eye exhibits no discharge. Left eye exhibits no discharge. No scleral icterus. Neck: Neck supple. Cardiovascular: Normal rate, regular rhythm, normal heart sounds and intact distal pulses. Pulmonary/Chest: Effort normal and breath sounds normal. No respiratory distress. He has no wheezes. He has no rales. Abdominal: Soft. Bowel sounds are normal. He exhibits no distension. There is no tenderness. There is no rebound and no guarding. Musculoskeletal: He exhibits no edema or tenderness (Bue). Lymphadenopathy:     He has no cervical adenopathy. Neurological: He is alert and oriented to person, place, and time. He exhibits normal muscle tone. Gait normal.   Skin: Skin is warm and dry. No erythema. Psychiatric: Affect normal.   Nursing note and vitals reviewed.       LABS   Data Review:   Lab Results   Component Value Date/Time    WBC 4.8 11/09/2018 09:12 AM    HGB 14.6 11/09/2018 09:12 AM    HCT 44.3 11/09/2018 09:12 AM    PLATELET 443 50/53/3252 09:12 AM    MCV 94.3 11/09/2018 09:12 AM       Lab Results   Component Value Date/Time    Sodium 138 11/09/2018 09:12 AM    Potassium 4.3 11/09/2018 09:12 AM    Chloride 104 11/09/2018 09:12 AM    CO2 27 11/09/2018 09:12 AM    Anion gap 7 11/09/2018 09:12 AM    Glucose 138 (H) 11/09/2018 09:12 AM    BUN 26 (H) 11/09/2018 09:12 AM    Creatinine 1.19 11/09/2018 09:12 AM    BUN/Creatinine ratio 22 (H) 11/09/2018 09:12 AM    GFR est AA >60 11/09/2018 09:12 AM    GFR est non-AA 60 (L) 11/09/2018 09:12 AM    Calcium 8.4 (L) 11/09/2018 09:12 AM       Lab Results   Component Value Date/Time    Cholesterol, total 161 11/09/2018 09:12 AM    HDL Cholesterol 44 11/09/2018 09:12 AM    LDL, calculated 97.8 11/09/2018 09:12 AM    VLDL, calculated 19.2 11/09/2018 09:12 AM Triglyceride 96 11/09/2018 09:12 AM    CHOL/HDL Ratio 3.7 11/09/2018 09:12 AM       Lab Results   Component Value Date/Time    Hemoglobin A1c 7.1 (H) 11/09/2018 09:12 AM    Hemoglobin A1c (POC) 6.4 06/20/2018 08:40 AM       Assessment/Plan:   1. Type 2 DM: Worsened. A1C is 7.1. +ETOH. -I will have patient return to clinic in 6 months to reassess his A1c. For now, we will proceed with lifestyle modification measures alone. I encouraged him to consume no more than 45 g of carb per meal and no more than 15 g of carb per snack. I also encouraged him to exercise as tolerated. I will recheck his weight at his follow-up. - I instructed him once again to restrict all alcohol beverage intake  - I encouraged him to get his formal eye exam scheduled later this yr.  -If his A1C has not improved by the time of his follow-up appointment, I will add medication. 2.  Hypertension: Stable. -Continue with medication as prescribed. 3.  Hyperlipidemia: Stable. - C/w rx as prescribed. 4. Hypothyroidism: +Mild hypocalcemia.  -Continue with Synthroid. He can take an over-the-counter multivitamin daily      Health Maintenance Due   Topic Date Due    Shingrix Vaccine Age 49> (1 of 2) 09/08/1993    DTaP/Tdap/Td series (1 - Tdap) 01/02/2007    EYE EXAM RETINAL OR DILATED Q1  11/28/2018     Lab review: labs are reviewed in the EHR     I have discussed the diagnosis with the patient and the intended plan as seen in the above orders. The patient has received an after-visit summary and questions were answered concerning future plans. I have discussed medication side effects and warnings with the patient as well. I have reviewed the plan of care with the patient, accepted their input and they are in agreement with the treatment goals. All questions were answered. The patient understands the plan of care. Handouts provided today with above information.  Pt instructed if symptoms worsen to call the office or report to the ED for continued care. Greater than 50% of the visit time was spent in counseling and/or coordination of care. Voice recognition was used to generate this report, which may have resulted in some phonetic based errors in grammar and contents. Even though attempts were made to correct all the mistakes, some may have been missed, and remained in the body of the document. Follow-up Disposition:  Return in about 6 months (around 5/15/2019) for BP check, DM check, weight check.     Dorian Crystal MD

## 2018-11-15 NOTE — PATIENT INSTRUCTIONS
Health Maintenance Due   Topic Date Due    Shingrix Vaccine Age 49> (1 of 2) 09/08/1993    DTaP/Tdap/Td series (1 - Tdap) 01/02/2007    EYE EXAM RETINAL OR DILATED Q1  11/28/2018       Medicare Wellness Visit, Male    The best way to live healthy is to have a lifestyle where you eat a well-balanced diet, exercise regularly, limit alcohol use, and quit all forms of tobacco/nicotine, if applicable. Regular preventive services are another way to keep healthy. Preventive services (vaccines, screening tests, monitoring & exams) can help personalize your care plan, which helps you manage your own care. Screening tests can find health problems at the earliest stages, when they are easiest to treat. [facility] [facility] follows the current, evidence-based guidelines published by the [facility] Higginbotham ([facility]) when recommending preventive services for our patients. Because we follow these guidelines, sometimes recommendations change over time as research supports it. (For example, a prostate screening blood test is no longer routinely recommended for men with no symptoms.)  Of course, you and your doctor may decide to screen more often for some diseases, based on your risk and co-morbidities (chronic disease you are already diagnosed with). Preventive services for you include:  - Medicare offers their members a free annual wellness visit, which is time for you and your primary care provider to discuss and plan for your preventive service needs. Take advantage of this benefit every year!  -All adults over age 72 should receive the recommended pneumonia vaccines. Current USPSTF guidelines recommend a series of two vaccines for the best pneumonia protection.   -All adults should have a flu vaccine yearly and an ECG.  All adults age 61 and older should receive a shingles vaccine once in their lifetime.    -All adults age 38-68 who are overweight should have a diabetes screening test once every three years.   -Other screening tests & preventive services for persons with diabetes include: an eye exam to screen for diabetic retinopathy, a kidney function test, a foot exam, and stricter control over your cholesterol.   -Cardiovascular screening for adults with routine risk involves an electrocardiogram (ECG) at intervals determined by the provider.   -Colorectal cancer screening should be done for adults age 54-65 with no increased risk factors for colorectal cancer. There are a number of acceptable methods of screening for this type of cancer. Each test has its own benefits and drawbacks. Discuss with your provider what is most appropriate for you during your annual wellness visit. The different tests include: colonoscopy (considered the best screening method), a fecal occult blood test, a fecal DNA test, and sigmoidoscopy.  -All adults born between [facility] should be screened once for Hepatitis C.  -An Abdominal Aortic Aneurysm (AAA) Screening is recommended for men age 73-68 who has ever smoked in their lifetime. Here is a list of your current Health Maintenance items (your personalized list of preventive services) with a due date:  Health Maintenance Due   Topic Date Due    Shingles Vaccine (1 of 2) 09/08/1993    DTaP/Tdap/Td  (1 - Tdap) 01/02/2007    Eye Exam  11/28/2018     Medicare Part B Preventive Services Limitations Recommendation Scheduled   Bone Mass Measurement  (age 72 & older, biennial) Requires diagnosis related to osteoporosis or estrogen deficiency. Biennial benefit unless patient has history of long-term glucocorticoid tx or baseline is needed because initial test was by other method Not applicable Not applicable   Cardiovascular Screening Blood Tests (every 5 years)  Total cholesterol, HDL, Triglycerides Order as a panel if possible We should check your cholesterol panel at least once every 5 years.  Up to date   Colorectal Cancer Screening  -Fecal occult blood test (annual)  -Flexible sigmoidoscopy (5y)  -Screening colonoscopy (10y)  -Barium Enema  Due per your Gastroenterologist's recommendations. Up to date   Counseling to Prevent Tobacco Use (up to 8 sessions per year)  - Counseling greater than 3 and up to 10 minutes  - Counseling greater than 10 minutes Patients must be asymptomatic of tobacco-related conditions to receive as preventive service Continue with smoking cessation efforts. Diabetes Screening Tests (at least every 3 years, Medicare covers annually or at 6-month intervals for prediabetic patients)    Fasting blood sugar (FBS) or glucose tolerance test (GTT) Patient must be diagnosed with one of the following:  -Hypertension, Dyslipidemia, obesity, previous impaired FBS or GTT  Or any two of the following: overweight, FH of diabetes, age ? 72, history of gestational diabetes, birth of baby weighing more than 9 pounds Should be done yearly Up to date   Diabetes Self-Management Training (DSMT) (no USPSTF recommendation) Requires referral by treating physician for patient with diabetes or renal disease. 10 hours of initial DSMT session of no less than 30 minutes each in a continuous 12-month period. 2 hours of follow-up DSMT in subsequent years. Discussed today   Glaucoma Screening (no USPSTF recommendation) Diabetes mellitus, family history, , age 48 or over,  American, age 72 or over Continue with annual eye exams. Overdue   Human Immunodeficiency Virus (HIV) Screening (annually for increased risk patients)  HIV-1 and HIV-2 by EIA, JEFERSON, rapid antibody test, or oral mucosa transudate Patient must be at increased risk for HIV infection per USPSTF guidelines or pregnant. Tests covered annually for patients at increased risk. Pregnant patients may receive up to 3 test during pregnancy.  Not applicable Not applicable   Medical Nutrition Therapy (MNT) (for diabetes or renal disease not recommended schedule) Requires referral by treating physician for patient with diabetes or renal disease. Can be provided in same year as diabetes self-management training (DSMT), and CMS recommends medical nutrition therapy take place after DSMT. Up to 3 hours for initial year and 2 hours in subsequent years. Discussed today   Shingles Vaccination A shingles vaccine is also recommended once in a lifetime after age 61 Vaccination recommended for shingles vaccination once after age 61 Overdue   Seasonal Influenza Vaccination (annually)  Continue with yearly \"flu\" shot annually Up to date   Pneumococcal Vaccination (once after 72)  Please receive this vaccination at age 72. Up to date   Hepatitis B Vaccinations (if medium/high risk) Medium/high risk factors:  End-stage renal disease,  Hemophiliacs who received Factor VIII or IX concentrates, Clients of institutions for the mentally retarded, Persons who live in the same house as a HepB virus carrier, Homosexual men, Illicit injectable drug abusers. Not applicable Not applicable   Screening Mammography (biennial age 54-69) Annually (age 36 or over) Not applicable Not applicable   Screening Pap Tests and Pelvic Examination (up to age 79 and after 79 if unknown history or abnormal study last 10 years) Every 25 months except high risk Not applicable Not applicable   Ultrasound Screening for Abdominal Aortic Aneurysm (AAA) (once) Patient must be referred through IPPE and not have had a screening for abdominal aortic aneurysm before under Medicare. Limited to patients who meet one of the following criteria:  - Men who are 73-68 years old and have smoked more than 100 cigarettes in their lifetime.  -Anyone with a FH of AAA  -Anyone recommended for screening by USPSTF Recommended once after age 72 if you have smoked cigarettes.   Not warranted       Medicare Wellness Visit, Male    The best way to live healthy is to have a lifestyle where you eat a well-balanced diet, exercise regularly, limit alcohol use, and quit all forms of tobacco/nicotine, if applicable. Regular preventive services are another way to keep healthy. Preventive services (vaccines, screening tests, monitoring & exams) can help personalize your care plan, which helps you manage your own care. Screening tests can find health problems at the earliest stages, when they are easiest to treat. [facility] follows the current, evidence-based guidelines published by the [facility] Higginbotham ([facility]) when recommending preventive services for our patients. Because we follow these guidelines, sometimes recommendations change over time as research supports it. (For example, a prostate screening blood test is no longer routinely recommended for men with no symptoms.)  Of course, you and your doctor may decide to screen more often for some diseases, based on your risk and co-morbidities (chronic disease you are already diagnosed with). Preventive services for you include:  - Medicare offers their members a free annual wellness visit, which is time for you and your primary care provider to discuss and plan for your preventive service needs. Take advantage of this benefit every year!  -All adults over age 72 should receive the recommended pneumonia vaccines. Current USPSTF guidelines recommend a series of two vaccines for the best pneumonia protection.   -All adults should have a flu vaccine yearly and an ECG.  All adults age 61 and older should receive a shingles vaccine once in their lifetime.    -All adults age 38-68 who are overweight should have a diabetes screening test once every three years.   -Other screening tests & preventive services for persons with diabetes include: an eye exam to screen for diabetic retinopathy, a kidney function test, a foot exam, and stricter control over your cholesterol.   -Cardiovascular screening for adults with routine risk involves an electrocardiogram (ECG) at intervals determined by the provider.   -Colorectal cancer screening should be done for adults age 54-65 with no increased risk factors for colorectal cancer. There are a number of acceptable methods of screening for this type of cancer. Each test has its own benefits and drawbacks. Discuss with your provider what is most appropriate for you during your annual wellness visit. The different tests include: colonoscopy (considered the best screening method), a fecal occult blood test, a fecal DNA test, and sigmoidoscopy.  -All adults born between [facility] should be screened once for Hepatitis C.  -An Abdominal Aortic Aneurysm (AAA) Screening is recommended for men age 73-68 who has ever smoked in their lifetime.      Here is a list of your current Health Maintenance items (your personalized list of preventive services) with a due date:  Health Maintenance Due   Topic Date Due    Shingles Vaccine (1 of 2) 09/08/1993    DTaP/Tdap/Td  (1 - Tdap) 01/02/2007    Eye Exam  11/28/2018

## 2018-11-15 NOTE — ACP (ADVANCE CARE PLANNING)
Advance Care Planning (ACP) Provider Conversation Snapshot    Date of ACP Conversation: 11/15/18  Persons included in Conversation:  patient  Length of ACP Conversation in minutes:  <16 minutes (Non-Billable)    Authorized Decision Maker (if patient is incapable of making informed decisions): This person is:   Healthcare Agent/Medical Power of  under Advance Directive    For Patients with Decision Making Capacity:   Values/Goals: Exploration of values, goals, and preferences if recovery is not expected, even with continued medical treatment in the event of:  Imminent death  Severe, permanent brain injury    Conversation Outcomes / Follow-Up Plan:   Reviewed existing Advance Directive . He has no changes to make to his preexisting advance directive.       Dr. Caballero Arm  [facility], [address] [place] [address], [address] [place] [address] Str.  Phone: [phone]  Fax: [phone]

## 2018-11-15 NOTE — PROGRESS NOTES
Chief Complaint   Patient presents with   [facility] Annual Wellness Visit       1. Have you been to the ER, urgent care clinic since your last visit? Hospitalized since your last visit? No    2. Have you seen or consulted any other health care providers outside of the [address] [place] [address] since your last visit? Include any pap smears or colon screening. No    Health Maintenance Due   Topic Date Due    Shingrix Vaccine Age 49> (1 of 2) 09/08/1993    DTaP/Tdap/Td series (1 - Tdap) 01/02/2007    EYE EXAM RETINAL OR DILATED Q1  11/28/2018     This is the Subsequent Medicare Annual Wellness Exam, performed 12 months or more after the Initial AWV or the last Subsequent AWV    I have reviewed the patient's medical history in detail and updated the computerized patient record. History   Pt is a 73yo male with h/o ED, melanoma in situ on left dorsal forearm s/p removal, MDD, rectal polyp, diverticulosis, CKD stage 3, HTN, type 2 DM (foot exams are done by Yusef Jones), hypothyroidism, DJD, gout, vocal cord nodule, depression/anxiety (followed by Psychiatry), vitamin D deficiency, RBBB (s/p normal stress test 2018), and HLD. Health Maintenance History  Immunizations reviewed: Tdap up to date   Pneumovax: up to date   Flu: up to date  Zoster: over-due    Immunization History   Administered Date(s) Administered    Influenza High Dose Vaccine PF 10/02/2015, 10/24/2016, 10/05/2017, 10/31/2018    Influenza Vaccine 10/01/2013, 10/15/2013, 11/20/2014    Influenza Vaccine Split 10/21/2011, 11/09/2012    Pneumococcal Conjugate (PCV-13) 06/24/2015    Pneumococcal Polysaccharide (PPSV-23) 01/01/2014, 08/10/2017    Pneumococcal Vaccine (Unspecified Type) 01/01/2007    Td 01/01/2007    Zoster Vaccine, Live 02/01/2013       Colonoscopy: No hematochezia/melena.  Up to date    Eye exam: He is scheduled for a formal eye exam.    PSA: No urinary sx        Past Medical History:   Diagnosis Date    Adverse effect of anesthesia Violent dreams with Ether    Arthritis     hip, knee    Chronic kidney disease     had previous reaction with kidneys after a bp med, no issues now    Colon polyps     dysplastic    Depression     Diabetes mellitus ([facility] [address] 2017    no meds    Diverticulosis     seen on colonoscopy from 8/20/15    Diverticulosis of sigmoid colon 01/27/10    GERD (gastroesophageal reflux disease)     no meds    Gout     Hepatitis     Hepatitis A - while he was young    Hyperlipidemia     Hypertension 15 years    Hypovitaminosis D     Nephrolithiasis     Osteoarthritis of right knee 2/19/2018    Plantar wart     Rectal polyp     seen on colonoscopy from 8/20/15    Thyroid disease     hypothyroidism      Past Surgical History:   Procedure Laterality Date    ENDOSCOPY, COLON, DIAGNOSTIC  01/27/2010    polyp distal rectum, removed; diverticulosis of sigmoid    HX CATARACT REMOVAL Bilateral     HX COLONOSCOPY      HX HEENT  2008    vocal cord polyp removed    HX POLYPECTOMY  01/27/2010    Distal rectum    HX TONSILLECTOMY      LAP,INGUINAL HERNIA REPR,INITIAL Right 05/31/2018    Dr. Cristine Joy Left 1/2014    TOTAL KNEE ARTHROPLASTY Right 02/2018     Current Outpatient Medications   Medication Sig Dispense Refill    diphtheria-pertussis, acellular,-tetanus (ACELL) 2.5-8-5 Lf-mcg-Lf/0.5mL susp susp 0.5 mL by IntraMUSCular route once for 1 dose. 0.5 mL 0    varicella-zoster recombinant, PF, (SHINGRIX) 50 mcg/0.5 mL susr injection 0.5 mL by IntraMUSCular route every two (2) months. 0.5 mL 1    levothyroxine (SYNTHROID) 50 mcg tablet TAKE ONE TABLET BY MOUTH DAILY BEFORE BREAKFAST 90 Tab 3    carvedilol (COREG) 6.25 mg tablet TAKE ONE TABLET BY MOUTH TWICE A DAY WITH MEALS 60 Tab 10    sildenafil, antihypertensive, (REVATIO) 20 mg tablet TAKE TWO TABLETS BY MOUTH DAILY 1 HOUR (RANGE: 30 MINUTES TO 4 HOURS) BEFORE SEXUAL ACTIVITY AS NEEDED.  MAXIMUM DOSE IS 5 TABLETS A DAY 30 Tab 10    ibuprofen (MOTRIN) 800 mg tablet Take 1 Tab by mouth three (3) times daily as needed for Pain. 40 Tab 0    ubidecarenone (COQ-10 PO) Take 200 mEq by mouth daily.  aspirin delayed-release 81 mg tablet Take 81 mg by mouth daily.  lisinopril (PRINIVIL, ZESTRIL) 5 mg tablet Take 1 Tab by mouth daily. (Patient taking differently: Take 5 mg by mouth nightly.) 90 Tab 3    atorvastatin (LIPITOR) 40 mg tablet Take 1 Tab by mouth daily. (Patient taking differently: Take 40 mg by mouth nightly.) 90 Tab 3    allopurinol (ZYLOPRIM) 100 mg tablet Take 2 Tabs by mouth daily. 180 Tab 3    terbinafine HCl (LAMISIL) 250 mg tablet Take 250 mg by mouth daily. Indications: Taken for 1 full week a month; and then not for another 3 weeks      venlafaxine-SR (EFFEXOR-XR) 150 mg capsule Take 150 mg by mouth daily.  multivitamin (ONE A DAY) tablet Take 1 Tab by mouth daily.  Cholecalciferol, Vitamin D3, (VITAMIN D) 2,000 unit Cap Take 4,000 Units by mouth daily. Allergies   Allergen Reactions    Amlodipine Other (comments)     BLE edema     Family History   Problem Relation Age of Onset   [address] [facility] Arthritis-rheumatoid Mother     Hypertension Mother    [facility] Elevated Lipids Mother     Thyroid Disease Sister     Cancer Father         colon    Heart Disease Other     Hypertension Other     Cancer Other         breast    Heart Disease Maternal Grandmother     Dementia Maternal Grandfather     Cancer Paternal Grandmother         breast    No Known Problems Paternal Grandfather      Social History     Tobacco Use    Smoking status: Former Smoker     Packs/day: 1.00     Years: 6.00     Pack years: 6.00     Types: Cigarettes     Last attempt to quit: 1992     Years since quittin.8    Smokeless tobacco: Never Used   Substance Use Topics    Alcohol use:  Yes     Alcohol/week: 6.0 oz     Types: 10 Glasses of wine per week     Comment: wine with dinner sometimes     Patient Active Problem List Diagnosis Code    Hyperlipidemia E78.5    Hypovitaminosis D E55.9    Gout M10.9    Family history of colon cancer. personal hx colon polyps Z80.0    Vocal cord nodule J38.2    Essential hypertension I10    Type 2 diabetes mellitus without complication (HCC) V87.0    Hypothyroidism due to acquired atrophy of thyroid E03.4    CKD (chronic kidney disease) stage 3, GFR 30-59 ml/min (HCC) N18.3    Rectal polyp -  seen on colonoscopy from 8/20/15 K62.1    Diverticulosis of intestine without bleeding - seen on colonoscopy from 8/20/15 K57.90    Major depressive disorder in remission F32.5    Melanoma in situ - on left dorsal forearm 2016 D03.9    Erectile dysfunction N52.9    Osteoarthritis of right knee M17.11    Right inguinal hernia K40.90       Depression Risk Factor Screening:     PHQ over the last two weeks 11/15/2018   Little interest or pleasure in doing things Not at all   Feeling down, depressed, irritable, or hopeless Not at all   Total Score PHQ 2 0   Trouble falling or staying asleep, or sleeping too much -   Feeling tired or having little energy -   Poor appetite, weight loss, or overeating -   Feeling bad about yourself - or that you are a failure or have let yourself or your family down -   Trouble concentrating on things such as school, work, reading, or watching TV -   Moving or speaking so slowly that other people could have noticed; or the opposite being so fidgety that others notice -   Thoughts of being better off dead, or hurting yourself in some way -   PHQ 9 Score -   How difficult have these problems made it for you to do your work, take care of your home and get along with others -     He denies depression sx today. He lost his mother to CHF related issues in Sept. 2018. She was in her 80s. He states he is doing ok. +ETOH intake - as discussed in my E/M visit portion. I encouraged him to stop all ETOH intake and to c/w Psychiatry f/u and rx per their recommendation.  I encouraged him to replace ETOH with a healthier alternative behavior. Alcohol Risk Factor Screening:   Patient will consume 10 glasses of wine per week    Functional Ability and Level of Safety:   Hearing Loss  Hearing is good. Activities of Daily Living  The home contains: no safety equipment. Patient does total self care    Fall Risk  Fall Risk Assessment, last 12 mths 11/15/2018   Able to walk? Yes   Fall in past 12 months? No   Fall with injury? -   Number of falls in past 12 months -       Abuse Screen  Patient is not abused     ROS:  Gen: No fever/chlls  HEENT: No sore throat, eye pain, ear pain, or congestion. No HA  CV: No CP  Resp: No cough/SOB  GI: No abdominal pain  : No hematuria/dysuria  Derm: No rash  Neuro: No new paresthesias/weakness  Musc: No new myalgias/jt pain  Psych: No depression sx      Cognitive Screening   Evaluation of Cognitive Function:  Has your family/caregiver stated any concerns about your memory: no  Normal     Visit Vitals  /74 (BP 1 Location: Left arm, BP Patient Position: Sitting)   Pulse 61   Temp 97.6 °F (36.4 °C) (Oral)   Resp 12   Ht 5' 9\" (1.753 m)   Wt 214 lb 3.2 oz (97.2 kg)   SpO2 99%   BMI 31.63 kg/m²       General:  Alert, cooperative, no distress   Head:  Normocephalic, without obvious abnormality, atraumatic. Eyes:  Conjunctivae clear   Ears:  Clear external auditory canals   Nose: Nares normal. Septum midline. Mucosa normal. No drainage or sinus tenderness. Throat: Lips, mucosa, and tongue normal. Unremarkable oropharynx   Neck: Supple, symmetrical, trachea midline, no adenopathy. Lungs:   Clear to auscultation bilaterally. Heart:  Regular rate and rhythm, S1, S2 normal, no murmur, click, rub or gallop. Abdomen:   Soft, non-tender. Bowel sounds normal. No masses. Extremities: Extremities normal no edema. Pulses: +2 radial pulses b/l   Skin:  No rashes or lesions.    Lymph nodes: Cervical LN normal.   Neurologic:  Psych: Stable gait  Euthymic     3/3 short item recall  Unremarkable clock drawing     Patient Care Team   Patient Care Team:  Chilango Quintana MD as PCP - General (Family Practice)  Bill Em, SHELBIE as Ambulatory Care Navigator (Internal Medicine)  Alisa Palacio, [address] (Physician Assistant)  Montse Birch MD (Gastroenterology)  Bobby Cheadle, MD (Otolaryngology)  Heena Thapa MD (Orthopedic Surgery)  Shayy Cortez DPM (Podiatry)  Bryan Ferrera DPM (Podiatry)  Miranda Trevino MD (Ophthalmology)  Abi Constantino MD (Orthopedic Surgery)  Slim Pizarro MD (Unknown Physician Specialty)  Doug Crawford, SHELBIE as Ambulatory Care Navigator (Internal Medicine)  Lisbeth Oreilly NP as Nurse Practitioner (Nurse Practitioner)    Assessment/Plan   Education and counseling provided:  Are appropriate based on today's review and evaluation    Diagnoses and all orders for this visit:    1. Recurrent major depressive disorder, in remission ([facility] [address]    2. Type 2 diabetes mellitus without complication, unspecified whether long term insulin use ([facility] [address]    3. Essential hypertension    4. Hypothyroidism due to acquired atrophy of thyroid    5. Mixed hyperlipidemia    Other orders  -     diphtheria-pertussis, acellular,-tetanus (ACELL) 2.5-8-5 Lf-mcg-Lf/0.5mL susp susp; 0.5 mL by IntraMUSCular route once for 1 dose.  -     varicella-zoster recombinant, PF, (SHINGRIX) 50 mcg/0.5 mL susr injection; 0.5 mL by IntraMUSCular route every two (2) months. Health Maintenance Due   Topic Date Due    Shingrix Vaccine Age 49> (1 of 2) 09/08/1993    DTaP/Tdap/Td series (1 - Tdap) 01/02/2007    EYE EXAM RETINAL OR DILATED Q1  11/28/2018     Health Maintenance:   -I encouraged him to get all recommended vaccinations.  -He is already receiving diabetes education resources with [employer] [facility].  He is content with their services, declining additional DM education resources.  -I encouraged him to stop all alcoholic beverage consumption.  -We discussed his diabetes goals. I encouraged him to lower his carb intake. - I encouraged him to get his formal eye exam.      Advance Care Planning (ACP) Provider Conversation Snapshot    Date of ACP Conversation: 11/15/18  Persons included in Conversation:  patient  Length of ACP Conversation in minutes:  <16 minutes (Non-Billable)    Authorized Decision Maker (if patient is incapable of making informed decisions): This person is:   Healthcare Agent/Medical Power of  under Advance Directive    For Patients with Decision Making Capacity:   Values/Goals: Exploration of values, goals, and preferences if recovery is not expected, even with continued medical treatment in the event of:  Imminent death  Severe, permanent brain injury    Conversation Outcomes / Follow-Up Plan:   Reviewed existing Advance Directive . He has no changes to make to his preexisting advance directive.       Dr. Alana Carreon  [facility] [address], [address] [place] [address] Str.  Phone: [phone]  Fax: [phone]

## 2018-12-03 DIAGNOSIS — E11.9 TYPE 2 DIABETES MELLITUS WITHOUT COMPLICATION (HCC): ICD-10-CM

## 2018-12-03 DIAGNOSIS — M10.9 ACUTE GOUT, UNSPECIFIED CAUSE, UNSPECIFIED SITE: ICD-10-CM

## 2018-12-03 DIAGNOSIS — N18.2 RENAL FAILURE, CHRONIC, STAGE 2 (MILD): ICD-10-CM

## 2018-12-03 RX ORDER — ALLOPURINOL 100 MG/1
TABLET ORAL
Qty: 180 TAB | Refills: 2 | Status: SHIPPED | OUTPATIENT
Start: 2018-12-03 | End: 2019-09-09 | Stop reason: SDUPTHER

## 2019-02-25 DIAGNOSIS — E78.2 MIXED HYPERLIPIDEMIA: ICD-10-CM

## 2019-02-25 RX ORDER — ATORVASTATIN CALCIUM 40 MG/1
TABLET, FILM COATED ORAL
Qty: 90 TAB | Refills: 2 | Status: SHIPPED | OUTPATIENT
Start: 2019-02-25 | End: 2019-12-17 | Stop reason: SDUPTHER

## 2019-02-25 RX ORDER — SCOLOPAMINE TRANSDERMAL SYSTEM 1 MG/1
1 PATCH, EXTENDED RELEASE TRANSDERMAL
Qty: 6 PATCH | Refills: 0 | Status: SHIPPED | OUTPATIENT
Start: 2019-02-25 | End: 2020-01-30 | Stop reason: ALTCHOICE

## 2019-02-25 NOTE — TELEPHONE ENCOUNTER
Patient contacted, patient identified with two identifiers (Name & ). Patient is aware of rx sent to the pharmacy.

## 2019-03-04 ENCOUNTER — TELEPHONE (OUTPATIENT)
Dept: INTERNAL MEDICINE CLINIC | Age: 74
End: 2019-03-04

## 2019-07-05 ENCOUNTER — OFFICE VISIT (OUTPATIENT)
Dept: INTERNAL MEDICINE CLINIC | Age: 74
End: 2019-07-05

## 2019-07-05 VITALS
BODY MASS INDEX: 31.9 KG/M2 | TEMPERATURE: 96.5 F | OXYGEN SATURATION: 99 % | RESPIRATION RATE: 14 BRPM | DIASTOLIC BLOOD PRESSURE: 90 MMHG | HEART RATE: 58 BPM | WEIGHT: 215.4 LBS | SYSTOLIC BLOOD PRESSURE: 155 MMHG | HEIGHT: 69 IN

## 2019-07-05 DIAGNOSIS — E03.4 HYPOTHYROIDISM DUE TO ACQUIRED ATROPHY OF THYROID: ICD-10-CM

## 2019-07-05 DIAGNOSIS — M1A.9XX0 CHRONIC GOUT WITHOUT TOPHUS, UNSPECIFIED CAUSE, UNSPECIFIED SITE: ICD-10-CM

## 2019-07-05 DIAGNOSIS — E11.9 TYPE 2 DIABETES MELLITUS WITHOUT COMPLICATION, UNSPECIFIED WHETHER LONG TERM INSULIN USE (HCC): Primary | ICD-10-CM

## 2019-07-05 DIAGNOSIS — E78.2 MIXED HYPERLIPIDEMIA: ICD-10-CM

## 2019-07-05 DIAGNOSIS — D03.9 MELANOMA IN SITU, UNSPECIFIED SITE (HCC): ICD-10-CM

## 2019-07-05 DIAGNOSIS — F33.40 RECURRENT MAJOR DEPRESSIVE DISORDER, IN REMISSION (HCC): ICD-10-CM

## 2019-07-05 DIAGNOSIS — I10 ESSENTIAL HYPERTENSION: ICD-10-CM

## 2019-07-05 PROBLEM — E11.21 TYPE 2 DIABETES WITH NEPHROPATHY (HCC): Status: ACTIVE | Noted: 2019-07-05

## 2019-07-05 LAB — HBA1C MFR BLD HPLC: 6.6 %

## 2019-07-05 NOTE — PROGRESS NOTES
INTERNISTS [facility]:  7/5/2019, MRN: [id number]      Vivian Cm is a 74 y.o. male and presents to clinic for Diabetes (follow up ROOM  3)    Subjective:   Pt is a 75yo male with h/o ED, melanoma in situ on left dorsal forearm s/p removal, MDD, rectal polyp, diverticulosis, CKD stage 3, HTN, type 2 DM (foot exams are done by Marine Harada), hypothyroidism, DJD, gout, vocal cord nodule, depression/anxiety (followed by Psychiatry), vitamin D deficiency, RBBB (s/p normal stress test 2018), and HLD. 1. Type 2 DM: Present for years. At his last appointment, he reported drinking excessive amounts of alcohol. His A1c was 7.1 when last checked in 2018. Since then, he has cut down on his ETOH intake. His A1C is 6.6. He drank a bottle of wine last night. He continues to drink ETOH but has cut down since his last apt.    2.  Hypertension: Present for over 6 months. On carvedilol and lisinopril. No adverse side effects of taking these medications. Bp is 155/90. today. 3. HLD: Present for over 6 months. On Lipitor. No adverse side effects of taking this medication. His weight is 215lbs. He is not regularly exercising or dieting. 4.  Hypothyroidism: Present for over 6 months. Taking Synthroid 50 mcg daily. No refills are needed. 5. H/o Skin Cancer: No new suspicious skin lesions. His next Dermatology apt: on Tuesday. He is wearing sunscreen sometimes. 6. Depression: Present for >1 yr. Followed by Psychiatry. On effexor. He was started on an additional rx (he does not know the name) to help him cut back on ETOH intake. He has a compulsion to drink each day around 5pm. Others around him are complaining about his drinking. He had a bottle of wine last night. He has anxiety sx as well. He drinks to \"take the edge off. \"       Patient Active Problem List    Diagnosis Date Noted    Right inguinal hernia 05/17/2018    Osteoarthritis of right knee 02/19/2018    Erectile dysfunction 01/23/2017    Melanoma in situ - on left dorsal forearm 2016 09/16/2016    Major depressive disorder in remission 08/03/2016    Rectal polyp -  seen on colonoscopy from 8/20/15 08/02/2016    Diverticulosis of intestine without bleeding - seen on colonoscopy from 8/20/15 08/02/2016    CKD (chronic kidney disease) stage 3, GFR 30-59 ml/min ([facility] 11/09/2015    Essential hypertension 06/24/2015    [facility] 18/21/0249    Hypothyroidism due to acquired atrophy of thyroid 06/24/2015    Vocal cord nodule 10/21/2013    Gout 10/21/2011    Family history of colon cancer. personal hx colon polyps 10/21/2011    Hyperlipidemia     Hypovitaminosis D        Current Outpatient Medications   Medication Sig Dispense Refill    atorvastatin (LIPITOR) 40 mg tablet TAKE ONE TABLET BY MOUTH DAILY 90 Tab 2    lisinopril (PRINIVIL, ZESTRIL) 5 mg tablet TAKE ONE TABLET BY MOUTH DAILY 90 Tab 3    allopurinol (ZYLOPRIM) 100 mg tablet TAKE TWO TABLETS BY MOUTH DAILY 180 Tab 2    varicella-zoster recombinant, PF, (SHINGRIX) 50 mcg/0.5 mL susr injection 0.5 mL by IntraMUSCular route every two (2) months. 0.5 mL 1    levothyroxine (SYNTHROID) 50 mcg tablet TAKE ONE TABLET BY MOUTH DAILY BEFORE BREAKFAST 90 Tab 3    carvedilol (COREG) 6.25 mg tablet TAKE ONE TABLET BY MOUTH TWICE A DAY WITH MEALS 60 Tab 10    sildenafil, antihypertensive, (REVATIO) 20 mg tablet TAKE TWO TABLETS BY MOUTH DAILY 1 HOUR (RANGE: 30 MINUTES TO 4 HOURS) BEFORE SEXUAL ACTIVITY AS NEEDED. MAXIMUM DOSE IS 5 TABLETS A DAY 30 Tab 10    ibuprofen (MOTRIN) 800 mg tablet Take 1 Tab by mouth three (3) times daily as needed for Pain. 40 Tab 0    ubidecarenone (COQ-10 PO) Take 200 mEq by mouth daily.  aspirin delayed-release 81 mg tablet Take 81 mg by mouth daily.  venlafaxine-SR (EFFEXOR-XR) 150 mg capsule Take 150 mg by mouth daily.  multivitamin (ONE A DAY) tablet Take 1 Tab by mouth daily.       Cholecalciferol, Vitamin D3, (VITAMIN D) 2,000 unit Cap Take 4,000 Units by mouth daily.  scopolamine (TRANSDERM-SCOP) 1 mg over 3 days pt3d 1 Patch by TransDERmal route every seventy-two (72) hours. 6 Patch 0    terbinafine HCl (LAMISIL) 250 mg tablet Take 250 mg by mouth daily.  Indications: Taken for 1 full week a month; and then not for another 3 weeks         Allergies   Allergen Reactions    Amlodipine Other (comments)     BLE edema       Past Medical History:   Diagnosis Date    Adverse effect of anesthesia     Violent dreams with Ether    Arthritis     hip, knee    Chronic kidney disease     had previous reaction with kidneys after a bp med, no issues now    Colon polyps     dysplastic    Depression     Diabetes mellitus ([facility] 2017    no meds    Diverticulosis     seen on colonoscopy from 8/20/15    Diverticulosis of sigmoid colon 01/27/10    GERD (gastroesophageal reflux disease)     no meds    Gout     Hepatitis     Hepatitis A - while he was young    Hyperlipidemia     Hypertension 15 years    Hypovitaminosis D     Nephrolithiasis     Osteoarthritis of right knee 2/19/2018    Plantar wart     Rectal polyp     seen on colonoscopy from 8/20/15    Thyroid disease     hypothyroidism       Past Surgical History:   Procedure Laterality Date    ENDOSCOPY, COLON, DIAGNOSTIC  01/27/2010    polyp distal rectum, removed; diverticulosis of sigmoid    HX CATARACT REMOVAL Bilateral     HX COLONOSCOPY      HX HEENT  2008    vocal cord polyp removed    HX POLYPECTOMY  01/27/2010    Distal rectum    HX TONSILLECTOMY      Jettie Carly Right 05/31/2018    Dr. Lester Benítez Left 1/2014    TOTAL KNEE ARTHROPLASTY Right 02/2018       Family History   Problem Relation Age of Onset   [facility] Arthritis-rheumatoid Mother     Hypertension Mother     Elevated Lipids Mother     Thyroid Disease Sister     Cancer Father         colon    Heart Disease Other     Hypertension Other     Cancer Other         breast    Heart Disease Maternal Grandmother     Dementia Maternal Grandfather     Cancer Paternal Grandmother         breast    No Known Problems Paternal Grandfather        Social History     Tobacco Use    Smoking status: Former Smoker     Packs/day: 1.00     Years: 6.00     Pack years: 6.00     Types: Cigarettes     Last attempt to quit: 1992     Years since quittin.5    Smokeless tobacco: Never Used   Substance Use Topics    Alcohol use: Yes     Alcohol/week: 6.0 oz     Types: 10 Glasses of wine per week     Comment: wine with dinner sometimes       ROS   Review of Systems   Constitutional: Negative for chills and fever. HENT: Negative for ear pain and sore throat. Eyes: Negative for blurred vision and pain. Respiratory: Negative for cough and shortness of breath. Cardiovascular: Negative for chest pain. Gastrointestinal: Negative for abdominal pain, blood in stool and melena. Genitourinary: Negative for dysuria and hematuria. Musculoskeletal: Negative for joint pain and myalgias. Skin: Negative for rash. Neurological: Negative for tingling, focal weakness and headaches. Endo/Heme/Allergies: Does not bruise/bleed easily. Psychiatric/Behavioral: Negative for substance abuse. Objective     Vitals:    19 0907 19 0918   BP: (!) 166/92 155/90   Pulse: (!) 59 (!) 58   Resp: 14 14   Temp: 96.5 °F (35.8 °C)    TempSrc: Oral    SpO2: 99%    Weight: 215 lb 6.4 oz (97.7 kg)    Height: 5' 9\" (1.753 m)    PainSc:   0 - No pain      BP Readings from Last 3 Encounters:   19 155/90   11/15/18 135/74   18 (!) 167/95     Physical Exam   Constitutional: He is oriented to person, place, and time and well-developed, well-nourished, and in no distress. HENT:   Head: Normocephalic and atraumatic.    Right Ear: External ear normal.   Left Ear: External ear normal.   Nose: Nose normal.   Mouth/Throat: Oropharynx is clear and moist. No oropharyngeal exudate. Eyes: Conjunctivae and EOM are normal. Right eye exhibits no discharge. Left eye exhibits no discharge. No scleral icterus. Neck: Neck supple. Cardiovascular: Normal rate, normal heart sounds and intact distal pulses. Pulmonary/Chest: Effort normal and breath sounds normal. No respiratory distress. Abdominal: Soft. Bowel sounds are normal. He exhibits no distension. There is no tenderness. Musculoskeletal: He exhibits no edema or tenderness (BUE). Lymphadenopathy:     He has no cervical adenopathy. Neurological: He is alert and oriented to person, place, and time. He exhibits normal muscle tone. Gait normal.   Skin: Skin is warm and dry. No erythema. Psychiatric: Affect normal.   Nursing note and vitals reviewed.       LABS   Data Review:   Lab Results   Component Value Date/Time    WBC 4.8 11/09/2018 09:12 AM    HGB 14.6 11/09/2018 09:12 AM    HCT 44.3 11/09/2018 09:12 AM    PLATELET 864 04/27/5052 09:12 AM    MCV 94.3 11/09/2018 09:12 AM       Lab Results   Component Value Date/Time    Sodium 138 11/09/2018 09:12 AM    Potassium 4.3 11/09/2018 09:12 AM    Chloride 104 11/09/2018 09:12 AM    CO2 27 11/09/2018 09:12 AM    Anion gap 7 11/09/2018 09:12 AM    Glucose 138 (H) 11/09/2018 09:12 AM    BUN 26 (H) 11/09/2018 09:12 AM    Creatinine 1.19 11/09/2018 09:12 AM    BUN/Creatinine ratio 22 (H) 11/09/2018 09:12 AM    GFR est AA >60 11/09/2018 09:12 AM    GFR est non-AA 60 (L) 11/09/2018 09:12 AM    Calcium 8.4 (L) 11/09/2018 09:12 AM       Lab Results   Component Value Date/Time    Cholesterol, total 161 11/09/2018 09:12 AM    HDL Cholesterol 44 11/09/2018 09:12 AM    LDL, calculated 97.8 11/09/2018 09:12 AM    VLDL, calculated 19.2 11/09/2018 09:12 AM    Triglyceride 96 11/09/2018 09:12 AM    CHOL/HDL Ratio 3.7 11/09/2018 09:12 AM       Lab Results   Component Value Date/Time    Hemoglobin A1c 7.1 (H) 11/09/2018 09:12 AM    Hemoglobin A1c (POC) 6.6 07/05/2019 09:21 AM       Assessment/Plan:   1. Type 2 diabetes mellitus without complication:  Diet controlled. A1C is 6.6. +ETOH intake. - I encouraged him to stop all alcoholic beverage consumption. I suspect that his A1c will improve even further when he stops this. I will recheck his weight at his follow-up appointment as well. We discussed the need to reduce liquid calories. - Checking a urine protein screen, CMP, lipid panel, and A1c just before his follow-up appointment. ORDERS:  - AMB POC HEMOGLOBIN A1C    2. Depression:  -I encouraged him to continue with Rx as prescribed by his psychiatrist.  -I encouraged him to schedule an appointment for counseling. He has done counseling in the past and found that it helped with depression symptoms. 3.  Hypothyroidism: Stable. -Continue with Rx as prescribed.  -Checking a CBC and TFTs just before his follow-up appointment. 4.  Hypertension: BP is likely falsely high from white coat HTN  -Continue with Rx as prescribed. - RTC for a BP check  - Checking a urine protein screen, CMP, lipid panel, and A1c just before his follow-up appointment. 5.  General:  -I will check a uric acid level just before his follow-up appointment given history of gout. 6.  Hyperlipidemia: Stable. -Continue with Rx as prescribed.  -Checking a CMP and a lipid panel just before his follow-up appointment. 7.  History of Skin Cancer: Status post removal.  -I encouraged him to wear sunscreen daily.  - I encouraged him to follow-up with his dermatologist for serial skin exams. Health Maintenance Due   Topic Date Due    Shingrix Vaccine Age 49> (1 of 2) 09/08/1993    DTaP/Tdap/Td series (1 - Tdap) 01/02/2007    HEMOGLOBIN A1C Q6M  05/09/2019     Lab review: labs are reviewed in the EHR and ordered as mentioned above. I have discussed the diagnosis with the patient and the intended plan as seen in the above orders.   The patient has received an after-visit summary and questions were answered concerning future plans. I have discussed medication side effects and warnings with the patient as well. I have reviewed the plan of care with the patient, accepted their input and they are in agreement with the treatment goals. All questions were answered. The patient understands the plan of care. Handouts provided today with above information. Pt instructed if symptoms worsen to call the office or report to the ED for continued care. Greater than 50% of the visit time was spent in counseling and/or coordination of care. Voice recognition was used to generate this report, which may have resulted in some phonetic based errors in grammar and contents. Even though attempts were made to correct all the mistakes, some may have been missed, and remained in the body of the document.           Isabel Louis MD

## 2019-07-05 NOTE — PROGRESS NOTES
Chief Complaint   Patient presents with    Diabetes     follow up ROOM  3       1. Have you been to the ER, urgent care clinic since your last visit? Hospitalized since your last visit? No    2. Have you seen or consulted any other health care providers outside of the [address] [place] [address] since your last visit? Include any pap smears or colon screening.  No      Health Maintenance Due   Topic Date Due    Shingrix Vaccine Age 49> (1 of 2) 09/08/1993    DTaP/Tdap/Td series (1 - Tdap) 01/02/2007    HEMOGLOBIN A1C Q6M  05/09/2019

## 2019-07-05 NOTE — PATIENT INSTRUCTIONS
Health Maintenance Due   Topic Date Due    Shingrix Vaccine Age 49> (1 of 2) 09/08/1993    DTaP/Tdap/Td series (1 - Tdap) 01/02/2007    HEMOGLOBIN A1C Q6M  05/09/2019          Learning About Diabetes Food Guidelines  Your Care Instructions    Meal planning is important to manage diabetes. It helps keep your blood sugar at a target level (which you set with your doctor). You don't have to eat special foods. You can eat what your family eats, including sweets once in a while. But you do have to pay attention to how often you eat and how much you eat of certain foods. You may want to work with a dietitian or a certified diabetes educator (CDE) to help you plan meals and snacks. A dietitian or CDE can also help you lose weight if that is one of your goals. What should you know about eating carbs? Managing the amount of carbohydrate (carbs) you eat is an important part of healthy meals when you have diabetes. Carbohydrate is found in many foods. · Learn which foods have carbs. And learn the amounts of carbs in different foods. ? Bread, cereal, pasta, and rice have about 15 grams of carbs in a serving. A serving is 1 slice of bread (1 ounce), ½ cup of cooked cereal, or 1/3 cup of cooked pasta or rice. ? Fruits have 15 grams of carbs in a serving. A serving is 1 small fresh fruit, such as an apple or orange; ½ of a banana; ½ cup of cooked or canned fruit; ½ cup of fruit juice; 1 cup of melon or raspberries; or 2 tablespoons of dried fruit. ? Milk and no-sugar-added yogurt have 15 grams of carbs in a serving. A serving is 1 cup of milk or 2/3 cup of no-sugar-added yogurt. ? Starchy vegetables have 15 grams of carbs in a serving. A serving is ½ cup of mashed potatoes or sweet potato; 1 cup winter squash; ½ of a small baked potato; ½ cup of cooked beans; or ½ cup cooked corn or green peas.   · Learn how much carbs to eat each day and at each meal. A dietitian or CDE can teach you how to keep track of the amount of carbs you eat. This is called carbohydrate counting. · If you are not sure how to count carbohydrate grams, use the Plate Method to plan meals. It is a good, quick way to make sure that you have a balanced meal. It also helps you spread carbs throughout the day. ? Divide your plate by types of foods. Put non-starchy vegetables on half the plate, meat or other protein food on one-quarter of the plate, and a grain or starchy vegetable in the final quarter of the plate. To this you can add a small piece of fruit and 1 cup of milk or yogurt, depending on how many carbs you are supposed to eat at a meal.  · Try to eat about the same amount of carbs at each meal. Do not \"save up\" your daily allowance of carbs to eat at one meal.  · Proteins have very little or no carbs per serving. Examples of proteins are beef, chicken, turkey, fish, eggs, tofu, cheese, cottage cheese, and peanut butter. A serving size of meat is 3 ounces, which is about the size of a deck of cards. Examples of meat substitute serving sizes (equal to 1 ounce of meat) are 1/4 cup of cottage cheese, 1 egg, 1 tablespoon of peanut butter, and ½ cup of tofu. How can you eat out and still eat healthy? · Learn to estimate the serving sizes of foods that have carbohydrate. If you measure food at home, it will be easier to estimate the amount in a serving of restaurant food. · If the meal you order has too much carbohydrate (such as potatoes, corn, or baked beans), ask to have a low-carbohydrate food instead. Ask for a salad or green vegetables. · If you use insulin, check your blood sugar before and after eating out to help you plan how much to eat in the future. · If you eat more carbohydrate at a meal than you had planned, take a walk or do other exercise. This will help lower your blood sugar. What else should you know? · Limit saturated fat, such as the fat from meat and dairy products.  This is a healthy choice because people who have diabetes are at higher risk of heart disease. So choose lean cuts of meat and nonfat or low-fat dairy products. Use olive or canola oil instead of butter or shortening when cooking. · Don't skip meals. Your blood sugar may drop too low if you skip meals and take insulin or certain medicines for diabetes. · Check with your doctor before you drink alcohol. Alcohol can cause your blood sugar to drop too low. Alcohol can also cause a bad reaction if you take certain diabetes medicines. Follow-up care is a key part of your treatment and safety. Be sure to make and go to all appointments, and call your doctor if you are having problems. It's also a good idea to know your test results and keep a list of the medicines you take. Where can you learn more? Go to http://sapna-araseli.info/. Enter R922 in the search box to learn more about \"Learning About Diabetes Food Guidelines. \"  Current as of: July 25, 2018  Content Version: 11.9  © 9420-2698 [employer]. Care instructions adapted under license by [employer] (which disclaims liability or warranty for this information). If you have questions about a medical condition or this instruction, always ask your healthcare professional. [address] any warranty or liability for your use of this information.

## 2019-08-01 DIAGNOSIS — N52.9 ERECTILE DYSFUNCTION, UNSPECIFIED ERECTILE DYSFUNCTION TYPE: ICD-10-CM

## 2019-08-01 RX ORDER — SILDENAFIL CITRATE 20 MG/1
TABLET ORAL
Qty: 30 TAB | Refills: 9 | Status: SHIPPED | OUTPATIENT
Start: 2019-08-01 | End: 2020-10-28

## 2019-09-09 DIAGNOSIS — E11.9 TYPE 2 DIABETES MELLITUS WITHOUT COMPLICATION (HCC): ICD-10-CM

## 2019-09-09 DIAGNOSIS — M10.9 ACUTE GOUT, UNSPECIFIED CAUSE, UNSPECIFIED SITE: ICD-10-CM

## 2019-09-09 DIAGNOSIS — N18.2 RENAL FAILURE, CHRONIC, STAGE 2 (MILD): ICD-10-CM

## 2019-09-10 RX ORDER — ALLOPURINOL 100 MG/1
TABLET ORAL
Qty: 180 TAB | Refills: 1 | Status: SHIPPED | OUTPATIENT
Start: 2019-09-10 | End: 2020-03-24

## 2019-11-01 ENCOUNTER — HOSPITAL ENCOUNTER (OUTPATIENT)
Dept: LAB | Age: 74
Discharge: HOME | End: 2019-11-01
Payer: MEDICARE

## 2019-11-01 ENCOUNTER — APPOINTMENT (OUTPATIENT)
Dept: INTERNAL MEDICINE CLINIC | Age: 74
End: 2019-11-01

## 2019-11-01 DIAGNOSIS — I10 ESSENTIAL HYPERTENSION: ICD-10-CM

## 2019-11-01 DIAGNOSIS — E11.9 TYPE 2 DIABETES MELLITUS WITHOUT COMPLICATION, UNSPECIFIED WHETHER LONG TERM INSULIN USE (HCC): ICD-10-CM

## 2019-11-01 DIAGNOSIS — E03.4 HYPOTHYROIDISM DUE TO ACQUIRED ATROPHY OF THYROID: ICD-10-CM

## 2019-11-01 DIAGNOSIS — M1A.9XX0 CHRONIC GOUT WITHOUT TOPHUS, UNSPECIFIED CAUSE, UNSPECIFIED SITE: ICD-10-CM

## 2019-11-01 DIAGNOSIS — D03.9 MELANOMA IN SITU, UNSPECIFIED SITE (HCC): ICD-10-CM

## 2019-11-01 DIAGNOSIS — E78.2 MIXED HYPERLIPIDEMIA: ICD-10-CM

## 2019-11-01 LAB
ALBUMIN SERPL-MCNC: 4.4 G/DL (ref 3.4–5)
ALBUMIN/GLOB SERPL: 1.5 {RATIO} (ref 0.8–1.7)
ALP SERPL-CCNC: 115 U/L (ref 45–117)
ALT SERPL-CCNC: 31 U/L (ref 16–61)
ANION GAP SERPL CALC-SCNC: 6 MMOL/L (ref 3–18)
AST SERPL-CCNC: 21 U/L (ref 10–38)
BASOPHILS # BLD: 0 K/UL (ref 0–0.1)
BASOPHILS NFR BLD: 0 % (ref 0–2)
BILIRUB SERPL-MCNC: 0.5 MG/DL (ref 0.2–1)
BUN SERPL-MCNC: 27 MG/DL (ref 7–18)
BUN/CREAT SERPL: 23 (ref 12–20)
CALCIUM SERPL-MCNC: 9 MG/DL (ref 8.5–10.1)
CHLORIDE SERPL-SCNC: 107 MMOL/L (ref 100–111)
CO2 SERPL-SCNC: 28 MMOL/L (ref 21–32)
CREAT SERPL-MCNC: 1.2 MG/DL (ref 0.6–1.3)
DIFFERENTIAL METHOD BLD: ABNORMAL
EOSINOPHIL # BLD: 0.2 K/UL (ref 0–0.4)
EOSINOPHIL NFR BLD: 3 % (ref 0–5)
ERYTHROCYTE [DISTWIDTH] IN BLOOD BY AUTOMATED COUNT: 13 % (ref 11.6–14.5)
GLOBULIN SER CALC-MCNC: 2.9 G/DL (ref 2–4)
GLUCOSE SERPL-MCNC: 155 MG/DL (ref 74–99)
HBA1C MFR BLD: 7.1 % (ref 4.2–5.6)
HCT VFR BLD AUTO: 44.8 % (ref 36–48)
HGB BLD-MCNC: 14.8 G/DL (ref 13–16)
LYMPHOCYTES # BLD: 1.6 K/UL (ref 0.9–3.6)
LYMPHOCYTES NFR BLD: 27 % (ref 21–52)
MCH RBC QN AUTO: 31.9 PG (ref 24–34)
MCHC RBC AUTO-ENTMCNC: 33 G/DL (ref 31–37)
MCV RBC AUTO: 96.6 FL (ref 74–97)
MONOCYTES # BLD: 0.5 K/UL (ref 0.05–1.2)
MONOCYTES NFR BLD: 8 % (ref 3–10)
NEUTS SEG # BLD: 3.7 K/UL (ref 1.8–8)
NEUTS SEG NFR BLD: 62 % (ref 40–73)
PLATELET # BLD AUTO: 211 K/UL (ref 135–420)
PMV BLD AUTO: 10.1 FL (ref 9.2–11.8)
POTASSIUM SERPL-SCNC: 4.8 MMOL/L (ref 3.5–5.5)
PROT SERPL-MCNC: 7.3 G/DL (ref 6.4–8.2)
RBC # BLD AUTO: 4.64 M/UL (ref 4.7–5.5)
SODIUM SERPL-SCNC: 141 MMOL/L (ref 136–145)
URATE SERPL-MCNC: 4.7 MG/DL (ref 2.6–7.2)
WBC # BLD AUTO: 5.9 K/UL (ref 4.6–13.2)

## 2019-11-01 PROCEDURE — 80053 COMPREHEN METABOLIC PANEL: CPT

## 2019-11-01 PROCEDURE — 36415 COLL VENOUS BLD VENIPUNCTURE: CPT

## 2019-11-01 PROCEDURE — 80061 LIPID PANEL: CPT

## 2019-11-01 PROCEDURE — 85025 COMPLETE CBC W/AUTO DIFF WBC: CPT

## 2019-11-01 PROCEDURE — 83036 HEMOGLOBIN GLYCOSYLATED A1C: CPT

## 2019-11-01 PROCEDURE — 82043 UR ALBUMIN QUANTITATIVE: CPT

## 2019-11-01 PROCEDURE — 84550 ASSAY OF BLOOD/URIC ACID: CPT

## 2019-11-01 PROCEDURE — 84439 ASSAY OF FREE THYROXINE: CPT

## 2019-11-02 LAB
CHOLEST SERPL-MCNC: 179 MG/DL
CREAT UR-MCNC: 64 MG/DL (ref 30–125)
HDLC SERPL-MCNC: 44 MG/DL (ref 40–60)
HDLC SERPL: 4.1 {RATIO} (ref 0–5)
LDLC SERPL CALC-MCNC: 103 MG/DL (ref 0–100)
LIPID PROFILE,FLP: ABNORMAL
MICROALBUMIN UR-MCNC: 8.78 MG/DL (ref 0–3)
MICROALBUMIN/CREAT UR-RTO: 137 MG/G (ref 0–30)
T4 FREE SERPL-MCNC: 1 NG/DL (ref 0.7–1.5)
TRIGL SERPL-MCNC: 160 MG/DL (ref ?–150)
TSH SERPL DL<=0.05 MIU/L-ACNC: 3.13 UIU/ML (ref 0.36–3.74)
VLDLC SERPL CALC-MCNC: 32 MG/DL

## 2019-11-04 NOTE — PROGRESS NOTES
I will discuss his results with him at his upcoming appointment. There is some evidence of microalbuminuria. His A1c is unchanged at 7.1. Meanwhile, his total cholesterol is 179. His triglycerides are 160. His HDL was 44. His LDL is 103. TFTs are unremarkable. His renal function is normal.  His electrolytes are unremarkable. His liver function tests are normal.  His uric acid level is lower at 4.7. It was 6.7 just a year ago. His CBC is unremarkable.     Dr. Sudhir Nieves  [facility], [address] [place] [address] Str.  Phone: [phone]  Fax: [phone]

## 2019-11-08 ENCOUNTER — OFFICE VISIT (OUTPATIENT)
Dept: INTERNAL MEDICINE CLINIC | Age: 74
End: 2019-11-08

## 2019-11-08 VITALS
DIASTOLIC BLOOD PRESSURE: 91 MMHG | WEIGHT: 217.2 LBS | HEIGHT: 69 IN | HEART RATE: 73 BPM | BODY MASS INDEX: 32.17 KG/M2 | RESPIRATION RATE: 14 BRPM | TEMPERATURE: 97.4 F | OXYGEN SATURATION: 99 % | SYSTOLIC BLOOD PRESSURE: 155 MMHG

## 2019-11-08 DIAGNOSIS — M1A.9XX0 CHRONIC GOUT WITHOUT TOPHUS, UNSPECIFIED CAUSE, UNSPECIFIED SITE: ICD-10-CM

## 2019-11-08 DIAGNOSIS — I10 ESSENTIAL HYPERTENSION: ICD-10-CM

## 2019-11-08 DIAGNOSIS — F33.40 RECURRENT MAJOR DEPRESSIVE DISORDER, IN REMISSION (HCC): ICD-10-CM

## 2019-11-08 DIAGNOSIS — E11.21 TYPE 2 DIABETES WITH NEPHROPATHY (HCC): Primary | ICD-10-CM

## 2019-11-08 DIAGNOSIS — E78.2 MIXED HYPERLIPIDEMIA: ICD-10-CM

## 2019-11-08 DIAGNOSIS — E03.4 HYPOTHYROIDISM DUE TO ACQUIRED ATROPHY OF THYROID: ICD-10-CM

## 2019-11-08 NOTE — PROGRESS NOTES
Chief Complaint   Patient presents with    Diabetes     follow up ROOM 3    Labs     done 11-01-19       1. Have you been to the ER, urgent care clinic since your last visit? Hospitalized since your last visit? No    2. Have you seen or consulted any other health care providers outside of the [address] [place] [address] since your last visit? Include any pap smears or colon screening.  No  Health Maintenance Due   Topic Date Due    Shingrix Vaccine Age 49> (1 of 2) 09/08/1993    DTaP/Tdap/Td series (1 - Tdap) 01/02/2007    MEDICARE YEARLY EXAM  11/16/2019    GLAUCOMA SCREENING Q2Y  11/28/2019    EYE EXAM RETINAL OR DILATED  11/28/2019

## 2019-11-08 NOTE — PROGRESS NOTES
INTERNISTS OF [facility][note 3]:  11/8/2019, MRN: [id number]      Power Jimenes is a 66 y.o. male and presents to clinic for Diabetes (follow up ROOM 3) and Labs (done 11-01-19)    Subjective:   Pt is a 72yo male with h/o ED, melanoma in situ on left dorsal forearm s/p removal, MDD, rectal polyp, diverticulosis, CKD stage 3, HTN, type 2 DM (foot exams are done by Gregory Mckeon), hypothyroidism, DJD, gout, vocal cord nodule, depression/anxiety (followed by Psychiatry), vitamin D deficiency, RBBB (s/p normal stress test 2018), and HLD.     1. Type 2 Diabetes: His last appointment, he admitted to drinking excessive amounts of alcohol beverages as a means to cope with his underlying depression. His A1c when recently checked was 7.1 and unchanged. His most recent labs show some evidence of microalbuminuria. He is presently diet-controlled. He is drinking less alcoholic beverages. 2.  Hypertension: BP is 147/87 today. He is taking Coreg and lisinopril. No adverse side effects with taking these medicines. +H/o microalbumiuria. 3. HLD: Taking Lipitor. His most recent labs show: His total cholesterol is 179. His triglycerides are 160. His HDL was 44. His LDL is 103.     4.  Hypothyroidism: On Synthroid 50 micrograms daily. His most recent TFTs were normal.    5.  Gout: No flareups since his last appointment. His most recent uric acid level measured 4.7 compared to 6.7 just a year ago. He takes allopurinol 100 mg twice daily. 6. Depression and ETOH Intake: He sees a psychiatrist regularly. He is on Effexor. His last apt with Psychiatry: within the past 6 wks. He is not going to counseling. He does not want to see a therapist at this time. He is consuming 3 glasses of wine per night, down from a bottle of wine on avg per night he reported at his last apt.        Patient Active Problem List    Diagnosis Date Noted    Type 2 diabetes with nephropathy ([facility] [address] 07/05/2019    Right inguinal hernia 05/17/2018    Osteoarthritis of right knee 02/19/2018    Erectile dysfunction 01/23/2017    Melanoma in situ - on left dorsal forearm 2016 09/16/2016    Major depressive disorder in remission 08/03/2016    Rectal polyp -  seen on colonoscopy from 8/20/15 08/02/2016    Diverticulosis of intestine without bleeding - seen on colonoscopy from 8/20/15 08/02/2016    CKD (chronic kidney disease) stage 3, GFR 30-59 ml/min ([facility] [address] 11/09/2015    Essential hypertension 06/24/2015    Type 2 diabetes mellitus without complication ([facility] [address] 30/14/6471    Hypothyroidism due to acquired atrophy of thyroid 06/24/2015    Vocal cord nodule 10/21/2013    Gout 10/21/2011    Family history of colon cancer. personal hx colon polyps 10/21/2011    Hyperlipidemia     Hypovitaminosis D        Current Outpatient Medications   Medication Sig Dispense Refill    allopurinol (ZYLOPRIM) 100 mg tablet TAKE ONE TABLET BY MOUTH TWICE A  Tab 1    sildenafil, antihypertensive, (REVATIO) 20 mg tablet TAKE TWO TABLETS BY MOUTH DAILY 1 HOUR PRIOR TO SEXUAL RELATIONS AS NEEDED *MAX DAILY DOSE: 5 TABLETS * 30 Tab 9    atorvastatin (LIPITOR) 40 mg tablet TAKE ONE TABLET BY MOUTH DAILY 90 Tab 2    scopolamine (TRANSDERM-SCOP) 1 mg over 3 days pt3d 1 Patch by TransDERmal route every seventy-two (72) hours. 6 Patch 0    lisinopril (PRINIVIL, ZESTRIL) 5 mg tablet TAKE ONE TABLET BY MOUTH DAILY 90 Tab 3    varicella-zoster recombinant, PF, (SHINGRIX) 50 mcg/0.5 mL susr injection 0.5 mL by IntraMUSCular route every two (2) months. 0.5 mL 1    levothyroxine (SYNTHROID) 50 mcg tablet TAKE ONE TABLET BY MOUTH DAILY BEFORE BREAKFAST 90 Tab 3    carvedilol (COREG) 6.25 mg tablet TAKE ONE TABLET BY MOUTH TWICE A DAY WITH MEALS 60 Tab 10    ibuprofen (MOTRIN) 800 mg tablet Take 1 Tab by mouth three (3) times daily as needed for Pain. 40 Tab 0    ubidecarenone (COQ-10 PO) Take 200 mEq by mouth daily.       aspirin delayed-release 81 mg tablet Take 81 mg by mouth daily.  terbinafine HCl (LAMISIL) 250 mg tablet Take 250 mg by mouth daily. Indications: Taken for 1 full week a month; and then not for another 3 weeks      venlafaxine-SR (EFFEXOR-XR) 150 mg capsule Take 150 mg by mouth daily.  multivitamin (ONE A DAY) tablet Take 1 Tab by mouth daily.  Cholecalciferol, Vitamin D3, (VITAMIN D) 2,000 unit Cap Take 4,000 Units by mouth daily.          Allergies   Allergen Reactions    Amlodipine Other (comments)     BLE edema       Past Medical History:   Diagnosis Date    Adverse effect of anesthesia     Violent dreams with Ether    Arthritis     hip, knee    Chronic kidney disease     had previous reaction with kidneys after a bp med, no issues now    Colon polyps     dysplastic    Depression     Diabetes mellitus ([facility] [address] 2017    no meds    Diverticulosis     seen on colonoscopy from 8/20/15    Diverticulosis of sigmoid colon 01/27/10    GERD (gastroesophageal reflux disease)     no meds    Gout     Hepatitis     Hepatitis A - while he was young    Hyperlipidemia     Hypertension 15 years    Hypovitaminosis D     Nephrolithiasis     Osteoarthritis of right knee 2/19/2018    Plantar wart     Rectal polyp     seen on colonoscopy from 8/20/15    Thyroid disease     hypothyroidism       Past Surgical History:   Procedure Laterality Date    ENDOSCOPY, COLON, DIAGNOSTIC  01/27/2010    polyp distal rectum, removed; diverticulosis of sigmoid    HX CATARACT REMOVAL Bilateral     HX COLONOSCOPY      HX HEENT  2008    vocal cord polyp removed    HX POLYPECTOMY  01/27/2010    Distal rectum    HX TONSILLECTOMY      Thania Elders Right 05/31/2018    Dr. Kirk Leslie Left 1/2014    TOTAL KNEE ARTHROPLASTY Right 02/2018       Family History   Problem Relation Age of Onset   [de-identified] Arthritis-rheumatoid Mother     Hypertension Mother     Elevated Lipids Mother     Thyroid Disease Sister    Geoffrey Jansen Cancer Father         colon    Heart Disease Other     Hypertension Other     Cancer Other         breast    Heart Disease Maternal Grandmother     Dementia Maternal Grandfather     Cancer Paternal Grandmother         breast    No Known Problems Paternal Grandfather        Social History     Tobacco Use    Smoking status: Former Smoker     Packs/day: 1.00     Years: 6.00     Pack years: 6.00     Types: Cigarettes     Last attempt to quit: 1992     Years since quittin.8    Smokeless tobacco: Never Used   Substance Use Topics    Alcohol use: Yes     Alcohol/week: 10.0 standard drinks     Types: 10 Glasses of wine per week     Comment: wine with dinner sometimes       ROS   Review of Systems   Constitutional: Negative for chills and fever. HENT: Negative for ear pain and sore throat. Eyes: Negative for blurred vision and pain. Respiratory: Negative for cough and shortness of breath. Cardiovascular: Negative for chest pain. Gastrointestinal: Negative for abdominal pain, blood in stool and melena. Genitourinary: Negative for dysuria and hematuria. Musculoskeletal: Negative for joint pain and myalgias. Skin: Negative for rash. Neurological: Negative for tingling, focal weakness and headaches. Endo/Heme/Allergies: Does not bruise/bleed easily. Psychiatric/Behavioral: Positive for depression. Negative for substance abuse. Objective     There were no vitals filed for this visit. Physical Exam   Constitutional: He is oriented to person, place, and time and well-developed, well-nourished, and in no distress. HENT:   Head: Normocephalic and atraumatic. Right Ear: External ear normal.   Left Ear: External ear normal.   Nose: Nose normal.   Mouth/Throat: Oropharynx is clear and moist. No oropharyngeal exudate. Eyes: Pupils are equal, round, and reactive to light. Conjunctivae and EOM are normal. Right eye exhibits no discharge. Left eye exhibits no discharge.  No scleral icterus. Neck: Neck supple. Cardiovascular: Normal rate, regular rhythm, normal heart sounds and intact distal pulses. Pulmonary/Chest: Effort normal and breath sounds normal. No respiratory distress. Abdominal: Soft. Bowel sounds are normal. He exhibits no distension. There is no tenderness. Musculoskeletal: He exhibits no edema or tenderness (Bue). Lymphadenopathy:     He has no cervical adenopathy. Neurological: He is alert and oriented to person, place, and time. He exhibits normal muscle tone. Gait normal.   Skin: Skin is warm and dry. No erythema. Psychiatric: Affect normal.   Nursing note and vitals reviewed. LABS   Data Review:   Lab Results   Component Value Date/Time    WBC 5.9 11/01/2019 08:26 AM    HGB 14.8 11/01/2019 08:26 AM    HCT 44.8 11/01/2019 08:26 AM    PLATELET 112 07/95/0456 08:26 AM    MCV 96.6 11/01/2019 08:26 AM       Lab Results   Component Value Date/Time    Sodium 141 11/01/2019 08:26 AM    Potassium 4.8 11/01/2019 08:26 AM    Chloride 107 11/01/2019 08:26 AM    CO2 28 11/01/2019 08:26 AM    Anion gap 6 11/01/2019 08:26 AM    Glucose 155 (H) 11/01/2019 08:26 AM    BUN 27 (H) 11/01/2019 08:26 AM    Creatinine 1.20 11/01/2019 08:26 AM    BUN/Creatinine ratio 23 (H) 11/01/2019 08:26 AM    GFR est AA >60 11/01/2019 08:26 AM    GFR est non-AA 59 (L) 11/01/2019 08:26 AM    Calcium 9.0 11/01/2019 08:26 AM       Lab Results   Component Value Date/Time    Cholesterol, total 179 11/01/2019 08:26 AM    HDL Cholesterol 44 11/01/2019 08:26 AM    LDL, calculated 103 (H) 11/01/2019 08:26 AM    VLDL, calculated 32 11/01/2019 08:26 AM    Triglyceride 160 (H) 11/01/2019 08:26 AM    CHOL/HDL Ratio 4.1 11/01/2019 08:26 AM       Lab Results   Component Value Date/Time    Hemoglobin A1c 7.1 (H) 11/01/2019 08:26 AM    Hemoglobin A1c (POC) 6.6 07/05/2019 09:21 AM       Assessment/Plan:   1.  Depression and ETOH Use:  -I encouraged him to seek counseling services.  -Continue to follow-up with Psychiatry.  -Continue with Rx as prescribed.  -I encouraged him to stop all alcohol beverage consumption. I encouraged him to find a healthier on his way to improve his stress. 2.  Gout: Stable. -Continue with Rx as prescribed. 3.  Type 2 Diabetes: Diet controlled. -I encouraged him to reduce his carb intake. We discussed the importance of weight reduction. I will recheck his weight and his A1c at his next appointment. 4.  Hypertension: Stable. -Continue with Rx as prescribed. 5.  Hypothyroidism: Stable. -Continue Rx as prescribed. 6. HLD: Stable. -Continue with Rx as prescribed. 7.  Health Maintenance:  -We will do a Medicare Wellness Visit at his follow-up appointment. Health Maintenance Due   Topic Date Due    Shingrix Vaccine Age 49> (1 of 2) 09/08/1993    DTaP/Tdap/Td series (1 - Tdap) 01/02/2007    Influenza Age 5 to Adult  08/01/2019    MEDICARE YEARLY EXAM  11/16/2019    GLAUCOMA SCREENING Q2Y  11/28/2019    EYE EXAM RETINAL OR DILATED  11/28/2019     Lab review: labs are reviewed in the EHR    I have discussed the diagnosis with the patient and the intended plan as seen in the above orders. The patient has received an after-visit summary and questions were answered concerning future plans. I have discussed medication side effects and warnings with the patient as well. I have reviewed the plan of care with the patient, accepted their input and they are in agreement with the treatment goals. All questions were answered. The patient understands the plan of care. Handouts provided today with above information. Pt instructed if symptoms worsen to call the office or report to the ED for continued care. Greater than 50% of the visit time was spent in counseling and/or coordination of care. Voice recognition was used to generate this report, which may have resulted in some phonetic based errors in grammar and contents.  Even though attempts were made to correct all the mistakes, some may have been missed, and remained in the body of the document.           Max Yi MD

## 2019-11-08 NOTE — PATIENT INSTRUCTIONS
Health Maintenance Due   Topic Date Due    Shingrix Vaccine Age 49> (1 of 2) 09/08/1993    DTaP/Tdap/Td series (1 - Tdap) 01/02/2007    MEDICARE YEARLY EXAM  11/16/2019    GLAUCOMA SCREENING Q2Y  11/28/2019    EYE EXAM RETINAL OR DILATED  11/28/2019          Learning About Diabetes Food Guidelines  Your Care Instructions    Meal planning is important to manage diabetes. It helps keep your blood sugar at a target level (which you set with your doctor). You don't have to eat special foods. You can eat what your family eats, including sweets once in a while. But you do have to pay attention to how often you eat and how much you eat of certain foods. You may want to work with a dietitian or a certified diabetes educator (CDE) to help you plan meals and snacks. A dietitian or CDE can also help you lose weight if that is one of your goals. What should you know about eating carbs? Managing the amount of carbohydrate (carbs) you eat is an important part of healthy meals when you have diabetes. Carbohydrate is found in many foods. · Learn which foods have carbs. And learn the amounts of carbs in different foods. ? Bread, cereal, pasta, and rice have about 15 grams of carbs in a serving. A serving is 1 slice of bread (1 ounce), ½ cup of cooked cereal, or 1/3 cup of cooked pasta or rice. ? Fruits have 15 grams of carbs in a serving. A serving is 1 small fresh fruit, such as an apple or orange; ½ of a banana; ½ cup of cooked or canned fruit; ½ cup of fruit juice; 1 cup of melon or raspberries; or 2 tablespoons of dried fruit. ? Milk and no-sugar-added yogurt have 15 grams of carbs in a serving. A serving is 1 cup of milk or 2/3 cup of no-sugar-added yogurt. ? Starchy vegetables have 15 grams of carbs in a serving. A serving is ½ cup of mashed potatoes or sweet potato; 1 cup winter squash; ½ of a small baked potato; ½ cup of cooked beans; or ½ cup cooked corn or green peas.   · Learn how much carbs to eat each day and at each meal. A dietitian or CDE can teach you how to keep track of the amount of carbs you eat. This is called carbohydrate counting. · If you are not sure how to count carbohydrate grams, use the Plate Method to plan meals. It is a good, quick way to make sure that you have a balanced meal. It also helps you spread carbs throughout the day. ? Divide your plate by types of foods. Put non-starchy vegetables on half the plate, meat or other protein food on one-quarter of the plate, and a grain or starchy vegetable in the final quarter of the plate. To this you can add a small piece of fruit and 1 cup of milk or yogurt, depending on how many carbs you are supposed to eat at a meal.  · Try to eat about the same amount of carbs at each meal. Do not \"save up\" your daily allowance of carbs to eat at one meal.  · Proteins have very little or no carbs per serving. Examples of proteins are beef, chicken, turkey, fish, eggs, tofu, cheese, cottage cheese, and peanut butter. A serving size of meat is 3 ounces, which is about the size of a deck of cards. Examples of meat substitute serving sizes (equal to 1 ounce of meat) are 1/4 cup of cottage cheese, 1 egg, 1 tablespoon of peanut butter, and ½ cup of tofu. How can you eat out and still eat healthy? · Learn to estimate the serving sizes of foods that have carbohydrate. If you measure food at home, it will be easier to estimate the amount in a serving of restaurant food. · If the meal you order has too much carbohydrate (such as potatoes, corn, or baked beans), ask to have a low-carbohydrate food instead. Ask for a salad or green vegetables. · If you use insulin, check your blood sugar before and after eating out to help you plan how much to eat in the future. · If you eat more carbohydrate at a meal than you had planned, take a walk or do other exercise. This will help lower your blood sugar. What else should you know?   · Limit saturated fat, such as the fat from meat and dairy products. This is a healthy choice because people who have diabetes are at higher risk of heart disease. So choose lean cuts of meat and nonfat or low-fat dairy products. Use olive or canola oil instead of butter or shortening when cooking. · Don't skip meals. Your blood sugar may drop too low if you skip meals and take insulin or certain medicines for diabetes. · Check with your doctor before you drink alcohol. Alcohol can cause your blood sugar to drop too low. Alcohol can also cause a bad reaction if you take certain diabetes medicines. Follow-up care is a key part of your treatment and safety. Be sure to make and go to all appointments, and call your doctor if you are having problems. It's also a good idea to know your test results and keep a list of the medicines you take. Where can you learn more? Go to http://sapna-araseli.info/. Enter N889 in the search box to learn more about \"Learning About Diabetes Food Guidelines. \"  Current as of: April 16, 2019  Content Version: 12.2  © 1141-0098 [employer]. Care instructions adapted under license by [employer] (which disclaims liability or warranty for this information). If you have questions about a medical condition or this instruction, always ask your healthcare professional. [address] any warranty or liability for your use of this information.

## 2019-11-13 NOTE — TELEPHONE ENCOUNTER
11/1/19 office notes:  She has rheumatoid arthritis which has been in remission for the most part but she still notes intermittent inflammation in the hands, more so on the right. She has not been treated for this for many years. She was previously treated by Dr. Lopez with just Celebrex. She was never on a biologic agent. Osteoarthritis is more of an issue for her than inflammatory arthritis. Both knees have been replaced. She has had some knee pain since then. She saw Dr. Gonzalez about this 4/5/2017 and he ordered a CT of the left knee which showed normal expected postoperative change and a small ossified loose body with a medial suprapatellar joint space. She has chronic left shoulder pain and had a left glenohumeral joint steroid injection on 7/26/2019 and again on 9/20/2019 with Dr. Ramirez with significant relief. Her range of motion has improved considerably.   Pt says he is returning a call to Dr. Nicolasa Cole. Please return call.

## 2019-11-20 DIAGNOSIS — E03.4 HYPOTHYROIDISM DUE TO ACQUIRED ATROPHY OF THYROID: ICD-10-CM

## 2019-11-21 RX ORDER — CARVEDILOL 6.25 MG/1
TABLET ORAL
Qty: 60 TAB | Refills: 9 | Status: SHIPPED | OUTPATIENT
Start: 2019-11-21 | End: 2020-02-18

## 2019-11-21 RX ORDER — LEVOTHYROXINE SODIUM 50 UG/1
TABLET ORAL
Qty: 90 TAB | Refills: 2 | Status: SHIPPED | OUTPATIENT
Start: 2019-11-21 | End: 2020-08-26

## 2019-12-11 ENCOUNTER — TELEPHONE (OUTPATIENT)
Dept: INTERNAL MEDICINE CLINIC | Age: 74
End: 2019-12-11

## 2019-12-11 NOTE — TELEPHONE ENCOUNTER
Patient's wife Claribel Aggarwal called in with concerns. She states the patient has a serious alcohol problem and he needs help. Last night she heard a terrible noise in the bathroom and the patient was sprawled out on the bathroom floor. She states the patient said he was dizzy and that is what caused him to fall. She does not want the patient to know she would like a call on [phone] if she does not answer she said he is there. She thinks he will need an intervention ASAP. She went to speak with a  today & would like to have everything together before approaching the patient.

## 2019-12-17 DIAGNOSIS — E78.2 MIXED HYPERLIPIDEMIA: ICD-10-CM

## 2019-12-17 RX ORDER — ATORVASTATIN CALCIUM 40 MG/1
40 TABLET, FILM COATED ORAL DAILY
Qty: 90 TAB | Refills: 3 | Status: SHIPPED | OUTPATIENT
Start: 2019-12-17 | End: 2020-12-21

## 2019-12-17 NOTE — TELEPHONE ENCOUNTER
Last Visit: 11/8/19 with MD Kanwal Joy  Next Appointment: 1/2/20 with MD Kanwal Joy  Previous Refill Encounter(s): 2/25/19 #90 with 2 refills    Requested Prescriptions     Pending Prescriptions Disp Refills    atorvastatin (LIPITOR) 40 mg tablet 90 Tab 3     Sig: Take 1 Tab by mouth daily.

## 2020-01-02 ENCOUNTER — OFFICE VISIT (OUTPATIENT)
Dept: INTERNAL MEDICINE CLINIC | Age: 75
End: 2020-01-02

## 2020-01-02 VITALS
BODY MASS INDEX: 31.7 KG/M2 | HEIGHT: 69 IN | TEMPERATURE: 96.1 F | WEIGHT: 214 LBS | HEART RATE: 80 BPM | OXYGEN SATURATION: 100 % | RESPIRATION RATE: 12 BRPM | DIASTOLIC BLOOD PRESSURE: 89 MMHG | SYSTOLIC BLOOD PRESSURE: 148 MMHG

## 2020-01-02 DIAGNOSIS — F33.40 RECURRENT MAJOR DEPRESSIVE DISORDER, IN REMISSION (HCC): ICD-10-CM

## 2020-01-02 DIAGNOSIS — Z00.00 MEDICARE ANNUAL WELLNESS VISIT, SUBSEQUENT: ICD-10-CM

## 2020-01-02 DIAGNOSIS — Z78.9 ALCOHOL USE: Primary | ICD-10-CM

## 2020-01-02 DIAGNOSIS — Z71.89 ADVANCE CARE PLANNING: ICD-10-CM

## 2020-01-02 NOTE — PROGRESS NOTES
Narda Obando presents today for   Chief Complaint   Patient presents with   [facility] Annual Wellness Visit     ROOM 2       Is someone accompanying this pt? no    Is the patient using any DME equipment during OV? no    Depression Screening:  3 most recent PHQ Screens 11/8/2019   Little interest or pleasure in doing things Not at all   Feeling down, depressed, irritable, or hopeless Not at all   Total Score PHQ 2 0   Trouble falling or staying asleep, or sleeping too much -   Feeling tired or having little energy -   Poor appetite, weight loss, or overeating -   Feeling bad about yourself - or that you are a failure or have let yourself or your family down -   Trouble concentrating on things such as school, work, reading, or watching TV -   Moving or speaking so slowly that other people could have noticed; or the opposite being so fidgety that others notice -   Thoughts of being better off dead, or hurting yourself in some way -   PHQ 9 Score -   How difficult have these problems made it for you to do your work, take care of your home and get along with others -       Learning Assessment:  Learning Assessment 7/5/2019   PRIMARY LEARNER Patient   HIGHEST LEVEL OF EDUCATION - PRIMARY LEARNER  > 4 YEARS OF COLLEGE   BARRIERS PRIMARY LEARNER NONE   CO-LEARNER CAREGIVER No   PRIMARY LANGUAGE ENGLISH   LEARNER PREFERENCE PRIMARY DEMONSTRATION     -   ANSWERED BY patient   RELATIONSHIP SELF       Abuse Screening:  Abuse Screening Questionnaire 7/5/2019   Do you ever feel afraid of your partner? N   Are you in a relationship with someone who physically or mentally threatens you? N   Is it safe for you to go home? Y       Fall Risk  Fall Risk Assessment, last 12 mths 11/8/2019   Able to walk? Yes   Fall in past 12 months? No   Fall with injury? -   Number of falls in past 12 months -       Health Maintenance reviewed and discussed and ordered per Provider.     Health Maintenance Due   Topic Date Due    DTaP/Tdap/Td series (1 - Tdap) 09[birth year]08/1954    Shingrix Vaccine Age 50> (1 of 2) 09/08/1993    MEDICARE YEARLY EXAM  11/16/2019   . Coordination of Care:  1. Have you been to the ER, urgent care clinic since your last visit? Hospitalized since your last visit? no    2. Have you seen or consulted any other health care providers outside of the [address] [place] [address] since your last visit? Include any pap smears or colon screening. no                This is the Subsequent Medicare Annual Wellness Exam, performed 12 months or more after the Initial AWV or the last Subsequent AWV    I have reviewed the patient's medical history in detail and updated the computerized patient record. History     Patient Active Problem List   Diagnosis Code    Hyperlipidemia E78.5    Hypovitaminosis D E55.9    Gout M10.9    Family history of colon cancer.   personal hx colon polyps Z80.0    Vocal cord nodule J38.2    Essential hypertension I10    Type 2 diabetes mellitus without complication (HCC) D35.9    Hypothyroidism due to acquired atrophy of thyroid E03.4    CKD (chronic kidney disease) stage 3, GFR 30-59 ml/min (HCC) N18.3    Rectal polyp -  seen on colonoscopy from 8/20/15 K62.1    Diverticulosis of intestine without bleeding - seen on colonoscopy from 8/20/15 K57.90    Major depressive disorder in remission F32.5    Melanoma in situ - on left dorsal forearm 2016 D03.9    Erectile dysfunction N52.9    Osteoarthritis of right knee M17.11    Right inguinal hernia K40.90    Type 2 diabetes with nephropathy (HCC) E11.21     Past Medical History:   Diagnosis Date    Adverse effect of anesthesia     Violent dreams with Ether    Arthritis     hip, knee    Chronic kidney disease     had previous reaction with kidneys after a bp med, no issues now    Colon polyps     dysplastic    Depression     Diabetes mellitus ([facility] [address] 2017    no meds    Diverticulosis     seen on colonoscopy from 8/20/15    Diverticulosis of sigmoid colon 01/27/10    GERD (gastroesophageal reflux disease)     no meds    Gout     Hepatitis     Hepatitis A - while he was young    Hyperlipidemia     Hypertension 15 years    Hypovitaminosis D     Nephrolithiasis     Osteoarthritis of right knee 2/19/2018    Plantar wart     Rectal polyp     seen on colonoscopy from 8/20/15    Thyroid disease     hypothyroidism      Past Surgical History:   Procedure Laterality Date    ENDOSCOPY, COLON, DIAGNOSTIC  01/27/2010    polyp distal rectum, removed; diverticulosis of sigmoid    HX CATARACT REMOVAL Bilateral     HX COLONOSCOPY      HX HEENT  2008    vocal cord polyp removed    HX POLYPECTOMY  01/27/2010    Distal rectum    HX TONSILLECTOMY      LAP,INGUINAL HERNIA REPR,INITIAL Right 05/31/2018    Dr. Rex Seth LITHOTRIPSY      TOTAL KNEE ARTHROPLASTY Left 1/2014    TOTAL KNEE ARTHROPLASTY Right 02/2018     Current Outpatient Medications   Medication Sig Dispense Refill    atorvastatin (LIPITOR) 40 mg tablet Take 1 Tab by mouth daily. 90 Tab 3    carvedilol (COREG) 6.25 mg tablet TAKE ONE TABLET BY MOUTH TWICE A DAY WITH MEALS 60 Tab 9    levothyroxine (SYNTHROID) 50 mcg tablet TAKE ONE TABLET BY MOUTH DAILY BEFORE BREAKFAST 90 Tab 2    allopurinol (ZYLOPRIM) 100 mg tablet TAKE ONE TABLET BY MOUTH TWICE A  Tab 1    sildenafil, antihypertensive, (REVATIO) 20 mg tablet TAKE TWO TABLETS BY MOUTH DAILY 1 HOUR PRIOR TO SEXUAL RELATIONS AS NEEDED *MAX DAILY DOSE: 5 TABLETS * 30 Tab 9    lisinopril (PRINIVIL, ZESTRIL) 5 mg tablet TAKE ONE TABLET BY MOUTH DAILY 90 Tab 3    varicella-zoster recombinant, PF, (SHINGRIX) 50 mcg/0.5 mL susr injection 0.5 mL by IntraMUSCular route every two (2) months. 0.5 mL 1    ibuprofen (MOTRIN) 800 mg tablet Take 1 Tab by mouth three (3) times daily as needed for Pain. 40 Tab 0    ubidecarenone (COQ-10 PO) Take 200 mEq by mouth daily.  aspirin delayed-release 81 mg tablet Take 81 mg by mouth daily.       terbinafine HCl (LAMISIL) 250 mg tablet Take 250 mg by mouth every thirty (30) days. Indications: Taken for 1 full week a month; and then not for another 3 weeks      venlafaxine-SR (EFFEXOR-XR) 150 mg capsule Take 150 mg by mouth daily.  multivitamin (ONE A DAY) tablet Take 1 Tab by mouth daily.  Cholecalciferol, Vitamin D3, (VITAMIN D) 2,000 unit Cap Take 4,000 Units by mouth daily.  scopolamine (TRANSDERM-SCOP) 1 mg over 3 days pt3d 1 Patch by TransDERmal route every seventy-two (72) hours.  6 Patch 0     Allergies   Allergen Reactions    Amlodipine Other (comments)     BLE edema       Family History   Problem Relation Age of Onset   [place] Arthritis-rheumatoid Mother     Hypertension Mother    [place] Elevated Lipids Mother     Thyroid Disease Sister     Cancer Father         colon    Heart Disease Other     Hypertension Other     Cancer Other         breast    Heart Disease Maternal Grandmother     Dementia Maternal Grandfather     Cancer Paternal Grandmother         breast    No Known Problems Paternal Grandfather      Social History     Tobacco Use    Smoking status: Former Smoker     Packs/day: 1.00     Years: 6.00     Pack years: 6.00     Types: Cigarettes     Last attempt to quit: 1992     Years since quittin.0    Smokeless tobacco: Never Used   Substance Use Topics    Alcohol use: Not Currently     Alcohol/week: 10.0 standard drinks     Types: 10 Glasses of wine per week     Comment: wine with dinner sometimes       Depression Risk Factor Screening:     3 most recent PHQ Screens 2020   PHQ Not Done Active Diagnosis of Depression or Bipolar Disorder   Little interest or pleasure in doing things Several days   Feeling down, depressed, irritable, or hopeless Several days   Total Score PHQ 2 2   Trouble falling or staying asleep, or sleeping too much -   Feeling tired or having little energy -   Poor appetite, weight loss, or overeating -   Feeling bad about yourself - or that you are a failure or have let yourself or your family down -   Trouble concentrating on things such as school, work, reading, or watching TV -   Moving or speaking so slowly that other people could have noticed; or the opposite being so fidgety that others notice -   Thoughts of being better off dead, or hurting yourself in some way -   PHQ 9 Score -   How difficult have these problems made it for you to do your work, take care of your home and get along with others -       Alcohol Risk Factor Screening (MALE > 65): Do you average more 1 drink per night or more than 7 drinks a week: No    In the past three months have you have had more than 4 drinks containing alcohol on one occasion: No      Functional Ability and Level of Safety:   Hearing: Hearing is good. Activities of Daily Living: The home contains: no safety equipment. Patient does total self care    Ambulation: {Patient ambulates:40186::\"with no difficulty\"}    Fall Risk:  Fall Risk Assessment, last 12 mths 2020   Able to walk? Yes   Fall in past 12 months? Yes   Fall with injury?  No   Number of falls in past 12 months 2   Fall Risk Score 2       Abuse Screen:  Patient is not abused    Cognitive Screening   Has your family/caregiver stated any concerns about your memory: no  {Cognitive Screenin}    Patient Care Team   Patient Care Team:  Ashia Bergeron MD as PCP - General (Family Practice)  Ashia Bergeron MD as PCP - [facility] Empaneled Provider  Leigh Machuca RN as Ambulatory Care Manager (Internal Medicine)  [place] (Physician Assistant)  Marianela Doe MD (Gastroenterology)  Basil Perez MD (Otolaryngology)  Cathi Brock MD (Orthopedic Surgery)  Inderjit Tomas DPM (Podiatry)  Armani Mitchell DPM (Podiatry)  Lesley Peterson MD (Ophthalmology)  Vidya Ramos., MD (Orthopedic Surgery)  Zahraa Katz MD (Unknown Physician Specialty)  Lester Curran NP as Nurse Practitioner (Nurse Practitioner)    Assessment/Plan   Education and counseling provided:  {Education List, choose as appropriate:19754::\"Are appropriate based on today's review and evaluation\"}    {There are no diagnoses linked to this encounter.  (Refresh or delete this SmartLink)}    Health Maintenance Due   Topic Date Due    DTaP/Tdap/Td series (1 - Tdap) 09/08/1954    Shingrix Vaccine Age 50> (1 of 2) 09/08/1993    MEDICARE YEARLY EXAM  11/16/2019

## 2020-01-02 NOTE — TELEPHONE ENCOUNTER
Please fax my last 2 office notes to his Psychiatrist for review.     Dr. Ellie Henderson  [facility], [facility], [facility] [address]  Phone: [phone]  Fax: [phone]

## 2020-01-02 NOTE — PATIENT INSTRUCTIONS
Health Maintenance Due   Topic Date Due    DTaP/Tdap/Td series (1 - Tdap) 09[birth year]/1954    Shingrix Vaccine Age 49> (1 of 2) 09/08/1993    MEDICARE YEARLY EXAM  11/16/2019       Medicare Wellness Visit, Male    The best way to live healthy is to have a lifestyle where you eat a well-balanced diet, exercise regularly, limit alcohol use, and quit all forms of tobacco/nicotine, if applicable. Regular preventive services are another way to keep healthy. Preventive services (vaccines, screening tests, monitoring & exams) can help personalize your care plan, which helps you manage your own care. Screening tests can find health problems at the earliest stages, when they are easiest to treat. [facility] follows the current, evidence-based guidelines published by the [facility] Ulysses Higginbotham ([facility]) when recommending preventive services for our patients. Because we follow these guidelines, sometimes recommendations change over time as research supports it. (For example, a prostate screening blood test is no longer routinely recommended for men with no symptoms). Of course, you and your doctor may decide to screen more often for some diseases, based on your risk and co-morbidities (chronic disease you are already diagnosed with). Preventive services for you include:  - Medicare offers their members a free annual wellness visit, which is time for you and your primary care provider to discuss and plan for your preventive service needs. Take advantage of this benefit every year!  -All adults over age 72 should receive the recommended pneumonia vaccines. Current USPSTF guidelines recommend a series of two vaccines for the best pneumonia protection.   -All adults should have a flu vaccine yearly and tetanus vaccine every 10 years.  -All adults age 48 and older should receive the shingles vaccines (series of two vaccines).        -All adults age 38-68 who are overweight should have a diabetes screening test once every three years.   -Other screening tests & preventive services for persons with diabetes include: an eye exam to screen for diabetic retinopathy, a kidney function test, a foot exam, and stricter control over your cholesterol.   -Cardiovascular screening for adults with routine risk involves an electrocardiogram (ECG) at intervals determined by the provider.   -Colorectal cancer screening should be done for adults age 54-65 with no increased risk factors for colorectal cancer. There are a number of acceptable methods of screening for this type of cancer. Each test has its own benefits and drawbacks. Discuss with your provider what is most appropriate for you during your annual wellness visit. The different tests include: colonoscopy (considered the best screening method), a fecal occult blood test, a fecal DNA test, and sigmoidoscopy.  -All adults born between [facility] should be screened once for Hepatitis C.  -An Abdominal Aortic Aneurysm (AAA) Screening is recommended for men age 73-68 who has ever smoked in their lifetime. Here is a list of your current Health Maintenance items (your personalized list of preventive services) with a due date:  Health Maintenance Due   Topic Date Due    DTaP/Tdap/Td  (1 - Tdap) 09/08/1954    Shingles Vaccine (1 of 2) 09/08/1993    Annual Well Visit  11/16/2019       Medicare Part B Preventive Services Limitations Recommendation Scheduled   Bone Mass Measurement  (age 72 & older, biennial) Requires diagnosis related to osteoporosis or estrogen deficiency. Biennial benefit unless patient has history of long-term glucocorticoid tx or baseline is needed because initial test was by other method Not applicable  Not applicable   Cardiovascular Screening Blood Tests (every 5 years)  Total cholesterol, HDL, Triglycerides Order as a panel if possible We should check your cholesterol panel at least once every 5 years.  Up to date   Colorectal Cancer Screening  -Fecal occult blood test (annual)  -Flexible sigmoidoscopy (5y)  -Screening colonoscopy (10y)  -Barium Enema  Due per your Gastroenterologist's recommendations. Up to date   Counseling to Prevent Tobacco Use (up to 8 sessions per year)  - Counseling greater than 3 and up to 10 minutes  - Counseling greater than 10 minutes Patients must be asymptomatic of tobacco-related conditions to receive as preventive service Continue with smoking cessation efforts. Not applicable   Diabetes Screening Tests (at least every 3 years, Medicare covers annually or at 6-month intervals for prediabetic patients)    Fasting blood sugar (FBS) or glucose tolerance test (GTT) Patient must be diagnosed with one of the following:  -Hypertension, Dyslipidemia, obesity, previous impaired FBS or GTT  Or any two of the following: overweight, FH of diabetes, age ? 72, history of gestational diabetes, birth of baby weighing more than 9 pounds Should be done yearly Up to date   Diabetes Self-Management Training (DSMT) (no USPSTF recommendation) Requires referral by treating physician for patient with diabetes or renal disease. 10 hours of initial DSMT session of no less than 30 minutes each in a continuous 12-month period. 2 hours of follow-up DSMT in subsequent years. Not applicable Not applicable   Glaucoma Screening (no USPSTF recommendation) Diabetes mellitus, family history, , age 48 or over,  American, age 72 or over Continue with annual eye exams. Up to date   Human Immunodeficiency Virus (HIV) Screening (annually for increased risk patients)  HIV-1 and HIV-2 by EIA, JEFERSON, rapid antibody test, or oral mucosa transudate Patient must be at increased risk for HIV infection per USPSTF guidelines or pregnant. Tests covered annually for patients at increased risk. Pregnant patients may receive up to 3 test during pregnancy.  Not applicable Not applicable   Medical Nutrition Therapy (MNT) (for diabetes or renal disease not recommended schedule) Requires referral by treating physician for patient with diabetes or renal disease. Can be provided in same year as diabetes self-management training (DSMT), and CMS recommends medical nutrition therapy take place after DSMT. Up to 3 hours for initial year and 2 hours in subsequent years. Not applicable Not applicable   Shingles Vaccination A shingles vaccine is also recommended once in a lifetime after age 61 Vaccination recommended for shingles vaccination once after age 61 Overdue   Seasonal Influenza Vaccination (annually)  Continue with yearly \"flu\" shot annually Up to date   Pneumococcal Vaccination (once after 65)  Please receive this vaccination at age 72. Up to date   Hepatitis B Vaccinations (if medium/high risk) Medium/high risk factors:  End-stage renal disease,  Hemophiliacs who received Factor VIII or IX concentrates, Clients of institutions for the mentally retarded, Persons who live in the same house as a HepB virus carrier, Homosexual men, Illicit injectable drug abusers. Not applicable Not applicable   Screening Mammography (biennial age 54-69) Annually (age 36 or over) Not applicable Not applicable   Screening Pap Tests and Pelvic Examination (up to age 79 and after 79 if unknown history or abnormal study last 10 years) Every 25 months except high risk Not applicable Not applicable   Ultrasound Screening for Abdominal Aortic Aneurysm (AAA) (once) Patient must be referred through IPPE and not have had a screening for abdominal aortic aneurysm before under Medicare. Limited to patients who meet one of the following criteria:  - Men who are 73-68 years old and have smoked more than 100 cigarettes in their lifetime.  -Anyone with a FH of AAA  -Anyone recommended for screening by USPSTF Recommended once after age 72 if you have smoked cigarettes. Up to date          Learning About Alcohol Use Disorder  What is alcohol use disorder?   Alcohol use disorder means that a person drinks alcohol even though it causes harm to themselves or others. It can range from mild to severe. The more signs of this disorder you have, the more severe it may be. Moderate to severe alcohol use disorder is sometimes called addiction. People who have it may find it hard to control their use of alcohol. People who have this disorder may argue with others about how much they're drinking. Their job may be affected because of drinking. They may drink when it's dangerous or illegal, such as when they drive. They also may have a strong need, or craving, to drink. They may feel like they must drink just to get by. Their drinking may increase their risk of getting hurt or being in a car crash. Over time, drinking too much alcohol may cause health problems. These may include high blood pressure, liver problems, or problems with digestion. What are the signs? Maybe you've wondered about your alcohol habits, or how to tell if your drinking is becoming a problem. Here are some of the signs of alcohol use disorder. You may have it if you have two or more of the following signs:  · You drink larger amounts of alcohol than you ever meant to. Or you've been drinking for a longer time than you ever meant to. · You can't cut down or control your use. Or you constantly wish you could cut down. · You spend a lot of time getting or drinking alcohol or recovering from its effects. · You have strong cravings for alcohol. · You can no longer do your main jobs at work, at school, or at home. · You keep drinking alcohol, even though your use hurts your relationships. · You have stopped doing important activities because of your alcohol use. · You drink alcohol in situations where doing so is dangerous. · You keep drinking alcohol even though you know it's causing health problems. · You need more and more alcohol to get the same effect, or you get less effect from the same amount over time.  This is called tolerance. · You have uncomfortable symptoms when you stop drinking alcohol or use less. This is called withdrawal.  Alcohol use disorder can range from mild to severe. The more signs you have, the more severe the disorder may be. Moderate to severe alcohol use disorder is sometimes called addiction. You might not realize that your drinking is a problem. You might not drink large amounts when you drink. Or you might go for days or weeks between drinking episodes. But even if you don't drink very often, your drinking could still be harmful and put you at risk. How is alcohol use disorder treated? Getting help is up to you. But you don't have to do it alone. There are many people and kinds of treatments that can help. Treatment for alcohol use disorder can include:  · Group therapy, one or more types of counseling, and alcohol education. · Medicines that help to:  ? Reduce withdrawal symptoms and help you safely stop drinking. ? Reduce cravings for alcohol. · Support groups. These groups include Alcoholics Anonymous and [place] (Self-Management and Recovery Training). Some people are able to stop or cut back on drinking with help from a counselor. People who have moderate to severe alcohol use disorder may need medical treatment. They may need to stay in a hospital or treatment center. You may have a treatment team to help you. This team may include a psychologist or psychiatrist, counselors, doctors, social workers, nurses, and a . A  helps plan and manage your treatment. Follow-up care is a key part of your treatment and safety. Be sure to make and go to all appointments, and call your doctor if you are having problems. It's also a good idea to know your test results and keep a list of the medicines you take. Where can you learn more? Go to http://sapna-araseli.info/.   Enter [id number] in the search box to learn more about \"Learning About Alcohol Use Disorder. \"  Current as of: February 5, 2019  Content Version: 12.2  © 0791-2978 [employer]. Care instructions adapted under license by [employer] (which disclaims liability or warranty for this information). If you have questions about a medical condition or this instruction, always ask your healthcare professional. [address] any warranty or liability for your use of this information. Medicare Wellness Visit, Male    The best way to live healthy is to have a lifestyle where you eat a well-balanced diet, exercise regularly, limit alcohol use, and quit all forms of tobacco/nicotine, if applicable. Regular preventive services are another way to keep healthy. Preventive services (vaccines, screening tests, monitoring & exams) can help personalize your care plan, which helps you manage your own care. Screening tests can find health problems at the earliest stages, when they are easiest to treat. [facility] follows the current, evidence-based guidelines published by the [facility] Ulysses Higginbotham (USPSTF) when recommending preventive services for our patients. Because we follow these guidelines, sometimes recommendations change over time as research supports it. (For example, a prostate screening blood test is no longer routinely recommended for men with no symptoms). Of course, you and your doctor may decide to screen more often for some diseases, based on your risk and co-morbidities (chronic disease you are already diagnosed with). Preventive services for you include:  - Medicare offers their members a free annual wellness visit, which is time for you and your primary care provider to discuss and plan for your preventive service needs. Take advantage of this benefit every year!  -All adults over age 72 should receive the recommended pneumonia vaccines.  Current USPSTF guidelines recommend a series of two vaccines for the best pneumonia protection.   -All adults should have a flu vaccine yearly and tetanus vaccine every 10 years.  -All adults age 48 and older should receive the shingles vaccines (series of two vaccines). -All adults age 38-68 who are overweight should have a diabetes screening test once every three years.   -Other screening tests & preventive services for persons with diabetes include: an eye exam to screen for diabetic retinopathy, a kidney function test, a foot exam, and stricter control over your cholesterol.   -Cardiovascular screening for adults with routine risk involves an electrocardiogram (ECG) at intervals determined by the provider.   -Colorectal cancer screening should be done for adults age 54-65 with no increased risk factors for colorectal cancer. There are a number of acceptable methods of screening for this type of cancer. Each test has its own benefits and drawbacks. Discuss with your provider what is most appropriate for you during your annual wellness visit. The different tests include: colonoscopy (considered the best screening method), a fecal occult blood test, a fecal DNA test, and sigmoidoscopy.  -All adults born between [facility] should be screened once for Hepatitis C.  -An Abdominal Aortic Aneurysm (AAA) Screening is recommended for men age 73-68 who has ever smoked in their lifetime.      Here is a list of your current Health Maintenance items (your personalized list of preventive services) with a due date:  Health Maintenance Due   Topic Date Due    DTaP/Tdap/Td  (1 - Tdap) 09/08/1954    Shingles Vaccine (1 of 2) 09/08/1993    Annual Well Visit  11/16/2019

## 2020-01-02 NOTE — PROGRESS NOTES
[facility]NISTS OF [place]:  01/13/20, [id number]      The Subsequent Medicare Annual Wellness Exam PROGRESS NOTE    This is a Subsequent Medicare Annual Wellness Exam (AWV). I have reviewed the patient's medical history in detail and updated the computerized patient record. Melita Pena is a 66 y.o.  male and presents for an annual wellness exam.    SUBJECTIVE  The pt has no acute complaints today. Past Medical History:   Diagnosis Date    Adverse effect of anesthesia     Violent dreams with Ether    Arthritis     hip, knee    Chronic kidney disease     had previous reaction with kidneys after a bp med, no issues now    Colon polyps     dysplastic    Depression     Diabetes mellitus ([facility] [address] 2017    no meds    Diverticulosis     seen on colonoscopy from 8/20/15    Diverticulosis of sigmoid colon 01/27/10    GERD (gastroesophageal reflux disease)     no meds    Gout     Hepatitis     Hepatitis A - while he was young    Hyperlipidemia     Hypertension 15 years    Hypovitaminosis D     Nephrolithiasis     Osteoarthritis of right knee 2/19/2018    Plantar wart     Rectal polyp     seen on colonoscopy from 8/20/15    Thyroid disease     hypothyroidism      Past Surgical History:   Procedure Laterality Date    ENDOSCOPY, COLON, DIAGNOSTIC  01/27/2010    polyp distal rectum, removed; diverticulosis of sigmoid    HX CATARACT REMOVAL Bilateral     HX COLONOSCOPY      HX HEENT  2008    vocal cord polyp removed    HX POLYPECTOMY  01/27/2010    Distal rectum    HX TONSILLECTOMY      LAP,INGUINAL HERNIA REPR,INITIAL Right 05/31/2018    Dr. Kimberly Rush LITHOTRIPSY      TOTAL KNEE ARTHROPLASTY Left 1/2014    TOTAL KNEE ARTHROPLASTY Right 02/2018     Current Outpatient Medications   Medication Sig Dispense Refill    atorvastatin (LIPITOR) 40 mg tablet Take 1 Tab by mouth daily.  90 Tab 3    carvedilol (COREG) 6.25 mg tablet TAKE ONE TABLET BY MOUTH TWICE A DAY WITH MEALS 60 Tab 9    levothyroxine (SYNTHROID) 50 mcg tablet TAKE ONE TABLET BY MOUTH DAILY BEFORE BREAKFAST 90 Tab 2    allopurinol (ZYLOPRIM) 100 mg tablet TAKE ONE TABLET BY MOUTH TWICE A  Tab 1    sildenafil, antihypertensive, (REVATIO) 20 mg tablet TAKE TWO TABLETS BY MOUTH DAILY 1 HOUR PRIOR TO SEXUAL RELATIONS AS NEEDED *MAX DAILY DOSE: 5 TABLETS * 30 Tab 9    lisinopril (PRINIVIL, ZESTRIL) 5 mg tablet TAKE ONE TABLET BY MOUTH DAILY 90 Tab 3    varicella-zoster recombinant, PF, (SHINGRIX) 50 mcg/0.5 mL susr injection 0.5 mL by IntraMUSCular route every two (2) months. 0.5 mL 1    ibuprofen (MOTRIN) 800 mg tablet Take 1 Tab by mouth three (3) times daily as needed for Pain. 40 Tab 0    ubidecarenone (COQ-10 PO) Take 200 mEq by mouth daily.  aspirin delayed-release 81 mg tablet Take 81 mg by mouth daily.  terbinafine HCl (LAMISIL) 250 mg tablet Take 250 mg by mouth every thirty (30) days. Indications: Taken for 1 full week a month; and then not for another 3 weeks      venlafaxine-SR (EFFEXOR-XR) 150 mg capsule Take 150 mg by mouth daily.  multivitamin (ONE A DAY) tablet Take 1 Tab by mouth daily.  Cholecalciferol, Vitamin D3, (VITAMIN D) 2,000 unit Cap Take 4,000 Units by mouth daily.  scopolamine (TRANSDERM-SCOP) 1 mg over 3 days pt3d 1 Patch by TransDERmal route every seventy-two (72) hours.  6 Patch 0     Allergies   Allergen Reactions    Amlodipine Other (comments)     BLE edema     Family History   Problem Relation Age of Onset   [employer] Arthritis-rheumatoid Mother     Hypertension Mother     Elevated Lipids Mother     Thyroid Disease Sister     Cancer Father         colon    Heart Disease Other     Hypertension Other     Cancer Other         breast    Heart Disease Maternal Grandmother     Dementia Maternal Grandfather     Cancer Paternal Grandmother         breast    No Known Problems Paternal Grandfather      Social History     Tobacco Use    Smoking status: Former Smoker Packs/day: 1.00     Years: 6.00     Pack years: 6.00     Types: Cigarettes     Last attempt to quit: 1992     Years since quittin.0    Smokeless tobacco: Never Used   Substance Use Topics    Alcohol use: Not Currently     Alcohol/week: 10.0 standard drinks     Types: 10 Glasses of wine per week     Comment: wine with dinner sometimes     Patient Active Problem List   Diagnosis Code    Hyperlipidemia E78.5    Hypovitaminosis D E55.9    Gout M10.9    Family history of colon cancer. personal hx colon polyps Z80.0    Vocal cord nodule J38.2    Essential hypertension I10    Type 2 diabetes mellitus without complication (HCC) D00.8    Hypothyroidism due to acquired atrophy of thyroid E03.4    CKD (chronic kidney disease) stage 3, GFR 30-59 ml/min (HCC) N18.3    Rectal polyp -  seen on colonoscopy from 8/20/15 K62.1    Diverticulosis of intestine without bleeding - seen on colonoscopy from 8/20/15 K57.90    Major depressive disorder in remission F32.5    Melanoma in situ - on left dorsal forearm 2016 D03.9    Erectile dysfunction N52.9    Osteoarthritis of right knee M17.11    Right inguinal hernia K40.90    Type 2 diabetes with nephropathy (HCC) E11.21     Pt is a 72yo male with h/o ED, melanoma in situ on left dorsal forearm s/p removal, MDD, rectal polyp, diverticulosis, CKD stage 3, HTN, type 2 DM (foot exams are done by Joseline Hollis), hypothyroidism, DJD, gout, vocal cord nodule, depression/anxiety (followed by Psychiatry), vitamin D deficiency, RBBB (s/p normal stress test 2018), and HLD. Health Maintenance History  Immunizations reviewed:    Tdap up to date   Pneumovax: up to date   Flu: up to date  Zoster: over-due    Immunization History   Administered Date(s) Administered    Influenza High Dose Vaccine PF 10/02/2015, 10/24/2016, 10/05/2017, 10/01/2018, 10/31/2018, 10/27/2019    Influenza Vaccine 10/01/2013, 10/15/2013, 2014    Influenza Vaccine Split 10/21/2011, 2012    Pneumococcal Conjugate (PCV-13) 2015    Pneumococcal Polysaccharide (PPSV-23) 2014, 08/10/2017    Pneumococcal Vaccine (Unspecified Type) 2007    Td 2007    Zoster Vaccine, Live 2013       Colonoscopy: Up to date. No bleeding. Eye exam: Up to date. No vision changes. PSA: No new urinary sx. Review of Systems   Constitutional: Negative for chills and fever. HENT: Negative for ear pain and sore throat. Eyes: Negative for blurred vision and pain. Respiratory: Negative for cough and shortness of breath. Cardiovascular: Negative for chest pain. Gastrointestinal: Negative for abdominal pain, blood in stool and melena. Genitourinary: Negative for dysuria and hematuria. Musculoskeletal: Negative for joint pain and myalgias. Skin: Negative for rash. Neurological: Negative for tingling, focal weakness and headaches. Endo/Heme/Allergies: Does not bruise/bleed easily. Psychiatric/Behavioral: Negative for substance abuse. Depression Risk Factor Screening:      Patient Health Questionnaire (PHQ-2)   Over the last 2 weeks, how often have you been bothered by any of the following problems? · Little interest or pleasure in doing things? · Several days. [1]  · Feeling down, depressed, or hopeless? · Several days. [1]    Total Score: 2/6  PHQ-2 Assessment Scoring:   A score of 2 or more requires further screening with the PHQ-9    Followed by Psychiatry. See E/M portion of visit.    Alcohol Risk Factor Screening:   Men:   On any occasion during the past 3 months, have you had more than 4 drinks containing alcohol? yes    Do you average more than 14 drinks per week? no    Tobacco Use Screening:     Social History     Tobacco Use   Smoking Status Former Smoker    Packs/day: 1.00    Years: 6.00    Pack years: 6.00    Types: Cigarettes    Last attempt to quit: 1992    Years since quittin.0   Smokeless Tobacco Never Used       Hearing Loss Hearing is unchanged. Activities of Daily Living   Self-care. Requires assistance with: no ADLs    Fall Risk   No fall risk factors    Abuse Screen   Patient is not abused  None    Additional Examination Findings:  Vitals:    01/02/20 0900 01/02/20 0909   BP: 155/90 148/89   Pulse: 68 80   Resp: 14 12   Temp: 96.1 °F (35.6 °C)    TempSrc: Oral    SpO2: 100%    Weight: 214 lb (97.1 kg)    Height: 5' 9\" (1.753 m)    PainSc:   0 - No pain       Body mass index is 31.6 kg/m². Evaluation of Cognitive Function:  Mood/affect: Euthymic  Appearance: Well-groomed  Family member/caregiver input: He is not accompanied by a family member      General:   Well-nourished, well-groomed, pleasant, alert, in no acute distress.      Head:  Normocephalic, atraumatic  Ears:  External ears WNL  Eyes:  Clear sclera  Neuro:   Alert, conversant, appropriate, following commands, no sensory deficit   Skin:    No rashes noted  Psych:  Flat affect      Dementia Screen (Mini-Cog):  Clock Drawing (ten past eleven) Exercise: Unremarkable      LABS   Data Review:   Lab Results   Component Value Date/Time    Sodium 141 11/01/2019 08:26 AM    Potassium 4.8 11/01/2019 08:26 AM    Chloride 107 11/01/2019 08:26 AM    CO2 28 11/01/2019 08:26 AM    Anion gap 6 11/01/2019 08:26 AM    Glucose 155 (H) 11/01/2019 08:26 AM    BUN 27 (H) 11/01/2019 08:26 AM    Creatinine 1.20 11/01/2019 08:26 AM    BUN/Creatinine ratio 23 (H) 11/01/2019 08:26 AM    GFR est AA >60 11/01/2019 08:26 AM    GFR est non-AA 59 (L) 11/01/2019 08:26 AM    Calcium 9.0 11/01/2019 08:26 AM       Lab Results   Component Value Date/Time    WBC 5.9 11/01/2019 08:26 AM    HGB 14.8 11/01/2019 08:26 AM    HCT 44.8 11/01/2019 08:26 AM    PLATELET 791 76/89/0087 08:26 AM    MCV 96.6 11/01/2019 08:26 AM       Lab Results   Component Value Date/Time    Hemoglobin A1c 7.1 (H) 11/01/2019 08:26 AM    Hemoglobin A1c (POC) 6.6 07/05/2019 09:21 AM       Lab Results   Component Value Date/Time Cholesterol, total 179 11/01/2019 08:26 AM    HDL Cholesterol 44 11/01/2019 08:26 AM    LDL, calculated 103 (H) 11/01/2019 08:26 AM    VLDL, calculated 32 11/01/2019 08:26 AM    Triglyceride 160 (H) 11/01/2019 08:26 AM    CHOL/HDL Ratio 4.1 11/01/2019 08:26 AM     Patient Care Team:  Luciana Chapa MD as PCP - General (Family Practice)  Luciana Chapa MD as PCP - [facility] Empaneled Provider  Devika Hair, RN as Ambulatory Care Manager (Internal Medicine)  Isabella Reese (Physician Assistant)  Brenda Hobbs MD (Gastroenterology)  Juan Sahu MD (Otolaryngology)  Hugo Balderas MD (Orthopedic Surgery)  Aidan Jaffe DPM (Podiatry)  Rupali Parker DPM (Podiatry)  Mikael Dong MD (Ophthalmology)  Mynor Banuelos MD (Orthopedic Surgery)  Gianna Duong MD (Unknown Physician Specialty)  Rick Rodriguez NP as Nurse Practitioner (Nurse Practitioner)    End-of-life planning  Advanced Directive in the case than an injury or illness causes the patient to be unable to make health care decisions was discussed with the patient. Advice/Referrals/Counselling/Plan:   Education and counseling provided:  Are appropriate based on today's review and evaluation  End-of-Life planning (with patient's consent)  Pneumococcal Vaccine  Influenza Vaccine  Prostate cancer screening tests (PSA, covered annually)  Colorectal cancer screening tests  Cardiovascular screening blood test  Screening for glaucoma  Diabetes screening test  Medical nutrition therapy for individuals with diabetes or renal disease  Diabetes outpatient self-management training services  Include in education list (weight loss, physical activity, smoking cessation, fall prevention, and nutrition)    ICD-10-CM ICD-9-CM    1. Alcohol use Z72.89 V49.89    2. [facility] F33.40 296.35    3.  Medicare annual wellness visit, subsequent Z00.00 V70.0      reviewed diet, exercise and weight control. Brief written plan, checklist    Assessment/Plan:    Health Maintenance:  -I encouraged him to get all recommended vaccinations.  -I encouraged him to stop all alcohol beverage consumption. Please see E/M portion of his visit today. Lab review: labs are reviewed, up to date and normal      Advance Care Planning  Advance Care Planning (ACP) Provider Conversation     Date of ACP Conversation: 01/13/20  Persons included in Conversation:  patient    Authorized Decision Maker (if patient is incapable of making informed decisions): This person is:   Healthcare Agent/Medical Power of  under Advance Directive        For Patients with Decision Making Capacity:   Values/Goals: Exploration of values, goals, and preferences if recovery is not expected, even with continued medical treatment in the event of:  Imminent death  Severe, permanent brain injury    Conversation Outcomes / Follow-Up Plan:   Reviewed existing Advance Directive . He has no changes to make to his pre-existing advance directive. I have discussed the diagnosis with the patient and the intended plan as seen in the above orders. The patient has received an after-visit summary and questions were answered concerning future plans. I have discussed medication side effects and warnings with the patient as well. I have reviewed the plan of care with the patient, accepted their input and they are in agreement with the treatment goals. Follow-up and Dispositions    · Return in about 4 weeks (around 1/30/2020) for BP check.

## 2020-01-02 NOTE — PROGRESS NOTES
INTERNISTS OF [facility]:  1/13/2020, MRN: [id number]    Marcio Garcia is a 66 y.o. male and presents to clinic for ETOH intake and Annual Wellness Visit (ROOM 2)    Subjective:   Pt is a 72yo male with h/o ED, melanoma in situ on left dorsal forearm s/p removal, MDD, rectal polyp, diverticulosis, CKD stage 3, HTN, type 2 DM (foot exams are done by Selene Murphy), hypothyroidism, DJD, gout, vocal cord nodule, depression/anxiety (followed by Psychiatry), vitamin D deficiency, RBBB (s/p normal stress test 2018), and HLD. Depression/ETOH Abuse: The patient is followed regularly by a psychiatrist for treatment of underlying anxiety and depression symptoms. He has had these symptoms for years. He is present taking: Effexor. No adverse side effects of taking his medicine. He has not had an alcoholic beverage for over 2 weeks. The thing that made him stay sober was when his wife contacted their . Per the 's advice, if the patient was in some type of motor vehicle accident while under the influence of alcohol, they could lose all of their physicians including their house and be sued appropriately. The patient notes that when he would consume alcoholic beverages, he typically would consume drinks on the way home from the liquor store. He continues to have some depression symptoms. Although he follows up with his psychiatrist regularly, he is not told his psychiatrist of his feelings and recent battles with alcoholism.         Patient Active Problem List    Diagnosis Date Noted    Type 2 diabetes with nephropathy ([facility] [address] 07/05/2019    Right inguinal hernia 05/17/2018    Osteoarthritis of right knee 02/19/2018    Erectile dysfunction 01/23/2017    Melanoma in situ - on left dorsal forearm 2016 09/16/2016    Major depressive disorder in remission 08/03/2016    Rectal polyp -  seen on colonoscopy from 8/20/15 08/02/2016    Diverticulosis of intestine without bleeding - seen on colonoscopy from 8/20/15 08/02/2016    CKD (chronic kidney disease) stage 3, GFR 30-59 ml/min ([facility] 11/09/2015    Essential hypertension 06/24/2015    Type 2 diabetes mellitus without complication ([facility] 48/10/6811    Hypothyroidism due to acquired atrophy of thyroid 06/24/2015    Vocal cord nodule 10/21/2013    Gout 10/21/2011    Family history of colon cancer. personal hx colon polyps 10/21/2011    Hyperlipidemia     Hypovitaminosis D        Current Outpatient Medications   Medication Sig Dispense Refill    atorvastatin (LIPITOR) 40 mg tablet Take 1 Tab by mouth daily. 90 Tab 3    carvedilol (COREG) 6.25 mg tablet TAKE ONE TABLET BY MOUTH TWICE A DAY WITH MEALS 60 Tab 9    levothyroxine (SYNTHROID) 50 mcg tablet TAKE ONE TABLET BY MOUTH DAILY BEFORE BREAKFAST 90 Tab 2    allopurinol (ZYLOPRIM) 100 mg tablet TAKE ONE TABLET BY MOUTH TWICE A  Tab 1    sildenafil, antihypertensive, (REVATIO) 20 mg tablet TAKE TWO TABLETS BY MOUTH DAILY 1 HOUR PRIOR TO SEXUAL RELATIONS AS NEEDED *MAX DAILY DOSE: 5 TABLETS * 30 Tab 9    lisinopril (PRINIVIL, ZESTRIL) 5 mg tablet TAKE ONE TABLET BY MOUTH DAILY 90 Tab 3    varicella-zoster recombinant, PF, (SHINGRIX) 50 mcg/0.5 mL susr injection 0.5 mL by IntraMUSCular route every two (2) months. 0.5 mL 1    ibuprofen (MOTRIN) 800 mg tablet Take 1 Tab by mouth three (3) times daily as needed for Pain. 40 Tab 0    ubidecarenone (COQ-10 PO) Take 200 mEq by mouth daily.  aspirin delayed-release 81 mg tablet Take 81 mg by mouth daily.  terbinafine HCl (LAMISIL) 250 mg tablet Take 250 mg by mouth every thirty (30) days. Indications: Taken for 1 full week a month; and then not for another 3 weeks      venlafaxine-SR (EFFEXOR-XR) 150 mg capsule Take 150 mg by mouth daily.  multivitamin (ONE A DAY) tablet Take 1 Tab by mouth daily.  Cholecalciferol, Vitamin D3, (VITAMIN D) 2,000 unit Cap Take 4,000 Units by mouth daily.       scopolamine (TRANSDERM-SCOP) 1 mg over 3 days pt3d 1 Patch by TransDERmal route every seventy-two (72) hours.  6 Patch 0       Allergies   Allergen Reactions    Amlodipine Other (comments)     BLE edema       Past Medical History:   Diagnosis Date    Adverse effect of anesthesia     Violent dreams with Ether    Arthritis     hip, knee    Chronic kidney disease     had previous reaction with kidneys after a bp med, no issues now    Colon polyps     dysplastic    Depression     Diabetes mellitus ([facility] [address] 2017    no meds    Diverticulosis     seen on colonoscopy from 8/20/15    Diverticulosis of sigmoid colon 01/27/10    GERD (gastroesophageal reflux disease)     no meds    Gout     Hepatitis     Hepatitis A - while he was young    Hyperlipidemia     Hypertension 15 years    Hypovitaminosis D     Nephrolithiasis     Osteoarthritis of right knee 2/19/2018    Plantar wart     Rectal polyp     seen on colonoscopy from 8/20/15    Thyroid disease     hypothyroidism       Past Surgical History:   Procedure Laterality Date    ENDOSCOPY, COLON, DIAGNOSTIC  01/27/2010    polyp distal rectum, removed; diverticulosis of sigmoid    HX CATARACT REMOVAL Bilateral     HX COLONOSCOPY      HX HEENT  2008    vocal cord polyp removed    HX POLYPECTOMY  01/27/2010    Distal rectum    HX TONSILLECTOMY      Liliam Yarbrough Right 05/31/2018    Dr. Leon Eunice Left 1/2014    TOTAL KNEE ARTHROPLASTY Right 02/2018       Family History   Problem Relation Age of Onset   [facility] Arthritis-rheumatoid Mother     Hypertension Mother     Elevated Lipids Mother     Thyroid Disease Sister     Cancer Father         colon    Heart Disease Other     Hypertension Other     Cancer Other         breast    Heart Disease Maternal Grandmother     Dementia Maternal Grandfather     Cancer Paternal Grandmother         breast    No Known Problems Paternal Grandfather        Social History     Tobacco Use    Smoking status: Former Smoker     Packs/day: 1.00     Years: 6.00     Pack years: 6.00     Types: Cigarettes     Last attempt to quit: 1992     Years since quittin.0    Smokeless tobacco: Never Used   Substance Use Topics    Alcohol use: Not Currently     Alcohol/week: 10.0 standard drinks     Types: 10 Glasses of wine per week     Comment: wine with dinner sometimes       ROS   Review of Systems   Constitutional: Negative for chills and fever. HENT: Negative for ear pain and sore throat. Eyes: Negative for blurred vision and pain. Respiratory: Negative for cough and shortness of breath. Cardiovascular: Negative for chest pain. Gastrointestinal: Negative for abdominal pain. Genitourinary: Negative for dysuria and urgency. Musculoskeletal: Negative for joint pain and myalgias. Skin: Negative for rash. Neurological: Negative for focal weakness and headaches. Endo/Heme/Allergies: Does not bruise/bleed easily. Psychiatric/Behavioral: Negative for substance abuse. Objective     Vitals:    20 0900 20 0909   BP: 155/90 148/89   Pulse: 68 80   Resp: 14 12   Temp: 96.1 °F (35.6 °C)    TempSrc: Oral    SpO2: 100%    Weight: 214 lb (97.1 kg)    Height: 5' 9\" (1.753 m)    PainSc:   0 - No pain        Physical Exam  Vitals signs reviewed. Constitutional:       Appearance: Normal appearance. HENT:      Head: Normocephalic and atraumatic. Right Ear: External ear normal.      Left Ear: External ear normal.      Nose: Nose normal.      Mouth/Throat:      Pharynx: No oropharyngeal exudate or posterior oropharyngeal erythema. Neck:      Musculoskeletal: Neck supple. No muscular tenderness. Cardiovascular:      Rate and Rhythm: Normal rate and regular rhythm. Pulses: Normal pulses. Heart sounds: Normal heart sounds. Pulmonary:      Effort: Pulmonary effort is normal.      Breath sounds: Normal breath sounds. Abdominal:      General: Abdomen is flat.  Bowel sounds are normal.      Palpations: Abdomen is soft. Musculoskeletal:         General: No swelling (Bue) or tenderness (Bue). Skin:     General: Skin is warm and dry. Findings: No erythema. Neurological:      General: No focal deficit present. Mental Status: He is alert. Gait: Gait normal.   Psychiatric:      Comments: Flat affect         LABS   Data Review:   Lab Results   Component Value Date/Time    WBC 5.9 11/01/2019 08:26 AM    HGB 14.8 11/01/2019 08:26 AM    HCT 44.8 11/01/2019 08:26 AM    PLATELET 499 42/55/3054 08:26 AM    MCV 96.6 11/01/2019 08:26 AM       Lab Results   Component Value Date/Time    Sodium 141 11/01/2019 08:26 AM    Potassium 4.8 11/01/2019 08:26 AM    Chloride 107 11/01/2019 08:26 AM    CO2 28 11/01/2019 08:26 AM    Anion gap 6 11/01/2019 08:26 AM    Glucose 155 (H) 11/01/2019 08:26 AM    BUN 27 (H) 11/01/2019 08:26 AM    Creatinine 1.20 11/01/2019 08:26 AM    BUN/Creatinine ratio 23 (H) 11/01/2019 08:26 AM    GFR est AA >60 11/01/2019 08:26 AM    GFR est non-AA 59 (L) 11/01/2019 08:26 AM    Calcium 9.0 11/01/2019 08:26 AM       Lab Results   Component Value Date/Time    Cholesterol, total 179 11/01/2019 08:26 AM    HDL Cholesterol 44 11/01/2019 08:26 AM    LDL, calculated 103 (H) 11/01/2019 08:26 AM    VLDL, calculated 32 11/01/2019 08:26 AM    Triglyceride 160 (H) 11/01/2019 08:26 AM    CHOL/HDL Ratio 4.1 11/01/2019 08:26 AM       Lab Results   Component Value Date/Time    Hemoglobin A1c 7.1 (H) 11/01/2019 08:26 AM    Hemoglobin A1c (POC) 6.6 07/05/2019 09:21 AM       Assessment/Plan:   Depression and ETOH Abuse:  - Continue to follow-up with Psychiatry. C/w rx as prescribed. - Faxing my last few office notes to his psychiatry team.  We discussed this today. I encouraged him to let his psychiatrist know of his recent struggles with alcohol abuse. I congratulated patient on his sobriety and encouraged him to continue being sober.   Return to clinic to assess his sobriety. Health Maintenance Due   Topic Date Due    DTaP/Tdap/Td series (1 - Tdap) 09/08/1956    Shingrix Vaccine Age 50> (1 of 2) 09/08/1993     Lab review: labs are reviewed in the EHR    I have discussed the diagnosis with the patient and the intended plan as seen in the above orders. The patient has received an after-visit summary and questions were answered concerning future plans. I have discussed medication side effects and warnings with the patient as well. I have reviewed the plan of care with the patient, accepted their input and they are in agreement with the treatment goals. All questions were answered. The patient understands the plan of care. Handouts provided today with above information. Pt instructed if symptoms worsen to call the office or report to the ED for continued care. Greater than 50% of the visit time was spent in counseling and/or coordination of care. Voice recognition was used to generate this report, which may have resulted in some phonetic based errors in grammar and contents. Even though attempts were made to correct all the mistakes, some may have been missed, and remained in the body of the document. Follow-up and Dispositions    · Return in about 4 weeks (around 1/30/2020) for BP check.          Marek Griggs MD

## 2020-01-13 NOTE — ACP (ADVANCE CARE PLANNING)
Advance Care Planning  Advance Care Planning (ACP) Provider Conversation     Date of ACP Conversation: 01/13/20  Persons included in Conversation:  patient    Authorized Decision Maker (if patient is incapable of making informed decisions): This person is:   Healthcare Agent/Medical Power of  under Advance Directive        For Patients with Decision Making Capacity:   Values/Goals: Exploration of values, goals, and preferences if recovery is not expected, even with continued medical treatment in the event of:  Imminent death  Severe, permanent brain injury    Conversation Outcomes / Follow-Up Plan:   Reviewed existing Advance Directive . He has no changes to make to his pre-existing advance directive.     Dr. Ellie Henderson  [facility] [address], [address] [place] [address] Str.  Phone: [phone]  Fax: [phone]

## 2020-01-14 NOTE — TELEPHONE ENCOUNTER
Chief Complaint   Patient presents with    Alcohol Problem     01- Patient's wife reached, and 2 identifiers were used: Full Name, and Date of Birth verified on the patient. The patient is seeing Psychiatrist Dr Claudette Goldsmith at the [place] [address], contact number is [phone]/YTA number [phone]. The patient's wife states her appreciation to Dr Everardo Light for her interest, and care of the patient, and states at the moment she feels the patient is doing well. The patient is seeing the Psychiatrist, and understands we will be faxing over Dr Rosanne Vann office visit notes today, for his Psychiatrist to review.

## 2020-01-30 ENCOUNTER — OFFICE VISIT (OUTPATIENT)
Dept: INTERNAL MEDICINE CLINIC | Age: 75
End: 2020-01-30

## 2020-01-30 VITALS
HEIGHT: 69 IN | TEMPERATURE: 95.6 F | SYSTOLIC BLOOD PRESSURE: 126 MMHG | WEIGHT: 213.6 LBS | RESPIRATION RATE: 14 BRPM | OXYGEN SATURATION: 99 % | DIASTOLIC BLOOD PRESSURE: 71 MMHG | HEART RATE: 68 BPM | BODY MASS INDEX: 31.64 KG/M2

## 2020-01-30 DIAGNOSIS — Z78.9 ALCOHOL USE: ICD-10-CM

## 2020-01-30 DIAGNOSIS — I10 ESSENTIAL HYPERTENSION: Primary | ICD-10-CM

## 2020-01-30 DIAGNOSIS — F33.40 RECURRENT MAJOR DEPRESSIVE DISORDER, IN REMISSION (HCC): ICD-10-CM

## 2020-01-30 PROBLEM — E11.21 TYPE 2 DIABETES WITH NEPHROPATHY (HCC): Status: RESOLVED | Noted: 2019-07-05 | Resolved: 2020-01-30

## 2020-01-30 NOTE — PROGRESS NOTES
Melita Pena presents today for   Chief Complaint   Patient presents with    Hypertension     follow up  ROOM  2       Is someone accompanying this pt? no    Is the patient using any DME equipment during OV? no    Depression Screening:  3 most recent PHQ Screens 1/2/2020   PHQ Not Done Active Diagnosis of Depression or Bipolar Disorder   Little interest or pleasure in doing things Several days   Feeling down, depressed, irritable, or hopeless Several days   Total Score PHQ 2 2   Trouble falling or staying asleep, or sleeping too much -   Feeling tired or having little energy -   Poor appetite, weight loss, or overeating -   Feeling bad about yourself - or that you are a failure or have let yourself or your family down -   Trouble concentrating on things such as school, work, reading, or watching TV -   Moving or speaking so slowly that other people could have noticed; or the opposite being so fidgety that others notice -   Thoughts of being better off dead, or hurting yourself in some way -   PHQ 9 Score -   How difficult have these problems made it for you to do your work, take care of your home and get along with others -       Learning Assessment:  Learning Assessment 7/5/2019   PRIMARY LEARNER Patient   HIGHEST LEVEL OF EDUCATION - PRIMARY LEARNER  > 4 YEARS OF COLLEGE   BARRIERS PRIMARY LEARNER NONE   CO-LEARNER CAREGIVER No   PRIMARY LANGUAGE ENGLISH   LEARNER PREFERENCE PRIMARY DEMONSTRATION     -   ANSWERED BY patient   RELATIONSHIP SELF       Abuse Screening:  Abuse Screening Questionnaire 7/5/2019   Do you ever feel afraid of your partner? N   Are you in a relationship with someone who physically or mentally threatens you? N   Is it safe for you to go home? Y       Fall Risk  Fall Risk Assessment, last 12 mths 1/2/2020   Able to walk? Yes   Fall in past 12 months? Yes   Fall with injury?  No   Number of falls in past 12 months 2   Fall Risk Score 2       Health Maintenance reviewed and discussed and ordered per Provider. Health Maintenance Due   Topic Date Due    DTaP/Tdap/Td series (1 - Tdap) 09/08/1954    Shingrix Vaccine Age 50> (1 of 2) 09/08/1993   . Coordination of Care:  1. Have you been to the ER, urgent care clinic since your last visit? Hospitalized since your last visit? no    2. Have you seen or consulted any other health care providers outside of the [address] [place] [address] since your last visit? Include any pap smears or colon screening.  no

## 2020-01-30 NOTE — PATIENT INSTRUCTIONS
Health Maintenance Due   Topic Date Due    DTaP/Tdap/Td series (1 - Tdap) 09/08/1954    Shingrix Vaccine Age 50> (1 of 2) 09/08/1993          Home Blood Pressure Test: About This Test  What is it? A home blood pressure test allows you to keep track of your blood pressure at home. Blood pressure is a measure of the force of blood against the walls of your arteries. Blood pressure readings include two numbers, such as 130/80 (say \"130 over 80\"). The first number is the systolic pressure. The second number is the diastolic pressure. Why is this test done? You may do this test at home to:  · Find out if you have high blood pressure. · Track your blood pressure if you have high blood pressure. · Track how well medicine is working to reduce high blood pressure. · Check how lifestyle changes, such as weight loss and exercise, are affecting blood pressure. How can you prepare for the test?  · Do not use caffeine, tobacco, or medicines known to raise blood pressure (such as nasal decongestant sprays) for at least 30 minutes before taking your blood pressure. · Do not exercise for at least 30 minutes before taking your blood pressure. What happens before the test?  Take your blood pressure while you feel comfortable and relaxed. Sit quietly with both feet on the floor for at least 5 minutes before the test.  What happens during the test?  · Sit with your arm slightly bent and resting on a table so that your upper arm is at the same level as your heart. · Roll up your sleeve or take off your shirt to expose your upper arm. · Wrap the blood pressure cuff around your upper arm so that the lower edge of the cuff is about 1 inch above the bend of your elbow. Proceed with the following steps depending on if you are using an automatic or manual pressure monitor.   Automatic blood pressure monitors  · Press the on/off button on the automatic monitor and wait until the ready-to-measure \"heart\" symbol appears next to zero in the display window. · Press the start button. The cuff will inflate and deflate by itself. · Your blood pressure numbers will appear on the screen. · Write your numbers in your log book, along with the date and time. Manual blood pressure monitors  · Place the earpieces of a stethoscope in your ears, and place the bell of the stethoscope over the artery, just below the cuff. · Close the valve on the rubber inflating bulb. · Squeeze the bulb rapidly with your opposite hand to inflate the cuff until the dial or column of mercury reads about 30 mm Hg higher than your usual systolic pressure. If you do not know your usual pressure, inflate the cuff to 210 mm Hg or until the pulse at your wrist disappears. · Open the pressure valve just slightly by twisting or pressing the valve on the bulb. · As you watch the pressure slowly fall, note the level on the dial at which you first start to hear a pulsing or tapping sound through the stethoscope. This is your systolic blood pressure. · Continue letting the air out slowly. The sounds will become muffled and will finally disappear. Note the pressure when the sounds completely disappear. This is your diastolic blood pressure. Let out all the remaining air. · Write your numbers in your log book, along with the date and time. What else should you know about the test?  It is more accurate to take the average of several readings made throughout the day than to rely on a single reading. It's normal for blood pressure to go up and down throughout the day. Follow-up care is a key part of your treatment and safety. Be sure to make and go to all appointments, and call your doctor if you are having problems. It's also a good idea to keep a list of the medicines you take. Where can you learn more? Go to http://sapna-araseli.info/.   Enter C427 in the search box to learn more about \"Home Blood Pressure Test: About This Test.\"  Current as of: April 9, 2019  Content Version: 12.2  © 0962-5385 [employer]. Care instructions adapted under license by [employer] (which disclaims liability or warranty for this information). If you have questions about a medical condition or this instruction, always ask your healthcare professional. [address] any warranty or liability for your use of this information.

## 2020-01-30 NOTE — PROGRESS NOTES
INTERNISTS [facility]:  1/30/2020, MRN: [id number]      Kirk Young is a 66 y.o. male and presents to clinic for Hypertension (follow up  ROOM  2)    Subjective:   Pt is a 72yo male with h/o ED, melanoma in situ on left dorsal forearm s/p removal, MDD, rectal polyp, diverticulosis, CKD stage 3, HTN, type 2 DM (foot exams are done by Annmarie Pacheco), hypothyroidism, DJD, gout, vocal cord nodule, depression/anxiety (followed by Psychiatry), vitamin D deficiency, RBBB (s/p normal stress test 2018), and HLD. 1. Depression and H/o ETOH Abuse: He is doing chair yoga. He is exercising twice a wk with a . His eating habits have improved. \"My wife said I'm the old me. \" His mood has improved. He is taking two classes at [employer] (Digital journalism and Economics). No relapses with ETOH. He sees his psychiatrist in 6 wks. He just saw Psychiatry last wk. He is taking: Effexor. 2. HTN: BP is 126/71. It was elevated at his last appointment, measuring 148/89. He is taking carvedilol and lisinopril. No adverse side effects of taking these medicines. Patient Active Problem List    Diagnosis Date Noted    Type 2 diabetes with nephropathy ([address] 07/05/2019    Right inguinal hernia 05/17/2018    Osteoarthritis of right knee 02/19/2018    Erectile dysfunction 01/23/2017    Melanoma in situ - on left dorsal forearm 2016 09/16/2016    Major depressive disorder in remission 08/03/2016    Rectal polyp -  seen on colonoscopy from 8/20/15 08/02/2016    Diverticulosis of intestine without bleeding - seen on colonoscopy from 8/20/15 08/02/2016    CKD (chronic kidney disease) stage 3, GFR 30-59 ml/min ([address] 11/09/2015    Essential hypertension 06/24/2015    Type 2 diabetes mellitus without complication ([address] 18/63/2052    Hypothyroidism due to acquired atrophy of thyroid 06/24/2015    Vocal cord nodule 10/21/2013    Gout 10/21/2011    Family history of colon cancer.   personal hx colon polyps 10/21/2011    Hyperlipidemia     Hypovitaminosis D        Current Outpatient Medications   Medication Sig Dispense Refill    atorvastatin (LIPITOR) 40 mg tablet Take 1 Tab by mouth daily. 90 Tab 3    carvedilol (COREG) 6.25 mg tablet TAKE ONE TABLET BY MOUTH TWICE A DAY WITH MEALS 60 Tab 9    levothyroxine (SYNTHROID) 50 mcg tablet TAKE ONE TABLET BY MOUTH DAILY BEFORE BREAKFAST 90 Tab 2    allopurinol (ZYLOPRIM) 100 mg tablet TAKE ONE TABLET BY MOUTH TWICE A  Tab 1    sildenafil, antihypertensive, (REVATIO) 20 mg tablet TAKE TWO TABLETS BY MOUTH DAILY 1 HOUR PRIOR TO SEXUAL RELATIONS AS NEEDED *MAX DAILY DOSE: 5 TABLETS * 30 Tab 9    lisinopril (PRINIVIL, ZESTRIL) 5 mg tablet TAKE ONE TABLET BY MOUTH DAILY 90 Tab 3    ibuprofen (MOTRIN) 800 mg tablet Take 1 Tab by mouth three (3) times daily as needed for Pain. 40 Tab 0    ubidecarenone (COQ-10 PO) Take 200 mEq by mouth daily.  aspirin delayed-release 81 mg tablet Take 81 mg by mouth daily.  terbinafine HCl (LAMISIL) 250 mg tablet Take 250 mg by mouth every thirty (30) days. Indications: Taken for 1 full week a month; and then not for another 3 weeks      venlafaxine-SR (EFFEXOR-XR) 150 mg capsule Take 150 mg by mouth daily.  multivitamin (ONE A DAY) tablet Take 1 Tab by mouth daily.  Cholecalciferol, Vitamin D3, (VITAMIN D) 2,000 unit Cap Take 4,000 Units by mouth daily.  varicella-zoster recombinant, PF, (SHINGRIX) 50 mcg/0.5 mL susr injection 0.5 mL by IntraMUSCular route every two (2) months.  0.5 mL 1       Allergies   Allergen Reactions    Amlodipine Other (comments)     BLE edema       Past Medical History:   Diagnosis Date    Adverse effect of anesthesia     Violent dreams with Ether    Arthritis     hip, knee    Chronic kidney disease     had previous reaction with kidneys after a bp med, no issues now    Colon polyps     dysplastic    Depression     Diabetes mellitus ([facility] [address] 2017    no meds    Diverticulosis     seen on colonoscopy from 8/20/15    Diverticulosis of sigmoid colon 01/27/10    GERD (gastroesophageal reflux disease)     no meds    Gout     Hepatitis     Hepatitis A - while he was young    Hyperlipidemia     Hypertension 15 years    Hypovitaminosis D     Nephrolithiasis     Osteoarthritis of right knee 2018    Plantar wart     Rectal polyp     seen on colonoscopy from 8/20/15    Thyroid disease     hypothyroidism       Past Surgical History:   Procedure Laterality Date    ENDOSCOPY, COLON, DIAGNOSTIC  2010    polyp distal rectum, removed; diverticulosis of sigmoid    HX CATARACT REMOVAL Bilateral     HX COLONOSCOPY      HX HEENT      vocal cord polyp removed    HX POLYPECTOMY  2010    Distal rectum    HX TONSILLECTOMY      Krishan Marybellowell Right 2018    Dr. Grover Essex Left 2014    TOTAL KNEE ARTHROPLASTY Right 2018       Family History   Problem Relation Age of Onset   Chadflori Rivera Arthritis-rheumatoid Mother     Hypertension Mother     Elevated Lipids Mother     Thyroid Disease Sister     Cancer Father         colon    Heart Disease Other     Hypertension Other     Cancer Other         breast    Heart Disease Maternal Grandmother     Dementia Maternal Grandfather     Cancer Paternal Grandmother         breast    No Known Problems Paternal Grandfather        Social History     Tobacco Use    Smoking status: Former Smoker     Packs/day: 1.00     Years: 6.00     Pack years: 6.00     Types: Cigarettes     Last attempt to quit: 1992     Years since quittin.0    Smokeless tobacco: Never Used   Substance Use Topics    Alcohol use: Not Currently     Alcohol/week: 10.0 standard drinks     Types: 10 Glasses of wine per week     Comment: wine with dinner sometimes       ROS   Review of Systems   Constitutional: Negative for chills and fever. HENT: Negative for ear pain and sore throat.     Eyes: Negative for blurred vision and pain. Respiratory: Negative for cough and shortness of breath. Cardiovascular: Negative for chest pain. Gastrointestinal: Negative for abdominal pain, blood in stool and melena. Genitourinary: Negative for dysuria and hematuria. Musculoskeletal: Negative for joint pain and myalgias. Skin: Negative for rash. Neurological: Negative for tingling, focal weakness and headaches. Endo/Heme/Allergies: Does not bruise/bleed easily. Psychiatric/Behavioral: Negative for substance abuse. Objective     Vitals:    01/30/20 1139   BP: 126/71   Pulse: 68   Resp: 14   Temp: 95.6 °F (35.3 °C)   TempSrc: Oral   SpO2: 99%   Weight: 213 lb 9.6 oz (96.9 kg)   Height: 5' 9\" (1.753 m)   PainSc:   0 - No pain       Physical Exam  Vitals signs and nursing note reviewed. Constitutional:       Appearance: Normal appearance. HENT:      Head: Normocephalic and atraumatic. Right Ear: External ear normal.      Left Ear: External ear normal.      Nose: Nose normal.      Mouth/Throat:      Mouth: Mucous membranes are moist.      Pharynx: Oropharynx is clear. No oropharyngeal exudate or posterior oropharyngeal erythema. Eyes:      Extraocular Movements: Extraocular movements intact. Conjunctiva/sclera: Conjunctivae normal.      Pupils: Pupils are equal, round, and reactive to light. Neck:      Musculoskeletal: Normal range of motion and neck supple. No muscular tenderness. Cardiovascular:      Rate and Rhythm: Normal rate and regular rhythm. Pulses: Normal pulses. Heart sounds: Normal heart sounds. Pulmonary:      Effort: Pulmonary effort is normal.      Breath sounds: Normal breath sounds. Abdominal:      General: Abdomen is flat. Bowel sounds are normal. There is no distension. Palpations: Abdomen is soft. Tenderness: There is no abdominal tenderness. Musculoskeletal:         General: No swelling or tenderness (Bue).    Lymphadenopathy:      Cervical: No cervical adenopathy. Skin:     General: Skin is warm and dry. Findings: No erythema. Neurological:      Mental Status: He is alert and oriented to person, place, and time. Gait: Gait normal.   Psychiatric:         Mood and Affect: Mood normal.         LABS   Data Review:   Lab Results   Component Value Date/Time    WBC 5.9 11/01/2019 08:26 AM    HGB 14.8 11/01/2019 08:26 AM    HCT 44.8 11/01/2019 08:26 AM    PLATELET 548 78/97/1187 08:26 AM    MCV 96.6 11/01/2019 08:26 AM       Lab Results   Component Value Date/Time    Sodium 141 11/01/2019 08:26 AM    Potassium 4.8 11/01/2019 08:26 AM    Chloride 107 11/01/2019 08:26 AM    CO2 28 11/01/2019 08:26 AM    Anion gap 6 11/01/2019 08:26 AM    Glucose 155 (H) 11/01/2019 08:26 AM    BUN 27 (H) 11/01/2019 08:26 AM    Creatinine 1.20 11/01/2019 08:26 AM    BUN/Creatinine ratio 23 (H) 11/01/2019 08:26 AM    GFR est AA >60 11/01/2019 08:26 AM    GFR est non-AA 59 (L) 11/01/2019 08:26 AM    Calcium 9.0 11/01/2019 08:26 AM       Lab Results   Component Value Date/Time    Cholesterol, total 179 11/01/2019 08:26 AM    HDL Cholesterol 44 11/01/2019 08:26 AM    LDL, calculated 103 (H) 11/01/2019 08:26 AM    VLDL, calculated 32 11/01/2019 08:26 AM    Triglyceride 160 (H) 11/01/2019 08:26 AM    CHOL/HDL Ratio 4.1 11/01/2019 08:26 AM       Lab Results   Component Value Date/Time    Hemoglobin A1c 7.1 (H) 11/01/2019 08:26 AM    Hemoglobin A1c (POC) 6.6 07/05/2019 09:21 AM       Assessment/Plan:   1. Depression: Improved. No relapses with ETOH  -Continue with Rx as prescribed. -Continue to follow-up with Psychiatry  - I encouraged to continue his sobriety. 2. HTN: Stable. -Continue with Rx as prescribed. -Return to clinic in 6 months to assess his blood pressure.  -I encouraged him to continue exercising regularly. I will recheck his weight at his follow-up appointment.       Health Maintenance Due   Topic Date Due    DTaP/Tdap/Td series (1 - Tdap) 09[birth year]/1954    Shingrix Vaccine Age 50> (1 of 2) 09/08/1993     Lab review: labs are reviewed in the EHR and ordered as mentioned above    I have discussed the diagnosis with the patient and the intended plan as seen in the above orders. The patient has received an after-visit summary and questions were answered concerning future plans. I have discussed medication side effects and warnings with the patient as well. I have reviewed the plan of care with the patient, accepted their input and they are in agreement with the treatment goals. All questions were answered. The patient understands the plan of care. Handouts provided today with above information. Pt instructed if symptoms worsen to call the office or report to the ED for continued care. Greater than 50% of the visit time was spent in counseling and/or coordination of care. Voice recognition was used to generate this report, which may have resulted in some phonetic based errors in grammar and contents. Even though attempts were made to correct all the mistakes, some may have been missed, and remained in the body of the document.           Cintia Marcial MD

## 2020-02-17 ENCOUNTER — TELEPHONE (OUTPATIENT)
Dept: INTERNAL MEDICINE CLINIC | Age: 75
End: 2020-02-17

## 2020-02-17 NOTE — TELEPHONE ENCOUNTER
Returned call to patient to given message, he states that he was not diagnosed with the flu, he only wanted tamiflu just in case. Patient states that his wife is out of town and he doesn't want her to get whatever he may have. Patient advised that he would need to have an appointment for any medications. Patient states that he will call back for an appointment.

## 2020-02-17 NOTE — TELEPHONE ENCOUNTER
Pt calling asking to talk to a nurse, says he had the flu. Wants to know if he should get the stuff for his wife so she doesn't get it?

## 2020-02-17 NOTE — TELEPHONE ENCOUNTER
Please have him get tamiflu prophylaxis from his wife's PCP.     Dr. Copeland President  [facility], [address] [facility], [address] [place] [address] Str.  Phone: [phone]  Fax: [phone]

## 2020-02-18 ENCOUNTER — OFFICE VISIT (OUTPATIENT)
Dept: INTERNAL MEDICINE CLINIC | Age: 75
End: 2020-02-18

## 2020-02-18 VITALS
RESPIRATION RATE: 12 BRPM | DIASTOLIC BLOOD PRESSURE: 59 MMHG | HEART RATE: 61 BPM | SYSTOLIC BLOOD PRESSURE: 100 MMHG | HEIGHT: 69 IN | OXYGEN SATURATION: 96 % | BODY MASS INDEX: 31.1 KG/M2 | WEIGHT: 210 LBS | TEMPERATURE: 95.6 F

## 2020-02-18 DIAGNOSIS — K52.9 GASTROENTERITIS: Primary | ICD-10-CM

## 2020-02-18 DIAGNOSIS — I10 ESSENTIAL HYPERTENSION: ICD-10-CM

## 2020-02-18 RX ORDER — CARVEDILOL 3.12 MG/1
3.12 TABLET ORAL 2 TIMES DAILY
Qty: 60 TAB | Refills: 5 | Status: SHIPPED | OUTPATIENT
Start: 2020-02-18 | End: 2020-05-28

## 2020-02-18 RX ORDER — OSELTAMIVIR PHOSPHATE 75 MG/1
75 CAPSULE ORAL 2 TIMES DAILY
Qty: 10 CAP | Refills: 0 | Status: CANCELLED | OUTPATIENT
Start: 2020-02-18 | End: 2020-02-23

## 2020-02-18 NOTE — PROGRESS NOTES
INTERNISTS [facility]:  2/18/2020, MRN: [id number]      Jaylene Paget is a 66 y.o. male and presents to clinic for ED Follow-up (patient went to [facility] on [address] yesterday 02- for Flu Symptoms, and tested for the Influenza Negative Results)    Subjective:   Pt is a 72yo male with h/o ED, melanoma in situ on left dorsal forearm s/p removal, MDD, rectal polyp, diverticulosis, CKD stage 3, HTN, type 2 DM (foot exams are done by Pooja Cochran), hypothyroidism, DJD, gout, vocal cord nodule, depression/anxiety (followed by Psychiatry), vitamin D deficiency, RBBB (s/p normal stress test 2018), and HLD.     Gastroenteritis: Duration of sx: Sunday. Alleviating factors: imodium. Sick contacts: none. Diarrhea sx subsided. +Fatigue. +Poor appetite. No cough, sore throat, or ear pain. +Abdominal pain (lower). Pain is triggered by: po intake. Pain improves with BMs. Taking imodium. No stools today. No bleeding. No nausea. Sx are improving. He went to [facility] yesterday. He tested negative for influenza. BP is 100/59. Taking coreg and lisinopril. Patient Active Problem List    Diagnosis Date Noted    Right inguinal hernia 05/17/2018    Osteoarthritis of right knee 02/19/2018    Erectile dysfunction 01/23/2017    Melanoma in situ - on left dorsal forearm 2016 09/16/2016    Major depressive disorder in remission 08/03/2016    Rectal polyp -  seen on colonoscopy from 8/20/15 08/02/2016    Diverticulosis of intestine without bleeding - seen on colonoscopy from 8/20/15 08/02/2016    CKD (chronic kidney disease) stage 3, GFR 30-59 ml/min ([facility] [address] 11/09/2015    Essential hypertension 06/24/2015    Type 2 diabetes mellitus without complication ([facility] [address] 86/75/5971    Hypothyroidism due to acquired atrophy of thyroid 06/24/2015    Vocal cord nodule 10/21/2013    Gout 10/21/2011    Family history of colon cancer.   personal hx colon polyps 10/21/2011    Hyperlipidemia     Hypovitaminosis D        Current Outpatient Medications   Medication Sig Dispense Refill    atorvastatin (LIPITOR) 40 mg tablet Take 1 Tab by mouth daily. 90 Tab 3    carvedilol (COREG) 6.25 mg tablet TAKE ONE TABLET BY MOUTH TWICE A DAY WITH MEALS 60 Tab 9    levothyroxine (SYNTHROID) 50 mcg tablet TAKE ONE TABLET BY MOUTH DAILY BEFORE BREAKFAST 90 Tab 2    allopurinol (ZYLOPRIM) 100 mg tablet TAKE ONE TABLET BY MOUTH TWICE A  Tab 1    sildenafil, antihypertensive, (REVATIO) 20 mg tablet TAKE TWO TABLETS BY MOUTH DAILY 1 HOUR PRIOR TO SEXUAL RELATIONS AS NEEDED *MAX DAILY DOSE: 5 TABLETS * 30 Tab 9    lisinopril (PRINIVIL, ZESTRIL) 5 mg tablet TAKE ONE TABLET BY MOUTH DAILY 90 Tab 3    ibuprofen (MOTRIN) 800 mg tablet Take 1 Tab by mouth three (3) times daily as needed for Pain. 40 Tab 0    ubidecarenone (COQ-10 PO) Take 200 mEq by mouth daily.  aspirin delayed-release 81 mg tablet Take 81 mg by mouth daily.  terbinafine HCl (LAMISIL) 250 mg tablet Take 250 mg by mouth every thirty (30) days. Indications: Taken for 1 full week a month; and then not for another 3 weeks      venlafaxine-SR (EFFEXOR-XR) 150 mg capsule Take 150 mg by mouth daily.  multivitamin (ONE A DAY) tablet Take 1 Tab by mouth daily.  Cholecalciferol, Vitamin D3, (VITAMIN D) 2,000 unit Cap Take 4,000 Units by mouth daily.  varicella-zoster recombinant, PF, (SHINGRIX) 50 mcg/0.5 mL susr injection 0.5 mL by IntraMUSCular route every two (2) months.  0.5 mL 1       Allergies   Allergen Reactions    Amlodipine Other (comments)     BLE edema       Past Medical History:   Diagnosis Date    Adverse effect of anesthesia     Violent dreams with Ether    Arthritis     hip, knee    Chronic kidney disease     had previous reaction with kidneys after a bp med, no issues now    Colon polyps     dysplastic    Depression     Diabetes mellitus ([facility] [address] 2017    no meds    Diverticulosis     seen on colonoscopy from 8/20/15    Diverticulosis of sigmoid colon 01/27/10    GERD (gastroesophageal reflux disease)     no meds    Gout     Hepatitis     Hepatitis A - while he was young    Hyperlipidemia     Hypertension 15 years    Hypovitaminosis D     Nephrolithiasis     Osteoarthritis of right knee 2018    Plantar wart     Rectal polyp     seen on colonoscopy from 8/20/15    Thyroid disease     hypothyroidism       Past Surgical History:   Procedure Laterality Date    ENDOSCOPY, COLON, DIAGNOSTIC  2010    polyp distal rectum, removed; diverticulosis of sigmoid    HX CATARACT REMOVAL Bilateral     HX COLONOSCOPY      HX HEENT      vocal cord polyp removed    HX POLYPECTOMY  2010    Distal rectum    HX TONSILLECTOMY      Kely Pandey Right 2018    Dr. Mervat Garber Left 2014    TOTAL KNEE ARTHROPLASTY Right 2018       Family History   Problem Relation Age of Onset   [facility] Arthritis-rheumatoid Mother     Hypertension Mother     Elevated Lipids Mother     Thyroid Disease Sister     Cancer Father         colon    Heart Disease Other     Hypertension Other     Cancer Other         breast    Heart Disease Maternal Grandmother     Dementia Maternal Grandfather     Cancer Paternal Grandmother         breast    No Known Problems Paternal Grandfather        Social History     Tobacco Use    Smoking status: Former Smoker     Packs/day: 1.00     Years: 6.00     Pack years: 6.00     Types: Cigarettes     Last attempt to quit: 1992     Years since quittin.1    Smokeless tobacco: Never Used   Substance Use Topics    Alcohol use: Not Currently     Alcohol/week: 10.0 standard drinks     Types: 10 Glasses of wine per week     Comment: wine with dinner sometimes       ROS   Review of Systems   Constitutional: Positive for malaise/fatigue. Negative for chills and fever. HENT: Negative for ear pain and sore throat.     Eyes: Negative for blurred vision and pain.   Respiratory: Negative for cough and shortness of breath. Cardiovascular: Negative for chest pain. Gastrointestinal: Positive for abdominal pain, diarrhea and nausea. Negative for blood in stool and melena. Genitourinary: Negative for dysuria and hematuria. Musculoskeletal: Negative for joint pain and myalgias. Skin: Negative for rash. Neurological: Negative for tingling, focal weakness and headaches. Endo/Heme/Allergies: Does not bruise/bleed easily. Psychiatric/Behavioral: Negative for substance abuse. Objective     Vitals:    02/18/20 1122   BP: 100/59   Pulse: 61   Resp: 12   Temp: 95.6 °F (35.3 °C)   TempSrc: Oral   SpO2: 96%   Weight: 210 lb (95.3 kg)   Height: 5' 9\" (1.753 m)   PainSc:   0 - No pain       Physical Exam  Vitals signs reviewed. Constitutional:       Appearance: Normal appearance. HENT:      Head: Normocephalic and atraumatic. Right Ear: External ear normal.      Left Ear: External ear normal.      Nose: Nose normal.      Mouth/Throat:      Pharynx: No oropharyngeal exudate or posterior oropharyngeal erythema. Eyes:      Extraocular Movements: Extraocular movements intact. Conjunctiva/sclera: Conjunctivae normal.   Neck:      Musculoskeletal: Neck supple. Cardiovascular:      Rate and Rhythm: Normal rate and regular rhythm. Pulses: Normal pulses. Heart sounds: Normal heart sounds. Pulmonary:      Effort: Pulmonary effort is normal.      Breath sounds: Normal breath sounds. Abdominal:      General: Abdomen is flat. Bowel sounds are normal. There is no distension. Palpations: Abdomen is soft. Tenderness: There is no abdominal tenderness. Musculoskeletal:         General: No swelling or tenderness (Bue). Lymphadenopathy:      Cervical: No cervical adenopathy. Skin:     General: Skin is warm and dry. Findings: No erythema. Neurological:      Mental Status: He is alert and oriented to person, place, and time. Gait: Gait normal.   Psychiatric:         Mood and Affect: Mood normal.         LABS   Data Review:   Lab Results   Component Value Date/Time    WBC 5.9 11/01/2019 08:26 AM    HGB 14.8 11/01/2019 08:26 AM    HCT 44.8 11/01/2019 08:26 AM    PLATELET 676 94/64/2423 08:26 AM    MCV 96.6 11/01/2019 08:26 AM       Lab Results   Component Value Date/Time    Sodium 141 11/01/2019 08:26 AM    Potassium 4.8 11/01/2019 08:26 AM    Chloride 107 11/01/2019 08:26 AM    CO2 28 11/01/2019 08:26 AM    Anion gap 6 11/01/2019 08:26 AM    Glucose 155 (H) 11/01/2019 08:26 AM    BUN 27 (H) 11/01/2019 08:26 AM    Creatinine 1.20 11/01/2019 08:26 AM    BUN/Creatinine ratio 23 (H) 11/01/2019 08:26 AM    GFR est AA >60 11/01/2019 08:26 AM    GFR est non-AA 59 (L) 11/01/2019 08:26 AM    Calcium 9.0 11/01/2019 08:26 AM       Lab Results   Component Value Date/Time    Cholesterol, total 179 11/01/2019 08:26 AM    HDL Cholesterol 44 11/01/2019 08:26 AM    LDL, calculated 103 (H) 11/01/2019 08:26 AM    VLDL, calculated 32 11/01/2019 08:26 AM    Triglyceride 160 (H) 11/01/2019 08:26 AM    CHOL/HDL Ratio 4.1 11/01/2019 08:26 AM       Lab Results   Component Value Date/Time    Hemoglobin A1c 7.1 (H) 11/01/2019 08:26 AM    Hemoglobin A1c (POC) 6.6 07/05/2019 09:21 AM       Assessment/Plan:   1. Gastroenteritis: Per hx. PE findings are reassuring.  - Rest. Hydration with water.  -Return to clinic if symptoms worsen. 2. HTN: BP is low. - Decreasing his coreg dose from 6.25mg bid to 3.125mg bid. - C/w lisinopril 5 mg daily. Health Maintenance Due   Topic Date Due    DTaP/Tdap/Td series (1 - Tdap) 09/08/1954    Shingrix Vaccine Age 50> (1 of 2) 09/08/1993     Lab review: labs are reviewed in the EHR    I have discussed the diagnosis with the patient and the intended plan as seen in the above orders. The patient has received an after-visit summary and questions were answered concerning future plans.   I have discussed medication side effects and warnings with the patient as well. I have reviewed the plan of care with the patient, accepted their input and they are in agreement with the treatment goals. All questions were answered. The patient understands the plan of care. Handouts provided today with above information. Pt instructed if symptoms worsen to call the office or report to the ED for continued care. Greater than 50% of the visit time was spent in counseling and/or coordination of care. Voice recognition was used to generate this report, which may have resulted in some phonetic based errors in grammar and contents. Even though attempts were made to correct all the mistakes, some may have been missed, and remained in the body of the document. Follow-up and Dispositions    · Return if symptoms worsen or fail to improve.          Leonor Otero MD

## 2020-02-18 NOTE — PATIENT INSTRUCTIONS
Cough: Care Instructions  Your Care Instructions    A cough is your body's response to something that bothers your throat or airways. Many things can cause a cough. You might cough because of a cold or the flu, bronchitis, or asthma. Smoking, postnasal drip, allergies, and stomach acid that backs up into your throat also can cause coughs. A cough is a symptom, not a disease. Most coughs stop when the cause, such as a cold, goes away. You can take a few steps at home to cough less and feel better. Follow-up care is a key part of your treatment and safety. Be sure to make and go to all appointments, and call your doctor if you are having problems. It's also a good idea to know your test results and keep a list of the medicines you take. How can you care for yourself at home? · Drink lots of water and other fluids. This helps thin the mucus and soothes a dry or sore throat. Honey or lemon juice in hot water or tea may ease a dry cough. · Take cough medicine as directed by your doctor. · Prop up your head on pillows to help you breathe and ease a dry cough. · Try cough drops to soothe a dry or sore throat. Cough drops don't stop a cough. Medicine-flavored cough drops are no better than candy-flavored drops or hard candy. · Do not smoke. Avoid secondhand smoke. If you need help quitting, talk to your doctor about stop-smoking programs and medicines. These can increase your chances of quitting for good. When should you call for help? Call 911 anytime you think you may need emergency care.  For example, call if:    · You have severe trouble breathing.    Call your doctor now or seek immediate medical care if:    · You cough up blood.     · You have new or worse trouble breathing.     · You have a new or higher fever.     · You have a new rash.    Watch closely for changes in your health, and be sure to contact your doctor if:    · You cough more deeply or more often, especially if you notice more mucus or a change in the color of your mucus.     · You have new symptoms, such as a sore throat, an earache, or sinus pain.     · You do not get better as expected. Where can you learn more? Go to http://sapna-araseli.info/. Enter D279 in the search box to learn more about \"Cough: Care Instructions. \"  Current as of: June 9, 2019  Content Version: 12.2  © 1307-6211 [employer]. Care instructions adapted under license by [employer] (which disclaims liability or warranty for this information). If you have questions about a medical condition or this instruction, always ask your healthcare professional. [place] [address] any warranty or liability for your use of this information.     Health Maintenance Due   Topic Date Due    DTaP/Tdap/Td series (1 - Tdap) 09/08/1956    Shingrix Vaccine Age 50> (1 of 2) 09/08/1993

## 2020-02-18 NOTE — PROGRESS NOTES
Marcio Garcia presents today for   Chief Complaint   Patient presents with   [facility] ED Follow-up     patient went to [facility] on [address] yesterday 02- for Flu Symptoms, and tested for the Influenza Negative Results       Is someone accompanying this pt? no    Is the patient using any DME equipment during [address]? no    Depression Screening:  3 most recent PHQ Screens 2/18/2020   PHQ Not Done -   Little interest or pleasure in doing things Several days   Feeling down, depressed, irritable, or hopeless Several days   Total Score PHQ 2 2   Trouble falling or staying asleep, or sleeping too much -   Feeling tired or having little energy -   Poor appetite, weight loss, or overeating -   Feeling bad about yourself - or that you are a failure or have let yourself or your family down -   Trouble concentrating on things such as school, work, reading, or watching TV -   Moving or speaking so slowly that other people could have noticed; or the opposite being so fidgety that others notice -   Thoughts of being better off dead, or hurting yourself in some way -   PHQ 9 Score -   How difficult have these problems made it for you to do your work, take care of your home and get along with others -       Learning Assessment:  Learning Assessment 2/18/2020   PRIMARY LEARNER Patient   HIGHEST LEVEL OF EDUCATION - PRIMARY LEARNER  > 4 YEARS OF COLLEGE   BARRIERS PRIMARY LEARNER NONE   CO-LEARNER CAREGIVER No   PRIMARY LANGUAGE ENGLISH   LEARNER PREFERENCE PRIMARY DEMONSTRATION     -   ANSWERED BY patient   RELATIONSHIP SELF       Abuse Screening:  Abuse Screening Questionnaire 2/18/2020   Do you ever feel afraid of your partner? N   Are you in a relationship with someone who physically or mentally threatens you? N   Is it safe for you to go home? Y       Fall Risk  Fall Risk Assessment, last 12 mths 2/18/2020   Able to walk? Yes   Fall in past 12 months? Yes   Fall with injury?  No   Number of falls in past 12 months 1   Fall Risk Score 1       Health Maintenance reviewed and discussed and ordered per Provider. Health Maintenance Due   Topic Date Due    DTaP/Tdap/Td series (1 - Tdap) 09[birth year]1954    Shingrix Vaccine Age 50> (1 of 2) 09/08/1993   . Coordination of Care:  1. Have you been to the ER, urgent care clinic since your last visit? Hospitalized since your last visit? Yes When: 02- Where: [facility]. Reason: Flu Symptoms, and was tested for Influenza, which came up Negative results. 2. Have you seen or consulted any other health care providers outside of the [address] [place] [address] since your last visit? Include any pap smears or colon screening.  no

## 2020-03-25 DIAGNOSIS — E11.9 TYPE 2 DIABETES MELLITUS WITHOUT COMPLICATION, WITHOUT LONG-TERM CURRENT USE OF INSULIN (HCC): ICD-10-CM

## 2020-03-25 DIAGNOSIS — I10 ESSENTIAL HYPERTENSION: ICD-10-CM

## 2020-03-25 DIAGNOSIS — N18.2 RENAL FAILURE, CHRONIC, STAGE 2 (MILD): ICD-10-CM

## 2020-03-25 RX ORDER — LISINOPRIL 5 MG/1
TABLET ORAL
Qty: 90 TAB | Refills: 2 | Status: SHIPPED | OUTPATIENT
Start: 2020-03-25 | End: 2020-12-21

## 2020-05-28 ENCOUNTER — VIRTUAL VISIT (OUTPATIENT)
Dept: INTERNAL MEDICINE CLINIC | Age: 75
End: 2020-05-28

## 2020-05-28 DIAGNOSIS — E03.4 HYPOTHYROIDISM DUE TO ACQUIRED ATROPHY OF THYROID: ICD-10-CM

## 2020-05-28 DIAGNOSIS — I10 ESSENTIAL HYPERTENSION: Primary | ICD-10-CM

## 2020-05-28 DIAGNOSIS — F33.40 RECURRENT MAJOR DEPRESSIVE DISORDER, IN REMISSION (HCC): ICD-10-CM

## 2020-05-28 DIAGNOSIS — M1A.9XX0 CHRONIC GOUT WITHOUT TOPHUS, UNSPECIFIED CAUSE, UNSPECIFIED SITE: ICD-10-CM

## 2020-05-28 DIAGNOSIS — E11.9 TYPE 2 DIABETES MELLITUS WITHOUT COMPLICATION, UNSPECIFIED WHETHER LONG TERM INSULIN USE (HCC): ICD-10-CM

## 2020-05-28 DIAGNOSIS — D03.9 MELANOMA IN SITU, UNSPECIFIED SITE (HCC): ICD-10-CM

## 2020-05-28 RX ORDER — CARVEDILOL 6.25 MG/1
6.25 TABLET ORAL 2 TIMES DAILY WITH MEALS
Qty: 180 TAB | Refills: 3 | Status: SHIPPED | OUTPATIENT
Start: 2020-05-28 | End: 2021-06-14

## 2020-05-28 NOTE — PROGRESS NOTES
Moses Ordonez is a 66 y.o. male who was seen by synchronous (real-time) audio phone only technology on 5/28/2020. Assessment & Plan:   1. Type 2 DM:  - Low carb diet recommended. - Checking labs (urine protein screen, A1c, lipid panel, and a CMP) in late September. 2.  Gout: Stable.  Continue with allopurinol. We will check a uric acid level in late September. 3.  Hypertension:   Ordering carvedilol at the higher dose. I stressed the importance of taking his medication twice a day. C/w lisinopril as prescribed. We will check labs as mentioned above. 4. H/o ETOH: Sober. I congratulated him on being sober and encouraged him to continue a sober lifestyle. 5.  Depression: Stable. Continue to follow-up with his psychiatrist.  Berna Tolentino with Rx per their recommendations. 6.  History of skin cancer: Stable. Checking a CMP and CBC in light September. I will follow-up with him in early October. Lab review: labs are reviewed in the EHR and ordered as mentioned above     I have discussed the diagnosis with the patient and the intended plan as seen in the above orders. I have discussed medication side effects and warnings with the patient as well. I have reviewed the plan of care with the patient, accepted their input and they are in agreement with the treatment goals. All questions were answered. The patient understands the plan of care. Pt instructed if symptoms worsen to call the office or report to the ED for continued care. Greater than 50% of the visit time was spent in counseling and/or coordination of care. I spent 21 min with the pt for this phone only encounter. Voice recognition was used to generate this report, which may have resulted in some phonetic based errors in grammar and contents. Even though attempts were made to correct all the mistakes, some may have been missed, and remained in the body of the document. Subjective:    Moses Ordonez was seen for Chief Complaint   Patient presents with    Follow-up     Pt is a 74yo male with h/o ED, melanoma in situ on left dorsal forearm s/p removal, MDD, rectal polyp, diverticulosis, CKD stage 3, HTN, type 2 DM (foot exams are done by Audrey Goodson), hypothyroidism, DJD, gout, vocal cord nodule, depression/anxiety (followed by Psychiatry), vitamin D deficiency, RBBB (s/p normal stress test 2018), and HLD. 1. Type 2 DM:  Diet: he is diet controlled. His last A1C was 7.1 in November. +ACEi and statin. 2. HTN: Home BP checks: yes; his lowest BP in the past 2 wks was 130s/70. Taking: coreg 6.25mg daily instead of 3.123mg bid as prescribed and lisionpril. No adverse side effects from this rx. 3. ETOH Intake: +H/o heavy drinking. No relapses since his last apt. 4. Depression Hx: Last apt with Psychiatry: earlier this yr. Taking: Effexor. \"It's been stressful being with the wife 24-7. \" He is staying in the [facility]. 5. H/o Skin Cancer:  No new skin lesions. Dermatology apt this year: January; his next one is in July. 6. Gout: Taking allopurinol. No attacks since his last apt.    7. Hypothyroidism: Taking Synthroid 50mcg daily. TFTs in November were normal.        Prior to Admission medications    Medication Sig Start Date End Date Taking? Authorizing Provider   lisinopriL (PRINIVIL, ZESTRIL) 5 mg tablet TAKE ONE TABLET BY MOUTH DAILY 3/25/20   Delphine Sotomayor MD   allopurinoL (ZYLOPRIM) 100 mg tablet TAKE ONE TABLET BY MOUTH TWICE A DAY 3/24/20   Delphine Sotomayor MD   carvediloL (COREG) 3.125 mg tablet Take 1 Tab by mouth two (2) times a day. 2/18/20   Delphine Sotomayor MD   atorvastatin (LIPITOR) 40 mg tablet Take 1 Tab by mouth daily.  12/17/19   Delphine Sotomayor MD   levothyroxine (SYNTHROID) 50 mcg tablet TAKE ONE TABLET BY MOUTH DAILY BEFORE BREAKFAST 11/21/19   Delphine Sotomayor MD   sildenafil, antihypertensive, (REVATIO) 20 mg tablet TAKE TWO TABLETS BY MOUTH DAILY 1 HOUR PRIOR TO SEXUAL RELATIONS AS NEEDED *MAX DAILY DOSE: 5 TABLETS * 8/1/19   Mike Silverio MD   varicella-zoster recombinant, PF, [facility]) 50 mcg/0.5 mL susr injection 0.5 mL by IntraMUSCular route every two (2) months. 11/15/18   Mike Silverio MD   ibuprofen (MOTRIN) 800 mg tablet Take 1 Tab by mouth three (3) times daily as needed for Pain. 5/31/18   Kacey Gramajo MD   ubidecarenone (COQ-10 PO) Take 200 mEq by mouth daily. Provider, Historical   aspirin delayed-release 81 mg tablet Take 81 mg by mouth daily. Provider, Historical   terbinafine HCl (LAMISIL) 250 mg tablet Take 250 mg by mouth every thirty (30) days. Indications: Taken for 1 full week a month; and then not for another 3 weeks    Provider, Historical   venlafaxine-SR (EFFEXOR-XR) 150 mg capsule Take 150 mg by mouth daily. 7/17/17   Provider, Historical   multivitamin (ONE A DAY) tablet Take 1 Tab by mouth daily. Provider, Historical   Cholecalciferol, Vitamin D3, (VITAMIN D) 2,000 unit Cap Take 4,000 Units by mouth daily.     Provider, Historical     Allergies   Allergen Reactions    Amlodipine Other (comments)     BLE edema     Past Medical History:   Diagnosis Date    Adverse effect of anesthesia     Violent dreams with Ether    Arthritis     hip, knee    Chronic kidney disease     had previous reaction with kidneys after a bp med, no issues now    Colon polyps     dysplastic    Depression     Diabetes mellitus ([facility] [address] 2017    no meds    Diverticulosis     seen on colonoscopy from 8/20/15    Diverticulosis of sigmoid colon 01/27/10    GERD (gastroesophageal reflux disease)     no meds    Gout     Hepatitis     Hepatitis A - while he was young    Hyperlipidemia     Hypertension 15 years    Hypovitaminosis D     Nephrolithiasis     Osteoarthritis of right knee 2/19/2018    Plantar wart     Rectal polyp     seen on colonoscopy from 8/20/15    Thyroid disease     hypothyroidism     Past Surgical History:   Procedure Laterality Date    ENDOSCOPY, COLON, DIAGNOSTIC  2010    polyp distal rectum, removed; diverticulosis of sigmoid    HX CATARACT REMOVAL Bilateral     HX COLONOSCOPY      HX HEENT      vocal cord polyp removed    HX POLYPECTOMY  2010    Distal rectum    HX TONSILLECTOMY      [address] Right 2018    Dr. Marcio Nolen Left 2014    TOTAL KNEE ARTHROPLASTY Right 2018     Family History   Problem Relation Age of Onset   Lillian Franco Arthritis-rheumatoid Mother     Hypertension Mother     Elevated Lipids Mother     Thyroid Disease Sister     Cancer Father         colon    Heart Disease Other     Hypertension Other     Cancer Other         breast    Heart Disease Maternal Grandmother     Dementia Maternal Grandfather     Cancer Paternal Grandmother         breast    No Known Problems Paternal Grandfather      Social History     Socioeconomic History    Marital status:      Spouse name: Not on file    Number of children: Not on file    Years of education: Not on file    Highest education level: Not on file   Tobacco Use    Smoking status: Former Smoker     Packs/day: 1.00     Years: 6.00     Pack years: 6.00     Types: Cigarettes     Last attempt to quit: 1992     Years since quittin.4    Smokeless tobacco: Never Used   Substance and Sexual Activity    Alcohol use: Not Currently     Alcohol/week: 10.0 standard drinks     Types: 10 Glasses of wine per week     Comment: wine with dinner sometimes    Drug use: No    Sexual activity: Yes     Partners: Female       ROS:  Gen: No fever/chills  HEENT: No sore throat, eye pain, ear pain, or congestion.  No HA  CV: No CP  Resp: No cough/SOB  GI: No abdominal pain  : No hematuria/dysuria  Derm: No rash  Neuro: No new paresthesias/weakness  Musc: No new myalgias/jt pain  Psych: +anxiety    LABS:  Lab Results   Component Value Date/Time    Sodium 141 2019 08:26 AM    Potassium 4.8 11/01/2019 08:26 AM    Chloride 107 11/01/2019 08:26 AM    CO2 28 11/01/2019 08:26 AM    Anion gap 6 11/01/2019 08:26 AM    Glucose 155 (H) 11/01/2019 08:26 AM    BUN 27 (H) 11/01/2019 08:26 AM    Creatinine 1.20 11/01/2019 08:26 AM    BUN/Creatinine ratio 23 (H) 11/01/2019 08:26 AM    GFR est AA >60 11/01/2019 08:26 AM    GFR est non-AA 59 (L) 11/01/2019 08:26 AM    Calcium 9.0 11/01/2019 08:26 AM       Lab Results   Component Value Date/Time    Cholesterol, total 179 11/01/2019 08:26 AM    HDL Cholesterol 44 11/01/2019 08:26 AM    LDL, calculated 103 (H) 11/01/2019 08:26 AM    VLDL, calculated 32 11/01/2019 08:26 AM    Triglyceride 160 (H) 11/01/2019 08:26 AM    CHOL/HDL Ratio 4.1 11/01/2019 08:26 AM       Lab Results   Component Value Date/Time    WBC 5.9 11/01/2019 08:26 AM    HGB 14.8 11/01/2019 08:26 AM    HCT 44.8 11/01/2019 08:26 AM    PLATELET 323 83/49/8272 08:26 AM    MCV 96.6 11/01/2019 08:26 AM       Lab Results   Component Value Date/Time    Hemoglobin A1c 7.1 (H) 11/01/2019 08:26 AM    Hemoglobin A1c (POC) 6.6 07/05/2019 09:21 AM       Lab Results   Component Value Date/Time    TSH 3.13 11/01/2019 08:26 AM           Due to this being a TeleHealth phone only evaluation, many elements of the physical examination are unable to be assessed. The pt was seen by synchronous (real-time) audio phone only technology, and/or her healthcare decision maker, is aware that this patient-initiated, Telehealth encounter is a billable service, with coverage as determined by her insurance carrier. She is aware that she may receive a bill and has provided verbal consent to proceed: Yes. The pt is being evaluated by a phone only visit encounter for concerns as above. A caregiver was present when appropriate.  Due to this being a TeleHealth encounter (During STSIV-92 public health emergency), evaluation of the following organ systems was limited: Vitals/Constitutional/EENT/Resp/CV/GI//MS/Neuro/Skin/Heme-Lymph-Imm. Pursuant to the emergency declaration under the [address] [place] [address] and the [employer] and [employer] Act, this Virtual phone only Visit was conducted, with patient's (and/or legal guardian's) consent, to reduce the patient's risk of exposure to COVID-19 and provide necessary medical care. Services were provided through a phone only synchronous discussion virtually to substitute for in-person clinic visit. Patient and provider were located at their individual homes. We discussed the expected course, resolution and complications of the diagnosis(es) in detail. Medication risks, benefits, costs, interactions, and alternatives were discussed as indicated. I advised him to contact the office if his condition worsens, changes or fails to improve as anticipated. He expressed understanding with the diagnosis(es) and plan.      Yolanda Aiken MD

## 2020-08-26 DIAGNOSIS — E03.4 HYPOTHYROIDISM DUE TO ACQUIRED ATROPHY OF THYROID: ICD-10-CM

## 2020-08-26 RX ORDER — LEVOTHYROXINE SODIUM 50 UG/1
TABLET ORAL
Qty: 90 TAB | Refills: 1 | Status: SHIPPED | OUTPATIENT
Start: 2020-08-26 | End: 2021-02-16

## 2020-09-29 ENCOUNTER — HOSPITAL ENCOUNTER (OUTPATIENT)
Dept: LAB | Age: 75
Discharge: HOME | End: 2020-09-29
Payer: MEDICARE

## 2020-09-29 ENCOUNTER — APPOINTMENT (OUTPATIENT)
Dept: INTERNAL MEDICINE CLINIC | Age: 75
End: 2020-09-29

## 2020-09-29 DIAGNOSIS — E03.4 HYPOTHYROIDISM DUE TO ACQUIRED ATROPHY OF THYROID: ICD-10-CM

## 2020-09-29 DIAGNOSIS — M1A.9XX0 CHRONIC GOUT WITHOUT TOPHUS, UNSPECIFIED CAUSE, UNSPECIFIED SITE: ICD-10-CM

## 2020-09-29 DIAGNOSIS — E11.9 TYPE 2 DIABETES MELLITUS WITHOUT COMPLICATION, UNSPECIFIED WHETHER LONG TERM INSULIN USE (HCC): ICD-10-CM

## 2020-09-29 DIAGNOSIS — D03.9 MELANOMA IN SITU, UNSPECIFIED SITE (HCC): ICD-10-CM

## 2020-09-29 LAB
ALBUMIN SERPL-MCNC: 4 G/DL (ref 3.4–5)
ALBUMIN/GLOB SERPL: 1.4 {RATIO} (ref 0.8–1.7)
ALP SERPL-CCNC: 111 U/L (ref 45–117)
ALT SERPL-CCNC: 26 U/L (ref 16–61)
ANION GAP SERPL CALC-SCNC: 5 MMOL/L (ref 3–18)
AST SERPL-CCNC: 19 U/L (ref 10–38)
BASOPHILS # BLD: 0 K/UL (ref 0–0.1)
BASOPHILS NFR BLD: 0 % (ref 0–2)
BILIRUB SERPL-MCNC: 0.4 MG/DL (ref 0.2–1)
BUN SERPL-MCNC: 27 MG/DL (ref 7–18)
BUN/CREAT SERPL: 20 (ref 12–20)
CALCIUM SERPL-MCNC: 8.8 MG/DL (ref 8.5–10.1)
CHLORIDE SERPL-SCNC: 108 MMOL/L (ref 100–111)
CHOLEST SERPL-MCNC: 150 MG/DL
CO2 SERPL-SCNC: 29 MMOL/L (ref 21–32)
CREAT SERPL-MCNC: 1.37 MG/DL (ref 0.6–1.3)
CREAT UR-MCNC: 92 MG/DL (ref 30–125)
DIFFERENTIAL METHOD BLD: ABNORMAL
EOSINOPHIL # BLD: 0.1 K/UL (ref 0–0.4)
EOSINOPHIL NFR BLD: 2 % (ref 0–5)
ERYTHROCYTE [DISTWIDTH] IN BLOOD BY AUTOMATED COUNT: 13.5 % (ref 11.6–14.5)
GLOBULIN SER CALC-MCNC: 2.9 G/DL (ref 2–4)
GLUCOSE SERPL-MCNC: 129 MG/DL (ref 74–99)
HBA1C MFR BLD: 6.7 % (ref 4.2–5.6)
HCT VFR BLD AUTO: 41 % (ref 36–48)
HDLC SERPL-MCNC: 41 MG/DL (ref 40–60)
HDLC SERPL: 3.7 {RATIO} (ref 0–5)
HGB BLD-MCNC: 13.8 G/DL (ref 13–16)
LDLC SERPL CALC-MCNC: 92.2 MG/DL (ref 0–100)
LIPID PROFILE,FLP: NORMAL
LYMPHOCYTES # BLD: 1.4 K/UL (ref 0.9–3.6)
LYMPHOCYTES NFR BLD: 30 % (ref 21–52)
MCH RBC QN AUTO: 30.9 PG (ref 24–34)
MCHC RBC AUTO-ENTMCNC: 33.7 G/DL (ref 31–37)
MCV RBC AUTO: 91.9 FL (ref 74–97)
MICROALBUMIN UR-MCNC: 1.16 MG/DL (ref 0–3)
MICROALBUMIN/CREAT UR-RTO: 13 MG/G (ref 0–30)
MONOCYTES # BLD: 0.4 K/UL (ref 0.05–1.2)
MONOCYTES NFR BLD: 8 % (ref 3–10)
NEUTS SEG # BLD: 2.8 K/UL (ref 1.8–8)
NEUTS SEG NFR BLD: 60 % (ref 40–73)
PLATELET # BLD AUTO: 173 K/UL (ref 135–420)
PMV BLD AUTO: 10 FL (ref 9.2–11.8)
POTASSIUM SERPL-SCNC: 4.2 MMOL/L (ref 3.5–5.5)
PROT SERPL-MCNC: 6.9 G/DL (ref 6.4–8.2)
RBC # BLD AUTO: 4.46 M/UL (ref 4.7–5.5)
SODIUM SERPL-SCNC: 142 MMOL/L (ref 136–145)
T4 FREE SERPL-MCNC: 1 NG/DL (ref 0.7–1.5)
TRIGL SERPL-MCNC: 84 MG/DL (ref ?–150)
TSH SERPL DL<=0.05 MIU/L-ACNC: 2.73 UIU/ML (ref 0.36–3.74)
URATE SERPL-MCNC: 4.9 MG/DL (ref 2.6–7.2)
VLDLC SERPL CALC-MCNC: 16.8 MG/DL
WBC # BLD AUTO: 4.7 K/UL (ref 4.6–13.2)

## 2020-09-29 PROCEDURE — 36415 COLL VENOUS BLD VENIPUNCTURE: CPT

## 2020-09-29 PROCEDURE — 84439 ASSAY OF FREE THYROXINE: CPT

## 2020-09-29 PROCEDURE — 83036 HEMOGLOBIN GLYCOSYLATED A1C: CPT

## 2020-09-29 PROCEDURE — 85025 COMPLETE CBC W/AUTO DIFF WBC: CPT

## 2020-09-29 PROCEDURE — 82043 UR ALBUMIN QUANTITATIVE: CPT

## 2020-09-29 PROCEDURE — 84550 ASSAY OF BLOOD/URIC ACID: CPT

## 2020-09-29 PROCEDURE — 80061 LIPID PANEL: CPT

## 2020-09-29 PROCEDURE — 80053 COMPREHEN METABOLIC PANEL: CPT

## 2020-10-02 ENCOUNTER — TELEPHONE (OUTPATIENT)
Dept: INTERNAL MEDICINE CLINIC | Age: 75
End: 2020-10-02

## 2020-10-06 ENCOUNTER — VIRTUAL VISIT (OUTPATIENT)
Dept: INTERNAL MEDICINE CLINIC | Age: 75
End: 2020-10-06
Payer: MEDICARE

## 2020-10-06 DIAGNOSIS — E11.9 TYPE 2 DIABETES MELLITUS WITHOUT COMPLICATION, UNSPECIFIED WHETHER LONG TERM INSULIN USE (HCC): Primary | ICD-10-CM

## 2020-10-06 DIAGNOSIS — E03.4 HYPOTHYROIDISM DUE TO ACQUIRED ATROPHY OF THYROID: ICD-10-CM

## 2020-10-06 DIAGNOSIS — E78.2 MIXED HYPERLIPIDEMIA: ICD-10-CM

## 2020-10-06 DIAGNOSIS — D03.9 MELANOMA IN SITU, UNSPECIFIED SITE (HCC): ICD-10-CM

## 2020-10-06 DIAGNOSIS — F33.40 RECURRENT MAJOR DEPRESSIVE DISORDER, IN REMISSION (HCC): ICD-10-CM

## 2020-10-06 DIAGNOSIS — F10.11 HISTORY OF ALCOHOL ABUSE: ICD-10-CM

## 2020-10-06 DIAGNOSIS — I10 ESSENTIAL HYPERTENSION: ICD-10-CM

## 2020-10-06 DIAGNOSIS — M1A.9XX0 CHRONIC GOUT WITHOUT TOPHUS, UNSPECIFIED CAUSE, UNSPECIFIED SITE: ICD-10-CM

## 2020-10-06 PROCEDURE — 99442 PR PHYS/QHP TELEPHONE EVALUATION 11-20 MIN: CPT | Performed by: INTERNAL MEDICINE

## 2020-10-06 RX ORDER — SERTRALINE HYDROCHLORIDE 100 MG/1
100 TABLET, FILM COATED ORAL DAILY
Qty: 90 TAB | Refills: 3
Start: 2020-10-06

## 2020-10-06 NOTE — PROGRESS NOTES
Priyanka Whalen is a 66 y.o. male who was seen by synchronous (real-time) audio-phone only technology on 10/6/2020. Assessment & Plan:   1. HTN: Stable. - C/w rx as prescribed. - Checking labs in 6 months. 2. Type 2 DM: Stable. Continue a low-carb diet. I will check labs in 6 months. We will have him come in for an in office appointment at that time. 3.  Depression: Stable. Continue with Zoloft. Continue to follow-up with Psychiatry. 4.  Gout: Stable. Continue with Rx as prescribed. Checking labs in 6 months. 5.  Hypothyroidism: Stable. Continue with Rx as prescribed.  Checking labs in 6 months. 6. H/o ETOH Abuse. No relapses in between appointments. I encouraged him to continue his sober lifestyle. 7.  CELSO: Likely from dehydration. I encouraged him to consume more water. We will check his labs in 6 months. 8. H/o Skin Cancer: No new skin lesions.  Follow-up with Dermatoloy at least once a year. 9.  Health Maintenance:  We will have him schedule for his flu shot in our office. Lab review: labs are reviewed in the EHR and ordered as mentioned above. I have discussed the diagnosis with the patient and the intended plan as seen in the above orders. I have discussed medication side effects and warnings with the patient as well. I have reviewed the plan of care with the patient, accepted their input and they are in agreement with the treatment goals. All questions were answered. The patient understands the plan of care. Pt instructed if symptoms worsen to call the office or report to the ED for continued care. Greater than 50% of the visit time was spent in counseling and/or coordination of care. I spent 12 min with patient but his phone only encounter. Voice recognition was used to generate this report, which may have resulted in some phonetic based errors in grammar and contents.  Even though attempts were made to correct all the mistakes, some may have been missed, and remained in the body of the document. Subjective: Espinoza Adam was seen for   Chief Complaint   Patient presents with    Follow-up     Pt is a 64yo male with h/o ED, melanoma in situ on left dorsal forearm s/p removal, MDD, rectal polyp, diverticulosis, CKD stage 3, HTN, type 2 DM (foot exams are done by Genevieve Nichole), hypothyroidism, DJD, gout, vocal cord nodule, depression/anxiety (followed by Psychiatry), vitamin D deficiency, RBBB (s/p normal stress test 2018), and HLD. 1. HTN:  Home BP checks: none. He has not checked his BP consistently at home. +Taking coreg and lisinopril. 2. Type 2 DM: Diet controlled. His A1C is down to 6.7 from 7.1. No microalbuminuria. 3. CELSO: His creatinine is up to 1.37. BUN is up to 27. He admits to not drinking a lot of water. 4. ETOH Abuse: No relapses in between apts. 5. Depression: Followed by Psychiatry. He is taking zoloft in place of Effexor. He denies depression sx.     6. H/o Skin Cancer: No new skin lesions. Followed by Dermatology. 7. Hypothyroidism: +Synthroid. TFTs last month are WNL. 8. Gout: No flare ups. On allopurinol. 9. Health Maintenance:  - He wants to get the flu shot in our office. Prior to Admission medications    Medication Sig Start Date End Date Taking? Authorizing Provider   allopurinoL (ZYLOPRIM) 100 mg tablet TAKE ONE TABLET BY MOUTH TWICE A DAY 9/24/20   Maryann Escobar MD   levothyroxine (SYNTHROID) 50 mcg tablet TAKE ONE TABLET BY MOUTH EVERY MORNING BEFORE BREAKFAST 8/26/20   Maryann Escobar MD   carvediloL (COREG) 6.25 mg tablet Take 1 Tab by mouth two (2) times daily (with meals). 5/28/20   Maryann Escobar MD   lisinopriL (PRINIVIL, ZESTRIL) 5 mg tablet TAKE ONE TABLET BY MOUTH DAILY 3/25/20   Maryann Escobar MD   atorvastatin (LIPITOR) 40 mg tablet Take 1 Tab by mouth daily.  12/17/19   Maryann Escobar MD   sildenafil, antihypertensive, (REVATIO) 20 mg tablet TAKE TWO TABLETS BY MOUTH DAILY 1 HOUR PRIOR TO SEXUAL RELATIONS AS NEEDED *MAX DAILY DOSE: 5 TABLETS * 8/1/19   Jorge L Hdez MD   varicella-zoster recombinant, PF, [facility]) 50 mcg/0.5 mL susr injection 0.5 mL by IntraMUSCular route every two (2) months. 11/15/18   Jorge L Hdez MD   ibuprofen (MOTRIN) 800 mg tablet Take 1 Tab by mouth three (3) times daily as needed for Pain. 5/31/18   Natalia Damon MD   ubidecarenone (COQ-10 PO) Take 200 mEq by mouth daily. Provider, Historical   aspirin delayed-release 81 mg tablet Take 81 mg by mouth daily. Provider, Historical   terbinafine HCl (LAMISIL) 250 mg tablet Take 250 mg by mouth every thirty (30) days. Indications: Taken for 1 full week a month; and then not for another 3 weeks    Provider, Historical   venlafaxine-SR (EFFEXOR-XR) 150 mg capsule Take 150 mg by mouth daily. 7/17/17   Provider, Historical   multivitamin (ONE A DAY) tablet Take 1 Tab by mouth daily. Provider, Historical   Cholecalciferol, Vitamin D3, (VITAMIN D) 2,000 unit Cap Take 4,000 Units by mouth daily.     Provider, Historical     Allergies   Allergen Reactions    Amlodipine Other (comments)     BLE edema     Past Medical History:   Diagnosis Date    Adverse effect of anesthesia     Violent dreams with Ether    Arthritis     hip, knee    Chronic kidney disease     had previous reaction with kidneys after a bp med, no issues now    Colon polyps     dysplastic    Depression     Diabetes mellitus ([facility] [address] 2017    no meds    Diverticulosis     seen on colonoscopy from 8/20/15    Diverticulosis of sigmoid colon 01/27/10    GERD (gastroesophageal reflux disease)     no meds    Gout     Hepatitis     Hepatitis A - while he was young    Hyperlipidemia     Hypertension 15 years    Hypovitaminosis D     Nephrolithiasis     Osteoarthritis of right knee 2/19/2018    Plantar wart     Rectal polyp     seen on colonoscopy from 8/20/15    Thyroid disease     hypothyroidism Past Surgical History:   Procedure Laterality Date    ENDOSCOPY, COLON, DIAGNOSTIC  2010    polyp distal rectum, removed; diverticulosis of sigmoid    HX CATARACT REMOVAL Bilateral     HX COLONOSCOPY      HX HEENT      vocal cord polyp removed    HX POLYPECTOMY  2010    Distal rectum    HX TONSILLECTOMY      Conchisashli Brielle Right 2018    Dr. Bakari Oneill Left 2014    TOTAL KNEE ARTHROPLASTY Right 2018     Family History   Problem Relation Age of Onset   Yuly Turkish Arthritis-rheumatoid Mother     Hypertension Mother     Elevated Lipids Mother     Thyroid Disease Sister     Cancer Father         colon    Heart Disease Other     Hypertension Other     Cancer Other         breast    Heart Disease Maternal Grandmother     Dementia Maternal Grandfather     Cancer Paternal Grandmother         breast    No Known Problems Paternal Grandfather      Social History     Socioeconomic History    Marital status:      Spouse name: Not on file    Number of children: Not on file    Years of education: Not on file    Highest education level: Not on file   Tobacco Use    Smoking status: Former Smoker     Packs/day: 1.00     Years: 6.00     Pack years: 6.00     Types: Cigarettes     Last attempt to quit: 1992     Years since quittin.7    Smokeless tobacco: Never Used   Substance and Sexual Activity    Alcohol use: Not Currently     Alcohol/week: 10.0 standard drinks     Types: 10 Glasses of wine per week     Comment: wine with dinner sometimes    Drug use: No    Sexual activity: Yes     Partners: Female       ROS:  Gen: No fever/chills  HEENT: No sore throat, eye pain, ear pain, or congestion.  No HA  CV: No CP  Resp: No cough/SOB  GI: No abdominal pain  : No hematuria/dysuria  Derm: No rash  Neuro: No new paresthesias/weakness  Musc: No new myalgias/jt pain  Psych: No depression sx      LABS:  Lab Results Component Value Date/Time    Sodium 142 09/29/2020 08:24 AM    Potassium 4.2 09/29/2020 08:24 AM    Chloride 108 09/29/2020 08:24 AM    CO2 29 09/29/2020 08:24 AM    Anion gap 5 09/29/2020 08:24 AM    Glucose 129 (H) 09/29/2020 08:24 AM    BUN 27 (H) 09/29/2020 08:24 AM    Creatinine 1.37 (H) 09/29/2020 08:24 AM    BUN/Creatinine ratio 20 09/29/2020 08:24 AM    GFR est AA >60 09/29/2020 08:24 AM    GFR est non-AA 50 (L) 09/29/2020 08:24 AM    Calcium 8.8 09/29/2020 08:24 AM       Lab Results   Component Value Date/Time    Cholesterol, total 150 09/29/2020 08:24 AM    HDL Cholesterol 41 09/29/2020 08:24 AM    LDL, calculated 92.2 09/29/2020 08:24 AM    VLDL, calculated 16.8 09/29/2020 08:24 AM    Triglyceride 84 09/29/2020 08:24 AM    CHOL/HDL Ratio 3.7 09/29/2020 08:24 AM       Lab Results   Component Value Date/Time    WBC 4.7 09/29/2020 08:24 AM    HGB 13.8 09/29/2020 08:24 AM    HCT 41.0 09/29/2020 08:24 AM    PLATELET 547 79/21/4862 08:24 AM    MCV 91.9 09/29/2020 08:24 AM       Lab Results   Component Value Date/Time    Hemoglobin A1c 6.7 (H) 09/29/2020 08:24 AM    Hemoglobin A1c (POC) 6.6 07/05/2019 09:21 AM       Lab Results   Component Value Date/Time    TSH 2.73 09/29/2020 08:24 AM           Due to this being a TeleHealth phone only evaluation, many elements of the physical examination are unable to be assessed. The pt was seen by synchronous (real-time) audio-phone only technology, and/or her healthcare decision maker, is aware that this patient-initiated, Telehealth encounter is a billable service, with coverage as determined by her insurance carrier. She is aware that she may receive a bill and has provided verbal consent to proceed: Yes. The pt is being evaluated by a phone only visit encounter for concerns as above. A caregiver was present when appropriate.  Due to this being a TeleHealth encounter (During [facility] public health emergency), evaluation of the following organ systems was limited: Vitals/Constitutional/EENT/Resp/CV/GI//MS/Neuro/Skin/Heme-Lymph-Imm. Pursuant to the emergency declaration under the [address] [place] [address] and the [employer] and [employer] Act, this Virtual phone only Visit was conducted, with patient's (and/or legal guardian's) consent, to reduce the patient's risk of exposure to COVID-19 and provide necessary medical care. Services were provided through a phone only synchronous discussion virtually to substitute for in-person clinic visit. Patient and provider were located at their individual homes. We discussed the expected course, resolution and complications of the diagnosis(es) in detail. Medication risks, benefits, costs, interactions, and alternatives were discussed as indicated. I advised him to contact the office if his condition worsens, changes or fails to improve as anticipated. He expressed understanding with the diagnosis(es) and plan.      Evangelina Gilford, MD

## 2020-10-28 DIAGNOSIS — N52.9 ERECTILE DYSFUNCTION, UNSPECIFIED ERECTILE DYSFUNCTION TYPE: ICD-10-CM

## 2020-10-28 RX ORDER — SILDENAFIL CITRATE 20 MG/1
TABLET ORAL
Qty: 30 TAB | Refills: 8 | Status: SHIPPED | OUTPATIENT
Start: 2020-10-28 | End: 2022-04-07

## 2020-12-21 DIAGNOSIS — E78.2 MIXED HYPERLIPIDEMIA: ICD-10-CM

## 2020-12-21 DIAGNOSIS — E11.9 TYPE 2 DIABETES MELLITUS WITHOUT COMPLICATION, WITHOUT LONG-TERM CURRENT USE OF INSULIN (HCC): ICD-10-CM

## 2020-12-21 DIAGNOSIS — I10 ESSENTIAL HYPERTENSION: ICD-10-CM

## 2020-12-21 DIAGNOSIS — N18.2 RENAL FAILURE, CHRONIC, STAGE 2 (MILD): ICD-10-CM

## 2020-12-21 RX ORDER — ATORVASTATIN CALCIUM 40 MG/1
TABLET, FILM COATED ORAL
Qty: 90 TAB | Refills: 2 | Status: SHIPPED | OUTPATIENT
Start: 2020-12-21 | End: 2021-09-30

## 2020-12-21 RX ORDER — LISINOPRIL 5 MG/1
TABLET ORAL
Qty: 90 TAB | Refills: 1 | Status: SHIPPED | OUTPATIENT
Start: 2020-12-21 | End: 2021-06-28

## 2021-03-03 ENCOUNTER — HOSPITAL ENCOUNTER (OUTPATIENT)
Dept: LAB | Age: 76
Discharge: HOME | End: 2021-03-03
Payer: MEDICARE

## 2021-03-03 ENCOUNTER — APPOINTMENT (OUTPATIENT)
Dept: INTERNAL MEDICINE CLINIC | Age: 76
End: 2021-03-03

## 2021-03-03 DIAGNOSIS — I10 ESSENTIAL HYPERTENSION: ICD-10-CM

## 2021-03-03 DIAGNOSIS — E03.4 HYPOTHYROIDISM DUE TO ACQUIRED ATROPHY OF THYROID: ICD-10-CM

## 2021-03-03 DIAGNOSIS — E11.9 TYPE 2 DIABETES MELLITUS WITHOUT COMPLICATION, UNSPECIFIED WHETHER LONG TERM INSULIN USE (HCC): ICD-10-CM

## 2021-03-03 DIAGNOSIS — D03.9 MELANOMA IN SITU, UNSPECIFIED SITE (HCC): ICD-10-CM

## 2021-03-03 DIAGNOSIS — M1A.9XX0 CHRONIC GOUT WITHOUT TOPHUS, UNSPECIFIED CAUSE, UNSPECIFIED SITE: ICD-10-CM

## 2021-03-03 DIAGNOSIS — E78.2 MIXED HYPERLIPIDEMIA: ICD-10-CM

## 2021-03-03 LAB
ALBUMIN SERPL-MCNC: 4.1 G/DL (ref 3.4–5)
ALBUMIN/GLOB SERPL: 1.5 {RATIO} (ref 0.8–1.7)
ALP SERPL-CCNC: 107 U/L (ref 45–117)
ALT SERPL-CCNC: 26 U/L (ref 16–61)
ANION GAP SERPL CALC-SCNC: 8 MMOL/L (ref 3–18)
AST SERPL-CCNC: 18 U/L (ref 10–38)
BASOPHILS # BLD: 0 K/UL (ref 0–0.1)
BASOPHILS NFR BLD: 0 % (ref 0–2)
BILIRUB SERPL-MCNC: 0.5 MG/DL (ref 0.2–1)
BUN SERPL-MCNC: 27 MG/DL (ref 7–18)
BUN/CREAT SERPL: 23 (ref 12–20)
CALCIUM SERPL-MCNC: 8.7 MG/DL (ref 8.5–10.1)
CHLORIDE SERPL-SCNC: 110 MMOL/L (ref 100–111)
CHOLEST SERPL-MCNC: 141 MG/DL
CO2 SERPL-SCNC: 25 MMOL/L (ref 21–32)
CREAT SERPL-MCNC: 1.2 MG/DL (ref 0.6–1.3)
CREAT UR-MCNC: 94 MG/DL (ref 30–125)
DIFFERENTIAL METHOD BLD: ABNORMAL
EOSINOPHIL # BLD: 0.2 K/UL (ref 0–0.4)
EOSINOPHIL NFR BLD: 3 % (ref 0–5)
ERYTHROCYTE [DISTWIDTH] IN BLOOD BY AUTOMATED COUNT: 13.5 % (ref 11.6–14.5)
GLOBULIN SER CALC-MCNC: 2.8 G/DL (ref 2–4)
GLUCOSE SERPL-MCNC: 126 MG/DL (ref 74–99)
HBA1C MFR BLD: 6.7 % (ref 4.2–5.6)
HCT VFR BLD AUTO: 42.5 % (ref 36–48)
HDLC SERPL-MCNC: 42 MG/DL (ref 40–60)
HDLC SERPL: 3.4 {RATIO} (ref 0–5)
HGB BLD-MCNC: 14.3 G/DL (ref 13–16)
LDLC SERPL CALC-MCNC: 79.8 MG/DL (ref 0–100)
LIPID PROFILE,FLP: NORMAL
LYMPHOCYTES # BLD: 1.3 K/UL (ref 0.9–3.6)
LYMPHOCYTES NFR BLD: 20 % (ref 21–52)
MCH RBC QN AUTO: 30.9 PG (ref 24–34)
MCHC RBC AUTO-ENTMCNC: 33.6 G/DL (ref 31–37)
MCV RBC AUTO: 91.8 FL (ref 74–97)
MICROALBUMIN UR-MCNC: 2.53 MG/DL (ref 0–3)
MICROALBUMIN/CREAT UR-RTO: 27 MG/G (ref 0–30)
MONOCYTES # BLD: 0.6 K/UL (ref 0.05–1.2)
MONOCYTES NFR BLD: 10 % (ref 3–10)
NEUTS SEG # BLD: 4.5 K/UL (ref 1.8–8)
NEUTS SEG NFR BLD: 67 % (ref 40–73)
PLATELET # BLD AUTO: 178 K/UL (ref 135–420)
PMV BLD AUTO: 10.5 FL (ref 9.2–11.8)
POTASSIUM SERPL-SCNC: 4.3 MMOL/L (ref 3.5–5.5)
PROT SERPL-MCNC: 6.9 G/DL (ref 6.4–8.2)
RBC # BLD AUTO: 4.63 M/UL (ref 4.7–5.5)
SODIUM SERPL-SCNC: 143 MMOL/L (ref 136–145)
T4 FREE SERPL-MCNC: 0.9 NG/DL (ref 0.7–1.5)
TRIGL SERPL-MCNC: 96 MG/DL (ref ?–150)
TSH SERPL DL<=0.05 MIU/L-ACNC: 2.7 UIU/ML (ref 0.36–3.74)
URATE SERPL-MCNC: 4.9 MG/DL (ref 2.6–7.2)
VLDLC SERPL CALC-MCNC: 19.2 MG/DL
WBC # BLD AUTO: 6.6 K/UL (ref 4.6–13.2)

## 2021-03-03 PROCEDURE — 85025 COMPLETE CBC W/AUTO DIFF WBC: CPT

## 2021-03-03 PROCEDURE — 84439 ASSAY OF FREE THYROXINE: CPT

## 2021-03-03 PROCEDURE — 84550 ASSAY OF BLOOD/URIC ACID: CPT

## 2021-03-03 PROCEDURE — 80061 LIPID PANEL: CPT

## 2021-03-03 PROCEDURE — 80053 COMPREHEN METABOLIC PANEL: CPT

## 2021-03-03 PROCEDURE — 82043 UR ALBUMIN QUANTITATIVE: CPT

## 2021-03-03 PROCEDURE — 83036 HEMOGLOBIN GLYCOSYLATED A1C: CPT

## 2021-03-03 PROCEDURE — 36415 COLL VENOUS BLD VENIPUNCTURE: CPT

## 2021-03-03 NOTE — PROGRESS NOTES
Marni Ortiz presents today for   Chief Complaint   Patient presents with    Follow-up    Diabetes    Thyroid Problem    Cholesterol Problem    Hypertension    Labs     3-3-21    Annual Wellness Visit           Coordination of Care:  1. Have you been to the ER, urgent care clinic since your last visit? Hospitalized since your last visit? no    2. Have you seen or consulted any other health care providers outside of the [address] [place] [address] since your last visit? Include any pap smears or colon screening.  yes

## 2021-03-07 NOTE — PROGRESS NOTES
I will discuss his results with him at his upcoming appointment tomorrow. His uric acid level was normal.  His lipid and TFTs are normal.  He does not have any microalbuminuria. His CMP is unremarkable for significant abnormalities. His A1c is unchanged at 6.7. His CBC does not show any significant abnormalities.     Dr. Ana Fofana  [facility] [address]  Phone: [phone]  Fax: [phone]

## 2021-03-08 ENCOUNTER — OFFICE VISIT (OUTPATIENT)
Dept: INTERNAL MEDICINE CLINIC | Age: 76
End: 2021-03-08
Payer: MEDICARE

## 2021-03-08 VITALS
WEIGHT: 216 LBS | BODY MASS INDEX: 31.99 KG/M2 | RESPIRATION RATE: 16 BRPM | DIASTOLIC BLOOD PRESSURE: 72 MMHG | HEIGHT: 69 IN | TEMPERATURE: 97.4 F | OXYGEN SATURATION: 100 % | HEART RATE: 61 BPM | SYSTOLIC BLOOD PRESSURE: 127 MMHG

## 2021-03-08 DIAGNOSIS — F10.11 HISTORY OF ALCOHOL ABUSE: ICD-10-CM

## 2021-03-08 DIAGNOSIS — M1A.9XX0 CHRONIC GOUT WITHOUT TOPHUS, UNSPECIFIED CAUSE, UNSPECIFIED SITE: ICD-10-CM

## 2021-03-08 DIAGNOSIS — E11.9 TYPE 2 DIABETES MELLITUS WITHOUT COMPLICATION, UNSPECIFIED WHETHER LONG TERM INSULIN USE (HCC): Primary | ICD-10-CM

## 2021-03-08 DIAGNOSIS — Z71.89 ADVANCE CARE PLANNING: ICD-10-CM

## 2021-03-08 DIAGNOSIS — I10 ESSENTIAL HYPERTENSION: ICD-10-CM

## 2021-03-08 DIAGNOSIS — E78.2 MIXED HYPERLIPIDEMIA: ICD-10-CM

## 2021-03-08 DIAGNOSIS — Z00.00 MEDICARE ANNUAL WELLNESS VISIT, SUBSEQUENT: ICD-10-CM

## 2021-03-08 DIAGNOSIS — E03.4 HYPOTHYROIDISM DUE TO ACQUIRED ATROPHY OF THYROID: ICD-10-CM

## 2021-03-08 DIAGNOSIS — D03.9 MELANOMA IN SITU, UNSPECIFIED SITE (HCC): ICD-10-CM

## 2021-03-08 DIAGNOSIS — F33.40 RECURRENT MAJOR DEPRESSIVE DISORDER, IN REMISSION (HCC): ICD-10-CM

## 2021-03-08 PROCEDURE — G8752 SYS BP LESS 140: HCPCS | Performed by: INTERNAL MEDICINE

## 2021-03-08 PROCEDURE — G8417 CALC BMI ABV UP PARAM F/U: HCPCS | Performed by: INTERNAL MEDICINE

## 2021-03-08 PROCEDURE — G8536 NO DOC ELDER MAL SCRN: HCPCS | Performed by: INTERNAL MEDICINE

## 2021-03-08 PROCEDURE — 99214 OFFICE O/P EST MOD 30 MIN: CPT | Performed by: INTERNAL MEDICINE

## 2021-03-08 PROCEDURE — G0463 HOSPITAL OUTPT CLINIC VISIT: HCPCS | Performed by: INTERNAL MEDICINE

## 2021-03-08 PROCEDURE — G8427 DOCREV CUR MEDS BY ELIG CLIN: HCPCS | Performed by: INTERNAL MEDICINE

## 2021-03-08 PROCEDURE — G0439 PPPS, SUBSEQ VISIT: HCPCS | Performed by: INTERNAL MEDICINE

## 2021-03-08 PROCEDURE — G8754 DIAS BP LESS 90: HCPCS | Performed by: INTERNAL MEDICINE

## 2021-03-08 PROCEDURE — 1101F PT FALLS ASSESS-DOCD LE1/YR: CPT | Performed by: INTERNAL MEDICINE

## 2021-03-08 PROCEDURE — G9717 DOC PT DX DEP/BP F/U NT REQ: HCPCS | Performed by: INTERNAL MEDICINE

## 2021-03-08 NOTE — ACP (ADVANCE CARE PLANNING)
Advance Care Planning  Advance Care Planning (ACP) Provider Conversation     Date of ACP Conversation: 03/08/21  Persons included in Conversation:  patient    Authorized Decision Maker (if patient is incapable of making informed decisions): This person is:   Named in Advance Directive or Healthcare Power of           For Patients with Decision Making Capacity:   Values/Goals: Exploration of values, goals, and preferences if recovery is not expected, even with continued medical treatment in the event of:  Imminent death  Severe, permanent brain injury    Conversation Outcomes / Follow-Up Plan:   ACP complete - no further action today. He has no changes to make to his preexisting advance directive.     Dr. Sha Hitchcock  [facility], [address] [place] [address], [address] [place] [address] Str[address]  Phone: [phone]  Fax: [phone]

## 2021-03-08 NOTE — PROGRESS NOTES
INTERNISTS OF [facility]:  3/8/2021, MRN: [id number]      Virgilio Maravilla is a 66 y.o. male and presents to clinic for Follow-up, Diabetes, Thyroid Problem, Cholesterol Problem, Hypertension, Labs (3-3-21), and Annual Wellness Visit    Subjective:   Pt is a 66yo male with h/o ED, melanoma in situ on left dorsal forearm s/p removal, MDD, rectal polyp, diverticulosis, CKD stage 3, HTN, type 2 DM (foot exams are done by Marielle Bowman), hypothyroidism, DJD, gout, vocal cord nodule, depression/anxiety (followed by Psychiatry), vitamin     1. Type 2 DM: His most recent labs show that his A1c is unchanged at 6.7. There is no evidence of CKD. No microalbuminuria. His lipid panel results are normal.  He is taking no medication at this time for diabetes as he is diet controlled. 2.  Hypertension: Vital signs are stable. He is taking lisinopril and carvedilol. No adverse side effects. 3.  Hyperlipidemia: Treated with Lipitor. His most recent lipid panel results are normal. He is not exercising. Wt Readings from Last 3 Encounters:   03/08/21 216 lb (98 kg)   02/18/20 210 lb (95.3 kg)   01/30/20 213 lb 9.6 oz (96.9 kg)     4. History of ETOH Abuse: No relapses in between appointments. 5. Gout: No attacks since his last visit. On allopurinol. His most recent labs show that his uric acid level is under 7.    6. Hypothyroidism: Taking Synthroid 50 mcg daily. Asymptomatic. His most recent TFTs are normal.    7. H/o Skin Cancer: He has seen Dermatology since his last appointment. Today he reports: no new skin lesions. 8. Depression/Anxiety: He is planning to move to [place]. On zoloft. He will move to be closer to his grandchildren. Followed by Psychiatry regularly. 9 . Health Maintenance:  - He received  Both doses of Moderna. - Overdue for the shingles vaccine series.  - Overdue for the Tdap. UTD on his flu shot.      Patient Active Problem List    Diagnosis Date Noted    History of alcohol abuse 10/06/2020    Right inguinal hernia 05/17/2018    Osteoarthritis of right knee 02/19/2018    Erectile dysfunction 01/23/2017    Melanoma in situ - on left dorsal forearm 2016 09/16/2016    Major depressive disorder in remission 08/03/2016    Rectal polyp -  seen on colonoscopy from 8/20/15 08/02/2016    Diverticulosis of intestine without bleeding - seen on colonoscopy from 8/20/15 08/02/2016    CKD (chronic kidney disease) stage 3, GFR 30-59 ml/min 11/09/2015    Essential hypertension 06/24/2015    Type 2 diabetes mellitus without complication ([facility] [address] 24/25/7851    Hypothyroidism due to acquired atrophy of thyroid 06/24/2015    Vocal cord nodule 10/21/2013    Gout 10/21/2011    Family history of colon cancer. personal hx colon polyps 10/21/2011    Hyperlipidemia     Hypovitaminosis D        Current Outpatient Medications   Medication Sig Dispense Refill    levothyroxine (SYNTHROID) 50 mcg tablet TAKE ONE TABLET BY MOUTH EVERY MORNING BEFORE BREAKFAST 90 Tab 1    atorvastatin (LIPITOR) 40 mg tablet TAKE ONE TABLET BY MOUTH DAILY 90 Tab 2    lisinopriL (PRINIVIL, ZESTRIL) 5 mg tablet TAKE ONE TABLET BY MOUTH DAILY 90 Tab 1    sildenafiL, pulmonary hypertension, (REVATIO) 20 mg tablet TAKE TWO TABLETS BY MOUTH DAILY 1 HOUR PRIOR TO SEXUAL RELATIONS AS NEEDED *MAX 5 TABLETS PER DAY* 30 Tab 8    sertraline (ZOLOFT) 100 mg tablet Take 1 Tab by mouth daily. 90 Tab 3    allopurinoL (ZYLOPRIM) 100 mg tablet TAKE ONE TABLET BY MOUTH TWICE A  Tab 2    carvediloL (COREG) 6.25 mg tablet Take 1 Tab by mouth two (2) times daily (with meals). 180 Tab 3    ibuprofen (MOTRIN) 800 mg tablet Take 1 Tab by mouth three (3) times daily as needed for Pain. 40 Tab 0    ubidecarenone (COQ-10 PO) Take 200 mEq by mouth daily.  aspirin delayed-release 81 mg tablet Take 81 mg by mouth daily.  multivitamin (ONE A DAY) tablet Take 1 Tab by mouth daily.       Cholecalciferol, Vitamin D3, (VITAMIN D) 2,000 unit Cap Take 4,000 Units by mouth daily.  varicella-zoster recombinant, PF, (SHINGRIX) 50 mcg/0.5 mL susr injection 0.5 mL by IntraMUSCular route every two (2) months.  0.5 mL 1       Allergies   Allergen Reactions    Amlodipine Other (comments)     BLE edema       Past Medical History:   Diagnosis Date    Adverse effect of anesthesia     Violent dreams with Ether    Arthritis     hip, knee    Chronic kidney disease     had previous reaction with kidneys after a bp med, no issues now    Colon polyps     dysplastic    Depression     Diabetes mellitus ([facility] [address] 2017    no meds    Diverticulosis     seen on colonoscopy from 8/20/15    Diverticulosis of sigmoid colon 01/27/10    GERD (gastroesophageal reflux disease)     no meds    Gout     Hepatitis     Hepatitis A - while he was young    Hyperlipidemia     Hypertension 15 years    Hypovitaminosis D     Nephrolithiasis     Osteoarthritis of right knee 2/19/2018    Plantar wart     Rectal polyp     seen on colonoscopy from 8/20/15    Thyroid disease     hypothyroidism       Past Surgical History:   Procedure Laterality Date    ENDOSCOPY, COLON, DIAGNOSTIC  01/27/2010    polyp distal rectum, removed; diverticulosis of sigmoid    HX CATARACT REMOVAL Bilateral     HX COLONOSCOPY      HX HEENT  2008    vocal cord polyp removed    HX POLYPECTOMY  01/27/2010    Distal rectum    HX TONSILLECTOMY      Trevor Hintoniff Right 05/31/2018    Dr. Sue Hairston Left 1/2014    TOTAL KNEE ARTHROPLASTY Right 02/2018       Family History   Problem Relation Age of Onset   [facility] Arthritis-rheumatoid Mother     Hypertension Mother     Elevated Lipids Mother     Thyroid Disease Sister     Cancer Father         colon    Heart Disease Other     Hypertension Other     Cancer Other         breast    Heart Disease Maternal Grandmother     Dementia Maternal Grandfather     Cancer Paternal Grandmother breast    No Known Problems Paternal Grandfather        Social History     Tobacco Use    Smoking status: Former Smoker     Packs/day: 1.00     Years: 6.00     Pack years: 6.00     Types: Cigarettes     Quit date: 1992     Years since quittin.2    Smokeless tobacco: Never Used   Substance Use Topics    Alcohol use: Not Currently     Alcohol/week: 10.0 standard drinks     Types: 10 Glasses of wine per week     Comment: wine with dinner sometimes       ROS   Review of Systems   Constitutional: Negative for chills and fever. HENT: Negative for ear pain and sore throat. Eyes: Negative for blurred vision and pain. Respiratory: Negative for cough and shortness of breath. Cardiovascular: Negative for chest pain. Gastrointestinal: Negative for abdominal pain. Genitourinary: Negative for dysuria and hematuria. Musculoskeletal: Positive for joint pain (ankle pain off/on). Negative for myalgias. Skin: Negative for rash. Neurological: Negative for tingling, focal weakness and headaches. Endo/Heme/Allergies: Does not bruise/bleed easily. Psychiatric/Behavioral: Positive for depression and suicidal ideas (last SI occurred a month ago; he did not act on these throughts 2/2 wanting to be alive for his grandchildren). Negative for substance abuse. Objective     Vitals:    21 0922   BP: 127/72   Pulse: 61   Resp: 16   Temp: 97.4 °F (36.3 °C)   TempSrc: Temporal   SpO2: 100%   Weight: 216 lb (98 kg)   Height: 5' 9\" (1.753 m)   PainSc:   2   PainLoc: Back       Physical Exam  Vitals signs and nursing note reviewed. Constitutional:       Appearance: Normal appearance. HENT:      Head: Normocephalic and atraumatic. Right Ear: External ear normal.      Left Ear: External ear normal.   Eyes:      Conjunctiva/sclera: Conjunctivae normal.   Neck:      Musculoskeletal: Neck supple. Cardiovascular:      Rate and Rhythm: Normal rate and regular rhythm. Pulses: Normal pulses. Heart sounds: Normal heart sounds. Pulmonary:      Effort: Pulmonary effort is normal.      Breath sounds: Normal breath sounds. Abdominal:      General: Abdomen is flat. Bowel sounds are normal. There is no distension. Palpations: Abdomen is soft. Tenderness: There is no abdominal tenderness. Musculoskeletal:         General: No swelling or tenderness (BUE). Lymphadenopathy:      Cervical: No cervical adenopathy. Skin:     General: Skin is warm and dry. Findings: No erythema. Neurological:      Mental Status: He is alert and oriented to person, place, and time. Gait: Gait normal.   Psychiatric:         Mood and Affect: Mood normal.         LABS   Data Review:   Lab Results   Component Value Date/Time    WBC 6.6 03/03/2021 07:50 AM    HGB 14.3 03/03/2021 07:50 AM    HCT 42.5 03/03/2021 07:50 AM    PLATELET 589 76/40/5206 07:50 AM    MCV 91.8 03/03/2021 07:50 AM       Lab Results   Component Value Date/Time    Sodium 143 03/03/2021 07:50 AM    Potassium 4.3 03/03/2021 07:50 AM    Chloride 110 03/03/2021 07:50 AM    CO2 25 03/03/2021 07:50 AM    Anion gap 8 03/03/2021 07:50 AM    Glucose 126 (H) 03/03/2021 07:50 AM    BUN 27 (H) 03/03/2021 07:50 AM    Creatinine 1.20 03/03/2021 07:50 AM    BUN/Creatinine ratio 23 (H) 03/03/2021 07:50 AM    GFR est AA >60 03/03/2021 07:50 AM    GFR est non-AA 59 (L) 03/03/2021 07:50 AM    Calcium 8.7 03/03/2021 07:50 AM       Lab Results   Component Value Date/Time    Cholesterol, total 141 03/03/2021 07:50 AM    HDL Cholesterol 42 03/03/2021 07:50 AM    LDL, calculated 79.8 03/03/2021 07:50 AM    VLDL, calculated 19.2 03/03/2021 07:50 AM    Triglyceride 96 03/03/2021 07:50 AM    CHOL/HDL Ratio 3.4 03/03/2021 07:50 AM       Lab Results   Component Value Date/Time    Hemoglobin A1c 6.7 (H) 03/03/2021 07:50 AM    Hemoglobin A1c (POC) 6.6 07/05/2019 09:21 AM       Assessment/Plan:   1. H/o Skin Cancer:  In remission.  -Continue to see dermatology for regular skin exams.  -Checking labs in 5-6 months    2. Type 2 diabetes: Unchanged.  -I encouraged him to reduce his weight by exercising regularly.  -I encouraged him to continue managing his type 2 diabetes with dietary modification measures.  -We will check labs in 5 months. I will follow-up with him for a virtual visit at that time. 3. HTN/HLD: Stable. -Continue with Rx as prescribed. Checking labs in 5 months. 4.  Gout: Stable. -Continue with Rx as prescribed. Checking labs in 5 months. 5.  Hypothyroidism: Stable. -Continue with Rx as prescribed. Checking labs in 5 months. 6.  Depression: Active.  -Continue with Zoloft.  -Continue to follow-up with Psychiatry for medication management. 7. H/o ETOH Abuse: No relapses  -I encouraged him to continue his sobriety. Health Maintenance Due   Topic Date Due    COVID-19 Vaccine (1 of 2) Never done    Shingrix Vaccine Age 50> (1 of 2) Never done    Foot Exam Q1  05/22/2020    Medicare Yearly Exam  01/02/2021         Lab review: labs are reviewed in the EHR and ordered as mentioned above. I have discussed the diagnosis with the patient and the intended plan as seen in the above orders. The patient has received an after-visit summary and questions were answered concerning future plans. I have discussed medication side effects and warnings with the patient as well. I have reviewed the plan of care with the patient, accepted their input and they are in agreement with the treatment goals. All questions were answered. The patient understands the plan of care. Handouts provided today with above information. Pt instructed if symptoms worsen to call the office or report to the ED for continued care. Greater than 50% of the visit time was spent in counseling and/or coordination of care. Voice recognition was used to generate this report, which may have resulted in some phonetic based errors in grammar and contents.  Even though attempts were made to correct all the mistakes, some may have been missed, and remained in the body of the document.           Abhay Louise MD

## 2021-03-08 NOTE — PATIENT INSTRUCTIONS
Medicare Part B Preventive Services Limitations Recommendation Scheduled   Bone Mass Measurement  (age 72 & older, biennial) Requires diagnosis related to osteoporosis or estrogen deficiency. Biennial benefit unless patient has history of long-term glucocorticoid tx or baseline is needed because initial test was by other method Not applicable Not applicable   Cardiovascular Screening Blood Tests (every 5 years)  Total cholesterol, HDL, Triglycerides Order as a panel if possible We should check your cholesterol panel at least once every 5 years. Up to date   Colorectal Cancer Screening  -Fecal occult blood test (annual)  -Flexible sigmoidoscopy (5y)  -Screening colonoscopy (10y)  -Barium Enema  Due per your Gastroenterologist's recommendations. Up to date   Counseling to Prevent Tobacco Use (up to 8 sessions per year)  - Counseling greater than 3 and up to 10 minutes  - Counseling greater than 10 minutes Patients must be asymptomatic of tobacco-related conditions to receive as preventive service Continue with smoking cessation efforts. Not applicable   Diabetes Screening Tests (at least every 3 years, Medicare covers annually or at 6-month intervals for prediabetic patients)    Fasting blood sugar (FBS) or glucose tolerance test (GTT) Patient must be diagnosed with one of the following:  -Hypertension, Dyslipidemia, obesity, previous impaired FBS or GTT  Or any two of the following: overweight, FH of diabetes, age ? 72, history of gestational diabetes, birth of baby weighing more than 9 pounds Should be done yearly Up to date   Diabetes Self-Management Training (DSMT) (no USPSTF recommendation) Requires referral by treating physician for patient with diabetes or renal disease. 10 hours of initial DSMT session of no less than 30 minutes each in a continuous 12-month period. 2 hours of follow-up DSMT in subsequent years.   Let me know if you are interested in this option   Glaucoma Screening (no USPSTF recommendation) Diabetes mellitus, family history, , age 48 or over,  American, age 72 or over Continue with annual eye exams. Up to date   Human Immunodeficiency Virus (HIV) Screening (annually for increased risk patients)  HIV-1 and HIV-2 by EIA, JEFERSON, rapid antibody test, or oral mucosa transudate Patient must be at increased risk for HIV infection per USPSTF guidelines or pregnant. Tests covered annually for patients at increased risk. Pregnant patients may receive up to 3 test during pregnancy. Not applicable Not applicable   Medical Nutrition Therapy (MNT) (for diabetes or renal disease not recommended schedule) Requires referral by treating physician for patient with diabetes or renal disease. Can be provided in same year as diabetes self-management training (DSMT), and CMS recommends medical nutrition therapy take place after DSMT. Up to 3 hours for initial year and 2 hours in subsequent years. Let me know if you are interested in this option   Shingles Vaccination  Vaccination recommended for shingles vaccination once after age 61 Overdue per our records   Seasonal Influenza Vaccination (annually)  Continue with yearly \"flu\" shot annually Up to date   Pneumococcal Vaccination (once after 72)  Please receive this vaccination at age 72. Up to date   Hepatitis B Vaccinations (if medium/high risk) Medium/high risk factors:  End-stage renal disease,  Hemophiliacs who received Factor VIII or IX concentrates, Clients of institutions for the mentally retarded, Persons who live in the same house as a HepB virus carrier, Homosexual men, Illicit injectable drug abusers.  Not applicable Not applicable   Screening Mammography (biennial age 54-69) Annually (age 36 or over) Not applicable Not applicable   Screening Pap Tests and Pelvic Examination (up to age 79 and after 79 if unknown history or abnormal study last 10 years) Every 24 months except high risk Not applicable Not applicable   Ultrasound Screening for Abdominal Aortic Aneurysm (AAA) (once) Patient must be referred through [facility] and not have had a screening for abdominal aortic aneurysm before under Medicare. Limited to patients who meet one of the following criteria:  - Men who are 73-68 years old and have smoked more than 100 cigarettes in their lifetime.  -Anyone with a FH of AAA  -Anyone recommended for screening by USPSTF Recommended once after age 72 if you have smoked cigarettes. Up to date          Learning About Diabetes Food Guidelines  Your Care Instructions     Meal planning is important to manage diabetes. It helps keep your blood sugar at a target level (which you set with your doctor). You don't have to eat special foods. You can eat what your family eats, including sweets once in a while. But you do have to pay attention to how often you eat and how much you eat of certain foods. You may want to work with a dietitian or a certified diabetes educator (CDE) to help you plan meals and snacks. A dietitian or CDE can also help you lose weight if that is one of your goals. What should you know about eating carbs? Managing the amount of carbohydrate (carbs) you eat is an important part of healthy meals when you have diabetes. Carbohydrate is found in many foods. · Learn which foods have carbs. And learn the amounts of carbs in different foods. ? Bread, cereal, pasta, and rice have about 15 grams of carbs in a serving. A serving is 1 slice of bread (1 ounce), ½ cup of cooked cereal, or 1/3 cup of cooked pasta or rice. ? Fruits have 15 grams of carbs in a serving. A serving is 1 small fresh fruit, such as an apple or orange; ½ of a banana; ½ cup of cooked or canned fruit; ½ cup of fruit juice; 1 cup of melon or raspberries; or 2 tablespoons of dried fruit. ? Milk and no-sugar-added yogurt have 15 grams of carbs in a serving. A serving is 1 cup of milk or 2/3 cup of no-sugar-added yogurt. ? Starchy vegetables have 15 grams of carbs in a serving. A serving is ½ cup of mashed potatoes or sweet potato; 1 cup winter squash; ½ of a small baked potato; ½ cup of cooked beans; or ½ cup cooked corn or green peas. · Learn how much carbs to eat each day and at each meal. A dietitian or CDE can teach you how to keep track of the amount of carbs you eat. This is called carbohydrate counting. · If you are not sure how to count carbohydrate grams, use the Plate Method to plan meals. It is a good, quick way to make sure that you have a balanced meal. It also helps you spread carbs throughout the day. ? Divide your plate by types of foods. Put non-starchy vegetables on half the plate, meat or other protein food on one-quarter of the plate, and a grain or starchy vegetable in the final quarter of the plate. To this you can add a small piece of fruit and 1 cup of milk or yogurt, depending on how many carbs you are supposed to eat at a meal.  · Try to eat about the same amount of carbs at each meal. Do not \"save up\" your daily allowance of carbs to eat at one meal.  · Proteins have very little or no carbs per serving. Examples of proteins are beef, chicken, turkey, fish, eggs, tofu, cheese, cottage cheese, and peanut butter. A serving size of meat is 3 ounces, which is about the size of a deck of cards. Examples of meat substitute serving sizes (equal to 1 ounce of meat) are 1/4 cup of cottage cheese, 1 egg, 1 tablespoon of peanut butter, and ½ cup of tofu. How can you eat out and still eat healthy? · Learn to estimate the serving sizes of foods that have carbohydrate. If you measure food at home, it will be easier to estimate the amount in a serving of restaurant food. · If the meal you order has too much carbohydrate (such as potatoes, corn, or baked beans), ask to have a low-carbohydrate food instead. Ask for a salad or green vegetables. · If you use insulin, check your blood sugar before and after eating out to help you plan how much to eat in the future.   · If you eat more carbohydrate at a meal than you had planned, take a walk or do other exercise. This will help lower your blood sugar. What else should you know? · Limit saturated fat, such as the fat from meat and dairy products. This is a healthy choice because people who have diabetes are at higher risk of heart disease. So choose lean cuts of meat and nonfat or low-fat dairy products. Use olive or canola oil instead of butter or shortening when cooking. · Don't skip meals. Your blood sugar may drop too low if you skip meals and take insulin or certain medicines for diabetes. · Check with your doctor before you drink alcohol. Alcohol can cause your blood sugar to drop too low. Alcohol can also cause a bad reaction if you take certain diabetes medicines. Follow-up care is a key part of your treatment and safety. Be sure to make and go to all appointments, and call your doctor if you are having problems. It's also a good idea to know your test results and keep a list of the medicines you take. Where can you learn more? Go to http://www.mercado.com/  Enter I147 in the search box to learn more about \"Learning About Diabetes Food Guidelines. \"  Current as of: December 20, 2019               Content Version: 12.6  © 8554-0775 [employer]. Care instructions adapted under license by [employer] (which disclaims liability or warranty for this information). If you have questions about a medical condition or this instruction, always ask your healthcare professional. [place] [address] any warranty or liability for your use of this information. Medicare Wellness Visit, Male    The best way to live healthy is to have a lifestyle where you eat a well-balanced diet, exercise regularly, limit alcohol use, and quit all forms of tobacco/nicotine, if applicable. Regular preventive services are another way to keep healthy.  Preventive services (vaccines, screening tests, monitoring & exams) can help personalize your care plan, which helps you manage your own care. Screening tests can find health problems at the earliest stages, when they are easiest to treat. [facility] follows the current, evidence-based guidelines published by the [facility] Ulysses Higginbotham ([facility]) when recommending preventive services for our patients. Because we follow these guidelines, sometimes recommendations change over time as research supports it. (For example, a prostate screening blood test is no longer routinely recommended for men with no symptoms). Of course, you and your doctor may decide to screen more often for some diseases, based on your risk and co-morbidities (chronic disease you are already diagnosed with). Preventive services for you include:  - Medicare offers their members a free annual wellness visit, which is time for you and your primary care provider to discuss and plan for your preventive service needs. Take advantage of this benefit every year!  -All adults over age 72 should receive the recommended pneumonia vaccines. Current USPSTF guidelines recommend a series of two vaccines for the best pneumonia protection.   -All adults should have a flu vaccine yearly and tetanus vaccine every 10 years.  -All adults age 48 and older should receive the shingles vaccines (series of two vaccines).        -All adults age 38-68 who are overweight should have a diabetes screening test once every three years.   -Other screening tests & preventive services for persons with diabetes include: an eye exam to screen for diabetic retinopathy, a kidney function test, a foot exam, and stricter control over your cholesterol.   -Cardiovascular screening for adults with routine risk involves an electrocardiogram (ECG) at intervals determined by the provider.   -Colorectal cancer screening should be done for adults age 54-65 with no increased risk factors for colorectal cancer. There are a number of acceptable methods of screening for this type of cancer. Each test has its own benefits and drawbacks. Discuss with your provider what is most appropriate for you during your annual wellness visit. The different tests include: colonoscopy (considered the best screening method), a fecal occult blood test, a fecal DNA test, and sigmoidoscopy.  -All adults born between [facility] should be screened once for Hepatitis C.  -An Abdominal Aortic Aneurysm (AAA) Screening is recommended for men age 73-68 who has ever smoked in their lifetime.      Here is a list of your current Health Maintenance items (your personalized list of preventive services) with a due date:  Health Maintenance Due   Topic Date Due    COVID-19 Vaccine (1 of 2) Never done    Shingles Vaccine (1 of 2) Never done   [facility]  05/22/2020    Annual Well Visit  01/02/2021

## 2021-03-08 NOTE — PROGRESS NOTES
INTERNISTS OF [facility]:  03/08/21, [id number]      The Subsequent Medicare Annual Wellness Exam PROGRESS NOTE    This is a Subsequent Medicare Annual Wellness Exam (AWV). I have reviewed the patient's medical history in detail and updated the computerized patient record. Ellie Sanchez is a 66 y.o.  male and presents for an annual wellness exam.    SUBJECTIVE  The pt has no acute complaints today.      Past Medical History:   Diagnosis Date    Adverse effect of anesthesia     Violent dreams with Ether    Arthritis     hip, knee    Chronic kidney disease     had previous reaction with kidneys after a bp med, no issues now    Colon polyps     dysplastic    Depression     Diabetes mellitus ([facility] [address] 2017    no meds    Diverticulosis     seen on colonoscopy from 8/20/15    Diverticulosis of sigmoid colon 01/27/10    GERD (gastroesophageal reflux disease)     no meds    Gout     Hepatitis     Hepatitis A - while he was young    Hyperlipidemia     Hypertension 15 years    Hypovitaminosis D     Nephrolithiasis     Osteoarthritis of right knee 2/19/2018    Plantar wart     Rectal polyp     seen on colonoscopy from 8/20/15    Thyroid disease     hypothyroidism      Past Surgical History:   Procedure Laterality Date    ENDOSCOPY, COLON, DIAGNOSTIC  01/27/2010    polyp distal rectum, removed; diverticulosis of sigmoid    HX CATARACT REMOVAL Bilateral     HX COLONOSCOPY      HX HEENT  2008    vocal cord polyp removed    HX POLYPECTOMY  01/27/2010    Distal rectum    HX TONSILLECTOMY      LAP,INGUINAL HERNIA REPR,INITIAL Right 05/31/2018    Dr. Dayne Moore Left 1/2014    TOTAL KNEE ARTHROPLASTY Right 02/2018     Current Outpatient Medications   Medication Sig Dispense Refill    levothyroxine (SYNTHROID) 50 mcg tablet TAKE ONE TABLET BY MOUTH EVERY MORNING BEFORE BREAKFAST 90 Tab 1    atorvastatin (LIPITOR) 40 mg tablet TAKE ONE TABLET BY MOUTH DAILY 90 Tab 2    lisinopriL (PRINIVIL, ZESTRIL) 5 mg tablet TAKE ONE TABLET BY MOUTH DAILY 90 Tab 1    sildenafiL, pulmonary hypertension, (REVATIO) 20 mg tablet TAKE TWO TABLETS BY MOUTH DAILY 1 HOUR PRIOR TO SEXUAL RELATIONS AS NEEDED *MAX 5 TABLETS PER DAY* 30 Tab 8    sertraline (ZOLOFT) 100 mg tablet Take 1 Tab by mouth daily. 90 Tab 3    allopurinoL (ZYLOPRIM) 100 mg tablet TAKE ONE TABLET BY MOUTH TWICE A  Tab 2    carvediloL (COREG) 6.25 mg tablet Take 1 Tab by mouth two (2) times daily (with meals). 180 Tab 3    ibuprofen (MOTRIN) 800 mg tablet Take 1 Tab by mouth three (3) times daily as needed for Pain. 40 Tab 0    ubidecarenone (COQ-10 PO) Take 200 mEq by mouth daily.  aspirin delayed-release 81 mg tablet Take 81 mg by mouth daily.  multivitamin (ONE A DAY) tablet Take 1 Tab by mouth daily.  Cholecalciferol, Vitamin D3, (VITAMIN D) 2,000 unit Cap Take 4,000 Units by mouth daily.  varicella-zoster recombinant, PF, (SHINGRIX) 50 mcg/0.5 mL susr injection 0.5 mL by IntraMUSCular route every two (2) months.  0.5 mL 1     Allergies   Allergen Reactions    Amlodipine Other (comments)     BLE edema     Family History   Problem Relation Age of Onset   Lillian Franco Arthritis-rheumatoid Mother     Hypertension Mother    Lillian Joyaxley Elevated Lipids Mother     Thyroid Disease Sister     Cancer Father         colon    Heart Disease Other     Hypertension Other     Cancer Other         breast    Heart Disease Maternal Grandmother     Dementia Maternal Grandfather     Cancer Paternal Grandmother         breast    No Known Problems Paternal Grandfather      Social History     Tobacco Use    Smoking status: Former Smoker     Packs/day: 1.00     Years: 6.00     Pack years: 6.00     Types: Cigarettes     Quit date: 1992     Years since quittin.2    Smokeless tobacco: Never Used   Substance Use Topics    Alcohol use: Not Currently     Alcohol/week: 10.0 standard drinks     Types: 10 Glasses of wine per week     Comment: wine with dinner sometimes     Patient Active Problem List   Diagnosis Code    Hyperlipidemia E78.5    Hypovitaminosis D E55.9    Gout M10.9    Family history of colon cancer. personal hx colon polyps Z80.0    Vocal cord nodule J38.2    Essential hypertension I10    Type 2 diabetes mellitus without complication (HCC) T37.2    Hypothyroidism due to acquired atrophy of thyroid E03.4    CKD (chronic kidney disease) stage 3, GFR 30-59 ml/min N18.30    Rectal polyp -  seen on colonoscopy from 8/20/15 K62.1    Diverticulosis of intestine without bleeding - seen on colonoscopy from 8/20/15 K57.90    Major depressive disorder in remission F32.5    Melanoma in situ - on left dorsal forearm 2016 D03.9    Erectile dysfunction N52.9    Osteoarthritis of right knee M17.11    Right inguinal hernia K40.90    History of alcohol abuse F10.11     Pt is a 66yo male with h/o ED, melanoma in situ on left dorsal forearm s/p removal, MDD, rectal polyp, diverticulosis, CKD stage 3, HTN, type 2 DM (foot exams are done by Terry Watkins), hypothyroidism, DJD, gout, vocal cord nodule, depression/anxiety (followed by Psychiatry), vitamin     Health Maintenance History  Immunizations reviewed: Tdap over-due   Pneumovax: up to date   Flu: up to date  Zoster: over-due    Immunization History   Administered Date(s) Administered    Influenza High Dose Vaccine PF 10/02/2015, 10/24/2016, 10/05/2017, 10/01/2018, 10/31/2018, 10/27/2019    Influenza Vaccine 10/01/2013, 10/15/2013, 11/20/2014    Influenza Vaccine Split 10/21/2011, 11/09/2012    Pneumococcal Conjugate (PCV-13) 06/24/2015    Pneumococcal Polysaccharide (PPSV-23) 01/01/2014, 08/10/2017    Pneumococcal Vaccine (Unspecified Type) 01/01/2007    Td 01/01/2007    Zoster Vaccine, Live 02/01/2013       Colonoscopy: Up to date.  No bleeding     Eye exam: Up to date    PSA: No urinary sx        Review of Systems   Constitutional: Negative for chills and fever. HENT: Negative for ear pain and sore throat. Eyes: Negative for blurred vision and pain. Respiratory: Negative for shortness of breath. Cardiovascular: Negative for chest pain. Gastrointestinal: Negative for abdominal pain, blood in stool and melena. Genitourinary: Negative for dysuria and hematuria. Musculoskeletal: Positive for joint pain. Negative for myalgias. Skin: Negative for rash. Neurological: Negative for tingling, focal weakness and headaches. Endo/Heme/Allergies: Does not bruise/bleed easily. Psychiatric/Behavioral: Positive for depression. Negative for substance abuse. Depression Risk Factor Screening:      Patient Health Questionnaire (PHQ-2)   Over the last 2 weeks, how often have you been bothered by any of the following problems? · Little interest or pleasure in doing things? · Not at all. [0]  · Feeling down, depressed, or hopeless? · Several days. [1]    Total Score: 1/6  PHQ-2 Assessment Scoring:   A score of 2 or more requires further screening with the PHQ-9      Men:   On any occasion during the past 3 months, have you had more than 4 drinks containing alcohol? no    Do you average more than 14 drinks per week? no    Tobacco Use Screening:     Social History     Tobacco Use   Smoking Status Former Smoker    Packs/day: 1.00    Years: 6.00    Pack years: 6.00    Types: Cigarettes    Quit date: 1992    Years since quittin.2   Smokeless Tobacco Never Used       Hearing Loss    Hearing is unchanged    Activities of Daily Living   Self-care.    Requires assistance with: no ADLs  He does not need assistance with ADLs/IADLs/    Fall Risk   No falls within the past year    Abuse Screen   None    Additional Examination Findings:  Vitals:    21 0922   BP: 127/72   Pulse: 61   Resp: 16   Temp: 97.4 °F (36.3 °C)   TempSrc: Temporal   SpO2: 100%   Weight: 216 lb (98 kg)   Height: 5' 9\" (1.753 m)   PainSc:   2   PainLoc: Back      Body mass index is 31.9 kg/m². Evaluation of Cognitive Function:  Mood/affect: Euthymic  Appearance: Well-groomed  Family member/caregiver input: He is not with a family member today      General:   Well-nourished, well-groomed, pleasant, alert, in no acute distress.      Head:  Normocephalic, atraumatic  Ears:  External ears WNL  Eyes:  Clear sclera  Neuro:   Alert, conversant, appropriate, following commands,  Skin:    No rashes noted  Psych: Affect, mood and judgment appropriate      Dementia Screen:   Clock Drawing (ten past eleven) Exercise: unremarkable      LABS   Data Review:   Lab Results   Component Value Date/Time    Sodium 143 03/03/2021 07:50 AM    Potassium 4.3 03/03/2021 07:50 AM    Chloride 110 03/03/2021 07:50 AM    CO2 25 03/03/2021 07:50 AM    Anion gap 8 03/03/2021 07:50 AM    Glucose 126 (H) 03/03/2021 07:50 AM    BUN 27 (H) 03/03/2021 07:50 AM    Creatinine 1.20 03/03/2021 07:50 AM    BUN/Creatinine ratio 23 (H) 03/03/2021 07:50 AM    GFR est AA >60 03/03/2021 07:50 AM    GFR est non-AA 59 (L) 03/03/2021 07:50 AM    Calcium 8.7 03/03/2021 07:50 AM       Lab Results   Component Value Date/Time    WBC 6.6 03/03/2021 07:50 AM    HGB 14.3 03/03/2021 07:50 AM    HCT 42.5 03/03/2021 07:50 AM    PLATELET 583 93/89/3594 07:50 AM    MCV 91.8 03/03/2021 07:50 AM       Lab Results   Component Value Date/Time    Hemoglobin A1c 6.7 (H) 03/03/2021 07:50 AM    Hemoglobin A1c (POC) 6.6 07/05/2019 09:21 AM       Lab Results   Component Value Date/Time    Cholesterol, total 141 03/03/2021 07:50 AM    HDL Cholesterol 42 03/03/2021 07:50 AM    LDL, calculated 79.8 03/03/2021 07:50 AM    VLDL, calculated 19.2 03/03/2021 07:50 AM    Triglyceride 96 03/03/2021 07:50 AM    CHOL/HDL Ratio 3.4 03/03/2021 07:50 AM     Patient Care Team:  Sandra Beth MD as PCP - General (Family Medicine)  Sandra Beth MD as PCP - [address] [place] [address]  [facility]  Kathie Escobedo, RN as [facility] (Internal Medicine)  [address] [place] [address] Rd, JOHN Villar (Physician Assistant)  Taylor Funk MD (Gastroenterology)  Kristine Ortega MD (Otolaryngology)  Dennis Kevin MD (Orthopedic Surgery)  Tamela Mcgowan DPM (Podiatry)  Norah Davidson DPM (Podiatry)  Jennifer Acevedo MD (Ophthalmology)  Eloina Wilkerson MD (Orthopedic Surgery)  Marysol Verde MD (Unknown Physician Specialty)  Lizbeth Domingo, MICHAEL as Nurse Practitioner (Nurse Practitioner)  Marysol Verde MD (Psychiatry)    End-of-life planning  Advanced Directive in the case than an injury or illness causes the patient to be unable to make health care decisions was discussed with the patient. Advice/Referrals/Counselling/Plan:   Education and counseling provided:  Are appropriate based on today's review and evaluation  End-of-Life planning (with patient's consent)  Pneumococcal Vaccine  Influenza Vaccine  Colorectal cancer screening tests  Cardiovascular screening blood test  Screening for glaucoma  Diabetes screening test  Include in education list (weight loss, physical activity, smoking cessation, fall prevention, and nutrition)    ICD-10-CM ICD-9-CM    1. Type 2 diabetes mellitus without complication, unspecified whether long term insulin use (HCC)  K56.8 593.40 METABOLIC PANEL, COMPREHENSIVE      HEMOGLOBIN A1C W/O EAG      LIPID PANEL      MICROALBUMIN, UR, RAND W/ MICROALB/CREAT RATIO   2. Essential hypertension  Z46 752.9 METABOLIC PANEL, COMPREHENSIVE      HEMOGLOBIN A1C W/O EAG      LIPID PANEL      MICROALBUMIN, UR, RAND W/ MICROALB/CREAT RATIO      TSH AND FREE T4   3. Hypothyroidism due to acquired atrophy of thyroid  X99.7 376.5 METABOLIC PANEL, COMPREHENSIVE     246.8 CBC WITH AUTOMATED DIFF      TSH AND FREE T4   4. Mixed hyperlipidemia  A25.0 843.9 METABOLIC PANEL, COMPREHENSIVE      LIPID PANEL   5. Chronic gout without tophus, unspecified cause, unspecified site  M1A. 9XX0 495.96 METABOLIC PANEL, COMPREHENSIVE      URIC ACID   6.  Recurrent major depressive disorder, in remission ([facility]  F33.40 296.35    7. Melanoma in situ, unspecified site ([facility]  H39.3 801.3 METABOLIC PANEL, COMPREHENSIVE      CBC WITH AUTOMATED DIFF   8. History of alcohol abuse  F10.11 305.03      reviewed diet, exercise and weight control. Brief written plan, checklist    Assessment/Plan:    Health Maintenance:  - I encouraged him to get all recommended vaccinations. Lab review: labs are reviewed in the [address] [place] [address] (ACP) Provider Conversation     Date of ACP Conversation: 03/08/21  Persons included in Conversation:  patient    Authorized Decision Maker (if patient is incapable of making informed decisions): This person is:   Named in Advance Directive or Healthcare Power of           For Patients with Decision Making Capacity:   Values/Goals: Exploration of values, goals, and preferences if recovery is not expected, even with continued medical treatment in the event of:  Imminent death  Severe, permanent brain injury    Conversation Outcomes / Follow-Up Plan:   ACP complete - no further action today. He has no changes to make to his preexisting advance directive. I have discussed the diagnosis with the patient and the intended plan as seen in the above orders. The patient has received an after-visit summary and questions were answered concerning future plans. I have discussed medication side effects and warnings with the patient as well. I have reviewed the plan of care with the patient, accepted their input and they are in agreement with the treatment goals.

## 2021-03-15 NOTE — TELEPHONE ENCOUNTER
Call patient to schedule a video visit   Regarding: Non-Urgent Medical Question  Contact: [phone]  ----- Message from [address] [place] [address], [facility] sent at 2/25/2019 12:27 PM EST -----    I am leaving Thursday, February 28, on a cruise ship; please send a script for a patch to guard against sea-sickness to my pharmacy, [address] [place] [address]. Thank you.  Amilcar Licea

## 2021-06-14 DIAGNOSIS — I10 ESSENTIAL HYPERTENSION: ICD-10-CM

## 2021-06-14 RX ORDER — CARVEDILOL 6.25 MG/1
TABLET ORAL
Qty: 180 TABLET | Refills: 2 | Status: SHIPPED | OUTPATIENT
Start: 2021-06-14 | End: 2021-06-16

## 2021-06-16 DIAGNOSIS — I10 ESSENTIAL HYPERTENSION: ICD-10-CM

## 2021-06-16 RX ORDER — CARVEDILOL 6.25 MG/1
TABLET ORAL
Qty: 180 TABLET | Refills: 2 | Status: SHIPPED | OUTPATIENT
Start: 2021-06-16 | End: 2022-09-12

## 2021-08-12 DIAGNOSIS — E03.4 HYPOTHYROIDISM DUE TO ACQUIRED ATROPHY OF THYROID: ICD-10-CM

## 2021-08-12 RX ORDER — LEVOTHYROXINE SODIUM 50 UG/1
TABLET ORAL
Qty: 90 TABLET | Refills: 1 | Status: SHIPPED | OUTPATIENT
Start: 2021-08-12 | End: 2022-02-07

## 2021-08-31 ENCOUNTER — HOSPITAL ENCOUNTER (OUTPATIENT)
Dept: LAB | Age: 76
Discharge: HOME | End: 2021-08-31
Payer: MEDICARE

## 2021-08-31 ENCOUNTER — APPOINTMENT (OUTPATIENT)
Dept: INTERNAL MEDICINE CLINIC | Age: 76
End: 2021-08-31

## 2021-08-31 DIAGNOSIS — E11.9 TYPE 2 DIABETES MELLITUS WITHOUT COMPLICATION, UNSPECIFIED WHETHER LONG TERM INSULIN USE (HCC): ICD-10-CM

## 2021-08-31 DIAGNOSIS — D03.9 MELANOMA IN SITU, UNSPECIFIED SITE (HCC): ICD-10-CM

## 2021-08-31 DIAGNOSIS — M1A.9XX0 CHRONIC GOUT WITHOUT TOPHUS, UNSPECIFIED CAUSE, UNSPECIFIED SITE: ICD-10-CM

## 2021-08-31 DIAGNOSIS — I10 ESSENTIAL HYPERTENSION: ICD-10-CM

## 2021-08-31 DIAGNOSIS — E78.2 MIXED HYPERLIPIDEMIA: ICD-10-CM

## 2021-08-31 DIAGNOSIS — E03.4 HYPOTHYROIDISM DUE TO ACQUIRED ATROPHY OF THYROID: ICD-10-CM

## 2021-08-31 LAB
ALBUMIN SERPL-MCNC: 4 G/DL (ref 3.4–5)
ALBUMIN/GLOB SERPL: 1.4 {RATIO} (ref 0.8–1.7)
ALP SERPL-CCNC: 98 U/L (ref 45–117)
ALT SERPL-CCNC: 23 U/L (ref 16–61)
ANION GAP SERPL CALC-SCNC: 5 MMOL/L (ref 3–18)
AST SERPL-CCNC: 20 U/L (ref 10–38)
BASOPHILS # BLD: 0 K/UL (ref 0–0.1)
BASOPHILS NFR BLD: 1 % (ref 0–2)
BILIRUB SERPL-MCNC: 0.7 MG/DL (ref 0.2–1)
BUN SERPL-MCNC: 26 MG/DL (ref 7–18)
BUN/CREAT SERPL: 20 (ref 12–20)
CALCIUM SERPL-MCNC: 9 MG/DL (ref 8.5–10.1)
CHLORIDE SERPL-SCNC: 107 MMOL/L (ref 100–111)
CHOLEST SERPL-MCNC: 143 MG/DL
CO2 SERPL-SCNC: 27 MMOL/L (ref 21–32)
CREAT SERPL-MCNC: 1.3 MG/DL (ref 0.6–1.3)
CREAT UR-MCNC: 104 MG/DL (ref 30–125)
DIFFERENTIAL METHOD BLD: NORMAL
EOSINOPHIL # BLD: 0.2 K/UL (ref 0–0.4)
EOSINOPHIL NFR BLD: 4 % (ref 0–5)
ERYTHROCYTE [DISTWIDTH] IN BLOOD BY AUTOMATED COUNT: 13.4 % (ref 11.6–14.5)
GLOBULIN SER CALC-MCNC: 2.8 G/DL (ref 2–4)
GLUCOSE SERPL-MCNC: 153 MG/DL (ref 74–99)
HCT VFR BLD AUTO: 42 % (ref 36–48)
HDLC SERPL-MCNC: 36 MG/DL (ref 40–60)
HDLC SERPL: 4 {RATIO} (ref 0–5)
HGB BLD-MCNC: 13.6 G/DL (ref 13–16)
LDLC SERPL CALC-MCNC: 72 MG/DL (ref 0–100)
LIPID PROFILE,FLP: ABNORMAL
LYMPHOCYTES # BLD: 1.5 K/UL (ref 0.9–3.6)
LYMPHOCYTES NFR BLD: 26 % (ref 21–52)
MCH RBC QN AUTO: 30.6 PG (ref 24–34)
MCHC RBC AUTO-ENTMCNC: 32.4 G/DL (ref 31–37)
MCV RBC AUTO: 94.4 FL (ref 78–100)
MICROALBUMIN UR-MCNC: 0.64 MG/DL (ref 0–3)
MICROALBUMIN/CREAT UR-RTO: 6 MG/G (ref 0–30)
MONOCYTES # BLD: 0.5 K/UL (ref 0.05–1.2)
MONOCYTES NFR BLD: 8 % (ref 3–10)
NEUTS SEG # BLD: 3.5 K/UL (ref 1.8–8)
NEUTS SEG NFR BLD: 62 % (ref 40–73)
PLATELET # BLD AUTO: 189 K/UL (ref 135–420)
PMV BLD AUTO: 10.1 FL (ref 9.2–11.8)
POTASSIUM SERPL-SCNC: 4.9 MMOL/L (ref 3.5–5.5)
PROT SERPL-MCNC: 6.8 G/DL (ref 6.4–8.2)
RBC # BLD AUTO: 4.45 M/UL (ref 4.35–5.65)
SODIUM SERPL-SCNC: 139 MMOL/L (ref 136–145)
T4 FREE SERPL-MCNC: 1.1 NG/DL (ref 0.7–1.5)
TRIGL SERPL-MCNC: 175 MG/DL (ref ?–150)
TSH SERPL DL<=0.05 MIU/L-ACNC: 2.31 UIU/ML (ref 0.36–3.74)
URATE SERPL-MCNC: 5 MG/DL (ref 2.6–7.2)
VLDLC SERPL CALC-MCNC: 35 MG/DL
WBC # BLD AUTO: 5.8 K/UL (ref 4.6–13.2)

## 2021-08-31 PROCEDURE — 80061 LIPID PANEL: CPT

## 2021-08-31 PROCEDURE — 80053 COMPREHEN METABOLIC PANEL: CPT

## 2021-08-31 PROCEDURE — 85025 COMPLETE CBC W/AUTO DIFF WBC: CPT

## 2021-08-31 PROCEDURE — 84439 ASSAY OF FREE THYROXINE: CPT

## 2021-08-31 PROCEDURE — 84550 ASSAY OF BLOOD/URIC ACID: CPT

## 2021-08-31 PROCEDURE — 36415 COLL VENOUS BLD VENIPUNCTURE: CPT

## 2021-08-31 PROCEDURE — 82043 UR ALBUMIN QUANTITATIVE: CPT

## 2021-08-31 PROCEDURE — 83036 HEMOGLOBIN GLYCOSYLATED A1C: CPT

## 2021-09-01 LAB — HBA1C MFR BLD: 6.7 % (ref 4.2–5.6)

## 2021-09-01 NOTE — PROGRESS NOTES
I will discuss his results with him at his upcoming appointment. His uric acid, TFTs, a urine protein screen are normal.  His CBC is normal.  His CMP is not show any significant abnormalities. His A1c is 6.7 and unchanged. His total cholesterol is 143. Triglycerides are 175. HDL is 36. LDL 72. Overall his cholesterol is about the same as it was 6 months ago.     Dr. Junaid Tong  [facility], [address] [place] [address], [address] [place] [address] Str.  Phone: [phone]  Fax: [phone]

## 2021-09-07 ENCOUNTER — TELEPHONE (OUTPATIENT)
Dept: INTERNAL MEDICINE CLINIC | Age: 76
End: 2021-09-07

## 2021-09-07 ENCOUNTER — VIRTUAL VISIT (OUTPATIENT)
Dept: INTERNAL MEDICINE CLINIC | Age: 76
End: 2021-09-07
Payer: MEDICARE

## 2021-09-07 DIAGNOSIS — D03.9 MELANOMA IN SITU, UNSPECIFIED SITE (HCC): ICD-10-CM

## 2021-09-07 DIAGNOSIS — I10 ESSENTIAL HYPERTENSION: ICD-10-CM

## 2021-09-07 DIAGNOSIS — E78.2 MIXED HYPERLIPIDEMIA: ICD-10-CM

## 2021-09-07 DIAGNOSIS — F33.40 RECURRENT MAJOR DEPRESSIVE DISORDER, IN REMISSION (HCC): ICD-10-CM

## 2021-09-07 DIAGNOSIS — M1A.9XX0 CHRONIC GOUT WITHOUT TOPHUS, UNSPECIFIED CAUSE, UNSPECIFIED SITE: ICD-10-CM

## 2021-09-07 DIAGNOSIS — E03.4 HYPOTHYROIDISM DUE TO ACQUIRED ATROPHY OF THYROID: ICD-10-CM

## 2021-09-07 DIAGNOSIS — E11.9 TYPE 2 DIABETES MELLITUS WITHOUT COMPLICATION, UNSPECIFIED WHETHER LONG TERM INSULIN USE (HCC): Primary | ICD-10-CM

## 2021-09-07 DIAGNOSIS — F10.11 HISTORY OF ALCOHOL ABUSE: ICD-10-CM

## 2021-09-07 PROCEDURE — G8756 NO BP MEASURE DOC: HCPCS | Performed by: INTERNAL MEDICINE

## 2021-09-07 PROCEDURE — G8427 DOCREV CUR MEDS BY ELIG CLIN: HCPCS | Performed by: INTERNAL MEDICINE

## 2021-09-07 PROCEDURE — 1101F PT FALLS ASSESS-DOCD LE1/YR: CPT | Performed by: INTERNAL MEDICINE

## 2021-09-07 PROCEDURE — G9717 DOC PT DX DEP/BP F/U NT REQ: HCPCS | Performed by: INTERNAL MEDICINE

## 2021-09-07 PROCEDURE — 99214 OFFICE O/P EST MOD 30 MIN: CPT | Performed by: INTERNAL MEDICINE

## 2021-09-07 PROCEDURE — G0463 HOSPITAL OUTPT CLINIC VISIT: HCPCS | Performed by: INTERNAL MEDICINE

## 2021-09-07 NOTE — TELEPHONE ENCOUNTER
Check out comments: 30 min in office apt with me in late March with labs ordered 1 wk before his apt.

## 2021-09-07 NOTE — PROGRESS NOTES
Alka Deluca is a 66 y.o. male who was seen by synchronous (real-time) audio-video technology on 9/7/2021. Assessment & Plan:   1. Type 2 diabetes: Unchanged and stable. Continue with a low-carb diet. We will check labs in 6 months. 2. HTN/HLD: Stable. Return to clinic for an office BP check. Continue with medication as prescribed. I encouraged him to maintain a heart healthy diet and to exercise regularly. We will check labs in 6 months. 3.  Gout: Stable. Continue with medication as prescribed. We will check a uric acid level in 6 months. 4. Depression/Anxiety: +H/o ETOH use. I congratulated him on his sober lifestyle. Continue with sertraline. Continue to follow-up with Psychiatry  I encouraged him to find ways to cope with the underlying stressors being his wife's primary caregiver    5. Hypothyroidism: Stable. Continue with medication as prescribed. I will check labs in 6 months. Lab review: labs are reviewed in the EHR and ordered as mentioned above     I have discussed the diagnosis with the patient and the intended plan as seen in the above orders. I have discussed medication side effects and warnings with the patient as well. I have reviewed the plan of care with the patient, accepted their input and they are in agreement with the treatment goals. All questions were answered. The patient understands the plan of care. Pt instructed if symptoms worsen to call the office or report to the ED for continued care. Greater than 50% of the visit time was spent in counseling and/or coordination of care. Voice recognition was used to generate this report, which may have resulted in some phonetic based errors in grammar and contents. Even though attempts were made to correct all the mistakes, some may have been missed, and remained in the body of the document. Subjective:    Alka Deluca was seen for   Chief Complaint   Patient presents with   [facility] Diabetes Pt is a 64yo male with h/o ED, melanoma in situ on left dorsal forearm s/p removal, MDD, rectal polyp, diverticulosis, CKD stage 3, HTN, type 2 DM (foot exams are done by Melissa Stuart), hypothyroidism, DJD, gout, vocal cord nodule, depression/anxiety (followed by Psychiatry), vitamin D deficiency, RBBB (s/p normal stress test 2018), and HLD. 1. Type 2 DM: His most recent labs show a normal creatinine and an A1C of 6.7. Diet controlled. His A1c is unchanged at 6.7.    2. 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 She has sarcoidosis and immunocompromise. He is fully vaccinated against Covid-19 with the Moderna vaccine in spring of this year. He take sertraline and feet his psychiatrist every 3 months. He states that his depression symptoms are stable. Although he has a history of heavy drinking, he remains sober. 3. HTN/HLD: Taking lisinopril, carvedilol, and Lipitor. No adverse side effects. His most recent lipid panel is unremarkable except for a low HDL and a mildly elevated triglyceride count of 175. There is also no microalbuminuria. His creatinine as mentioned above is within normal limits. 4.  Hypothyroidism: He continues to take Synthroid 30 mcg daily. His most recent labs show normal TFTs. 5.  Gout: No flareups since his last appointment. He has chronic left ankle swelling but no pain associated with the swelling. He attributes his swelling to his history of gout. Taking allopurinol. 6. H/o Skin Cancer: No new skin lesions. He used sunscreen. His last Dermatology appointment was January. He is scheduled to follow-up with him in January 2022. Prior to Admission medications    Medication Sig Start Date End Date Taking?  Authorizing Provider   levothyroxine (SYNTHROID) 50 mcg tablet TAKE ONE TABLET BY MOUTH EVERY MORNING BEFORE BREAKFAST 8/12/21   Fransico Neville MD   allopurinoL (ZYLOPRIM) 100 mg tablet TAKE ONE TABLET BY MOUTH TWICE A DAY 6/28/21   Sarita Garcia MD   lisinopriL (PRINIVIL, ZESTRIL) 5 mg tablet TAKE ONE TABLET BY MOUTH DAILY 6/28/21   Sarita Garcia MD   carvediloL (COREG) 6.25 mg tablet TAKE ONE TABLET BY MOUTH TWICE A DAY WITH MEALS 6/16/21   Sarita Garcia MD   atorvastatin (LIPITOR) 40 mg tablet TAKE ONE TABLET BY MOUTH DAILY 12/21/20   Sarita Garcia MD   sildenafiL, pulmonary hypertension, (REVATIO) 20 mg tablet TAKE TWO TABLETS BY MOUTH DAILY 1 HOUR PRIOR TO SEXUAL RELATIONS AS NEEDED *MAX 5 TABLETS PER DAY* 10/28/20   Sarita Garcia MD   sertraline (ZOLOFT) 100 mg tablet Take 1 Tab by mouth daily. 10/6/20   Sarita Garcia MD   varicella-zoster recombinant, PF, [facility]) 50 mcg/0.5 mL susr injection 0.5 mL by IntraMUSCular route every two (2) months. 11/15/18   Sarita Garcia MD   ibuprofen (MOTRIN) 800 mg tablet Take 1 Tab by mouth three (3) times daily as needed for Pain. 5/31/18   Lizzie Guzman MD   ubidecarenone (COQ-10 PO) Take 200 mEq by mouth daily. Provider, Historical   aspirin delayed-release 81 mg tablet Take 81 mg by mouth daily. Provider, Historical   multivitamin (ONE A DAY) tablet Take 1 Tab by mouth daily. Provider, Historical   Cholecalciferol, Vitamin D3, (VITAMIN D) 2,000 unit Cap Take 4,000 Units by mouth daily.     Provider, Historical     Allergies   Allergen Reactions    Amlodipine Other (comments)     BLE edema     Past Medical History:   Diagnosis Date    Adverse effect of anesthesia     Violent dreams with Ether    Arthritis     hip, knee    Chronic kidney disease     had previous reaction with kidneys after a bp med, no issues now    Colon polyps     dysplastic    Depression     Diabetes mellitus ([facility] [address] 2017    no meds    Diverticulosis     seen on colonoscopy from 8/20/15    Diverticulosis of sigmoid colon 01/27/10    GERD (gastroesophageal reflux disease)     no meds    Gout     Hepatitis     Hepatitis A - while he was young    Hyperlipidemia     Hypertension 15 years    Hypovitaminosis D     Nephrolithiasis     Osteoarthritis of right knee 2018    Plantar wart     Rectal polyp     seen on colonoscopy from 8/20/15    Thyroid disease     hypothyroidism     Past Surgical History:   Procedure Laterality Date    ENDOSCOPY, COLON, DIAGNOSTIC  2010    polyp distal rectum, removed; diverticulosis of sigmoid    HX CATARACT REMOVAL Bilateral     HX COLONOSCOPY      HX HEENT      vocal cord polyp removed    HX POLYPECTOMY  2010    Distal rectum    HX TONSILLECTOMY      Elaine Po Right 2018    Dr. Miryam Painter Left 2014    TOTAL KNEE ARTHROPLASTY Right 2018     Family History   Problem Relation Age of Onset   [facility] Arthritis-rheumatoid Mother     Hypertension Mother     Elevated Lipids Mother     Thyroid Disease Sister     Cancer Father         colon    Heart Disease Other     Hypertension Other     Cancer Other         breast    Heart Disease Maternal Grandmother     Dementia Maternal Grandfather     Cancer Paternal Grandmother         breast    No Known Problems Paternal Grandfather      Social History     Socioeconomic History    Marital status:      Spouse name: Not on file    Number of children: Not on file    Years of education: Not on file    Highest education level: Not on file   Tobacco Use    Smoking status: Former Smoker     Packs/day: 1.00     Years: 6.00     Pack years: 6.00     Types: Cigarettes     Quit date: 1992     Years since quittin.7    Smokeless tobacco: Never Used   Substance and Sexual Activity    Alcohol use: Not Currently     Alcohol/week: 10.0 standard drinks     Types: 10 Glasses of wine per week     Comment: wine with dinner sometimes    Drug use: No    Sexual activity: Yes     Partners: Female     Social Determinants of Health     Financial Resource Strain:     Difficulty of Paying Living Expenses: Food Insecurity:     Worried About Running Out of Food in the Last Year:     [address] in the Last Year:    Transportation Needs:     Lack of Transportation (Medical):  Lack of Transportation (Non-Medical):    Physical Activity:     Days of Exercise per Week:     Minutes of Exercise per Session:    Stress:     Feeling of Stress :    Social Connections:     Frequency of Communication with Friends and Family:     Frequency of Social Gatherings with Friends and Family:     Attends Worship Services:     Active Member of Clubs or Organizations:     Attends Club or Organization Meetings:     Marital Status:        ROS:  Gen: No fever/chills  HEENT: No sore throat, eye pain, ear pain, or congestion. No HA  CV: No CP  Resp: No cough/SOB  GI: No abdominal pain  : No hematuria/dysuria  Derm: No rash  Neuro: No new paresthesias/weakness  Musc: +Cramps in hands off/on.  +Chronic left ankle swelling off/on, but w/o pain  Psych: + depression sx      Objective:     General: alert, cooperative, no distress   Mental  status: mental status: alert, oriented to person, place, and time, normal mood, behavior, speech, dress, motor activity, and thought processes   Resp: resp: normal effort and no respiratory distress   Neuro: neuro: no gross deficits   Skin: skin: no discoloration or lesions of concern on visible areas     LABS:  Lab Results   Component Value Date/Time    Sodium 139 08/31/2021 08:07 AM    Potassium 4.9 08/31/2021 08:07 AM    Chloride 107 08/31/2021 08:07 AM    CO2 27 08/31/2021 08:07 AM    Anion gap 5 08/31/2021 08:07 AM    Glucose 153 (H) 08/31/2021 08:07 AM    BUN 26 (H) 08/31/2021 08:07 AM    Creatinine 1.30 08/31/2021 08:07 AM    BUN/Creatinine ratio 20 08/31/2021 08:07 AM    GFR est AA >60 08/31/2021 08:07 AM    GFR est non-AA 54 (L) 08/31/2021 08:07 AM    Calcium 9.0 08/31/2021 08:07 AM       Lab Results   Component Value Date/Time    Cholesterol, total 143 08/31/2021 08:07 AM    HDL Cholesterol 36 (L) 08/31/2021 08:07 AM    LDL, calculated 72 08/31/2021 08:07 AM    VLDL, calculated 35 08/31/2021 08:07 AM    Triglyceride 175 (H) 08/31/2021 08:07 AM    CHOL/HDL Ratio 4.0 08/31/2021 08:07 AM       Lab Results   Component Value Date/Time    WBC 5.8 08/31/2021 08:07 AM    HGB 13.6 08/31/2021 08:07 AM    HCT 42.0 08/31/2021 08:07 AM    PLATELET 751 60/99/6473 08:07 AM    MCV 94.4 08/31/2021 08:07 AM       Lab Results   Component Value Date/Time    Hemoglobin A1c 6.7 (H) 08/31/2021 02:55 PM    Hemoglobin A1c (POC) 6.6 07/05/2019 09:21 AM       Lab Results   Component Value Date/Time    TSH 2.31 08/31/2021 08:07 AM           Due to this being a TeleHealth  evaluation, many elements of the physical examination are unable to be assessed. The pt was seen by synchronous (real-time) audio-video technology, and/or her healthcare decision maker, is aware that this patient-initiated, Telehealth encounter is a billable service, with coverage as determined by her insurance carrier. She is aware that she may receive a bill and has provided verbal consent to proceed: Yes. The pt is being evaluated by a video visit encounter for concerns as above. A caregiver was present when appropriate. Due to this being a TeleHealth encounter ([facility]), evaluation of the following organ systems was limited: Vitals/Constitutional/EENT/Resp/CV/GI//MS/Neuro/Skin/Heme-Lymph-Imm. Pursuant to the emergency declaration under the [facility]5 waiver authority and the [employer] Act, this Virtual  Visit was conducted, with patient's (and/or legal guardian's) consent, to reduce the patient's risk of exposure to COVID-19 and provide necessary medical care. Services were provided through a video synchronous discussion virtually to substitute for in-person clinic visit.  Patient and provider were located at their individual homes. We discussed the expected course, resolution and complications of the diagnosis(es) in detail. Medication risks, benefits, costs, interactions, and alternatives were discussed as indicated. I advised him to contact the office if his condition worsens, changes or fails to improve as anticipated. He expressed understanding with the diagnosis(es) and plan.      Elfego Tavarez MD

## 2021-09-30 DIAGNOSIS — E78.2 MIXED HYPERLIPIDEMIA: ICD-10-CM

## 2021-09-30 RX ORDER — ATORVASTATIN CALCIUM 40 MG/1
TABLET, FILM COATED ORAL
Qty: 90 TABLET | Refills: 2 | Status: SHIPPED | OUTPATIENT
Start: 2021-09-30 | End: 2022-06-27

## 2021-12-27 DIAGNOSIS — E11.9 TYPE 2 DIABETES MELLITUS WITHOUT COMPLICATION (HCC): ICD-10-CM

## 2021-12-27 DIAGNOSIS — N18.2 RENAL FAILURE, CHRONIC, STAGE 2 (MILD): ICD-10-CM

## 2021-12-27 DIAGNOSIS — M10.9 ACUTE GOUT, UNSPECIFIED CAUSE, UNSPECIFIED SITE: ICD-10-CM

## 2021-12-27 DIAGNOSIS — E11.9 TYPE 2 DIABETES MELLITUS WITHOUT COMPLICATION, WITHOUT LONG-TERM CURRENT USE OF INSULIN (HCC): ICD-10-CM

## 2021-12-27 DIAGNOSIS — I10 ESSENTIAL HYPERTENSION: ICD-10-CM

## 2021-12-27 RX ORDER — LISINOPRIL 5 MG/1
TABLET ORAL
Qty: 90 TABLET | Refills: 1 | Status: SHIPPED | OUTPATIENT
Start: 2021-12-27 | End: 2022-06-27

## 2021-12-27 RX ORDER — ALLOPURINOL 100 MG/1
TABLET ORAL
Qty: 180 TABLET | Refills: 1 | Status: SHIPPED | OUTPATIENT
Start: 2021-12-27 | End: 2022-06-27

## 2022-02-07 DIAGNOSIS — E03.4 HYPOTHYROIDISM DUE TO ACQUIRED ATROPHY OF THYROID: ICD-10-CM

## 2022-02-07 RX ORDER — LEVOTHYROXINE SODIUM 50 UG/1
TABLET ORAL
Qty: 90 TABLET | Refills: 1 | Status: SHIPPED | OUTPATIENT
Start: 2022-02-07 | End: 2022-08-22

## 2022-03-01 ENCOUNTER — HOSPITAL ENCOUNTER (OUTPATIENT)
Dept: LAB | Age: 77
Discharge: HOME | End: 2022-03-01
Payer: MEDICARE

## 2022-03-01 ENCOUNTER — APPOINTMENT (OUTPATIENT)
Dept: INTERNAL MEDICINE CLINIC | Age: 77
End: 2022-03-01

## 2022-03-01 DIAGNOSIS — M1A.9XX0 CHRONIC GOUT WITHOUT TOPHUS, UNSPECIFIED CAUSE, UNSPECIFIED SITE: ICD-10-CM

## 2022-03-01 DIAGNOSIS — E78.2 MIXED HYPERLIPIDEMIA: ICD-10-CM

## 2022-03-01 DIAGNOSIS — I10 ESSENTIAL HYPERTENSION: ICD-10-CM

## 2022-03-01 DIAGNOSIS — E03.4 HYPOTHYROIDISM DUE TO ACQUIRED ATROPHY OF THYROID: ICD-10-CM

## 2022-03-01 DIAGNOSIS — D03.9 MELANOMA IN SITU, UNSPECIFIED SITE (HCC): ICD-10-CM

## 2022-03-01 DIAGNOSIS — E11.9 TYPE 2 DIABETES MELLITUS WITHOUT COMPLICATION, UNSPECIFIED WHETHER LONG TERM INSULIN USE (HCC): ICD-10-CM

## 2022-03-01 DIAGNOSIS — F10.11 HISTORY OF ALCOHOL ABUSE: ICD-10-CM

## 2022-03-01 LAB
ALBUMIN SERPL-MCNC: 4.1 G/DL (ref 3.4–5)
ALBUMIN/GLOB SERPL: 1.5 {RATIO} (ref 0.8–1.7)
ALP SERPL-CCNC: 97 U/L (ref 45–117)
ALT SERPL-CCNC: 32 U/L (ref 16–61)
ANION GAP SERPL CALC-SCNC: 3 MMOL/L (ref 3–18)
AST SERPL-CCNC: 23 U/L (ref 10–38)
BASOPHILS # BLD: 0 K/UL (ref 0–0.1)
BASOPHILS NFR BLD: 0 % (ref 0–2)
BILIRUB SERPL-MCNC: 0.7 MG/DL (ref 0.2–1)
BUN SERPL-MCNC: 25 MG/DL (ref 7–18)
BUN/CREAT SERPL: 19 (ref 12–20)
CALCIUM SERPL-MCNC: 8.8 MG/DL (ref 8.5–10.1)
CHLORIDE SERPL-SCNC: 106 MMOL/L (ref 100–111)
CHOLEST SERPL-MCNC: 155 MG/DL
CO2 SERPL-SCNC: 29 MMOL/L (ref 21–32)
CREAT SERPL-MCNC: 1.31 MG/DL (ref 0.6–1.3)
CREAT UR-MCNC: 92 MG/DL (ref 30–125)
DIFFERENTIAL METHOD BLD: ABNORMAL
EOSINOPHIL # BLD: 0.1 K/UL (ref 0–0.4)
EOSINOPHIL NFR BLD: 3 % (ref 0–5)
ERYTHROCYTE [DISTWIDTH] IN BLOOD BY AUTOMATED COUNT: 13.6 % (ref 11.6–14.5)
EST. AVERAGE GLUCOSE BLD GHB EST-MCNC: 148 MG/DL
GLOBULIN SER CALC-MCNC: 2.8 G/DL (ref 2–4)
GLUCOSE SERPL-MCNC: 141 MG/DL (ref 74–99)
HBA1C MFR BLD: 6.8 % (ref 4.2–5.6)
HCT VFR BLD AUTO: 41.6 % (ref 36–48)
HDLC SERPL-MCNC: 40 MG/DL (ref 40–60)
HDLC SERPL: 3.9 {RATIO} (ref 0–5)
HGB BLD-MCNC: 13.5 G/DL (ref 13–16)
IMM GRANULOCYTES # BLD AUTO: 0 K/UL (ref 0–0.04)
IMM GRANULOCYTES NFR BLD AUTO: 1 % (ref 0–0.5)
LDLC SERPL CALC-MCNC: 85 MG/DL (ref 0–100)
LIPID PROFILE,FLP: ABNORMAL
LYMPHOCYTES # BLD: 1.2 K/UL (ref 0.9–3.6)
LYMPHOCYTES NFR BLD: 22 % (ref 21–52)
MCH RBC QN AUTO: 30.2 PG (ref 24–34)
MCHC RBC AUTO-ENTMCNC: 32.5 G/DL (ref 31–37)
MCV RBC AUTO: 93.1 FL (ref 78–100)
MICROALBUMIN UR-MCNC: 1.52 MG/DL (ref 0–3)
MICROALBUMIN/CREAT UR-RTO: 17 MG/G (ref 0–30)
MONOCYTES # BLD: 0.4 K/UL (ref 0.05–1.2)
MONOCYTES NFR BLD: 8 % (ref 3–10)
NEUTS SEG # BLD: 3.5 K/UL (ref 1.8–8)
NEUTS SEG NFR BLD: 66 % (ref 40–73)
NRBC # BLD: 0 K/UL (ref 0–0.01)
NRBC BLD-RTO: 0 PER 100 WBC
PLATELET # BLD AUTO: 175 K/UL (ref 135–420)
PMV BLD AUTO: 10.2 FL (ref 9.2–11.8)
POTASSIUM SERPL-SCNC: 4.3 MMOL/L (ref 3.5–5.5)
PROT SERPL-MCNC: 6.9 G/DL (ref 6.4–8.2)
RBC # BLD AUTO: 4.47 M/UL (ref 4.35–5.65)
SODIUM SERPL-SCNC: 138 MMOL/L (ref 136–145)
T4 FREE SERPL-MCNC: 1 NG/DL (ref 0.7–1.5)
TRIGL SERPL-MCNC: 150 MG/DL (ref ?–150)
TSH SERPL DL<=0.05 MIU/L-ACNC: 3.16 UIU/ML (ref 0.36–3.74)
URATE SERPL-MCNC: 4.5 MG/DL (ref 2.6–7.2)
VLDLC SERPL CALC-MCNC: 30 MG/DL
WBC # BLD AUTO: 5.3 K/UL (ref 4.6–13.2)

## 2022-03-01 PROCEDURE — 80061 LIPID PANEL: CPT

## 2022-03-01 PROCEDURE — 80053 COMPREHEN METABOLIC PANEL: CPT

## 2022-03-01 PROCEDURE — 85025 COMPLETE CBC W/AUTO DIFF WBC: CPT

## 2022-03-01 PROCEDURE — 36415 COLL VENOUS BLD VENIPUNCTURE: CPT

## 2022-03-01 PROCEDURE — 83036 HEMOGLOBIN GLYCOSYLATED A1C: CPT

## 2022-03-01 PROCEDURE — 82043 UR ALBUMIN QUANTITATIVE: CPT

## 2022-03-01 PROCEDURE — 84439 ASSAY OF FREE THYROXINE: CPT

## 2022-03-01 PROCEDURE — 84550 ASSAY OF BLOOD/URIC ACID: CPT

## 2022-03-02 NOTE — PROGRESS NOTES
I will discuss his results with him at his upcoming appointment. There is no microalbuminuria. His TFTs and uric acid level are normal.  His A1c is 6.8, up from 6.7 six months ago. His CBC is not show any significant abnormalities. His creatinine is mildly high at 1.31. This is his baseline. BUN is 25. LFTs are normal.  His total cholesterol is 155. Triglycerides are 150. HDL is 40. LDL is 85.     Dr. Grady Phipps  [facility] [address], [address] [place] [address] Str.  Phone: [phone]  Fax: [phone]

## 2022-03-07 NOTE — PROGRESS NOTES
Karol Flores presents today for   Chief Complaint   Patient presents with   [address]     3-1-22   [facility] Wellness Visit         1. \"Have you been to the ER, urgent care clinic since your last visit? Hospitalized since your last visit? \" no    2. \"Have you seen or consulted any other health care providers outside of the [address] [place] [address] since your last visit? \" yes     3. For patients aged 39-70: Has the patient had a colonoscopy / FIT/ Cologuard? NA - based on age      If the patient is female:    4. For patients aged 41-77: Has the patient had a mammogram within the past 2 years? NA - based on age or sex  See top three    5. For patients aged 21-65: Has the patient had a pap smear?  NA - based on age or sex

## 2022-03-08 ENCOUNTER — OFFICE VISIT (OUTPATIENT)
Dept: INTERNAL MEDICINE CLINIC | Age: 77
End: 2022-03-08
Payer: MEDICARE

## 2022-03-08 VITALS
WEIGHT: 211 LBS | SYSTOLIC BLOOD PRESSURE: 120 MMHG | HEART RATE: 60 BPM | OXYGEN SATURATION: 98 % | BODY MASS INDEX: 31.25 KG/M2 | RESPIRATION RATE: 14 BRPM | TEMPERATURE: 97.5 F | DIASTOLIC BLOOD PRESSURE: 67 MMHG | HEIGHT: 69 IN

## 2022-03-08 DIAGNOSIS — Z00.00 MEDICARE ANNUAL WELLNESS VISIT, SUBSEQUENT: ICD-10-CM

## 2022-03-08 DIAGNOSIS — M19.112 OSTEOARTHRITIS OF LEFT SHOULDER DUE TO ROTATOR CUFF INJURY: ICD-10-CM

## 2022-03-08 DIAGNOSIS — E03.4 HYPOTHYROIDISM DUE TO ACQUIRED ATROPHY OF THYROID: ICD-10-CM

## 2022-03-08 DIAGNOSIS — Z71.89 ADVANCE CARE PLANNING: ICD-10-CM

## 2022-03-08 DIAGNOSIS — S46.002S OSTEOARTHRITIS OF LEFT SHOULDER DUE TO ROTATOR CUFF INJURY: ICD-10-CM

## 2022-03-08 DIAGNOSIS — E11.9 TYPE 2 DIABETES MELLITUS WITHOUT COMPLICATION, UNSPECIFIED WHETHER LONG TERM INSULIN USE (HCC): Primary | ICD-10-CM

## 2022-03-08 DIAGNOSIS — F33.40 RECURRENT MAJOR DEPRESSIVE DISORDER, IN REMISSION (HCC): ICD-10-CM

## 2022-03-08 DIAGNOSIS — M1A.9XX0 CHRONIC GOUT WITHOUT TOPHUS, UNSPECIFIED CAUSE, UNSPECIFIED SITE: ICD-10-CM

## 2022-03-08 DIAGNOSIS — N39.43 DRIBBLING OF URINE: ICD-10-CM

## 2022-03-08 DIAGNOSIS — I10 ESSENTIAL HYPERTENSION: ICD-10-CM

## 2022-03-08 DIAGNOSIS — D03.9 MELANOMA IN SITU, UNSPECIFIED SITE (HCC): ICD-10-CM

## 2022-03-08 DIAGNOSIS — N18.30 STAGE 3 CHRONIC KIDNEY DISEASE, UNSPECIFIED WHETHER STAGE 3A OR 3B CKD (HCC): ICD-10-CM

## 2022-03-08 DIAGNOSIS — E78.2 MIXED HYPERLIPIDEMIA: ICD-10-CM

## 2022-03-08 PROCEDURE — G0439 PPPS, SUBSEQ VISIT: HCPCS | Performed by: INTERNAL MEDICINE

## 2022-03-08 PROCEDURE — G8427 DOCREV CUR MEDS BY ELIG CLIN: HCPCS | Performed by: INTERNAL MEDICINE

## 2022-03-08 PROCEDURE — 99214 OFFICE O/P EST MOD 30 MIN: CPT | Performed by: INTERNAL MEDICINE

## 2022-03-08 PROCEDURE — G8417 CALC BMI ABV UP PARAM F/U: HCPCS | Performed by: INTERNAL MEDICINE

## 2022-03-08 PROCEDURE — G8752 SYS BP LESS 140: HCPCS | Performed by: INTERNAL MEDICINE

## 2022-03-08 PROCEDURE — G8754 DIAS BP LESS 90: HCPCS | Performed by: INTERNAL MEDICINE

## 2022-03-08 PROCEDURE — 1101F PT FALLS ASSESS-DOCD LE1/YR: CPT | Performed by: INTERNAL MEDICINE

## 2022-03-08 PROCEDURE — G8536 NO DOC ELDER MAL SCRN: HCPCS | Performed by: INTERNAL MEDICINE

## 2022-03-08 PROCEDURE — G0463 HOSPITAL OUTPT CLINIC VISIT: HCPCS | Performed by: INTERNAL MEDICINE

## 2022-03-08 PROCEDURE — G9717 DOC PT DX DEP/BP F/U NT REQ: HCPCS | Performed by: INTERNAL MEDICINE

## 2022-03-08 NOTE — PATIENT INSTRUCTIONS
Learning About Meal Planning for Diabetes  Why plan your meals? Meal planning can be a key part of managing diabetes. Planning meals and snacks with the right balance of carbohydrate, protein, and fat can help you keep your blood sugar at the target level you set with your doctor. You don't have to eat special foods. You can eat what your family eats, including sweets once in a while. But you do have to pay attention to how often you eat and how much you eat of certain foods. You may want to work with a dietitian or a certified diabetes educator. He or she can give you tips and meal ideas and can answer your questions about meal planning. This health professional can also help you reach a healthy weight if that is one of your goals. What plan is right for you? Your dietitian or diabetes educator may suggest that you start with the plate format or carbohydrate counting. The plate format  The plate format is a simple way to help you manage how you eat. You plan meals by learning how much space each food should take on a plate. Using the plate format helps you spread carbohydrate throughout the day. It can make it easier to keep your blood sugar level within your target range. It also helps you see if you're eating healthy portion sizes. To use the plate format, you put non-starchy vegetables on half your plate. Add meat or meat substitutes on one-quarter of the plate. Put a grain or starchy vegetable (such as brown rice or a potato) on the final quarter of the plate. You can add a small piece of fruit and some low-fat or fat-free milk or yogurt, depending on your carbohydrate goal for each meal.  Here are some tips for using the plate format:  · Make sure that you are not using an oversized plate. A 9-inch plate is best. Many restaurants use larger plates. · Get used to using the plate format at home. Then you can use it when you eat out. · Write down your questions about using the plate format.  Talk to your doctor, a dietitian, or a diabetes educator about your concerns. Carbohydrate counting  With carbohydrate counting, you plan meals based on the amount of carbohydrate in each food. Carbohydrate raises blood sugar higher and more quickly than any other nutrient. It is found in desserts, breads and cereals, and fruit. It's also found in starchy vegetables such as potatoes and corn, grains such as rice and pasta, and milk and yogurt. Spreading carbohydrate throughout the day helps keep your blood sugar levels within your target range. Your daily amount depends on several things, including your weight, how active you are, which diabetes medicines you take, and what your goals are for your blood sugar levels. A registered dietitian or diabetes educator can help you plan how much carbohydrate to include in each meal and snack. A guideline for your daily amount of carbohydrate is:  · 45 to 60 grams at each meal. That's about the same as 3 to 4 carbohydrate servings. · 15 to 20 grams at each snack. That's about the same as 1 carbohydrate serving. The Nutrition Facts label on packaged foods tells you how much carbohydrate is in a serving of the food. First, look at the serving size on the food label. Is that the amount you eat in a serving? All of the nutrition information on a food label is based on that serving size. So if you eat more or less than that, you'll need to adjust the other numbers. Total carbohydrate is the next thing you need to look for on the label. If you count carbohydrate servings, one serving of carbohydrate is 15 grams. For foods that don't come with labels, such as fresh fruits and vegetables, you'll need a guide that lists carbohydrate in these foods. Ask your doctor, dietitian, or diabetes educator about books or other nutrition guides you can use.   If you take insulin, you need to know how many grams of carbohydrate are in a meal. This lets you know how much rapid-acting insulin to take before you eat. If you use an insulin pump, you get a constant rate of insulin during the day. So the pump must be programmed at meals to give you extra insulin to cover the rise in blood sugar after meals. When you know how much carbohydrate you will eat, you can take the right amount of insulin. Or, if you always use the same amount of insulin, you need to make sure that you eat the same amount of carbohydrate at meals. If you need more help to understand carbohydrate counting and food labels, ask your doctor, dietitian, or diabetes educator. How can you plan healthy meals? Here are some tips to get started:  · Plan your meals a week at a time. Don't forget to include snacks too. · Use cookbooks or online recipes to plan several main meals. Plan some quick meals for busy nights. You also can double some recipes that freeze well. Then you can save half for other busy nights when you don't have time to cook. · Make sure you have the ingredients you need for your recipes. If you're running low on basic items, put these items on your shopping list too. · List foods that you use to make breakfasts, lunches, and snacks. List plenty of fruits and vegetables. · Post this list on the refrigerator. Add to it as you think of more things you need. · Take the list to the store to do your weekly shopping. Follow-up care is a key part of your treatment and safety. Be sure to make and go to all appointments, and call your doctor if you are having problems. It's also a good idea to know your test results and keep a list of the medicines you take. Where can you learn more? Go to http://www.gray.com/  Enter K733 in the search box to learn more about \"Learning About Meal Planning for Diabetes. \"  Current as of: December 17, 2020               Content Version: 13.0  © 4245-5074 Healthwise, Incorporated.    Care instructions adapted under license by [employer] (which disclaims liability or warranty for this information). If you have questions about a medical condition or this instruction, always ask your healthcare professional. [place] [address] any warranty or liability for your use of this information. Medicare Wellness Visit, Male    The best way to live healthy is to have a lifestyle where you eat a well-balanced diet, exercise regularly, limit alcohol use, and quit all forms of tobacco/nicotine, if applicable. Regular preventive services are another way to keep healthy. Preventive services (vaccines, screening tests, monitoring & exams) can help personalize your care plan, which helps you manage your own care. Screening tests can find health problems at the earliest stages, when they are easiest to treat. [facility] follows the current, evidence-based guidelines published by the [facility] Ulysses Higginbotham ([facility]) when recommending preventive services for our patients. Because we follow these guidelines, sometimes recommendations change over time as research supports it. (For example, a prostate screening blood test is no longer routinely recommended for men with no symptoms). Of course, you and your doctor may decide to screen more often for some diseases, based on your risk and co-morbidities (chronic disease you are already diagnosed with). Preventive services for you include:  - Medicare offers their members a free annual wellness visit, which is time for you and your primary care provider to discuss and plan for your preventive service needs. Take advantage of this benefit every year!  -All adults over age 72 should receive the recommended pneumonia vaccines.  Current USPSTF guidelines recommend a series of two vaccines for the best pneumonia protection.   -All adults should have a flu vaccine yearly and tetanus vaccine every 10 years.  -All adults age 48 and older should receive the shingles vaccines (series of two vaccines). -All adults age 38-68 who are overweight should have a diabetes screening test once every three years.   -Other screening tests & preventive services for persons with diabetes include: an eye exam to screen for diabetic retinopathy, a kidney function test, a foot exam, and stricter control over your cholesterol.   -Cardiovascular screening for adults with routine risk involves an electrocardiogram (ECG) at intervals determined by the provider.   -Colorectal cancer screening should be done for adults age 54-65 with no increased risk factors for colorectal cancer. There are a number of acceptable methods of screening for this type of cancer. Each test has its own benefits and drawbacks. Discuss with your provider what is most appropriate for you during your annual wellness visit. The different tests include: colonoscopy (considered the best screening method), a fecal occult blood test, a fecal DNA test, and sigmoidoscopy.  -All adults born between [facility] should be screened once for Hepatitis C.  -An Abdominal Aortic Aneurysm (AAA) Screening is recommended for men age 73-68 who has ever smoked in their lifetime.      Here is a list of your current Health Maintenance items (your personalized list of preventive services) with a due date:  Health Maintenance Due   Topic Date Due    Shingles Vaccine (1 of 2) Never done    Diabetic Foot Care  05/22/2020    Eye Exam  11/05/2021    Annual Well Visit  03/09/2022

## 2022-03-08 NOTE — PROGRESS NOTES
INTERNISTS OF [facility]:  3/8/2022, MRN: [id number]      Juan J Sarkar is a 64 y.o. male and presents to clinic for Follow-up, Labs (3-1-22), and Annual Wellness Visit      Subjective: The pt is a 66yo male with h/o ED, melanoma in situ on left dorsal forearm s/p removal, MDD, rectal polyp, diverticulosis, CKD stage 3, HTN, type 2 DM (foot exams are done by Neelam Dempsey), hypothyroidism, DJD, gout, vocal cord nodule, depression/anxiety (followed by Psychiatry), vitamin D deficiency, RBBB (s/p normal stress test 2018), and HLD. 1. Type 2 DM and CKD: Present for years. His most recent labs show that his A1c is up to 6.8 from 6.7 last year. He is diet controlled. +CKD. His most recent creatinine is mildly high at 1.31. His BUN is 25. These are at his baseline values. 2. Depression: Present for years. Followed by a psychiatrist.  Taking sertraline and seeing his psychiatrist at 3-month intervals. Today he reports:  He feels his sertraline is working well. +H/o ETOH abuse. He remained sober. No relapses. 3. HTN/HLD: Blood pressure is 120/67. His most recent labs show: LFTs are normal.  His total cholesterol is 155. Triglycerides are 150. HDL is 40. LDL is 85. On Lipitor, carvedilol, and lisinopril. No adverse effects. 4.  Hypothyroidism: Taking Synthroid 50 mcg daily. TFTs per recent labs are normal.    5.  Gout: On allopurinol. No gout flares in between appointments. His uric acid level is within normal limits. His left ankle is swollen but \"it doesn't bother me. \"     6. H/o Skin Cancer: Next Dermatology apt: next year. He met with his dermatologist in January. No new skin lesions. 7. Left Shoulder Pain: S/p 3rd Moderna vaccine. Since having this vaccine, he has had left shoulder pain. \"But now it doesn't wake me up. It's not difficult. \" No alleviating factors are known. 8. Urinary Dribbling: Present since his last apt.  He gets up once to urinate at night.          Patient Active Problem List    Diagnosis Date Noted    History of alcohol abuse 10/06/2020    Right inguinal hernia 05/17/2018    Osteoarthritis of right knee 02/19/2018    Erectile dysfunction 01/23/2017    Melanoma in situ - on left dorsal forearm 2016 09/16/2016    Major depressive disorder in remission 08/03/2016    Rectal polyp -  seen on colonoscopy from 8/20/15 08/02/2016    Diverticulosis of intestine without bleeding - seen on colonoscopy from 8/20/15 08/02/2016    CKD (chronic kidney disease) stage 3, GFR 30-59 ml/min ([facility] [address] 11/09/2015    Essential hypertension 06/24/2015    Type 2 diabetes mellitus without complication ([facility] [address] 61/07/8435    Hypothyroidism due to acquired atrophy of thyroid 06/24/2015    Vocal cord nodule 10/21/2013    Gout 10/21/2011    Family history of colon cancer. personal hx colon polyps 10/21/2011    Hyperlipidemia     Hypovitaminosis D        Current Outpatient Medications   Medication Sig Dispense Refill    levothyroxine (SYNTHROID) 50 mcg tablet TAKE ONE TABLET BY MOUTH EVERY MORNING BEFORE BREAKFAST 90 Tablet 1    allopurinoL (ZYLOPRIM) 100 mg tablet TAKE ONE TABLET BY MOUTH TWICE A  Tablet 1    lisinopriL (PRINIVIL, ZESTRIL) 5 mg tablet TAKE ONE TABLET BY MOUTH DAILY 90 Tablet 1    atorvastatin (LIPITOR) 40 mg tablet TAKE ONE TABLET BY MOUTH DAILY 90 Tablet 2    carvediloL (COREG) 6.25 mg tablet TAKE ONE TABLET BY MOUTH TWICE A DAY WITH MEALS 180 Tablet 2    sildenafiL, pulmonary hypertension, (REVATIO) 20 mg tablet TAKE TWO TABLETS BY MOUTH DAILY 1 HOUR PRIOR TO SEXUAL RELATIONS AS NEEDED *MAX 5 TABLETS PER DAY* 30 Tab 8    sertraline (ZOLOFT) 100 mg tablet Take 1 Tab by mouth daily. (Patient taking differently: Take 150 mg by mouth daily.) 90 Tab 3    ibuprofen (MOTRIN) 800 mg tablet Take 1 Tab by mouth three (3) times daily as needed for Pain. 40 Tab 0    ubidecarenone (COQ-10 PO) Take 200 mEq by mouth daily.       aspirin delayed-release 81 mg tablet Take 81 mg by mouth daily.  multivitamin (ONE A DAY) tablet Take 1 Tab by mouth daily.  Cholecalciferol, Vitamin D3, (VITAMIN D) 2,000 unit Cap Take 4,000 Units by mouth daily.  varicella-zoster recombinant, PF, (SHINGRIX) 50 mcg/0.5 mL susr injection 0.5 mL by IntraMUSCular route every two (2) months.  (Patient not taking: Reported on 3/8/2022) 0.5 mL 1       Allergies   Allergen Reactions    Amlodipine Other (comments)     BLE edema       Past Medical History:   Diagnosis Date    Adverse effect of anesthesia     Violent dreams with Ether    Arthritis     hip, knee    Chronic kidney disease     had previous reaction with kidneys after a bp med, no issues now    Colon polyps     dysplastic    Depression     Diabetes mellitus ([facility] [address] 2017    no meds    Diverticulosis     seen on colonoscopy from 8/20/15    Diverticulosis of sigmoid colon 01/27/10    GERD (gastroesophageal reflux disease)     no meds    Gout     Hepatitis     Hepatitis A - while he was young    Hyperlipidemia     Hypertension 15 years    Hypovitaminosis D     Nephrolithiasis     Osteoarthritis of right knee 2/19/2018    Plantar wart     Rectal polyp     seen on colonoscopy from 8/20/15    Thyroid disease     hypothyroidism       Past Surgical History:   Procedure Laterality Date    ENDOSCOPY, COLON, DIAGNOSTIC  01/27/2010    polyp distal rectum, removed; diverticulosis of sigmoid    HX CATARACT REMOVAL Bilateral     HX COLONOSCOPY      HX HEENT  2008    vocal cord polyp removed    HX POLYPECTOMY  01/27/2010    Distal rectum    HX TONSILLECTOMY      LITHOTRIPSY      WA LAP,INGUINAL HERNIA REPR,INITIAL Right 05/31/2018    Dr. Jenny Morton Left 1/2014    WA TOTAL KNEE ARTHROPLASTY Right 02/2018       Family History   Problem Relation Age of Onset   [facility] Arthritis-rheumatoid Mother     Hypertension Mother     Elevated Lipids Mother     Thyroid Disease Sister     Cancer Father         colon    Heart Disease Other     Hypertension Other     Cancer Other         breast    Heart Disease Maternal Grandmother     Dementia Maternal Grandfather     Cancer Paternal Grandmother         breast    No Known Problems Paternal Grandfather        Social History     Tobacco Use    Smoking status: Former Smoker     Packs/day: 1.00     Years: 6.00     Pack years: 6.00     Types: Cigarettes     Quit date: 1992     Years since quittin.2    Smokeless tobacco: Never Used   Substance Use Topics    Alcohol use: Not Currently     Alcohol/week: 10.0 standard drinks     Types: 10 Glasses of wine per week     Comment: wine with dinner sometimes       ROS   Review of Systems   Constitutional: Negative for chills and fever. HENT: Negative for ear pain and sore throat. Eyes: Negative for blurred vision and pain. Respiratory: Negative for cough and shortness of breath. Cardiovascular: Negative for chest pain. Gastrointestinal: Negative for abdominal pain, blood in stool and melena. Genitourinary: Negative for dysuria and hematuria. Musculoskeletal: Positive for joint pain. Negative for myalgias. Skin: Negative for rash. Neurological: Negative for headaches. Endo/Heme/Allergies: Does not bruise/bleed easily. Psychiatric/Behavioral: Negative for substance abuse. Objective     Vitals:    22 0826 22 0831   BP: (!) 146/70 120/67   Pulse: 60    Resp: 14    Temp: 97.5 °F (36.4 °C)    TempSrc: Temporal    SpO2: 98%    Weight: 211 lb (95.7 kg)    Height: 5' 9\" (1.753 m)    PainSc:   0 - No pain        Physical Exam  Vitals and nursing note reviewed. Constitutional:       Appearance: Normal appearance. HENT:      Head: Normocephalic and atraumatic. Right Ear: External ear normal.      Left Ear: External ear normal.   Eyes:      Extraocular Movements: Extraocular movements intact.       Conjunctiva/sclera: Conjunctivae normal.   Cardiovascular:      Rate and Rhythm: Normal rate and regular rhythm. Pulses: Normal pulses. Heart sounds: Normal heart sounds. Pulmonary:      Effort: Pulmonary effort is normal.      Breath sounds: Normal breath sounds. Abdominal:      General: Abdomen is flat. Bowel sounds are normal. There is no distension. Palpations: Abdomen is soft. Tenderness: There is no abdominal tenderness. Musculoskeletal:         General: No swelling (BUE) or tenderness (BUE). Cervical back: Neck supple. Comments: He has adequate ROM along his left shoulder but reports some discomfort with ROM exercises   Lymphadenopathy:      Cervical: No cervical adenopathy. Skin:     General: Skin is warm and dry. Findings: No erythema. Neurological:      Mental Status: He is alert. Mental status is at baseline.       Gait: Gait normal.   Psychiatric:         Mood and Affect: Mood normal.         LABS   Data Review:   Lab Results   Component Value Date/Time    WBC 5.3 03/01/2022 08:39 AM    HGB 13.5 03/01/2022 08:39 AM    HCT 41.6 03/01/2022 08:39 AM    PLATELET 839 49/65/4002 08:39 AM    MCV 93.1 03/01/2022 08:39 AM       Lab Results   Component Value Date/Time    Sodium 138 03/01/2022 08:39 AM    Potassium 4.3 03/01/2022 08:39 AM    Chloride 106 03/01/2022 08:39 AM    CO2 29 03/01/2022 08:39 AM    Anion gap 3 03/01/2022 08:39 AM    Glucose 141 (H) 03/01/2022 08:39 AM    BUN 25 (H) 03/01/2022 08:39 AM    Creatinine 1.31 (H) 03/01/2022 08:39 AM    BUN/Creatinine ratio 19 03/01/2022 08:39 AM    GFR est AA >60 03/01/2022 08:39 AM    GFR est non-AA 53 (L) 03/01/2022 08:39 AM    Calcium 8.8 03/01/2022 08:39 AM       Lab Results   Component Value Date/Time    Cholesterol, total 155 03/01/2022 08:39 AM    HDL Cholesterol 40 03/01/2022 08:39 AM    LDL, calculated 85 03/01/2022 08:39 AM    VLDL, calculated 30 03/01/2022 08:39 AM    Triglyceride 150 (H) 03/01/2022 08:39 AM    CHOL/HDL Ratio 3.9 03/01/2022 08:39 AM       Lab Results   Component Value Date/Time Hemoglobin A1c 6.8 (H) 03/01/2022 08:39 AM    Hemoglobin A1c (POC) 6.6 07/05/2019 09:21 AM       Assessment/Plan:   1. Recurrent major depressive disorder, in remission: Stable. - C/w rx per his psychiatrist.     2. Melanoma in situ Hx:   - I encouraged him to wear sunblock and to f/u with his Dermatologist for routine check ups    3. Type 2 diabetes mellitus without complication: Stable. - C/w a low carb diet  - Checking labs later this year    ORDERS:  - HEMOGLOBIN A1C WITH EAG; Future  - RENAL FUNCTION PANEL; Future    4. Dribbling of urine:   - Referral to Urology placed. ORDERS:  - REFERRAL TO UROLOGY    5. Osteoarthritis of left shoulder due to rotator cuff injury:   - Referral to PT  - Activity as tolerated. - Ok to trial NSAIDs x 1 wk, try tylenol, or use voltaren topical rx. ORDERS:  - REFERRAL TO PHYSICAL THERAPY    6. HTN/HLD: Stable. - C/w rx as prescribed  - RTC for a BP check    7. Gout: Stable. - C/w rx as prescribed. 8. Hypothyroidism: Stable. - C/w rx as prescribed. Health Maintenance Due   Topic Date Due    Shingrix Vaccine Age 49> (1 of 2) Never done    Foot Exam Q1  05/22/2020    Eye Exam Retinal or Dilated  11/05/2021    Medicare Yearly Exam  03/09/2022         Lab review: labs are reviewed in the EHR and ordered as mentioned above    I have discussed the diagnosis with the patient and the intended plan as seen in the above orders. The patient has received an after-visit summary and questions were answered concerning future plans. I have discussed medication side effects and warnings with the patient as well. I have reviewed the plan of care with the patient, accepted their input and they are in agreement with the treatment goals. All questions were answered. The patient understands the plan of care. Handouts provided today with above information. Pt instructed if symptoms worsen to call the office or report to the ED for continued care.   Greater than 50% of the visit time was spent in counseling and/or coordination of care. Voice recognition was used to generate this report, which may have resulted in some phonetic based errors in grammar and contents. Even though attempts were made to correct all the mistakes, some may have been missed, and remained in the body of the document.           Enoch Mistry MD

## 2022-03-14 ENCOUNTER — HOSPITAL ENCOUNTER (OUTPATIENT)
Dept: PHYSICAL THERAPY | Age: 77
Discharge: HOME | End: 2022-03-14
Payer: MEDICARE

## 2022-03-14 PROCEDURE — 97110 THERAPEUTIC EXERCISES: CPT

## 2022-03-14 PROCEDURE — 97140 MANUAL THERAPY 1/> REGIONS: CPT

## 2022-03-14 PROCEDURE — 97161 PT EVAL LOW COMPLEX 20 MIN: CPT

## 2022-03-14 NOTE — PROGRESS NOTES
[facility] PHYSICAL THERAPY AT 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 Comorbidities: DM, HTN, arthritis (B) knees and R hip    Medications: Verified on Patient Summary List   The Plan of Care and following information is based on the information from the initial evaluation.   ========================================================================  Assessment / key information:  Pt is a 63 yo male, R hand dominant, with 2 years of L shoulder ROM dysfunction and pain that started when he got his COVID booster shot in the L deltoid. Denies weakness and numbness/tingling. Pt notes no injuries and no imaging; no previous treatment. He presents with pain ranging from 0-6/10, located just proximal to deltoid tubersity. Pain is made worse with morning time, better with stretching. Social: Pt does all activities around the home (cook, wash-up, grocery shop),  and cares for his wife most of the day; pt does have a  1x/week. Pt has poor posture, grossly limited C/S and T/S ROM in all planes. Limited end range AROM L GHJ with painful arc. Strength is grossly intact with L 4+/5 and R 5/5. Limited joint mobility in all planes (L). TTP deltoid and posterior cuff. Presents with sxs of L shoulder OA and RTC impingement.    Functional limitations include: swimming (freestyle). Pt will benefit from PT interventions to address the aforementioned deficits and allow pt to return to PLOF.    ========================================================================  Eval Complexity: History: MEDIUM  Complexity : 1-2 comorbidities / personal factors will impact the outcome/ POC Exam:HIGH Complexity : 4+ Standardized tests and measures addressing body structure, function, activity limitation and / or participation in recreation  Presentation: LOW Complexity : Stable, uncomplicated  Clinical Decision Making:MEDIUM Complexity : FOTO score of 26-74Overall Complexity:LOW   Problem List: pain affecting function, decrease ROM, decrease strength, decrease ADL/ functional abilitiies, decrease activity tolerance, decrease flexibility/ joint mobility and decrease transfer abilities   Treatment Plan may include any combination of the following: Therapeutic exercise, Therapeutic activities, Neuromuscular re-education, Physical agent/modality, Manual therapy, Patient education, Self Care training, Functional mobility training, Home safety training and Stair training  Vasopnuematic compression justification:  Per bilateral girth measures taken and listed above the edema is considered significant and having an impact on the patient's strength, balance, gait and transfers  Patient / Family readiness to learn indicated by: asking questions, trying to perform skills and interest  Persons(s) to be included in education: patient (P)  Barriers to Learning/Limitations: None  Measures taken:    Patient Goal (s): \"get better \"   Patient self reported health status: good  Rehabilitation Potential: excellent   Short Term Goals: To be accomplished in  2  treatments:  1. Pt will be independent and compliant with HEP to decrease pain, increase ROM and return pt to PLOF. Status at last assessment: initiated at     Long Term Goals: To be accomplished in  8-10  treatments:  1.  Pt will increase score on the FOTO to > or = 64/100 to demo an increase in functional activity tolerance. Status at last assessment: 50/100   2. Pt will have an increase in L GHJ AROM to > or = 155 degrees with non-painful arc of movement to improve reaching, dressing, self-care  Status at last assessment: 134 with painful arc; scaption 128 with pain   3. Pt will be able to self-manage ave pain to < or = 2/10 ave to restore ease of movement, self-care, work. Status at last assessment: ranges 0-6/10   4. Pt will be able to swim for > or = 10 min with no c/o pain to resume workouts this summer. Status at last assessment: painful swimming  5. Pt will rate > or = +5 on the GROC to demo an increase in functional activity tolerance with the L shoulder  Status at last: NA   (All LTG as above will be assessed and updated every 10 visits or 30 days and progressed as needed)  Frequency / Duration:   Patient to be seen  1-2  times per week for 24-36  treatments:  Patient / Caregiver education and instruction: self care, activity modification and exercises  Therapist Signature: Rachel Carlson DPT Date: 4/70/1641   Certification Period: 3/14/22 to 6/12/22  Time: 8:35 AM   ========================================================================  I certify that the above Physical Therapy Services are being furnished while the patient is under my care. I agree with the treatment plan and certify that this therapy is necessary. Physician Signature:        Date:       Time:   Jesus Manuel Nava MD  Please sign and return to [facility] or you may fax the signed copy to [phone]. Thank you.

## 2022-03-14 NOTE — PROGRESS NOTES
PT DAILY TREATMENT NOTE     Patient Name: Aureliano Sanz  Date:3/14/2022  : 1945  [x]  Patient  Verified  [place]or: [place] MED[place]ARE / Plan: [place] MEDICARE PART A & B / Product Type: Medicare /    In time:11:01  Out time:11:47  Total Treatment Time (min): 46  Total Timed Codes (min): 20  1:1 Treatment Time (min): 46   Visit #: 1 of 8-10    Treatment Area: Osteoarthritis of left shoulder due to rotator cuff injury [M19.112, S46.002S]    SUBJECTIVE  Pain Level (0-10 scale): 0  Any medication changes, allergies to medications, adverse drug reactions, diagnosis change, or new procedure performed?: [x] No    [] Yes (see summary sheet for update)  Subjective functional status/changes:   [] No changes reported  SEE IE for Subjective Information    OBJECTIVE    Vasopnuematic compression justification:  Per bilateral girth measures taken and listed above the edema is considered significant and having an impact on the patient's functional mobility and self-care    10 min Therapeutic Exercise:  [x] See flow sheet : initiate POC, HEP   Rationale: increase ROM and increase strength to improve the patients ability to restore reaching, self-care, ADls,     10 min Manual Therapy:  Posterior and inferior GHJ mobs grade III; DTM to mid deltoid and posterior cuff, PROM L shoulder elevation and IR/ER     NV: scapular mobs    Rationale: decrease pain, increase ROM, increase tissue extensibility and decrease trigger points to restore OH reaching   The manual therapy interventions were performed at a separate and distinct time from the therapeutic activities interventions  (na Add 59 Modifier in conjunction with TA treatment)        x min Patient Education: [x] Review HEP    [] Progressed/Changed HEP based on:   POC  OA and impingement education  Prognosis  HEP    [] positioning   [] body mechanics   [] transfers   [] heat/ice application        Other Objective/Functional Measures:   CHIEF COMPLAINT:  See iE  DEFICITS:  Reaching, OH, swimming, pain   PAST TREATMENTS: none       OBJECTIVE:   Posture: poor   Inspection: WNL  ROM:  Limited end range                                            AROM                                                              PROM   Left Right  Left Right   Flexion 134 160 Flexion 162    Extension 54 50 Extension     Scaption/ABD 128p! 132 Scaptin/    ER @ 0 Degrees 55 p! 45 ER @ 45 Degrees 45 deg    IR/ADD L1 T10 IR/ADD     DARCIE C7 laterally T1        Strength:                                                                         L (1-5) R (1-5) Pain   Flexors 4+ 5 _ Yes   [] No   Abductors 4+ 5 [x] Yes   [] No   External Rotators 4+ 5 [] Yes   [] No   Internal Rotators 5 5 [] Yes   [] No   Supraspinatus 4+ 5 [] Yes   [] No   Serratus Anterior   [] Yes   [] No   Lower Trapezius   [] Yes   [] No   Elbow Flexion 5 5 [] Yes   [] No   Elbow Extension 5 5 [] Yes   [] No    strength :  Equal     Scapulohumoral Control / Rhythm:  Able to eccentrically lower with good control? Left: [x] Yes   [] No     Right: [x] Yes   [] No    Palpation  [] Min  [x] Mod  [] Severe    Location: mid deltoid, posterior cuff     Special Tests: + Neer, (-) HK and crossover  Min + with biceps load 2  (+) empty can for pain  + painful arc  (-) GHJ sulcus/distraction test          Pain Level (0-10 scale) post treatment: 0    ASSESSMENT/Changes in Function: see IE     Patient will continue to benefit from skilled PT services to modify and progress therapeutic interventions, address functional mobility deficits, address ROM deficits, address strength deficits, analyze and address soft tissue restrictions, analyze and cue movement patterns, analyze and modify body mechanics/ergonomics, assess and modify postural abnormalities, address imbalance/dizziness and instruct in home and community integration to attain remaining goals.      [x]  See Plan of Care  []  See progress note/recertification  []  See Discharge Summary         Progress towards goals / Updated goals:  SEE IE FOR GOALS     PLAN  []  Upgrade activities as tolerated     []  Continue plan of care  []  Update interventions per flow sheet       []  Discharge due to:_  [x]  Other:Initiate POC as stated in the IE      Justification for Eval Code Complexity:  Patient History : pain > 2 years  Examination see IE   Clinical Presentation: stable as OA and impingement   Clinical Decision Making : FOTO 50/100     Cheryl Schwarz DPT 3/14/2022  8:35 AM    Future Appointments   Date Time Provider Gisele Astorga   3/14/2022 11:30 AM Sebastian Chavis PT [facility]   7/6/2022  9:55 AM [facility] LAB VISIT [facility] AMB   7/12/2022 10:20 AM Murphy Penny MD [facility] AMB

## 2022-03-14 NOTE — ACP (ADVANCE CARE PLANNING)
Advance Care Planning  Advance Care Planning (ACP) Provider Conversation     Date of ACP Conversation: 3/8/22  Persons included in Conversation:  patient    Authorized Decision Maker (if patient is incapable of making informed decisions): This person is:   Named in Advance Directive or Healthcare Power of           For Patients with Decision Making Capacity:   Values/Goals: Exploration of values, goals, and preferences if recovery is not expected, even with continued medical treatment in the event of:  Imminent death  Severe, permanent brain injury    Conversation Outcomes / Follow-Up Plan:   ACP complete - no further action today. He has no changes to make to his preexisting advance directive.      Dr. Alcides Knight  [facility] of [address], [address] [place] [address].  Phone: [phone]  Fax: [phone]

## 2022-03-14 NOTE — PROGRESS NOTES
[facility] OF [facility]– [place]:  03/13/22, [id number]      The Subsequent Medicare Annual Wellness Exam PROGRESS NOTE    This is a Subsequent Medicare Annual Wellness Exam (AWV). I have reviewed the patient's medical history in detail and updated the computerized patient record. Jigna Chavez is a 64 y.o.   male and presents for an annual wellness exam.    SUBJECTIVE    Past Medical History:   Diagnosis Date    Adverse effect of anesthesia     Violent dreams with Ether    Arthritis     hip, knee    Chronic kidney disease     had previous reaction with kidneys after a bp med, no issues now    Colon polyps     dysplastic    Depression     Diabetes mellitus ([facility] 2017    no meds    Diverticulosis     seen on colonoscopy from 8/20/15    Diverticulosis of sigmoid colon 01/27/10    GERD (gastroesophageal reflux disease)     no meds    Gout     Hepatitis     Hepatitis A - while he was young    Hyperlipidemia     Hypertension 15 years    Hypovitaminosis D     Nephrolithiasis     Osteoarthritis of right knee 2/19/2018    Plantar wart     Rectal polyp     seen on colonoscopy from 8/20/15    Thyroid disease     hypothyroidism      Past Surgical History:   Procedure Laterality Date    ENDOSCOPY, COLON, DIAGNOSTIC  01/27/2010    polyp distal rectum, removed; diverticulosis of sigmoid    HX CATARACT REMOVAL Bilateral     HX COLONOSCOPY      HX HEENT  2008    vocal cord polyp removed    HX POLYPECTOMY  01/27/2010    Distal rectum    HX TONSILLECTOMY      LITHOTRIPSY      LA LAP,INGUINAL HERNIA REPR,INITIAL Right 05/31/2018    Dr. Ruma Ludwig Left 1/2014    LA TOTAL KNEE ARTHROPLASTY Right 02/2018     Current Outpatient Medications   Medication Sig Dispense Refill    levothyroxine (SYNTHROID) 50 mcg tablet TAKE ONE TABLET BY MOUTH EVERY MORNING BEFORE BREAKFAST 90 Tablet 1    allopurinoL (ZYLOPRIM) 100 mg tablet TAKE ONE TABLET BY MOUTH TWICE A  Tablet 1    lisinopriL (PRINIVIL, ZESTRIL) 5 mg tablet TAKE ONE TABLET BY MOUTH DAILY 90 Tablet 1    atorvastatin (LIPITOR) 40 mg tablet TAKE ONE TABLET BY MOUTH DAILY 90 Tablet 2    carvediloL (COREG) 6.25 mg tablet TAKE ONE TABLET BY MOUTH TWICE A DAY WITH MEALS 180 Tablet 2    sildenafiL, pulmonary hypertension, (REVATIO) 20 mg tablet TAKE TWO TABLETS BY MOUTH DAILY 1 HOUR PRIOR TO SEXUAL RELATIONS AS NEEDED *MAX 5 TABLETS PER DAY* 30 Tab 8    sertraline (ZOLOFT) 100 mg tablet Take 1 Tab by mouth daily. (Patient taking differently: Take 150 mg by mouth daily.) 90 Tab 3    ibuprofen (MOTRIN) 800 mg tablet Take 1 Tab by mouth three (3) times daily as needed for Pain. 40 Tab 0    ubidecarenone (COQ-10 PO) Take 200 mEq by mouth daily.  aspirin delayed-release 81 mg tablet Take 81 mg by mouth daily.  multivitamin (ONE A DAY) tablet Take 1 Tab by mouth daily.  Cholecalciferol, Vitamin D3, (VITAMIN D) 2,000 unit Cap Take 4,000 Units by mouth daily.  varicella-zoster recombinant, PF, (SHINGRIX) 50 mcg/0.5 mL susr injection 0.5 mL by IntraMUSCular route every two (2) months.  (Patient not taking: Reported on 3/8/2022) 0.5 mL 1     Allergies   Allergen Reactions    Amlodipine Other (comments)     BLE edema     Family History   Problem Relation Age of Onset   Armando Lobe Arthritis-rheumatoid Mother     Hypertension Mother    Armando Lobe Elevated Lipids Mother     Thyroid Disease Sister     Cancer Father         colon    Heart Disease Other     Hypertension Other     Cancer Other         breast    Heart Disease Maternal Grandmother     Dementia Maternal Grandfather     Cancer Paternal Grandmother         breast    No Known Problems Paternal Grandfather      Social History     Tobacco Use    Smoking status: Former Smoker     Packs/day: 1.00     Years: 6.00     Pack years: 6.00     Types: Cigarettes     Quit date: 1992     Years since quittin.2    Smokeless tobacco: Never Used   Substance Use Topics    Alcohol use: Not Currently     Alcohol/week: 10.0 standard drinks     Types: 10 Glasses of wine per week     Comment: wine with dinner sometimes     Patient Active Problem List   Diagnosis Code    Hyperlipidemia E78.5    Hypovitaminosis D E55.9    Gout M10.9    Family history of colon cancer. personal hx colon polyps Z80.0    Vocal cord nodule J38.2    Essential hypertension I10    Type 2 diabetes mellitus without complication (HCC) D78.7    Hypothyroidism due to acquired atrophy of thyroid E03.4    CKD (chronic kidney disease) stage 3, GFR 30-59 ml/min (HCC) N18.30    Rectal polyp -  seen on colonoscopy from 8/20/15 K62.1    Diverticulosis of intestine without bleeding - seen on colonoscopy from 8/20/15 K57.90    Major depressive disorder in remission F32.5    Melanoma in situ - on left dorsal forearm 2016 D03.9    Erectile dysfunction N52.9    Osteoarthritis of right knee M17.11    Right inguinal hernia K40.90    History of alcohol abuse F10.11     The pt is a 68yo male with h/o ED, melanoma in situ on left dorsal forearm s/p removal, MDD, rectal polyp, diverticulosis, CKD stage 3, HTN, type 2 DM (foot exams are done by Meg Otto), hypothyroidism, DJD, gout, vocal cord nodule, depression/anxiety (followed by Psychiatry), vitamin D deficiency, RBBB (s/p normal stress test 2018), and HLD. Health Maintenance History  Immunizations reviewed: Tdap up to date   Pneumovax: up to date   Flu: up to date  Zoster: over-due per our records.      Immunization History   Administered Date(s) Administered    COVID-19, Moderna Booster, PF, 0.25mL Dose 12/05/2021    COVID-19, [place], Primary or Immunocompromised Series, MRNA, PF, 100mcg/0.5mL 01/25/2021, 02/22/2021    Influenza High Dose Vaccine PF 10/02/2015, 10/24/2016, 10/05/2017, 10/01/2018, 10/31/2018, 10/27/2019, 10/04/2021    Influenza Vaccine 10/01/2013, 10/15/2013, 11/20/2014    Influenza Vaccine Split 10/21/2011, 11/09/2012    Pneumococcal Conjugate (PCV-13) 2015    Pneumococcal Polysaccharide (PPSV-23) 2014, 08/10/2017    Pneumococcal Vaccine (Unspecified Type) 2007    Td 2007    Zoster Vaccine, Live 2013       Colonoscopy:  Up to date    Eye exam: Overdue per our records. PSA: He is being referred to Urology - see E/M portion of the visit      Review of Systems   Constitutional: Negative for chills and fever. HENT: Negative for ear pain and sore throat. Eyes: Negative for blurred vision and pain. Respiratory: Negative for cough and shortness of breath. Cardiovascular: Negative for chest pain. Gastrointestinal: Negative for abdominal pain, blood in stool and melena. Genitourinary: Negative for dysuria and hematuria. Musculoskeletal: Positive for joint pain. Negative for myalgias. Skin: Negative for rash. Neurological: Negative for headaches. Endo/Heme/Allergies: Does not bruise/bleed easily. Psychiatric/Behavioral: Positive for depression. Negative for substance abuse and suicidal ideas. Depression Risk Factor Screening:      Patient Health Questionnaire (PHQ-2)   Over the last 2 weeks, how often have you been bothered by any of the following problems? · Little interest or pleasure in doing things? · More than half the days. [2]  · Feeling down, depressed, or hopeless? · Several days.  [1]    Total Score: 3/6  PHQ-2 Assessment Scoring:   A score of 2 or more requires further screening with the PHQ-9    Alcohol Risk Factor Screening:   Men:   On any occasion during the past 3 months, have you had more than 4 drinks containing alcohol? no    Do you average more than 14 drinks per week? no    Tobacco Use Screening:     Social History     Tobacco Use   Smoking Status Former Smoker    Packs/day: 1.00    Years: 6.00    Pack years: 6.00    Types: Cigarettes    Quit date: 1992    Years since quittin.2   Smokeless Tobacco Never Used       Hearing Loss   There have been no changes to the pt's hearing. No additional studies/evaluation is warranted at this time    Activities of Daily Living   Self-care. Requires assistance with: no ADLs  He does not need help with ADLs/IADLs    Fall Risk   No falls w/I the past year. Abuse Screen   None    Additional Examination Findings:  Vitals:    03/08/22 0826 03/08/22 0831   BP: (!) 146/70 120/67   Pulse: 60    Resp: 14    Temp: 97.5 °F (36.4 °C)    TempSrc: Temporal    SpO2: 98%    Weight: 211 lb (95.7 kg)    Height: 5' 9\" (1.753 m)    PainSc:   0 - No pain       Body mass index is 31.16 kg/m². Evaluation of Cognitive Function:  Mood/affect: Euthymic  Appearance: Well-groomed  Family member/caregiver input: He is not with a family member      General:   Well-nourished, well-groomed, pleasant, alert, in no acute distress. Head:  Normocephalic, atraumatic  Ears:  External ears WNL  Eyes:  Clear sclera  Neuro:   Alert, conversant, appropriate, following commands  Skin:    No rashes noted  Psych:  Affect, mood and judgment appropriate      Dementia Screen:  Clock Drawing (ten past eleven) Exercise: Unremarkable.       LABS   Data Review:   Lab Results   Component Value Date/Time    Sodium 138 03/01/2022 08:39 AM    Potassium 4.3 03/01/2022 08:39 AM    Chloride 106 03/01/2022 08:39 AM    CO2 29 03/01/2022 08:39 AM    Anion gap 3 03/01/2022 08:39 AM    Glucose 141 (H) 03/01/2022 08:39 AM    BUN 25 (H) 03/01/2022 08:39 AM    Creatinine 1.31 (H) 03/01/2022 08:39 AM    BUN/Creatinine ratio 19 03/01/2022 08:39 AM    GFR est AA >60 03/01/2022 08:39 AM    GFR est non-AA 53 (L) 03/01/2022 08:39 AM    Calcium 8.8 03/01/2022 08:39 AM       Lab Results   Component Value Date/Time    WBC 5.3 03/01/2022 08:39 AM    HGB 13.5 03/01/2022 08:39 AM    HCT 41.6 03/01/2022 08:39 AM    PLATELET 443 70/03/6156 08:39 AM    MCV 93.1 03/01/2022 08:39 AM       Lab Results   Component Value Date/Time    Hemoglobin A1c 6.8 (H) 03/01/2022 08:39 AM    Hemoglobin A1c (POC) 6.6 07/05/2019 09:21 AM       Lab Results   Component Value Date/Time    Cholesterol, total 155 03/01/2022 08:39 AM    HDL Cholesterol 40 03/01/2022 08:39 AM    LDL, calculated 85 03/01/2022 08:39 AM    VLDL, calculated 30 03/01/2022 08:39 AM    Triglyceride 150 (H) 03/01/2022 08:39 AM    CHOL/HDL Ratio 3.9 03/01/2022 08:39 AM         Patient Care Team:  Julianne Lu MD as PCP - General (Family Medicine)  Julianne Lu MD as PCP - [facility]  Aguila Mccallum, RN as Ambulatory Care Manager (Internal Medicine)  Isabella Quintana (Physician Assistant)  Willi Banegas MD (Gastroenterology)  Michelle Carrasco MD (Otolaryngology)  Anjel Barillas MD (Orthopedic Surgery)  Alicia Tanner DPM (Podiatry)  Kerry Posada DPM (Podiatry)  Mykel Myers MD (Ophthalmology)  Brianne Sibley MD (Orthopedic Surgery)  Ben Mcdaniel MD (Unknown Physician Specialty)  Amberly Hernandez NP as Nurse Practitioner (Nurse Practitioner)  Ben Mcdaniel MD (Psychiatry)    End-of-life planning  Advanced Directive in the case than an injury or illness causes the patient to be unable to make health care decisions was discussed with the patient. Advice/Referrals/Counselling/Plan:   Education and counseling provided:  Are appropriate based on today's review and evaluation  End-of-Life planning (with patient's consent)  Pneumococcal Vaccine  Influenza Vaccine  Prostate cancer screening tests (PSA, covered annually)  Colorectal cancer screening tests  Cardiovascular screening blood test  Screening for glaucoma  Diabetes screening test  Include in education list (weight loss, physical activity, smoking cessation, fall prevention, and nutrition)    ICD-10-CM ICD-9-CM    1. Medicare annual wellness visit, subsequent  Z00.00 V70.0    2. Recurrent major depressive disorder, in remission ([facility]  F33.40 296.35    3. Melanoma in situ, unspecified site ([facility]  D03.9 172.9    4.  Type 2 diabetes mellitus without complication, unspecified whether long term insulin use ([facility])  E11.9 250.00 HEMOGLOBIN A1C WITH EAG      RENAL FUNCTION PANEL   5. Stage 3 chronic kidney disease, unspecified whether stage 3a or 3b CKD ([facility])  N18.30 585.3 RENAL FUNCTION PANEL   6. Dribbling of urine  N39.43 788.35 REFERRAL TO UROLOGY   7. Osteoarthritis of left shoulder due to rotator cuff injury  M19.112 715.31 REFERRAL TO PHYSICAL THERAPY    S46.002S       reviewed diet, exercise and weight control. Brief written plan, checklist    Assessment/Plan:    Health Maintenance:  - I encouraged him to get all recommended vaccinations/screenings          Lab review: labs are reviewed in the [address] [place] [address] (ACP) Provider Conversation     Date of ACP Conversation: 3/8/22  Persons included in Conversation:  patient    Authorized Decision Maker (if patient is incapable of making informed decisions): This person is:   Named in Advance Directive or Healthcare Power of           For Patients with Decision Making Capacity:   Values/Goals: Exploration of values, goals, and preferences if recovery is not expected, even with continued medical treatment in the event of:  Imminent death  Severe, permanent brain injury    Conversation Outcomes / Follow-Up Plan:   ACP complete - no further action today. He has no changes to make to his preexisting advance directive. I have discussed the diagnosis with the patient and the intended plan as seen in the above orders. The patient has received an after-visit summary and questions were answered concerning future plans. I have discussed medication side effects and warnings with the patient as well. I have reviewed the plan of care with the patient, accepted their input and they are in agreement with the treatment goals. Follow-up and Dispositions    · Return in about 4 months (around 7/6/2022) for BP check, DM check.

## 2022-03-16 ENCOUNTER — HOSPITAL ENCOUNTER (OUTPATIENT)
Dept: PHYSICAL THERAPY | Age: 77
Discharge: HOME | End: 2022-03-16
Payer: MEDICARE

## 2022-03-16 PROCEDURE — 97530 THERAPEUTIC ACTIVITIES: CPT

## 2022-03-16 PROCEDURE — 97140 MANUAL THERAPY 1/> REGIONS: CPT

## 2022-03-16 PROCEDURE — 97110 THERAPEUTIC EXERCISES: CPT

## 2022-03-16 NOTE — PROGRESS NOTES
PT DAILY TREATMENT NOTE     Patient Name: Annika Scott  Date:3/16/2022  : 1945  [x]  Patient  Verified  [place]or: [place] / Plan: [place] MED[place]ARE PART A & B / Product Type: Medicare /    In time:1000   Out time:1046  Total Treatment Time (min): 46  Total Timed Codes (min): 46   1:1 Treatment Time (min): 46  Visit #: 2 of 8-10    Treatment Area: Osteoarthritis of left shoulder due to rotator cuff injury [M19.112, S46.002S]    SUBJECTIVE  Pain Level (0-10 scale): 3/10  Any medication changes, allergies to medications, adverse drug reactions, diagnosis change, or new procedure performed?: [x] No    [] Yes (see summary sheet for update)  Subjective functional status/changes:   [] No changes reported  Pt reports that he has been compliant w/ HEP. OBJECTIVE  Pt deferred modalities    15 min Therapeutic Exercise:  [x] See flow sheet :   Rationale: increase ROM and increase strength to improve the patients ability to restore reaching, self-care, ADls,       15 min Therapeutic Activity:  []  See flow sheet :   Rationale: increase ROM and improve coordination to improve the patients ability to reach overhead and behind the back. 16 min Manual Therapy:  Upper trap MET contract/relax stretch on the L while pt in R s/l position, lateral scapular glides, scap PNF, ringing/rolling upper trap, DTM posterior cuff and medial scap border. In supine: GHJ distraction followed by PROM into flex/ abd, IR/ER. Through axilla, trigger point release subscap, STM to the lat. Rationale: decrease pain, increase ROM, increase tissue extensibility and decrease trigger points to restore OH reaching   The manual therapy interventions were performed at a separate and distinct time from the therapeutic activities interventions  (na Add 59 Modifier in conjunction with TA treatment)          W/ TE min Patient Education: [x] Review HEP and intent of activity progression, potential for DOMs w/ activity progression.     [] Progressed/Changed HEP based on: Other Objective/Functional Measures:   + first visit since the initial evaluation  + initiated POC as outlined in the flow sheet  + finger ladder initial at rung 32, progressed to 33 by end of the reps  AROM: 154 d flex, \"just tight\" start of session, 170 d post manual techniques    Pain Level (0-10 scale) post treatment: 0/10      ASSESSMENT/Changes in Function:  Pt w/ good tolerance to activity progression today, requiring intermittent tactile cues for scap setting (retraction/depression) w/ there ex. 100% v.c. on technique for all other new activities. Pt denied any increased S&S w/ any exercise today. Noted reduced scap lateral glides, increased subscap muscle tone and decreased scap depression that improved w/ MT. Improved AROM of the R shoulder noted by 20 deg from IE and by end of session, pt w/ improved AROM to 170 d (measured in sitting). Pt w/ good tolerance to all activity as indicated with no reports of pain post tx. Patient will continue to benefit from skilled PT services to modify and progress therapeutic interventions, address functional mobility deficits, address ROM deficits, address strength deficits, analyze and address soft tissue restrictions, analyze and cue movement patterns, analyze and modify body mechanics/ergonomics, assess and modify postural abnormalities, address imbalance/dizziness and instruct in home and community integration to attain remaining goals. []  See Plan of Care  []  See progress note/recertification  []  See Discharge Summary         Progress towards goals / Updated goals: · Short Term Goals: To be accomplished in  2  treatments:  1. Pt will be independent and compliant with HEP to decrease pain, increase ROM and return pt to PLOF. Status at last assessment: initiated at    Current: 3/16/22: Pt notes compliance at least 1x/day. · Long Term Goals: To be accomplished in  8-10  treatments:  1.  Pt will increase score on the FOTO to > or = 64/100 to demo an increase in functional activity tolerance. Status at last assessment: 50/100   2. Pt will have an increase in L GHJ AROM to > or = 155 degrees with non-painful arc of movement to improve reaching, dressing, self-care  Status at last assessment: 134 with painful arc; scaption 128 with pain  Current: 3/16/22:  154 d flex, \"just tight\" start of session, 170 d post manual techniques  3. Pt will be able to self-manage ave pain to < or = 2/10 ave to restore ease of movement, self-care, work. Status at last assessment: ranges 0-6/10   4. Pt will be able to swim for > or = 10 min with no c/o pain to resume workouts this summer. Status at last assessment: painful swimming  5.  Pt will rate > or = +5 on the GROC to demo an increase in functional activity tolerance with the L shoulder  Status at last: NA     PLAN  [x]  Upgrade activities as tolerated     [x]  Continue plan of care  []  Update interventions per flow sheet       []  Discharge due to:_  [x]  Other:assess response to tx, initiate prone T, W, ER at 80 d     Amberly Hamilton, PT, DPT, CMPT 3/16/2022      Future Appointments   Date Time Provider Gisele Astorga   3/16/2022 10:00 AM [facility] [place] 2 [facility]   3/29/2022 10:45 AM [facility] PT [place] 2 [facility]   3/31/2022 10:45 AM [facility] [place] 1 [facility]   4/5/2022 11:30 AM [facility] PT [place] 2 [facility]   4/6/2022 10:45 AM [facility] PT [place] 2 [facility]   4/11/2022 10:45 AM Nicole Zamora, PT [facility]   4/14/2022 10:45 AM India Barragan, PT [facility]   4/18/2022 11:30 AM [facility] PT ENT 2 [facility]   4/20/2022 10:45 AM [facility] PT [place] 2 [facility]   7/6/2022  9:55 AM [facility] LAB VISIT [facility]   7/12/2022 10:20 AM Elmer Rand MD [facility]

## 2022-03-18 PROBLEM — N52.9 ERECTILE DYSFUNCTION: Status: ACTIVE | Noted: 2017-01-23

## 2022-03-19 PROBLEM — M17.11 OSTEOARTHRITIS OF RIGHT KNEE: Status: ACTIVE | Noted: 2018-02-19

## 2022-03-19 PROBLEM — K40.90 RIGHT INGUINAL HERNIA: Status: ACTIVE | Noted: 2018-05-17

## 2022-03-19 PROBLEM — F10.11 HISTORY OF ALCOHOL ABUSE: Status: ACTIVE | Noted: 2020-10-06

## 2022-03-29 ENCOUNTER — APPOINTMENT (OUTPATIENT)
Dept: PHYSICAL THERAPY | Age: 77
End: 2022-03-29
Payer: MEDICARE

## 2022-03-30 ENCOUNTER — HOSPITAL ENCOUNTER (OUTPATIENT)
Dept: PHYSICAL THERAPY | Age: 77
Discharge: HOME | End: 2022-03-30
Payer: MEDICARE

## 2022-03-30 PROCEDURE — 97110 THERAPEUTIC EXERCISES: CPT

## 2022-03-30 PROCEDURE — 97140 MANUAL THERAPY 1/> REGIONS: CPT

## 2022-03-30 PROCEDURE — 97530 THERAPEUTIC ACTIVITIES: CPT

## 2022-03-30 NOTE — PROGRESS NOTES
PT DAILY TREATMENT NOTE     Patient Name: Lauro Muller  Date:3/30/2022  : 1945  [x]  Patient  Verified  Payor: Misty Matta / Plan: [place] / Product Type: Medicare /    In time: 4542   Out time:1222  Total Treatment Time (min): 54  Total Timed Codes (min): 54    1:1 Treatment Time (min): 54   Visit #: 3 of 8-10    Treatment Area: Pain in left shoulder [M25.512]  Primary osteoarthritis, left shoulder [M19.012]    SUBJECTIVE  Pain Level (0-10 scale): 3 /10  Any medication changes, allergies to medications, adverse drug reactions, diagnosis change, or new procedure performed?: [x] No    [] Yes (see summary sheet for update)  Subjective functional status/changes:   [] No changes reported  Pt reports that he has pain mostly w/ he \"touches the shoulder\". Points to the posterolateral aspect of the L shoulder. Pt denies any adverse responses to activities performed at the last session. Pt reports improved overall ROM and reduced overall pain levels since starting therapy. OBJECTIVE  Pt deferred modalities    25 min Therapeutic Exercise:  [x] See flow sheet :   Rationale: increase ROM and increase strength to improve the patients ability to restore reaching, self-care, ADls,       15 min Therapeutic Activity:  []  See flow sheet :   Rationale: increase ROM and improve coordination to improve the patients ability to reach overhead and behind the back. 14 min Manual Therapy:  Upper trap MET contract/relax stretch on the L while pt in R s/l position, lateral scapular glides, scap PNF, ringing/rolling upper trap, DTM posterior cuff and medial scap border. In supine: GHJ distraction followed by PROM into flex/ abd, IR/ER.  Through axilla, trigger point release subscap, STM to the lat, CFM to the supraspinatus tendon (2x 15 sec)          Rationale: decrease pain, increase ROM, increase tissue extensibility and decrease trigger points to restore OH reaching   The manual therapy interventions were performed at a separate and distinct time from the therapeutic activities interventions  (na Add 59 Modifier in conjunction with TA treatment)          W/ TE min Patient Education: [x] Review HEP and intent of activity progression, potential for DOMs w/ activity progression, importance of cont use of the UE within new available range. [] Progressed/Changed HEP based on: Other Objective/Functional Measures:   + progressed   -UBE to retro/fwd/retro x 2 min each   - finger ladder flex and scaption   - low>mid pec stretch   - s/l ER w/ 2# wt, 2x10, abd: 2# wt 2x10  + added: prone I, W, ER w/ 90 d   Supine L shoulder PNF into D1/D2 flex  + finger ladder initial at rung 32, progressed to 35 by end of the reps for shoulder flex, and initial rung of 31 to final rung of 32 during scaption    AROM in sitting:  into L shoulder ext: 60 d without pain, flex: 170 d,  DARCIE: T3   No pain, FIR: small of back w/ minimal pain  In supine: ER at 45 d abd: 45 d, flex: 170 d no pain    Pain Level (0-10 scale) post treatment: 0 /10      ASSESSMENT/Changes in Function:  Pt noted to have good tolerance to all activity progressions as noted above. No increased S&S, pt w/ ability to progress to functional movement patterns in the supine position. Significant improvement in L shoulder AROM noted all planes as compared to the initial evaluation. Noted TTP to the L supraspinatus insertion that reduced w/ CFM to this tendon. Improved scapular mobility in all planes, likely allowing greater GHJ mobility w/ less restrictions. Pt is restricted w/ FIR at this time and has not attempted swimming. Progressing towards all goals.      Patient will continue to benefit from skilled PT services to modify and progress therapeutic interventions, address functional mobility deficits, address ROM deficits, address strength deficits, analyze and address soft tissue restrictions, analyze and cue movement patterns, analyze and modify body mechanics/ergonomics, assess and modify postural abnormalities, address imbalance/dizziness and instruct in home and community integration to attain remaining goals. []  See Plan of Care  []  See progress note/recertification  []  See Discharge Summary           PN due 4/14/22  Progress towards goals / Updated goals: · Short Term Goals: To be accomplished in  2  treatments:  1. Pt will be independent and compliant with HEP to decrease pain, increase ROM and return pt to PLOF. Status at last assessment: initiated at    Current: 3/30/22: Pt reports compliance w/ HEP daily  · Long Term Goals: To be accomplished in  8-10  treatments:  1. Pt will increase score on the FOTO to > or = 64/100 to demo an increase in functional activity tolerance. Status at last assessment: 50/100   2. Pt will have an increase in L GHJ AROM to > or = 155 degrees with non-painful arc of movement to improve reaching, dressing, self-care  Status at last assessment: 134 with painful arc; scaption 128 with pain  Current: 3/30/22:  170d flex AROM without pain  3. Pt will be able to self-manage ave pain to < or = 2/10 ave to restore ease of movement, self-care, work. Status at last assessment: ranges 0-6/10   Current: 3/30/22: 0-7/10 \"only when I push on the shoulder w/ my other hand\", average pain < 4/10  4. Pt will be able to swim for > or = 10 min with no c/o pain to resume workouts this summer. Status at last assessment: painful swimming  Current: 3/30/22: Pt reports that he has not tried swimming at this time  5.  Pt will rate > or = +5 on the GROC to demo an increase in functional activity tolerance with the L shoulder  Status at last: NA       PLAN  [x]  Upgrade activities as tolerated     [x]  Continue plan of care  []  Update interventions per flow sheet       []  Discharge due to:_  [x]  Other: consider adding towel IR stretch, sleepers stretch to improve FIR; update HEP NV    The Vu PT, DPT, CMPT 3/30/2022      Future Appointments   Date Time Provider Gisele Gauri   3/30/2022 11:30 AM [facility] [place] [facility]   3/31/2022 10:45 AM [facility] [place] [facility]   4/5/2022 11:30 AM [facility] PT [place] [facility]   4/6/2022 10:45 AM [facility] PT [place] [facility]   4/11/2022 10:45 AM Edith Gongora, PT [facility]   4/14/2022 10:45 AM Irene Hannon, PT [facility]   4/18/2022 11:30 AM [facility] PT [place] [facility]   4/20/2022 10:45 AM [facility] [place] [facility]   7/6/2022  9:55 AM [facility] VISIT [facility] AMB   7/12/2022 10:20 AM Bria Renee MD [facility] AMB

## 2022-03-31 ENCOUNTER — HOSPITAL ENCOUNTER (OUTPATIENT)
Dept: PHYSICAL THERAPY | Age: 77
Discharge: HOME | End: 2022-03-31
Payer: MEDICARE

## 2022-03-31 PROCEDURE — 97530 THERAPEUTIC ACTIVITIES: CPT | Performed by: GENERAL ACUTE CARE HOSPITAL

## 2022-03-31 PROCEDURE — 97140 MANUAL THERAPY 1/> REGIONS: CPT | Performed by: GENERAL ACUTE CARE HOSPITAL

## 2022-03-31 PROCEDURE — 97110 THERAPEUTIC EXERCISES: CPT | Performed by: GENERAL ACUTE CARE HOSPITAL

## 2022-03-31 NOTE — PROGRESS NOTES
PT DAILY TREATMENT NOTE     Patient Name: Alka Muroster  Date:3/31/2022  : 1945  [x]  Patient  Verified  Payor: Evan Coats / Plan: [place] / Product Type: Medicare /    In time:  10:42    Out time: 11:36  Total Treatment Time (min): 54   Total Timed Codes (min): 54    1:1 Treatment Time (min): 54   Visit #: 4 of 8-10    Treatment Area: Pain in left shoulder [M25.512]  Primary osteoarthritis, left shoulder [M19.012]    SUBJECTIVE  Pain Level (0-10 scale): 0 /10   Any medication changes, allergies to medications, adverse drug reactions, diagnosis change, or new procedure performed?: [x] No    [] Yes (see summary sheet for update)  Subjective functional status/changes:   [] No changes reported  Pt denies any pain this morning. Notes some soreness after having a PT session just yesterday. \"Its getting better all the time. \"     OBJECTIVE  Pt deferred modalities    25 min Therapeutic Exercise:  [x] See flow sheet :   Rationale: increase ROM and increase strength to improve the patients ability to restore reaching, self-care, ADls,       15 min Therapeutic Activity:  []  See flow sheet :   Rationale: increase ROM and improve coordination to improve the patients ability to reach overhead and behind the back. 14 min Manual Therapy:   DTM to upper trap/LS, scap mobs in all directions, DTM posterior cuff and medial scap border. In supine: GHJ distraction and inf/post GHJ mobs followed by PROM into flex/ abd, IR/ER.   CFM to the supraspinatus tendon (2x 15 sec)     Not initiated: Upper trap MET contract/relax stretch on the L while pt in R s/l position,Through axilla, trigger point release subscap, STM to the lat,       Rationale: decrease pain, increase ROM, increase tissue extensibility and decrease trigger points to restore OH reaching   The manual therapy interventions were performed at a separate and distinct time from the therapeutic activities interventions  (na Add 59 Modifier in conjunction with TA treatment)          W/ TE min Patient Education: [x] Review HEP    [x] Progressed/Changed HEP based on: updated HEP with printout provided (sse chart for copy) emphasizing shoulder and scapular strengthening     Other Objective/Functional Measures:     Mobility progression - added FIR stretch with strap, sleeper stretch   Strength progression - progressed to BlueTB with row/ext, increased reps with resisted ER/IR    (L) shoulder AROM: standing ext : 52deg, FIR T12 (after completing FIR and cane ext stretches), flexion 170 deg    Finger ladder: flexion up to lvl 34, scaption up to lvl 33 (improved compared to last session)      Pain Level (0-10 scale) post treatment: 0 /10      ASSESSMENT/Changes in Function:  Did not significantly progress exercise program due to muscular soreness from PT session yesterday. Improved FIR mobility today with reduced pain/discomfort at end range. Updated HEP to reflect new exercises performed in clinic for progression of RTC and scapular strengthening. Pt remains with moderate TTP in L upper trap, periscapular and RTC musculature. No pain reported post session, but notes some general fatigue. Making great progress with improvements in L shoulder AROM and strength. Patient will continue to benefit from skilled PT services to modify and progress therapeutic interventions, address functional mobility deficits, address ROM deficits, address strength deficits, analyze and address soft tissue restrictions, analyze and cue movement patterns, analyze and modify body mechanics/ergonomics, assess and modify postural abnormalities, address imbalance/dizziness and instruct in home and community integration to attain remaining goals. [x]  See Plan of Care  []  See progress note/recertification  []  See Discharge Summary           PN due 4/14/22  Progress towards goals / Updated goals: · Short Term Goals: To be accomplished in  2  treatments:  1.  Pt will be independent and compliant with HEP to decrease pain, increase ROM and return pt to PLOF. Status at last assessment: initiated at IE   Current: 3/30/22: Pt reports compliance w/ HEP daily  · Long Term Goals: To be accomplished in  8-10  treatments:  1. Pt will increase score on the FOTO to > or = 64/100 to demo an increase in functional activity tolerance. Status at last assessment: 50/100   2. Pt will have an increase in L GHJ AROM to > or = 155 degrees with non-painful arc of movement to improve reaching, dressing, self-care  Status at last assessment: 134 with painful arc; scaption 128 with pain  Current: 3/30/22:  170d flex AROM without pain  3. Pt will be able to self-manage ave pain to < or = 2/10 ave to restore ease of movement, self-care, work. Status at last assessment: ranges 0-6/10   Current: 3/30/22: 0-7/10 \"only when I push on the shoulder w/ my other hand\", average pain < 4/10  4. Pt will be able to swim for > or = 10 min with no c/o pain to resume workouts this summer. Status at last assessment: painful swimming  Current: 3/30/22: Pt reports that he has not tried swimming at this time  5.  Pt will rate > or = +5 on the GROC to demo an increase in functional activity tolerance with the L shoulder  Status at last: NA       PLAN  [x]  Upgrade activities as tolerated     [x]  Continue plan of care  []  Update interventions per flow sheet       []  Discharge due to:_  []  Other:      Rich Luz PT, DPT 3/31/2022      Future Appointments   Date Time Provider Gisele Astorga   3/31/2022 10:45 AM [address] [place] [address] 1 [facility]   4/5/2022 11:30 AM [facility] PT [place] [facility]   4/6/2022 10:45 AM [facility] PT [place] [facility]   4/11/2022 10:45 AM Allan Rothman, PT [facility]   4/14/2022 10:45 AM Vince Cerrato, PT [facility]   4/18/2022 11:30 AM [facility] PT [place] 2 [facility]   4/20/2022 10:45 AM [facility] PT [place] 2 [facility]   7/6/2022  9:55 AM [facility] AMB   7/12/2022 10:20 AM Terrence Frederick MD [facility] AM[facility]

## 2022-04-05 ENCOUNTER — HOSPITAL ENCOUNTER (OUTPATIENT)
Dept: PHYSICAL THERAPY | Age: 77
Discharge: HOME | End: 2022-04-05
Payer: MEDICARE

## 2022-04-05 PROCEDURE — 97110 THERAPEUTIC EXERCISES: CPT

## 2022-04-05 NOTE — PROGRESS NOTES
PT DAILY TREATMENT NOTE     Patient Name: Richard Morgan  Date:2022  : 1945  [x]  Patient  Verified  Payor: Moises Juares / Plan: [place] MED[place] / Product Type: Medicare /    In time:  4341   Out time: 8148  Total Treatment Time (min):23  Total Timed Codes (min):23   1:1 Treatment Time (min): 23  Visit #: 5 of 8-10    Treatment Area: Pain in left shoulder [M25.512]  Primary osteoarthritis, left shoulder [M19.012]    SUBJECTIVE  Pain Level (0-10 scale): 0/10   Any medication changes, allergies to medications, adverse drug reactions, diagnosis change, or new procedure performed?: [x] No    [] Yes (see summary sheet for update)  Subjective functional status/changes:   [] No changes reported  Pt reports that he is doing well, minimal to no soreness following last session. Notes that he had the wrong appt time and apologized. Pt arrived at 1045 for his 1130 appt, left and returned back at 1125. OBJECTIVE  Pt deferred modalities    23 min Therapeutic Exercise:  [x] See flow sheet :   Rationale: increase ROM and increase strength to improve the patients ability to restore reaching, self-care, ADls,       X min Therapeutic Activity:  []  See flow sheet :   Rationale: increase ROM and improve coordination to improve the patients ability to reach overhead and behind the back. X min Manual Therapy:   DTM to upper trap/LS, scap mobs in all directions, DTM posterior cuff and medial scap border. In supine: GHJ distraction and inf/post GHJ mobs followed by PROM into flex/ abd, IR/ER.   CFM to the supraspinatus tendon (2x 15 sec)     Not initiated: Upper trap MET contract/relax stretch on the L while pt in R s/l position,Through axilla, trigger point release subscap, STM to the lat,       Rationale: decrease pain, increase ROM, increase tissue extensibility and decrease trigger points to restore OH reaching   The manual therapy interventions were performed at a separate and distinct time from the therapeutic activities interventions  (na Add 59 Modifier in conjunction with TA treatment)          W/ TE min Patient Education: reviewed intent of new activity and current ROM     Other Objective/Functional Measures:   + added wall clocks and wall push ups to flow sheet today    Finger ladder: flexion up to lvl 34, scaption up to lvl 34       Pain Level (0-10 scale) post treatment: 0/10      ASSESSMENT/Changes in Function:  Pt w/ shortened tx time 2/2 pt initially thinking his appt was at 1045, however it was at 1130. Pt returned to clinic at [phone] and PT took patient back to get started. After a couple of exercises, pt received a phone call and reported that he had to leave 2/2 having a delivery at his house. Pt apologized for having to leave. Pt assured patient that it was understood and reminded pt of appt for next week. Pt verbalized gratitude and understanding and denied any pain or concerns for PT. Good tolerance w/ min v.c. for scap setting w/ wall clocks today and wall push ups. Will cont to progress pt towards swimming specific activity w/ goal to assist pt to return to swim now that he has improved mobility and no pain. Patient will continue to benefit from skilled PT services to modify and progress therapeutic interventions, address functional mobility deficits, address ROM deficits, address strength deficits, analyze and address soft tissue restrictions, analyze and cue movement patterns, analyze and modify body mechanics/ergonomics, assess and modify postural abnormalities, address imbalance/dizziness and instruct in home and community integration to attain remaining goals. [x]  See Plan of Care  []  See progress note/recertification  []  See Discharge Summary           PN due 4/14/22  Progress towards goals / Updated goals: · Short Term Goals: To be accomplished in  2  treatments:  1. Pt will be independent and compliant with HEP to decrease pain, increase ROM and return pt to PLOF.    Status at last assessment: initiated at IE   Current: 4/5/22: Pt noted compliance w/ HEP. · Long Term Goals: To be accomplished in  8-10  treatments:  1. Pt will increase score on the FOTO to > or = 64/100 to demo an increase in functional activity tolerance. Status at last assessment: 50/100   2. Pt will have an increase in L GHJ AROM to > or = 155 degrees with non-painful arc of movement to improve reaching, dressing, self-care  Status at last assessment: 134 with painful arc; scaption 128 with pain  Current: 3/30/22:  170d flex AROM without pain  3. Pt will be able to self-manage ave pain to < or = 2/10 ave to restore ease of movement, self-care, work. Status at last assessment: ranges 0-6/10   Current: 4/5/22: pt noted no pain x 3 days  4. Pt will be able to swim for > or = 10 min with no c/o pain to resume workouts this summer. Status at last assessment: painful swimming  Current: 4/5/22: Pt reports that he has not tried swimming at this time  5.  Pt will rate > or = +5 on the GROC to demo an increase in functional activity tolerance with the L shoulder  Status at last: NA       PLAN  [x]  Upgrade activities as tolerated     [x]  Continue plan of care  []  Update interventions per flow sheet       []  Discharge due to:_  []  Other:      [facility] Vu PT, DPT, CMPT 4/5/2022      Future Appointments   Date Time Provider Gisele Astorga   4/5/2022 11:30 AM [facility] PT [place] 2 [facility]   4/6/2022 10:45 AM [facility] PT [place] 2 [facility]   4/11/2022 10:45 AM Rolo Adams, PT [facility]   4/14/2022 10:45 AM Theo Marquez, PT [facility]   4/18/2022 11:30 AM [facility] PT [place] 2 [facility]   4/20/2022 10:45 AM [facility] PT [place] 2 [facility]   7/6/2022  9:55 AM [facility] VIS[facility] AMB   7/12/2022 10:20 AM Amelia Nowak MD [facility] AMB

## 2022-04-06 ENCOUNTER — HOSPITAL ENCOUNTER (OUTPATIENT)
Dept: PHYSICAL THERAPY | Age: 77
Discharge: HOME | End: 2022-04-06
Payer: MEDICARE

## 2022-04-06 PROCEDURE — 97110 THERAPEUTIC EXERCISES: CPT

## 2022-04-06 PROCEDURE — 97530 THERAPEUTIC ACTIVITIES: CPT

## 2022-04-06 NOTE — PROGRESS NOTES
PT DAILY TREATMENT NOTE     Patient Name: Yary Yepez  Date:2022  : 1945  [x]  Patient  Verified  Payor: Nishi Gil / Plan: [place] / Product Type: Medicare /    In time:  0   Out time:1130  Total Treatment Time (min):40  Total Timed Codes (min):40    1:1 Treatment Time (min): 40   Visit #: 6 of 8-10    Treatment Area: Pain in left shoulder [M25.512]  Primary osteoarthritis, left shoulder [M19.012]    SUBJECTIVE  Pain Level (0-10 scale): 3 /10   Any medication changes, allergies to medications, adverse drug reactions, diagnosis change, or new procedure performed?: [x] No    [] Yes (see summary sheet for update)  Subjective functional status/changes:   [] No changes reported  Pt reports that he made it home yesterday in time for his delivery. L shoulder sore today 2/2 sleeping per his report. OBJECTIVE  Pt deferred modalities    25 min Therapeutic Exercise:  [x] See flow sheet :   Rationale: increase ROM and increase strength to improve the patients ability to restore reaching, self-care, ADls,       15 min Therapeutic Activity:  [x]  See flow sheet : swimming specific task, D1/D2 functional movement patterns, FIR w/ strap   Rationale: increase ROM and improve coordination to improve the patients ability to reach overhead and behind the back. X min Manual Therapy:   DTM to upper trap/LS, scap mobs in all directions, DTM posterior cuff and medial scap border. In supine: GHJ distraction and inf/post GHJ mobs followed by PROM into flex/ abd, IR/ER.   CFM to the supraspinatus tendon (2x 15 sec)     Not initiated: Upper trap MET contract/relax stretch on the L while pt in R s/l position,Through axilla, trigger point release subscap, STM to the lat,       Rationale: decrease pain, increase ROM, increase tissue extensibility and decrease trigger points to restore OH reaching   The manual therapy interventions were performed at a separate and distinct time from the therapeutic activities interventions  (na Add 59 Modifier in conjunction with TA treatment)          W/ TE min Patient Education: reviewed intent of new activity and current ROM measurements and potential for DOMs. Other Objective/Functional Measures:   + progressed towel IR stretch hold to 3x30 seconds, wall push ups 3x10, wall clocks w/ green TB, D1/D2 flex w/ back to wall, pec stretch w/ high position  + shoulder ext: L: 56 d without pain, flex: 154 d, scaption: 150 d all without pain \"just stiff\"  + added quadruped thread the needles and modified cat/camel for t/s mobility    Pain Level (0-10 scale) post treatment: 0/10      ASSESSMENT/Changes in Function:  Pt noted to have good tolerance to activity progression today for RTC strengthening and progressive functional movement patterns. Noted greatest restriction w/ D2 functional movement pattern 2/2 pectoral and lat tissue restrictions. Pt noted to have improved AROM of the L shoulder all planes as compared to intitial evaluation without any c/o painful arc. Cont to be limited w/ soft tissue restrictions in the upper quadrant and decreased mobility of the t/s. Will cont to integrate trunk mobility, specifically rotation, and shoulder ROM as pt tolerates in prep for swimming. Patient will continue to benefit from skilled PT services to modify and progress therapeutic interventions, address functional mobility deficits, address ROM deficits, address strength deficits, analyze and address soft tissue restrictions, analyze and cue movement patterns, analyze and modify body mechanics/ergonomics, assess and modify postural abnormalities, address imbalance/dizziness and instruct in home and community integration to attain remaining goals. [x]  See Plan of Care  []  See progress note/recertification  []  See Discharge Summary           PN due 4/14/22  Progress towards goals / Updated goals: · Short Term Goals: To be accomplished in  2  treatments:  1.  Pt will be independent and compliant with HEP to decrease pain, increase ROM and return pt to PLOF. Status at last assessment: initiated at [facility]   Current: 4/5/22: Pt noted compliance w/ HEP. · Long Term Goals: To be accomplished in  8-10  treatments:  1. Pt will increase score on the FOTO to > or = 64/100 to demo an increase in functional activity tolerance. Status at last assessment: 50/100   2. Pt will have an increase in L GHJ AROM to > or = 155 degrees with non-painful arc of movement to improve reaching, dressing, self-care  Status at last assessment: 134 with painful arc; scaption 128 with pain  Current: 4/6/22: flex:  154 d without pain,  Scaption: 150 d without pain  3. Pt will be able to self-manage ave pain to < or = 2/10 ave to restore ease of movement, self-care, work. Status at last assessment: ranges 0-6/10   Current: 4/5/22: pt noted no pain x 3 days  4. Pt will be able to swim for > or = 10 min with no c/o pain to resume workouts this summer. Status at last assessment: painful swimming  Current: 4/5/22: Pt reports that he has not tried swimming at this time  5.  Pt will rate > or = +5 on the GROC to demo an increase in functional activity tolerance with the L shoulder  Status at last: NA       PLAN  [x]  Upgrade activities as tolerated     [x]  Continue plan of care  []  Update interventions per flow sheet       []  Discharge due to:_  []  Other:      Madina Jurado PT, DPT, CMPT 4/6/2022      Future Appointments   Date Time Provider Gisele Astorga   4/6/2022 10:45 AM [facility]   4/11/2022 10:45 AM Maria Teresa Borrego, PT [facility]   4/14/2022 10:45 AM Rochelle Smith PT [facility]   4/18/2022 11:30 AM [facility] PT [place] 2 [facility]   4/20/2022 10:45 AM [facility] PT [place] 2 [facility]   7/6/2022  9:55 AM [facility] VISIT [facility] BS AMB   7/12/2022 10:20 AM Shobha Shane MD [facility] BS AMB

## 2022-04-07 DIAGNOSIS — N52.9 ERECTILE DYSFUNCTION, UNSPECIFIED ERECTILE DYSFUNCTION TYPE: ICD-10-CM

## 2022-04-07 RX ORDER — SILDENAFIL CITRATE 20 MG/1
TABLET ORAL
Qty: 30 TABLET | Refills: 8 | Status: SHIPPED | OUTPATIENT
Start: 2022-04-07

## 2022-04-11 ENCOUNTER — HOSPITAL ENCOUNTER (OUTPATIENT)
Dept: PHYSICAL THERAPY | Age: 77
Discharge: HOME | End: 2022-04-11
Payer: MEDICARE

## 2022-04-11 PROCEDURE — 97140 MANUAL THERAPY 1/> REGIONS: CPT

## 2022-04-11 PROCEDURE — 97110 THERAPEUTIC EXERCISES: CPT

## 2022-04-11 PROCEDURE — 97530 THERAPEUTIC ACTIVITIES: CPT

## 2022-04-11 NOTE — PROGRESS NOTES
PT DAILY TREATMENT NOTE     Patient Name: Dimas Kuo  Date:2022  : 1945  [x]  Patient  Verified  Payor: [place] MEDICARE / Plan: [place] MEDICARE PART A & B / Product Type: Medicare /    In time:   Out time:113  Total Treatment Time (min):55  Total Timed Codes (min):55  1:1 Treatment Time (min): 55   Visit #: 7 of 8-10    Treatment Area: Pain in left shoulder [M25.512]  Primary osteoarthritis, left shoulder [M19.012]    SUBJECTIVE  Pain Level (0-10 scale): 10   Any medication changes, allergies to medications, adverse drug reactions, diagnosis change, or new procedure performed?: [x] No    [] Yes (see summary sheet for update)  Subjective functional status/changes:   [] No changes reported  Pt reports doing well overall     OBJECTIVE  Pt deferred modalities    32 min Therapeutic Exercise:  [x] See flow sheet :   Rationale: increase ROM and increase strength to improve the patients ability to restore reaching, self-care, ADls,       11 min Therapeutic Activity:  [x]  See flow sheet : swimming specific task, D1/D2 functional movement patterns, FIR w/ strap, DARCIE with wand    Rationale: increase ROM and improve coordination to improve the patients ability to reach overhead and behind the back.        14 min Manual Therapy:  Supine grade 3-4 joint mobs for flexion, scaption and ER, long axis distraction, inferior glide  Pect release   Prone TS PA mobs grade 3-4        Rationale: decrease pain, increase ROM, increase tissue extensibility and decrease trigger points to restore OH reaching   The manual therapy interventions were performed at a separate and distinct time from the therapeutic activities interventions  (na Add 59 Modifier in conjunction with TA treatment)          W/ TE min Patient Education: HEP - add chin tucks  - patient declined pictures      Other Objective/Functional Measures:   AAROM flexion/ scaption on finger ladder - 160 each     Added 90/90 DARCIE stretch with cane - patient limited in AROM but able to tolerate     Pain Level (0-10 scale) post treatment: 0/10      ASSESSMENT/Changes in Function:  Pt tolerated quadruped activities well that were added last session, but continue to demo significant limitation in TS mobility , especially into extension. Fwd head posture is still prevalent as evident during back to wall activities - added seted chin tucks in clinic and via HEP to improve CS posture. Challenged by new exercise as above, . Educated on upcoming reassessment for 30 day PN NV - 2 more apts left      Patient will continue to benefit from skilled PT services to modify and progress therapeutic interventions, address functional mobility deficits, address ROM deficits, address strength deficits, analyze and address soft tissue restrictions, analyze and cue movement patterns, analyze and modify body mechanics/ergonomics, assess and modify postural abnormalities, address imbalance/dizziness and instruct in home and community integration to attain remaining goals. [x]  See Plan of Care  []  See progress note/recertification  []  See Discharge Summary           PN due 4/14/22  Progress towards goals / Updated goals: ALL GOALS TO BE FORMALLY ASSESSED NV FOR 30 DAY PN 4/11/2022  · Short Term Goals: To be accomplished in  2  treatments:  1. Pt will be independent and compliant with HEP to decrease pain, increase ROM and return pt to PLOF. Status at last assessment: initiated at    Current: 4/5/22: Pt noted compliance w/ HEP. · Long Term Goals: To be accomplished in  8-10  treatments:  1. Pt will increase score on the FOTO to > or = 64/100 to demo an increase in functional activity tolerance. Status at last assessment: 50/100   2.  Pt will have an increase in L GHJ AROM to > or = 155 degrees with non-painful arc of movement to improve reaching, dressing, self-care  Status at last assessment: 134 with painful arc; scaption 128 with pain  Current: 4/6/22: flex:  154 d without pain,  Scaption: 150 d without pain  3. Pt will be able to self-manage ave pain to < or = 2/10 ave to restore ease of movement, self-care, work. Status at last assessment: ranges 0-6/10   Current: 4/5/22: pt noted no pain x 3 days  4. Pt will be able to swim for > or = 10 min with no c/o pain to resume workouts this summer. Status at last assessment: painful swimming  Current: 4/5/22: Pt reports that he has not tried swimming at this time  5.  Pt will rate > or = +5 on the GROC to demo an increase in functional activity tolerance with the L shoulder  Status at last: NA       PLAN  [x]  Upgrade activities as tolerated     [x]  Continue plan of care  []  Update interventions per flow sheet       []  Discharge due to:_  [x]  Other:  PN due NV      Tomasa Cormier DPT  4/11/2022      Future Appointments   Date Time Provider Gisele Astorga   4/11/2022 10:45 AM Alta Vasquez, PT [facility]   4/14/2022 10:45 AM Grayson Wiley PT [facility]   4/18/2022 11:30 AM [facility] PT [place] 2 [facility]   4/20/2022 10:45 AM [facility] PT [place] 2 [facility]   7/6/2022  9:55 AM [facility] V[facility]   7/12/2022 10:20 AM Yosvany Mosley MD [facility]

## 2022-04-14 ENCOUNTER — HOSPITAL ENCOUNTER (OUTPATIENT)
Dept: PHYSICAL THERAPY | Age: 77
Discharge: HOME | End: 2022-04-14
Payer: MEDICARE

## 2022-04-14 PROCEDURE — 97530 THERAPEUTIC ACTIVITIES: CPT

## 2022-04-14 PROCEDURE — 97110 THERAPEUTIC EXERCISES: CPT

## 2022-04-14 PROCEDURE — 97140 MANUAL THERAPY 1/> REGIONS: CPT

## 2022-04-14 NOTE — PROGRESS NOTES
PT DAILY TREATMENT NOTE     Patient Name: Juan Martin  Date:2022  : 1945  [x]  Patient  Verified  Payor: [place] MEDICARE / Plan: [place] MEDICARE PART A & B / Product Type: Medicare /    In time:11:30  Out time 12:29  Total Treatment Time (min): 59  Total Timed Codes (min): 59  1:1 Treatment Time (min): 11:30 to 12:18 (48)   Visit #: 8 of 8-10    Treatment Area: Pain in left shoulder [M25.512]  Primary osteoarthritis, left shoulder [M19.012]    SUBJECTIVE  Pain Level (0-10 scale): 0  Any medication changes, allergies to medications, adverse drug reactions, diagnosis change, or new procedure performed?: [x] No    [] Yes (see summary sheet for update)  Subjective functional status/changes:   [] No changes reported  Doing great     OBJECTIVE      40 (billed 29) min Therapeutic Exercise:  [x] See flow sheet :  UBe increased to L2  Reassessment for PN     Rationale: increase ROM and increase strength to improve the patients ability to reach, dress, ADLs     8 min Manual Therapy:      Supine grade 3-4 joint mobs for flexion, scaption and ER, long axis distraction, inferior glide  Prone TS PA mobs grade 3-4       Rationale: decrease pain, increase ROM, increase tissue extensibility and decrease trigger points to improve control of OH reaching   The manual therapy interventions were performed at a separate and distinct time from the therapeutic activities interventions   (na Add 59 Modifier in conjunction with TA treatment)       11 min Therapeutic Activity:  [x]?   See flow sheet : swimming specific task, D1/D2 functional movement patterns, FIR w/ strap, DARCIE with wand    Rationale: increase ROM and improve coordination to improve the patients ability to reach overhead and behind the back.           x min Patient Education: [x] Review HEP    [] Progressed/Changed HEP based on:   [] positioning   [] body mechanics   [] transfers   [] heat/ice application        Other Objective/Functional Measures:     Pain ranges: 0 to 6/10  Ave pain: 3/10  Improvements: mobility, no sharp pains in L shoulder, ROM; notes no major ROM limitations; has performed some swim simulated movements with no pain  Deficits: sleeping on the L shoulder (min waking from sleep)  FOTO 76/100  GROC +7     OBJECTIVE:   Posture: poor; forward head 11 deg   Inspection: WNL  ROM:  Limited end range                                            AROM                                                              PROM    Left Right   Left Right   Flexion 154 160 Flexion 166     Extension 64 50 Extension       Scaption/ABD 152p! 132 Scaptin/     ER @ 0 Degrees 61 60 ER @ 45 Degrees 62 deg     IR/ADD T10 T10 IR/ADD       DARCIE T1 slight lateral T1            Strength:                                                                          L (1-5) R (1-5) Pain   Flexors 5 5 _ Yes   []? No   Abductors 5 5 [x]? Yes   []? No   External Rotators 5 5 []? Yes   []? No   Internal Rotators 5 5 []? Yes   []? No   Supraspinatus 5 5 []? Yes   []? No   Serratus Anterior     []? Yes   []? No   Lower Trapezius     []? Yes   []? No   Elbow Flexion 5 5 []? Yes   []? No   Elbow Extension 5 5 []? Yes   []? No    strength :  Equal      Scapulohumoral Control / Rhythm:  Able to eccentrically lower with good control? Left: [x]? Yes   []? No     Right: [x]? Yes   []? No     Palpation  []? Min  [x]? Mod  []?  Severe    Location: mid deltoid, posterior cuff   reducd mobility in mid to lower T/S      Special Tests: (-)  Neer, (-) HK and crossover  (-) with biceps load 2  (-) empty can for pain  (-) painful arc; just end range pain scaption (L)  (-) GHJ sulcus/distraction test      Pain Level (0-10 scale) post treatment: 0    ASSESSMENT/Changes in Function: see PN     Patient will continue to benefit from skilled PT services to modify and progress therapeutic interventions, address functional mobility deficits, address ROM deficits, address strength deficits, analyze and address soft tissue restrictions, analyze and cue movement patterns, analyze and modify body mechanics/ergonomics, assess and modify postural abnormalities, address imbalance/dizziness and instruct in home and community integration to attain remaining goals. []  See Plan of Care  [x]  See progress note/recertification  []  See Discharge Summary         Progress towards goals / Updated goals:  PN due 4/14/22  Progress towards goals / Updated goals: ALL GOALS TO BE FORMALLY ASSESSED NV FOR 30 DAY PN 4/11/2022  · Short Term Goals: To be accomplished in  2  treatments:  1. Pt will be independent and compliant with HEP to decrease pain, increase ROM and return pt to PLOF. Status at last assessment: initiated at [facility]  Current: 4/14/22: Pt notes some compliance w/ HEP as needed to manage pain   · Long Term Goals: To be accomplished in  8-10  treatments:  1. Pt will increase score on the FOTO to > or = 64/100 to demo an increase in functional activity tolerance. Status at last assessment: 50/100   Current: goal met 76/100 (4/14/22)  2. Pt will have an increase in L GHJ AROM to > or = 155 degrees with non-painful arc of movement to improve reaching, dressing, self-care  Status at last assessment: 134 with painful arc; scaption 128 with pain  Current: goal progressing; flex:  154 d without pain,  Scaption: 152 d without pain (4/14/22)  3. Pt will be able to self-manage ave pain to < or = 2/10 ave to restore ease of movement, self-care, work. Status at last assessment: ranges 0-6/10   Current: goal progressing with pain ranging 0-6/10 but ave pain decreased to 3/10 (4/14/22)  4. Pt will be able to swim for > or = 10 min with no c/o pain to resume workouts this summer.   Status at last assessment: painful swimming  Current: 4/5/22: Pt reports that he has not tried swimming at this time but simulated movements yield no pain and improved ROM (4/14/22)  5.  Pt will rate > or = +5 on the GROC to demo an increase in functional activity tolerance with the L shoulder  Status at last: NA   Current: goal met +7 (4/14/22)     PLAN  [x]  Upgrade activities as tolerated     [x]  Continue plan of care  []  Update interventions per flow sheet       []  Discharge due to:_  [x]  Other:_  Con't for final 2 sessions and then D/C     Lon Mays, PT 4/14/2022  11:08 AM    Future Appointments   Date Time Provider Gisele Astorga   4/14/2022 11:30 AM Vince Cerrato, PT [facility]   4/18/2022 11:30 AM [facility] PT ENT 2 [facility]   4/20/2022 10:45 AM [facility] PT [place] 2 [facility]   7/6/2022  9:55 AM [facility] V[facility] AM[facility]   7/12/2022 10:20 AM Terrence Frederick MD [facility] AM[facility]

## 2022-04-14 NOTE — PROGRESS NOTES
[address] [facility] [address] [facility] AT [address] [facility] [address]  Phone: [phone] Fax: [phone]  PROGRESS NOTE  Patient Name: Tello Lilly : 1945   Treatment/Medical Diagnosis: Pain in left shoulder [M25.512]  Primary osteoarthritis, left shoulder [M19.012]   Referral Source: Bhavna Prakash MD     Date of Initial Visit: 3/14/22 Attended Visits: 8 Missed Visits: 0     SUMMARY OF TREATMENT   Pt is a 63 yo male, R hand dominant, with 2 years of L shoulder ROM dysfunction and pain that started when he got his COVID booster shot in the L deltoid. Ther ex including strengthening, ROM, flexibility, stabilization; manual therapy including: mobs, PROM, DTM, stretching; Patient education; HEP, postural education   CURRENT STATUS  Pt is making great progress in therapy. Pain ranges from 0-6/10 but ave pain 3/10. FOTO demo's significant functional improvements and pt has returned to all activities except swimming (not limited by pain but by time constraints). He demo's no + signs of impingement and is only min limited in end range abduction with end range pain. Pt has improved posture awareness but still limited T/S and C/S mobility.      Pain ranges: 0 to 6/10  Ave pain: 3/10  Improvements: mobility, no sharp pains in L shoulder, ROM; notes no major ROM limitations; has performed some swim simulated movements with no pain  Deficits: sleeping on the L shoulder (min waking from sleep)  FOTO 76/100  GROC +7     OBJECTIVE:   Posture: poor; forward head 11 deg   Inspection: WNL  ROM:  Limited end range                                            AROM                                                              PROM    Left Right   Left Right   Flexion 154 160 Flexion 166     Extension 64 50 Extension       Scaption/ABD 152p! 132 Scaptin/     ER @ 0 Degrees 61 60 ER @ 45 Degrees 62 deg     IR/ADD T10 T10 IR/ADD       DARCIE T1 slight lateral T1          Strength:                                                                          L (1-5) R (1-5) Pain   Flexors 5 5 _ Yes   []? No   Abductors 5 5 [x]? Yes   []? No   External Rotators 5 5 []? Yes   []? No   Internal Rotators 5 5 []? Yes   []? No   Supraspinatus 5 5 []? Yes   []? No   Serratus Anterior     []? Yes   []? No   Lower Trapezius     []? Yes   []? No   Elbow Flexion 5 5 []? Yes   []? No   Elbow Extension 5 5 []? Yes   []? No    strength :  Equal      Scapulohumoral Control / Rhythm:  Able to eccentrically lower with good control? Left: [x]? Yes   []? No     Right: [x]? Yes   []? No     Palpation  []? Min  [x]? Mod  []? Severe    Location: mid deltoid, posterior cuff   reducd mobility in mid to lower T/S      Special Tests: (-)  Neer, (-) HK and crossover  (-) with biceps load 2  (-) empty can for pain  (-) painful arc; just end range pain scaption (L)  (-) GHJ sulcus/distraction test        Goals for this period include:   · Short Term Goals: To be accomplished in  2  treatments:  1. Pt will be independent and compliant with HEP to decrease pain, increase ROM and return pt to OF. Status at last assessment: initiated at [facility]  Current: 4/14/22: Pt notes some compliance w/ HEP as needed to manage pain   · Long Term Goals: To be accomplished in  8-10  treatments:  1. Pt will increase score on the FOTO to > or = 64/100 to demo an increase in functional activity tolerance. Status at last assessment: 50/100   Current: goal met 76/100 (4/14/22)  2. Pt will have an increase in L GHJ AROM to > or = 155 degrees with non-painful arc of movement to improve reaching, dressing, self-care  Status at last assessment: 134 with painful arc; scaption 128 with pain  Current: goal progressing; flex:  154 d without pain,  Scaption: 152 d without pain (4/14/22)  3. Pt will be able to self-manage ave pain to < or = 2/10 ave to restore ease of movement, self-care, work.    Status at last assessment: ranges 0-6/10   Current: goal progressing with pain ranging 0-6/10 but ave pain decreased to 3/10 (4/14/22)  4. Pt will be able to swim for > or = 10 min with no c/o pain to resume workouts this summer.   Status at last assessment: painful swimming  Current: 4/5/22: Pt reports that he has not tried swimming at this time but simulated movements yield no pain and improved ROM (4/14/22)  5. Pt will rate > or = +5 on the GROC to demo an increase in functional activity tolerance with the L shoulder  Status at last: NA   Current: goal met +7 (4/14/22)     New Goals to be achieved in __2__  treatments:  1. Pt will have an increase in L GHJ AROM to > or = 155 degrees with non-painful arc of movement to improve reaching, dressing, self-care  Status at last assessment: 134 with painful arc; scaption 128 with pain  Current: goal progressing; flex:  154 d without pain,  Scaption: 152 d without pain (4/14/22)  2. Pt will be able to self-manage ave pain to < or = 2/10 ave to restore ease of movement, self-care, work. Status at last assessment: ranges 0-6/10   Current: goal progressing with pain ranging 0-6/10 but ave pain decreased to 3/10 (4/14/22)  3. Pt will be able to swim for > or = 10 min with no c/o pain to resume workouts this summer.   Status at last assessment: painful swimming  Current: 4/5/22: Pt reports that he has not tried swimming at this time but simulated movements yield no pain and improved ROM (4/14/22)    RECOMMENDATIONS  Recommend con't with 2 final Rx and then D/C to HEP   If you have any questions/comments please contact us directly at [phone]. Thank you for allowing us to assist in the care of your patient. Therapist Signature: Kisha Gandhi DPT Date: 4/14/2022   Reporting period: 3/14/22 to 4/14/22 Time: 11:13 AM   NOTE TO PHYSICIAN:  PLEASE COMPLETE THE ORDERS BELOW AND FAX TO   [facility] at [facility]: [phone].   If you are unable to process this request in 24 hours please contact our office: [phone].  ___ I have read the above report and request that my patient continue as recommended.   ___ I have read the above report and request that my patient continue therapy with the following changes/special instructions:_________________________________________________________   ___ I have read the above report and request that my patient be discharged from therapy.      Physician Signature:        Date:       Time:

## 2022-04-18 ENCOUNTER — HOSPITAL ENCOUNTER (OUTPATIENT)
Dept: PHYSICAL THERAPY | Age: 77
Discharge: HOME | End: 2022-04-18
Payer: MEDICARE

## 2022-04-18 PROCEDURE — 97110 THERAPEUTIC EXERCISES: CPT

## 2022-04-18 PROCEDURE — 97530 THERAPEUTIC ACTIVITIES: CPT

## 2022-04-18 NOTE — PROGRESS NOTES
PT DAILY TREATMENT NOTE     Patient Name: Deniz Luz  Date:2022  : 1945  [x]  Patient  Verified  Payor: Chung  / Plan: [place] MED[place] PART [place] & B / Product Type: Medicare /    In time:    Out time 121  Total Treatment Time (min):45  Total Timed Codes (min): 45   1:1 Treatment Time (min):45   Visit #: 1 of 2    Treatment Area: Pain in left shoulder [M25.512]  Primary osteoarthritis, left shoulder [M19.012]    SUBJECTIVE  Pain Level (0-10 scale): 0  Any medication changes, allergies to medications, adverse drug reactions, diagnosis change, or new procedure performed?: [x] No    [] Yes (see summary sheet for update)  Subjective functional status/changes:   [] No changes reported  Pt reports that he is doing well, denies any new concerns for PT. Notes that the pool opens on May 20 in the [place] (where he usually goes for the summer). OBJECTIVE      20  min Therapeutic Exercise:  [x] See flow sheet :   Rationale: increase ROM and increase strength to improve the patients ability to reach, dress, ADLs     x  min Manual Therapy:      Supine grade 3-4 joint mobs for flexion, scaption and ER, long axis distraction, inferior glide  Prone TS PA mobs grade 3-4       Rationale: decrease pain, increase ROM, increase tissue extensibility and decrease trigger points to improve control of OH reaching   The manual therapy interventions were performed at a separate and distinct time from the therapeutic activities interventions   (na Add 59 Modifier in conjunction with TA treatment)       25 min Therapeutic Activity:  [x]?   See flow sheet : swimming specific task, D1/D2 functional movement patterns, FIR w/ strap, DARCIE with wand, trunk rotations w/ thread the needle   Rationale: increase ROM and improve coordination to improve the patients ability to reach overhead and behind the back.           W/ TA min Patient Education: [x] Review HEP, review Dx, upcoming visit w/ confirmation of date/time and intent to d/c to HEP at the . [address]. Pt verbalized agreement/understanding and denied any concerns for PT post tx.     [] Progressed/Changed HEP based on:   [] positioning   [] body mechanics   [] transfers   [] heat/ice application        Other Objective/Functional Measures:   + progressed chin tucks to standing w/ back on wall for feedback  + added small green med ball for D1/D2 shoulder activity, increased reps to 2x10      Pain Level (0-10 scale) post treatment: 0/10    ASSESSMENT/Changes in Function:   Pt required intermittent v.c. needed for scap retraction/depression and CV nods during functional ROM activity 2/2 FHP. Improved postural self awareness w/ repetitions. Pt noted cont restrictions w/ t/s extension that inhibits full shoulder mobility. Pt able to complete all tasks to address t/s rotation/ext without reports of any increased pain. Emphasis this session on ROM/flexibility, strengthening of the UEs in prep for d/c to HEP. At this time, pt has not returned to swimming 2/2 not having access to a pool until May. Patient will continue to benefit from skilled PT services to modify and progress therapeutic interventions, address functional mobility deficits, address ROM deficits, address strength deficits, analyze and address soft tissue restrictions, analyze and cue movement patterns, analyze and modify body mechanics/ergonomics, assess and modify postural abnormalities, address imbalance/dizziness and instruct in home and community integration to attain remaining goals. []  See Plan of Care  []  See progress note/recertification  []  See Discharge Summary         Progress towards goals / Updated goals:  PN due 5/14/22  1.  Pt will have an increase in L GHJ AROM to > or = 155 degrees with non-painful arc of movement to improve reaching, dressing, self-care  Status at last assessment: 134 with painful arc; scaption 128 with pain  Current: goal progressing; flex:  154 d without pain,  Scaption: 152 d without pain (4/14/22)  2. Pt will be able to self-manage ave pain to < or = 2/10 ave to restore ease of movement, self-care, work.    Status at last assessment: ranges 0-6/10   Current: goal progressing with pain ranging 0-6/10 but ave pain decreased to 3/10 (4/14/22)  3. Pt will be able to swim for > or = 10 min with no c/o pain to resume workouts this summer.   Status at last assessment: painful swimming  Current: 4/5/22: Pt reports that he has not tried swimming at this time but simulated movements yield no pain and improved ROM (4/14/22)    PLAN  [x]  Upgrade activities as tolerated     [x]  Continue plan of care  []  Update interventions per flow sheet       []  Discharge due to:_  [x]  Other: review HEP anticipate d/c at the [facility], PT 4/18/2022  1215     Future Appointments   Date Time Provider Gisele Astorga   4/18/2022 11:30 AM 1800 [address] 2 [facility]   4/20/2022 10:45 AM [facility] PT ENT 2 [facility]   7/6/2022  9:55 AM IO LAB VISIT IO BS AMB   7/12/2022 10:20 AM Kirstie Salamanca MD IO BS AMB

## 2022-04-20 ENCOUNTER — APPOINTMENT (OUTPATIENT)
Dept: PHYSICAL THERAPY | Age: 77
End: 2022-04-20
Payer: MEDICARE

## 2022-04-21 ENCOUNTER — HOSPITAL ENCOUNTER (OUTPATIENT)
Dept: PHYSICAL THERAPY | Age: 77
Discharge: HOME | End: 2022-04-21
Payer: MEDICARE

## 2022-04-21 PROCEDURE — 97110 THERAPEUTIC EXERCISES: CPT

## 2022-04-21 PROCEDURE — 97530 THERAPEUTIC ACTIVITIES: CPT

## 2022-04-21 NOTE — PROGRESS NOTES
Physical Therapy Discharge Instructions      [facility] - [address] [facility]  [phone] fax      Patient: Ofelia Kuhn  : 1945      Continue Home Exercise Program 1-2 times per day for 4 weeks, then decrease to 3-4 times per week. If any return of S&S, please notify your medical provider as a new referral will be required to return to PT services. Continue with    [x] Ice  as needed for pain     [x] Heat           Follow up with MD:     [] Upon completion of therapy     [x] As needed      Recommendations:     [x]   Return to activity with home program         Return to/join local gym        Additional Comments: [facility]. It has been a pleasure working with you . Please do not hesitate to reach out if you have any questions or concerns.            [facility] [place] 2022 11:36 AM

## 2022-04-21 NOTE — PROGRESS NOTES
PT DAILY TREATMENT NOTE     Patient Name: Mady Archuleta  Date:2022  : 1945  [x]  Patient  Verified  Payor: [place] MEDICARE / Plan: [place] MEDICARE PART A & B / Product Type: Medicare /    In time: 5344   Out time 1215   Total Treatment Time (min): 45  Total Timed Codes (min): 45   1:1 Treatment Time (min):45  Visit #: 2 of 2    Treatment Area: Pain in left shoulder [M25.512]  Primary osteoarthritis, left shoulder [M19.012]    SUBJECTIVE  Pain Level (0-10 scale): 2/10 \"stiff and achy today\"  Any medication changes, allergies to medications, adverse drug reactions, diagnosis change, or new procedure performed?: [x] No    [] Yes (see summary sheet for update)  Subjective functional status/changes:   [] No changes reported  Pt notes that he is doing well, is more store and stiff today. Cont to report being ready for d/c. OBJECTIVE      25 min Therapeutic Exercise:  [x] See flow sheet :   Rationale: increase ROM and increase strength to improve the patients ability to reach, dress, ADLs     x  min Manual Therapy:      Supine grade 3-4 joint mobs for flexion, scaption and ER, long axis distraction, inferior glide  Prone TS PA mobs grade 3-4       Rationale: decrease pain, increase ROM, increase tissue extensibility and decrease trigger points to improve control of OH reaching   The manual therapy interventions were performed at a separate and distinct time from the therapeutic activities interventions   (na Add 59 Modifier in conjunction with TA treatment)       20 min Therapeutic Activity:  [x]? See flow sheet :reviewed updated HEP, d/c instructions, progress made, FOTO assessment, goals from therapy. Edu pt on when to return to MD and PT services.     Rationale: increase ROM and improve coordination to improve the patients ability to reach overhead and behind the back.           W/ TA min Patient Education: [x] Review HEP, review Dx, upcoming visit w/ confirmation of date/time and intent to d/c to HEP at the . [address]. Pt verbalized agreement/understanding and denied any concerns for PT post tx.     [] Progressed/Changed HEP based on:   [] positioning   [] body mechanics   [] transfers   [] heat/ice application        Other Objective/Functional Measures:   Pain ranges: 0-6/10  Avg pain: 0/10 \" I went all day without pain yesterday', w/ increased activity average pain level 3/10  Improvements: mobility, no sharp pains in L shoulder, ROM; notes no major ROM limitations; has performed some swim simulated movements with no pain  Deficits: sleeping on the L shoulder (min waking from sleep) pt stating \" my wife says she sees me sleeping on my R side\"   FOTO   72/100  GROC +4     OBJECTIVE:   Posture: poor; forward head 11 deg  (4/21/22, no change)  Inspection: WNL  ROM:  Limited end range                                            AROM                                                              PROM    Left Right   Left Right   Flexion 170 160 Flexion      Extension 64 50 Extension       Scaption/ABD 160d/120d (p! W/ abd) 132 Scaptin/ABD      ER @ 0 Degrees 61 60 ER @ 45 Degrees      IR/ADD T9 T10 IR/ADD       DARCIE T2 T1            Strength:                                                                          L (1-5) R (1-5) Pain   Flexors 5 5 _ Yes   [x]? ? No   Abductors 5 5 []? ? Yes   [x]? ? No   External Rotators 5 5 []? ? Yes   [x]? ? No   Internal Rotators 5 5 []? ? Yes   [x]? ? No   Supraspinatus 5 5 []? ? Yes   [x]? ? No   Serratus Anterior     []? ? Yes   []? ? No   Lower Trapezius     []? ? Yes   []? ? No   Elbow Flexion 5 5 []? ? Yes   [x]? ? No   Elbow Extension 5 5 []? ? Yes   [x]? ? No    strength :  Equal      Scapulohumoral Control / Rhythm:  Able to eccentrically lower with good control? Left: [x]? ? Yes   []? ? No     Right: [x]? ? Yes   []? ? No     Palpation  No TTP today to any RTC or GHJ locations assessed (grossly)     Special Tests: 4/21/22 no change  (-)  Neer, (-) HK and crossover  (-) with biceps load 2  (-) empty can for pain  (-) painful arc; just end range pain scaption (L)  (-) GHJ sulcus/distraction test      Pain Level (0-10 scale) post treatment: 0/10    ASSESSMENT/Changes in Function:   Pt has completed a total of 12 therapy sessions since his initial evaluation. At this time, he has made progress towards all updated goals. Goals for ROM met, however goals for pain not met 2/2 pt noting intermittent pain w/ increased physical activity w/ an average level of pain 0/10, also notes that he has not attempted to swim yet but plans to when he goes to stay in the [place] over the summer. Pt was provided w/ updated HEP, demo good recall and indep w/ performance of all the activity. Pt is able to perform all functional movement of the LUE without reports of pain and has demo improved strength and tolerance to functional ADLs. FOTO score down 4 points from 76 to 72/100 following last PN that may be 2/2 to reported \"stiffness today\" w/ changes in weather. Pt is ready to d/c to HEP 2/2 good progress towards goals, indep management of S&S. []  See Plan of Care  []  See progress note/recertification  [x]  See Discharge Summary         Progress towards goals / Updated goals:  PN due 5/14/22  1. Pt will have an increase in L GHJ AROM to > or = 155 degrees with non-painful arc of movement to improve reaching, dressing, self-care  Status at last assessment: 134 with painful arc; scaption 128 with pain  Current: 4/21/22 goal met; flex:  170 d without pain,  Scaption: 160 d without pain   2. Pt will be able to self-manage ave pain to < or = 2/10 ave to restore ease of movement, self-care, work.    Status at last assessment: ranges 0-6/10   Current: 4/21/22 goal not met: pt reports pain ranges 0-6/10, average pain 0/10  3. Pt will be able to swim for > or = 10 min with no c/o pain to resume workouts this summer.   Status at last assessment: painful swimming  Current: 4/21/22: goal not met 2/2 pt reports that he has not tried swimming at this time but simulated movements yield no pain and improved ROM     PLAN  []  Upgrade activities as tolerated     []  Continue plan of care  []  Update interventions per flow sheet       [x]  Discharge due to: indep management of S&S w/ HEP, average pain level of 0/10  []  Other:     Dennie Daft, PT 4/21/2022  1215    Future Appointments   Date Time Provider Gisele Astorga   7/6/2022  9:55 AM [facility] LAB VISIT IO BS AMB   7/12/2022 10:20 AM Miller Chappell MD IO BS AMB

## 2022-04-21 NOTE — PROGRESS NOTES
[address] [facility] AT [address], [place], [place] [address]  Phone: [phone] Fax [phone] SUMMARY  Patient Name: Vanessa Bajwa : 1945   Treatment/Medical Diagnosis: Pain in left shoulder [M25.512]  Primary osteoarthritis, left shoulder [M19.012]   Referral Source: Facundo Galvan MD     Date of Initial Visit: 3/14/22 Attended Visits: 12 Missed Visits: 0     SUMMARY OF TREATMENT  Pt is a 69 yo male, R hand dominant, with 2 years of L shoulder ROM dysfunction and pain that started when he got his COVID booster shot in the L deltoid. Ther ex including strengthening, ROM, flexibility, stabilization; manual therapy including: mobs, PROM, DTM, stretching; Patient education; HEP, postural education     CURRENT STATUS    Pt has completed a total of 12 therapy sessions since his initial evaluation. At this time, he has made progress towards all updated goals. Goals for ROM met, however goals for pain not met 2/2 pt noting intermittent pain w/ increased physical activity w/ an average level of pain 0/10, also notes that he has not attempted to swim yet but plans to when he goes to stay in the [place] over the summer. Pt was provided w/ updated HEP, demo good recall and indep w/ performance of all the activity. Pt is able to perform all functional movement of the LUE without reports of pain and has demo improved strength and tolerance to functional ADLs.  FOTO score down 4 points from 76 to 72/100 following last PN that may be 2/2 to reported \"stiffness today\" w/ changes in weather    Pain ranges: 0-6/10  Avg pain: 0/10 \" I went all day without pain yesterday', w/ increased activity average pain level 3/10  Improvements: mobility, no sharp pains in L shoulder, ROM; notes no major ROM limitations; has performed some swim simulated movements with no pain  Deficits: sleeping on the L shoulder (min waking from sleep) pt stating \" my wife says she sees me sleeping on my R side\"   FOTO   72/100  GROC +4     OBJECTIVE:   Posture: poor; forward head 11 deg  (4/21/22, no change)  Inspection: WNL  ROM:  Limited end range                                            AROM                                                              PROM    Left Right   Left Right   Flexion 170 160 Flexion       Extension 64 50 Extension       Scaption/ABD 160d/120d (p! W/ abd) 132 Scaptin/ABD       ER @ 0 Degrees 61 60 ER @ 45 Degrees       IR/ADD T9 T10 IR/ADD       DARCIE T2 T1            Strength:                                                                          L (1-5) R (1-5) Pain   Flexors 5 5 _ Yes   [x]? ?? No   Abductors 5 5 []??? Yes   [x]? ?? No   External Rotators 5 5 []??? Yes   [x]? ?? No   Internal Rotators 5 5 []??? Yes   [x]? ?? No   Supraspinatus 5 5 []??? Yes   [x]? ?? No   Serratus Anterior     []??? Yes   []? ?? No   Lower Trapezius     []??? Yes   []? ?? No   Elbow Flexion 5 5 []??? Yes   [x]? ?? No   Elbow Extension 5 5 []??? Yes   [x]? ?? No    strength :  Equal      Scapulohumoral Control / Rhythm:  Able to eccentrically lower with good control? Left: [x]??? Yes   []? ?? No     Right: [x]??? Yes   []? ?? No     Palpation  No TTP today to any RTC or GHJ locations assessed (grossly)     Special Tests: 4/21/22 no change  (-)  Neer, (-) HK and crossover  (-) with biceps load 2  (-) empty can for pain  (-) painful arc; just end range pain scaption (L)  (-) GHJ sulcus/distraction test       Progress towards goals / Updated goals:  1. Pt will have an increase in L GHJ AROM to > or = 155 degrees with non-painful arc of movement to improve reaching, dressing, self-care  Status at last assessment: 134 with painful arc; scaption 128 with pain  Current: 4/21/22 goal met; flex:  170 d without pain,  Scaption: 160 d without pain   2. Pt will be able to self-manage ave pain to < or = 2/10 ave to restore ease of movement, self-care, work.    Status at last assessment: ranges 0-6/10   Current: 4/21/22 goal not met: pt reports pain ranges 0-6/10, average pain 0/10  3. Pt will be able to swim for > or = 10 min with no c/o pain to resume workouts this summer.   Status at last assessment: painful swimming  Current: 4/21/22: goal not met 2/2 pt reports that he has not tried swimming at this time but simulated movements yield no pain and improved ROM      RECOMMENDATIONS  Discontinue therapy. Progressing towards or have reached established goals. Pt is ready to d/c to HEP 2/2 good progress towards goals, indep management of S&S. If you have any questions/comments please contact us directly at [phone]. Thank you for allowing us to assist in the care of your patient. Therapist Signature:  Shaheed Gunderson Date: 4/21/22   Reporting Period: 4/14/22 to 4/21/22 Time: 2:37 PM

## 2022-06-20 NOTE — INTERVAL H&P NOTE
H&P Update: Jake Louise was seen and examined. History and physical has been reviewed. The patient has been examined.  There have been no significant clinical changes since the completion of the originally dated History and Physical.    Signed By: Yoon Tanner MD     May 31, 2018 7:25 AM No

## 2022-06-27 DIAGNOSIS — M10.9 ACUTE GOUT, UNSPECIFIED CAUSE, UNSPECIFIED SITE: ICD-10-CM

## 2022-06-27 DIAGNOSIS — E78.2 MIXED HYPERLIPIDEMIA: ICD-10-CM

## 2022-06-27 DIAGNOSIS — E11.9 TYPE 2 DIABETES MELLITUS WITHOUT COMPLICATION (HCC): ICD-10-CM

## 2022-06-27 DIAGNOSIS — E11.9 TYPE 2 DIABETES MELLITUS WITHOUT COMPLICATION, WITHOUT LONG-TERM CURRENT USE OF INSULIN (HCC): ICD-10-CM

## 2022-06-27 DIAGNOSIS — I10 ESSENTIAL HYPERTENSION: ICD-10-CM

## 2022-06-27 DIAGNOSIS — N18.2 RENAL FAILURE, CHRONIC, STAGE 2 (MILD): ICD-10-CM

## 2022-06-27 RX ORDER — LISINOPRIL 5 MG/1
TABLET ORAL
Qty: 90 TABLET | Refills: 1 | Status: SHIPPED | OUTPATIENT
Start: 2022-06-27

## 2022-06-27 RX ORDER — ALLOPURINOL 100 MG/1
TABLET ORAL
Qty: 180 TABLET | Refills: 1 | Status: SHIPPED | OUTPATIENT
Start: 2022-06-27

## 2022-06-27 RX ORDER — ATORVASTATIN CALCIUM 40 MG/1
TABLET, FILM COATED ORAL
Qty: 90 TABLET | Refills: 2 | Status: SHIPPED | OUTPATIENT
Start: 2022-06-27

## 2022-07-07 ENCOUNTER — APPOINTMENT (OUTPATIENT)
Dept: INTERNAL MEDICINE CLINIC | Age: 77
End: 2022-07-07

## 2022-07-07 ENCOUNTER — HOSPITAL ENCOUNTER (OUTPATIENT)
Dept: LAB | Age: 77
Discharge: HOME | End: 2022-07-07
Payer: MEDICARE

## 2022-07-07 DIAGNOSIS — E11.9 TYPE 2 DIABETES MELLITUS WITHOUT COMPLICATION, UNSPECIFIED WHETHER LONG TERM INSULIN USE (HCC): ICD-10-CM

## 2022-07-07 DIAGNOSIS — N18.30 STAGE 3 CHRONIC KIDNEY DISEASE, UNSPECIFIED WHETHER STAGE 3A OR 3B CKD (HCC): ICD-10-CM

## 2022-07-07 LAB
ALBUMIN SERPL-MCNC: 4.2 G/DL (ref 3.4–5)
ANION GAP SERPL CALC-SCNC: 6 MMOL/L (ref 3–18)
BUN SERPL-MCNC: 29 MG/DL (ref 7–18)
BUN/CREAT SERPL: 17 (ref 12–20)
CALCIUM SERPL-MCNC: 9.2 MG/DL (ref 8.5–10.1)
CHLORIDE SERPL-SCNC: 110 MMOL/L (ref 100–111)
CO2 SERPL-SCNC: 28 MMOL/L (ref 21–32)
CREAT SERPL-MCNC: 1.75 MG/DL (ref 0.6–1.3)
EST. AVERAGE GLUCOSE BLD GHB EST-MCNC: 151 MG/DL
GLUCOSE SERPL-MCNC: 171 MG/DL (ref 74–99)
HBA1C MFR BLD: 6.9 % (ref 4.2–5.6)
PHOSPHATE SERPL-MCNC: 3.9 MG/DL (ref 2.5–4.9)
POTASSIUM SERPL-SCNC: 4.6 MMOL/L (ref 3.5–5.5)
SODIUM SERPL-SCNC: 144 MMOL/L (ref 136–145)

## 2022-07-07 PROCEDURE — 83036 HEMOGLOBIN GLYCOSYLATED A1C: CPT

## 2022-07-07 PROCEDURE — 80069 RENAL FUNCTION PANEL: CPT

## 2022-07-07 PROCEDURE — 36415 COLL VENOUS BLD VENIPUNCTURE: CPT

## 2022-07-08 NOTE — PROGRESS NOTES
I will let him know at his upcoming appointment that his A1c is 6.9, up from 6.8. Meanwhile, his renal panel shows worsening of his creatinine. It was 1.31. Now it is 1.75. BUN is up to 29 from 25. From DM? Dehydration?     Dr. Sha Hitchcock  [facility], [address] [place] [address], [address] [place] [address] Str.  Phone: [phone]  Fax: [phone]

## 2022-07-12 ENCOUNTER — OFFICE VISIT (OUTPATIENT)
Dept: INTERNAL MEDICINE CLINIC | Age: 77
End: 2022-07-12
Payer: MEDICARE

## 2022-07-12 VITALS
RESPIRATION RATE: 16 BRPM | SYSTOLIC BLOOD PRESSURE: 129 MMHG | HEART RATE: 60 BPM | HEIGHT: 69 IN | BODY MASS INDEX: 31.55 KG/M2 | WEIGHT: 213 LBS | DIASTOLIC BLOOD PRESSURE: 71 MMHG | OXYGEN SATURATION: 99 % | TEMPERATURE: 98.7 F

## 2022-07-12 DIAGNOSIS — E11.9 TYPE 2 DIABETES MELLITUS WITHOUT COMPLICATION, UNSPECIFIED WHETHER LONG TERM INSULIN USE (HCC): ICD-10-CM

## 2022-07-12 DIAGNOSIS — I10 ESSENTIAL HYPERTENSION: ICD-10-CM

## 2022-07-12 DIAGNOSIS — F33.40 RECURRENT MAJOR DEPRESSIVE DISORDER, IN REMISSION (HCC): ICD-10-CM

## 2022-07-12 DIAGNOSIS — R35.1 NOCTURIA: ICD-10-CM

## 2022-07-12 DIAGNOSIS — N18.30 STAGE 3 CHRONIC KIDNEY DISEASE, UNSPECIFIED WHETHER STAGE 3A OR 3B CKD (HCC): Primary | ICD-10-CM

## 2022-07-12 DIAGNOSIS — N39.43 DRIBBLING OF URINE: ICD-10-CM

## 2022-07-12 PROCEDURE — 1123F ACP DISCUSS/DSCN MKR DOCD: CPT | Performed by: INTERNAL MEDICINE

## 2022-07-12 PROCEDURE — 1101F PT FALLS ASSESS-DOCD LE1/YR: CPT | Performed by: INTERNAL MEDICINE

## 2022-07-12 PROCEDURE — G0463 HOSPITAL OUTPT CLINIC VISIT: HCPCS | Performed by: INTERNAL MEDICINE

## 2022-07-12 PROCEDURE — G9717 DOC PT DX DEP/BP F/U NT REQ: HCPCS | Performed by: INTERNAL MEDICINE

## 2022-07-12 PROCEDURE — 3044F HG A1C LEVEL LT 7.0%: CPT | Performed by: INTERNAL MEDICINE

## 2022-07-12 PROCEDURE — G8536 NO DOC ELDER MAL SCRN: HCPCS | Performed by: INTERNAL MEDICINE

## 2022-07-12 PROCEDURE — G8752 SYS BP LESS 140: HCPCS | Performed by: INTERNAL MEDICINE

## 2022-07-12 PROCEDURE — G8754 DIAS BP LESS 90: HCPCS | Performed by: INTERNAL MEDICINE

## 2022-07-12 PROCEDURE — G8427 DOCREV CUR MEDS BY ELIG CLIN: HCPCS | Performed by: INTERNAL MEDICINE

## 2022-07-12 PROCEDURE — 99214 OFFICE O/P EST MOD 30 MIN: CPT | Performed by: INTERNAL MEDICINE

## 2022-07-12 PROCEDURE — G8417 CALC BMI ABV UP PARAM F/U: HCPCS | Performed by: INTERNAL MEDICINE

## 2022-07-12 NOTE — PROGRESS NOTES
[facility]:  7/12/2022, MRN: [id number]      Cindy Castro is a 64 y.o. male and presents to clinic for a Follow-up and Labs (7-7-22)      Subjective: The pt is a 66yo male with h/o ED, melanoma in situ on left dorsal forearm s/p removal, MDD, rectal polyp, diverticulosis, CKD stage 3, HTN, type 2 DM (foot exams are done by Mary Daniels), hypothyroidism, DJD, gout, vocal cord nodule, depression/anxiety (followed by Psychiatry), vitamin D deficiency, RBBB (s/p normal stress test 2018), and HLD. 1.  Type 2 diabetes: His most recent labs show that his A1c is 6.9, up from 6.8. His most recent labs show that his creatinine is up from 1.31-1.75. BUN is up to 29 from 25. +CKD. He is diet controlled. He drinks a lot of iced tea. 2.  Urinary dribbling: He also has nocturia symptoms. He was referred to Urology at his last appointment. Today he reports: he has yet to schedule with Urology. His nocturia improved in between apts. He continues to have dribbling but much less than earlier this year. 3.  Hypertension: Blood pressure is 129/71 on carvedilol and lisinopril. 4.  Depression: He has a history of alcohol abuse and remains sober. He is followed by psychiatrist and is taking sertraline. Today he reports: his depression has worsened. He purchased a home for his daughter and it has been hard financially more so since then. His last apt with his psychiatrist: a month ago. His next apt is in September. 5. General: He continues to have left shoulder pain. S/p PT. No relief with topical rx OTC (voltaren).           Patient Active Problem List    Diagnosis Date Noted    History of alcohol abuse 10/06/2020    Right inguinal hernia 05/17/2018    Osteoarthritis of right knee 02/19/2018    Erectile dysfunction 01/23/2017    Melanoma in situ - on left dorsal forearm 2016 09/16/2016    Major depressive disorder in remission 08/03/2016    Rectal polyp -  seen on colonoscopy from 8/20/15 08/02/2016 Diverticulosis of intestine without bleeding - seen on colonoscopy from 8/20/15 08/02/2016    CKD (chronic kidney disease) stage 3, GFR 30-59 ml/min ([facility]) 11/09/2015    Essential hypertension 06/24/2015    Type 2 diabetes mellitus without complication [facility] 44/49/9557    Hypothyroidism due to acquired atrophy of thyroid 06/24/2015    Vocal cord nodule 10/21/2013    Gout 10/21/2011    Family history of colon cancer. personal hx colon polyps 10/21/2011    Hyperlipidemia     Hypovitaminosis D        Current Outpatient Medications   Medication Sig Dispense Refill    atorvastatin (LIPITOR) 40 mg tablet TAKE ONE TABLET BY MOUTH DAILY 90 Tablet 2    allopurinoL (ZYLOPRIM) 100 mg tablet TAKE ONE TABLET BY MOUTH TWICE A  Tablet 1    lisinopriL (PRINIVIL, ZESTRIL) 5 mg tablet TAKE 1 TABLET BY MOUTH DAILY 90 Tablet 1    sildenafiL (REVATIO) 20 mg tablet TAKE TWO TABLETS BY MOUTH DAILY 1 HOUR PRIOR TO SEXUAL RELATIONS AS NEEDED *MAX 5 TABLETS PER DAY* 30 Tablet 8    levothyroxine (SYNTHROID) 50 mcg tablet TAKE ONE TABLET BY MOUTH EVERY MORNING BEFORE BREAKFAST 90 Tablet 1    carvediloL (COREG) 6.25 mg tablet TAKE ONE TABLET BY MOUTH TWICE A DAY WITH MEALS 180 Tablet 2    sertraline (ZOLOFT) 100 mg tablet Take 1 Tab by mouth daily. (Patient taking differently: Take 150 mg by mouth daily.) 90 Tab 3    ibuprofen (MOTRIN) 800 mg tablet Take 1 Tab by mouth three (3) times daily as needed for Pain. 40 Tab 0    ubidecarenone (COQ-10 PO) Take 200 mEq by mouth daily. multivitamin (ONE A DAY) tablet Take 1 Tab by mouth daily. Cholecalciferol, Vitamin D3, (VITAMIN D) 2,000 unit Cap Take 4,000 Units by mouth daily. varicella-zoster recombinant, PF, (SHINGRIX) 50 mcg/0.5 mL susr injection 0.5 mL by IntraMUSCular route every two (2) months. (Patient not taking: Reported on 3/8/2022) 0.5 mL 1    aspirin delayed-release 81 mg tablet Take 81 mg by mouth daily.  (Patient not taking: Reported on 7/12/2022)         Allergies Allergen Reactions    Amlodipine Other (comments)     BLE edema       Past Medical History:   Diagnosis Date    Adverse effect of anesthesia     Violent dreams with Ether    Arthritis     hip, knee    Chronic kidney disease     had previous reaction with kidneys after a bp med, no issues now    Colon polyps     dysplastic    Depression     Diabetes mellitus ([facility] 2017    no meds    Diverticulosis     seen on colonoscopy from 8/20/15    Diverticulosis of sigmoid colon 01/27/10    GERD (gastroesophageal reflux disease)     no meds    Gout     Hepatitis     Hepatitis A - while he was young    Hyperlipidemia     Hypertension 15 years    Hypovitaminosis D     Nephrolithiasis     Osteoarthritis of right knee 2/19/2018    Plantar wart     Rectal polyp     seen on colonoscopy from 8/20/15    Thyroid disease     hypothyroidism       Past Surgical History:   Procedure Laterality Date    ENDOSCOPY, COLON, DIAGNOSTIC  01/27/2010    polyp distal rectum, removed; diverticulosis of sigmoid    HX CATARACT REMOVAL Bilateral     HX COLONOSCOPY      HX HEENT  2008    vocal cord polyp removed    HX POLYPECTOMY  01/27/2010    Distal rectum    HX TONSILLECTOMY      LITHOTRIPSY      NV LAP,INGUINAL HERNIA REPR,INITIAL Right 05/31/2018    Dr. Carolina Arteaga Left 1/2014    NV TOTAL KNEE ARTHROPLASTY Right 02/2018       Family History   Problem Relation Age of Onset    Arthritis-rheumatoid Mother     Hypertension Mother     Elevated Lipids Mother     Thyroid Disease Sister     Cancer Father         colon    Heart Disease Other     Hypertension Other     Cancer Other         breast    Heart Disease Maternal Grandmother     Dementia Maternal Grandfather     Cancer Paternal Grandmother         breast    No Known Problems Paternal Grandfather        Social History     Tobacco Use    Smoking status: Former Smoker     Packs/day: 1.00     Years: 6.00     Pack years: 6.00     Types: Cigarettes     Quit date: 1/1/1992 Years since quittin.5    Smokeless tobacco: Never Used   Substance Use Topics    Alcohol use: Not Currently     Alcohol/week: 10.0 standard drinks     Types: 10 Glasses of wine per week     Comment: wine with dinner sometimes       ROS   Review of Systems   Constitutional:  Negative for chills and fever. HENT:  Negative for ear pain and sore throat. Eyes:  Negative for blurred vision and pain. Respiratory:  Negative for cough and shortness of breath. Cardiovascular:  Negative for chest pain. Gastrointestinal:  Negative for abdominal pain, blood in stool and melena. Genitourinary:  Negative for dysuria and hematuria. Musculoskeletal:  Negative for joint pain and myalgias. Skin:  Negative for rash. Neurological:  Negative for headaches. Endo/Heme/Allergies:  Does not bruise/bleed easily. Psychiatric/Behavioral:  Positive for depression. Negative for substance abuse. Objective     Vitals:    22 1017   BP: 129/71   Pulse: 60   Resp: 16   Temp: 98.7 °F (37.1 °C)   TempSrc: Temporal   SpO2: 99%   Weight: 213 lb (96.6 kg)   Height: 5' 9\" (1.753 m)   PainSc:   0 - No pain       Physical Exam  Vitals and nursing note reviewed. Constitutional:       Appearance: Normal appearance. HENT:      Head: Normocephalic and atraumatic. Right Ear: External ear normal.      Left Ear: External ear normal.   Eyes:      General:         Right eye: No discharge. Left eye: No discharge. Extraocular Movements: Extraocular movements intact. Cardiovascular:      Rate and Rhythm: Normal rate and regular rhythm. Pulses: Normal pulses. Heart sounds: Normal heart sounds. No murmur heard. Pulmonary:      Effort: Pulmonary effort is normal.      Breath sounds: Normal breath sounds. Abdominal:      General: Abdomen is flat. Bowel sounds are normal. There is no distension. Palpations: Abdomen is soft. Tenderness: There is no abdominal tenderness.    Musculoskeletal: General: No swelling (BUE) or tenderness (BUE). Cervical back: Neck supple. Lymphadenopathy:      Cervical: No cervical adenopathy. Skin:     General: Skin is warm and dry. Findings: No erythema. Neurological:      Mental Status: He is alert. Mental status is at baseline. Gait: Gait normal.   Psychiatric:         Mood and Affect: Mood normal.       LABS   Data Review:   Lab Results   Component Value Date/Time    WBC 5.3 03/01/2022 08:39 AM    HGB 13.5 03/01/2022 08:39 AM    HCT 41.6 03/01/2022 08:39 AM    PLATELET 063 61/28/2744 08:39 AM    MCV 93.1 03/01/2022 08:39 AM       Lab Results   Component Value Date/Time    Sodium 144 07/07/2022 03:37 PM    Potassium 4.6 07/07/2022 03:37 PM    Chloride 110 07/07/2022 03:37 PM    CO2 28 07/07/2022 03:37 PM    Anion gap 6 07/07/2022 03:37 PM    Glucose 171 (H) 07/07/2022 03:37 PM    BUN 29 (H) 07/07/2022 03:37 PM    Creatinine 1.75 (H) 07/07/2022 03:37 PM    BUN/Creatinine ratio 17 07/07/2022 03:37 PM    GFR est AA 46 (L) 07/07/2022 03:37 PM    GFR est non-AA 38 (L) 07/07/2022 03:37 PM    Calcium 9.2 07/07/2022 03:37 PM       Lab Results   Component Value Date/Time    Cholesterol, total 155 03/01/2022 08:39 AM    HDL Cholesterol 40 03/01/2022 08:39 AM    LDL, calculated 85 03/01/2022 08:39 AM    VLDL, calculated 30 03/01/2022 08:39 AM    Triglyceride 150 (H) 03/01/2022 08:39 AM    CHOL/HDL Ratio 3.9 03/01/2022 08:39 AM       Lab Results   Component Value Date/Time    Hemoglobin A1c 6.9 (H) 07/07/2022 03:37 PM    Hemoglobin A1c (POC) 6.6 07/05/2019 09:21 AM       Assessment/Plan:   1. Stage 3 chronic kidney disease and Type 2 DM: His CELSO per recent labs is likely from dehydration. +Nocturia. - Checking a renal panel and PSA  - Low carb diet recommended  - Requesting his last eye exam.    ORDERS:  - RENAL FUNCTION PANEL; Future  - PSA, TOTAL &  FREE; Future    2. HTN: Stable  - C/w rx as prescribed. 3. Depression: Worsened. +SI.    - I encouraged him to c/w counseling   - I encouraged him to schedule with his psychiatrist for additional recommendations. 4. Urinary Dribbling: Improved. - I strongly encouraged him to schedule a check up with Urology      Health Maintenance Due   Topic Date Due    Shingrix Vaccine Age 49> (1 of 2) Never done    Foot Exam Q1  05/22/2020    Eye Exam Retinal or Dilated  11/05/2021     Lab review: labs are reviewed in the EHR and ordered as mentioned above    I have discussed the diagnosis with the patient and the intended plan as seen in the above orders. The patient has received an after-visit summary and questions were answered concerning future plans. I have discussed medication side effects and warnings with the patient as well. I have reviewed the plan of care with the patient, accepted their input and they are in agreement with the treatment goals. All questions were answered. The patient understands the plan of care. Handouts provided today with above information. Pt instructed if symptoms worsen to call the office or report to the ED for continued care. Greater than 50% of the visit time was spent in counseling and/or coordination of care. Voice recognition was used to generate this report, which may have resulted in some phonetic based errors in grammar and contents. Even though attempts were made to correct all the mistakes, some may have been missed, and remained in the body of the document.           Karlo Howard MD

## 2022-07-12 NOTE — PATIENT INSTRUCTIONS
Learning About Meal Planning for Diabetes  Why plan your meals? Meal planning can be a key part of managing diabetes. Planning meals and snacks with the right balance of carbohydrate, protein, and fat can help you keep your blood sugar at the target level you set with your doctor. You don't have to eat special foods. You can eat what your family eats, including sweets once in a while. But you do have to pay attention to how often you eat and how much you eat of certain foods. You may want to work with a dietitian or a diabetes educator. They can give you tips and meal ideas and can answer your questions about meal planning. This health professional can also help you reach a healthy weight if that is one of your goals. What plan is right for you? Your dietitian or diabetes educator may suggest that you start with the plate format or carbohydrate counting. The plate format  The plate format is a simple way to help you manage how you eat. You plan meals by learning how much space each food should take on a plate. Using the plate format helps you manage the amount of carbohydrate you eat. It can make it easier to keep your blood sugar level within your target range. It also helps you see if you're eating healthy portion sizes. To use the plate format, you put non-starchy vegetables on half your plate. Add lean protein foods, such as fish, lean meats and poultry, or soy products, on one-quarter of the plate. Put a grain or starchy vegetable (such as brown rice or a potato) on the final quarter of the plate. You can add a small piece of fruit and some low-fat or fat-free milk or yogurt, depending on your carbohydrate goal for each meal.  Here are some tips for using the plate format:  · Make sure that you are not using an oversized plate. A 9-inch plate is best. Many restaurants use larger plates. · Get used to using the plate format at home. Then you can use it when you eat out.   · Write down your questions about using the plate format. Talk to your doctor, a dietitian, or a diabetes educator about your concerns. Carbohydrate counting  With carbohydrate counting, you plan meals based on the amount of carbohydrate in each food. Carbohydrate raises blood sugar higher and more quickly than any other nutrient. It is found in desserts, breads and cereals, and fruit. It's also found in starchy vegetables such as potatoes and corn, grains such as rice and pasta, and milk and yogurt. You can help keep your blood sugar levels within your target range by planning how much carbohydrate to have at meals and snacks. The amount you need depends on several things. These include your weight, how active you are, which diabetes medicines you take, and what your goals are for your blood sugar levels. A registered dietitian or diabetes educator can help you plan how much carbohydrate to include in each meal and snack. An example of a carbohydrate counting plan is:  · 45 to 60 grams at each meal. That's about the same as 3 to 4 carbohydrate servings. · 15 to 20 grams at each snack. That's about the same as 1 carbohydrate serving. The Nutrition Facts label on packaged foods tells you how much carbohydrate is in a serving of the food. First, look at the serving size on the food label. Is that the amount you eat in a serving? All of the nutrition information on a food label is based on that serving size. So if you eat more or less than that, you'll need to adjust the other numbers. Total carbohydrate is the next thing you need to look for on the label. If you count carbohydrate servings, one serving of carbohydrate is 15 grams. For foods that don't come with labels, such as fresh fruits and vegetables, you'll need a guide that lists carbohydrate in these foods. Ask your doctor, dietitian, or diabetes educator about books or other nutrition guides you can use.   If you take insulin, you need to know how many grams of carbohydrate are in a meal. This lets you know how much rapid-acting insulin to take before you eat. If you use an insulin pump, you get a constant rate of insulin during the day. So the pump must be programmed at meals to give you extra insulin to cover the rise in blood sugar after meals. When you know how much carbohydrate you will eat, you can take the right amount of insulin. Or, if you always use the same amount of insulin, you need to make sure that you eat the same amount of carbohydrate at meals. If you need more help to understand carbohydrate counting and food labels, ask your doctor, dietitian, or diabetes educator. How can you plan healthy meals? Here are some tips to get started:  · Plan your meals a week at a time. Don't forget to include snacks too. · Use cookbooks or online recipes to plan several main meals. Plan some quick meals for busy nights. You also can double some recipes that freeze well. Then you can save half for other busy nights when you don't have time to cook. · Make sure you have the ingredients you need for your recipes. If you're running low on basic items, put these items on your shopping list too. · List foods that you use to make breakfasts, lunches, and snacks. List plenty of fruits and vegetables. · Post this list on the refrigerator. Add to it as you think of more things you need. · Take the list to the store to do your weekly shopping. Follow-up care is a key part of your treatment and safety. Be sure to make and go to all appointments, and call your doctor if you are having problems. It's also a good idea to know your test results and keep a list of the medicines you take. Where can you learn more? Go to http://www.gray.com/  Enter D326 in the search box to learn more about \"Learning About Meal Planning for Diabetes. \"  Current as of: September 8, 2021               Content Version: 13.2  © 5974-1288 Healthwise, [facility].    Care instructions adapted under license by [employer] (which disclaims liability or warranty for this information). If you have questions about a medical condition or this instruction, always ask your healthcare professional. [address] any warranty or liability for your use of this information.

## 2022-07-12 NOTE — PROGRESS NOTES
Donita De Leon presents today for   Chief Complaint   Patient presents with   [facility] Follow-up    Labs     7-7-22       1. \"Have you been to the ER, urgent care clinic since your last visit? Hospitalized since your last visit? \" NO    2. \"Have you seen or consulted any other health care providers outside of the [address] [place] [address] since your last visit? \" YES     3. For patients aged 39-70: Has the patient had a colonoscopy / FIT/ Cologuard? Yes - no Care Gap present      If the patient is female:    4. For patients aged 41-77: Has the patient had a mammogram within the past 2 years? NA - based on age or sex  See top three    5. For patients aged 21-65: Has the patient had a pap smear?  NA - based on age or sex

## 2022-07-26 ENCOUNTER — APPOINTMENT (OUTPATIENT)
Dept: INTERNAL MEDICINE CLINIC | Age: 77
End: 2022-07-26

## 2022-07-26 ENCOUNTER — HOSPITAL ENCOUNTER (OUTPATIENT)
Dept: LAB | Age: 77
Discharge: HOME | End: 2022-07-26
Payer: MEDICARE

## 2022-07-26 DIAGNOSIS — R35.1 NOCTURIA: ICD-10-CM

## 2022-07-26 DIAGNOSIS — N18.30 STAGE 3 CHRONIC KIDNEY DISEASE, UNSPECIFIED WHETHER STAGE 3A OR 3B CKD (HCC): ICD-10-CM

## 2022-07-26 LAB
ALBUMIN SERPL-MCNC: 4.2 G/DL (ref 3.4–5)
ANION GAP SERPL CALC-SCNC: 7 MMOL/L (ref 3–18)
BUN SERPL-MCNC: 25 MG/DL (ref 7–18)
BUN/CREAT SERPL: 18 (ref 12–20)
CALCIUM SERPL-MCNC: 9.1 MG/DL (ref 8.5–10.1)
CHLORIDE SERPL-SCNC: 106 MMOL/L (ref 100–111)
CO2 SERPL-SCNC: 26 MMOL/L (ref 21–32)
CREAT SERPL-MCNC: 1.36 MG/DL (ref 0.6–1.3)
GLUCOSE SERPL-MCNC: 142 MG/DL (ref 74–99)
PHOSPHATE SERPL-MCNC: 3.3 MG/DL (ref 2.5–4.9)
POTASSIUM SERPL-SCNC: 4.5 MMOL/L (ref 3.5–5.5)
SODIUM SERPL-SCNC: 139 MMOL/L (ref 136–145)

## 2022-07-26 PROCEDURE — 36415 COLL VENOUS BLD VENIPUNCTURE: CPT

## 2022-07-26 PROCEDURE — 80069 RENAL FUNCTION PANEL: CPT

## 2022-07-26 PROCEDURE — 84154 ASSAY OF PSA FREE: CPT

## 2022-07-27 LAB
PSA FREE MFR SERPL: 25.6 %
PSA FREE SERPL-MCNC: 0.82 NG/ML
PSA SERPL-MCNC: 3.2 NG/ML (ref 0–4)

## 2022-07-27 NOTE — PROGRESS NOTES
Please let him know that his kidney function improved from his follow-up labs. His creatinine is 1.36, his baseline. His BUN is still mildly elevated at 25. Please encourage him to continue staying hydrated by drinking water. Meanwhile, his PSA was 3.2. Given his symptoms of urinary dribbling, please have him follow-up with Urology. He is scheduled soon with him per our EHR.     Dr. Kirk Zamora  [facility], [address] [place] [address], [address] [place], [place] [address] [address]  Phone: [phone]  Fax: [phone]

## 2022-07-29 NOTE — PROGRESS NOTES
Patient contacted, patient identified with two identifiers (Name & ). Patient aware of results per DR. Barton and verbalizes understanding.

## 2022-08-22 DIAGNOSIS — E03.4 HYPOTHYROIDISM DUE TO ACQUIRED ATROPHY OF THYROID: ICD-10-CM

## 2022-08-22 RX ORDER — LEVOTHYROXINE SODIUM 50 UG/1
TABLET ORAL
Qty: 90 TABLET | Refills: 1 | Status: SHIPPED | OUTPATIENT
Start: 2022-08-22

## 2022-09-12 DIAGNOSIS — I10 ESSENTIAL HYPERTENSION: ICD-10-CM

## 2022-09-12 RX ORDER — CARVEDILOL 6.25 MG/1
TABLET ORAL
Qty: 180 TABLET | Refills: 2 | Status: SHIPPED | OUTPATIENT
Start: 2022-09-12 | End: 2022-09-15

## 2022-09-14 DIAGNOSIS — I10 ESSENTIAL HYPERTENSION: ICD-10-CM

## 2022-09-15 RX ORDER — CARVEDILOL 6.25 MG/1
TABLET ORAL
Qty: 180 TABLET | Refills: 2 | Status: SHIPPED | OUTPATIENT
Start: 2022-09-15

## 2022-11-14 ENCOUNTER — APPOINTMENT (OUTPATIENT)
Dept: INTERNAL MEDICINE CLINIC | Age: 77
End: 2022-11-14

## 2022-11-14 ENCOUNTER — HOSPITAL ENCOUNTER (OUTPATIENT)
Dept: LAB | Age: 77
Discharge: HOME | End: 2022-11-14
Payer: MEDICARE

## 2022-11-14 DIAGNOSIS — I10 ESSENTIAL HYPERTENSION: ICD-10-CM

## 2022-11-14 DIAGNOSIS — E11.9 TYPE 2 DIABETES MELLITUS WITHOUT COMPLICATION, UNSPECIFIED WHETHER LONG TERM INSULIN USE (HCC): ICD-10-CM

## 2022-11-14 DIAGNOSIS — N18.30 STAGE 3 CHRONIC KIDNEY DISEASE, UNSPECIFIED WHETHER STAGE 3A OR 3B CKD (HCC): ICD-10-CM

## 2022-11-14 LAB
ALBUMIN SERPL-MCNC: 4.3 G/DL (ref 3.4–5)
ANION GAP SERPL CALC-SCNC: 5 MMOL/L (ref 3–18)
BUN SERPL-MCNC: 25 MG/DL (ref 7–18)
BUN/CREAT SERPL: 20 (ref 12–20)
CALCIUM SERPL-MCNC: 8.7 MG/DL (ref 8.5–10.1)
CHLORIDE SERPL-SCNC: 104 MMOL/L (ref 100–111)
CO2 SERPL-SCNC: 29 MMOL/L (ref 21–32)
CREAT SERPL-MCNC: 1.26 MG/DL (ref 0.6–1.3)
EST. AVERAGE GLUCOSE BLD GHB EST-MCNC: 148 MG/DL
GLUCOSE SERPL-MCNC: 140 MG/DL (ref 74–99)
HBA1C MFR BLD: 6.8 % (ref 4.2–5.6)
PHOSPHATE SERPL-MCNC: 3.6 MG/DL (ref 2.5–4.9)
POTASSIUM SERPL-SCNC: 4.3 MMOL/L (ref 3.5–5.5)
SODIUM SERPL-SCNC: 138 MMOL/L (ref 136–145)

## 2022-11-14 PROCEDURE — 36415 COLL VENOUS BLD VENIPUNCTURE: CPT

## 2022-11-14 PROCEDURE — 83036 HEMOGLOBIN GLYCOSYLATED A1C: CPT

## 2022-11-14 PROCEDURE — 80069 RENAL FUNCTION PANEL: CPT

## 2022-11-14 NOTE — PROGRESS NOTES
I will discuss his A1c is 6.8, down from 6.9 in July at his upcoming appointment. His renal panel shows that his kidney function has improved. His BUN is 25 and unchanged. His creatinine is 1.26, down from 1.36 in July.     Dr. Kelechi Vela  [facility], [address] [place] [address], [address] [place] [address] Str.  Phone: [phone]  Fax: [phone]

## 2022-11-21 ENCOUNTER — OFFICE VISIT (OUTPATIENT)
Dept: INTERNAL MEDICINE CLINIC | Age: 77
End: 2022-11-21
Payer: MEDICARE

## 2022-11-21 VITALS
OXYGEN SATURATION: 98 % | HEART RATE: 68 BPM | WEIGHT: 212 LBS | DIASTOLIC BLOOD PRESSURE: 64 MMHG | TEMPERATURE: 97.8 F | HEIGHT: 69 IN | SYSTOLIC BLOOD PRESSURE: 121 MMHG | BODY MASS INDEX: 31.4 KG/M2 | RESPIRATION RATE: 16 BRPM

## 2022-11-21 DIAGNOSIS — E03.4 HYPOTHYROIDISM DUE TO ACQUIRED ATROPHY OF THYROID: ICD-10-CM

## 2022-11-21 DIAGNOSIS — D03.9 MELANOMA IN SITU, UNSPECIFIED SITE (HCC): ICD-10-CM

## 2022-11-21 DIAGNOSIS — I10 ESSENTIAL HYPERTENSION: ICD-10-CM

## 2022-11-21 DIAGNOSIS — R39.15 URINARY URGENCY: ICD-10-CM

## 2022-11-21 DIAGNOSIS — F33.40 RECURRENT MAJOR DEPRESSIVE DISORDER, IN REMISSION (HCC): ICD-10-CM

## 2022-11-21 DIAGNOSIS — E11.9 TYPE 2 DIABETES MELLITUS WITHOUT COMPLICATION, UNSPECIFIED WHETHER LONG TERM INSULIN USE (HCC): Primary | ICD-10-CM

## 2022-11-21 DIAGNOSIS — M1A.9XX0 CHRONIC GOUT WITHOUT TOPHUS, UNSPECIFIED CAUSE, UNSPECIFIED SITE: ICD-10-CM

## 2022-11-21 PROCEDURE — G8427 DOCREV CUR MEDS BY ELIG CLIN: HCPCS | Performed by: INTERNAL MEDICINE

## 2022-11-21 PROCEDURE — 3074F SYST BP LT 130 MM HG: CPT | Performed by: INTERNAL MEDICINE

## 2022-11-21 PROCEDURE — G8417 CALC BMI ABV UP PARAM F/U: HCPCS | Performed by: INTERNAL MEDICINE

## 2022-11-21 PROCEDURE — 3078F DIAST BP <80 MM HG: CPT | Performed by: INTERNAL MEDICINE

## 2022-11-21 PROCEDURE — G9717 DOC PT DX DEP/BP F/U NT REQ: HCPCS | Performed by: INTERNAL MEDICINE

## 2022-11-21 PROCEDURE — 99214 OFFICE O/P EST MOD 30 MIN: CPT | Performed by: INTERNAL MEDICINE

## 2022-11-21 PROCEDURE — G8754 DIAS BP LESS 90: HCPCS | Performed by: INTERNAL MEDICINE

## 2022-11-21 PROCEDURE — 1101F PT FALLS ASSESS-DOCD LE1/YR: CPT | Performed by: INTERNAL MEDICINE

## 2022-11-21 PROCEDURE — 1123F ACP DISCUSS/DSCN MKR DOCD: CPT | Performed by: INTERNAL MEDICINE

## 2022-11-21 PROCEDURE — G8752 SYS BP LESS 140: HCPCS | Performed by: INTERNAL MEDICINE

## 2022-11-21 PROCEDURE — G8536 NO DOC ELDER MAL SCRN: HCPCS | Performed by: INTERNAL MEDICINE

## 2022-11-21 PROCEDURE — 3044F HG A1C LEVEL LT 7.0%: CPT | Performed by: INTERNAL MEDICINE

## 2022-11-21 PROCEDURE — G0463 HOSPITAL OUTPT CLINIC VISIT: HCPCS | Performed by: INTERNAL MEDICINE

## 2022-11-21 NOTE — PATIENT INSTRUCTIONS
Learning About Tests When You Have Diabetes  Why do you need regular tests? Diabetes can lead to other health problems if it's not well managed. You'll need tests to monitor how well your diabetes is managed and to check for other things like high cholesterol or kidney problems. Having tests on a regular schedule can help your doctor find problems early, when it's best to start treating them. What tests do you need? These are the tests you may need and how often you should have them. The tests may vary depending on whether you have type 1 or type 2 diabetes. A1c blood test.  This test shows the average level of blood sugar over the past 2 to 3 months. It helps your doctor see whether blood sugar levels have been staying within your target range. How often: Every 3 to 6 months  Goal: A blood sugar level in your target range  Blood pressure test.  This test measures the pressure of blood flow in the arteries. Controlling blood pressure can help prevent damage to nerves and blood vessels. How often: Every 3 to 6 months  Goal: A blood pressure level in your target range  Cholesterol test.  This test measures the amount of a type of fat in the blood. It is common for people with diabetes to also have high cholesterol. Too much cholesterol in the blood can build up inside the blood vessels and raise the risk for heart attack and stroke. How often: At the time of your diabetes diagnosis, and as often as your doctor recommends after that  Goal: A cholesterol level in your target range  Albumin-creatinine ratio test.  This test checks for kidney damage by looking for the protein albumin (say \"al-BYOO-anushka\") in the urine. Albumin is normally found in the blood. Kidney damage can let small amounts of it (microalbumin) leak into the urine. How often: Once a year  Goal: No protein in the urine  Blood creatinine test/estimated glomerular filtration (eGFR).   The blood creatinine (say \"loma-KX-yr-neen\") level shows how well your kidneys are working. Creatinine is a waste product that muscles release into the blood. Blood creatinine is used to estimate the glomerular filtration rate. A high level of creatinine and/or a low eGFR may mean your kidneys are not working as well as they should. How often: Once a year  Goal: Normal level of creatinine in the blood. The eGFR goal is greater than 60 mL/min/1.73 m². Complete foot exam.  The doctor checks for foot sores and whether any sensation has been lost.  How often: Once a year  Goal: Healthy feet with no foot ulcers or loss of feeling  Dental exam and cleaning. The dentist checks for gum disease and tooth decay. People with high blood sugar are more likely to have these problems. How often: Every 6 months  Goal: Healthy teeth and gums  Complete eye exam.  High blood sugar levels can damage the eyes. This exam is done by an ophthalmologist or optometrist. It includes a dilated eye exam. The exam shows whether there's damage to the back of the eye (diabetic retinopathy). How often: Once a year. If you don't have any signs of diabetic retinopathy, your doctor may recommend an exam every 2 years. Goal: No damage to the back of the eye  Thyroid-stimulating hormone (TSH) blood test.  This test checks for thyroid disease, which is especially common for people with type 1 diabetes. Having too little or too much thyroid hormone can make it hard to manage your blood sugar. How often: As part of your diabetes diagnosis, and as often as your doctor recommends after that  Goal: Normal level of TSH in the blood  Follow-up care is a key part of your treatment and safety. Be sure to make and go to all appointments, and call your doctor if you are having problems. It's also a good idea to know your test results and keep a list of the medicines you take. Where can you learn more?   Go to http://www.Private.Me.com/  Enter A243 in the search box to learn more about \"Learning About Tests When You Have Diabetes. \"  Current as of: April 13, 2022               Content Version: 13.4  © 8497-2236 [employer]. Care instructions adapted under license by [employer] (which disclaims liability or warranty for this information). If you have questions about a medical condition or this instruction, always ask your healthcare professional. [place] [address] any warranty or liability for your use of this information.

## 2022-11-21 NOTE — PROGRESS NOTES
INTERNISTS OF [facility]:  11/21/2022, MRN: [id number]      Valerie Philippe is a 76 y.o. male and presents to clinic for Follow-up and Labs (11-14-22)      Subjective: The pt is a 67yo male with h/o ED, melanoma in situ on left dorsal forearm s/p removal, MDD, rectal polyp, left shoulder pain (s/p PT), diverticulosis, CKD stage 3, HTN, type 2 DM (foot exams are done by [facility]), hypothyroidism, DJD, gout, ETOH use, vocal cord nodule, depression/anxiety (followed by Psychiatry), vitamin D deficiency, RBBB (s/p normal stress test 2018), and HLD. 1. Type 2 DM: His A1C is 6.8, down from 6.9. Diet controlled. Eye exam: Overdue. He has not scheduled this exam.    2. Urinary Urgency: He met with Urology in between apts. No new sx. 3. Depression: His psychiatrist is retiring. He has an apt with the NP (Psychiatry): December. +Sober. Treated with zoloft. 4. H/o Skin Cancer: Next apt with Dermatology: January. No new skin lesions. 5. HTN: BP is 121/64. On coreg and low dose ACEi given his CKD. 6. Hypothyroidism: Taking Synthroid 50mcg daily. Asymptomatic. 7. Gout: No recent attacks. On allopurinol. Patient Active Problem List    Diagnosis Date Noted    History of alcohol abuse 10/06/2020    Right inguinal hernia 05/17/2018    Osteoarthritis of right knee 02/19/2018    Erectile dysfunction 01/23/2017    Melanoma in situ - on left dorsal forearm 2016 09/16/2016    Major depressive disorder in remission 08/03/2016    Rectal polyp -  seen on colonoscopy from 8/20/15 08/02/2016    Diverticulosis of intestine without bleeding - seen on colonoscopy from 8/20/15 08/02/2016    CKD (chronic kidney disease) stage 3, GFR 30-59 ml/min ([address] 11/09/2015    Essential hypertension 06/24/2015    Type 2 diabetes mellitus without complication ([facility] 81/70/7952    Hypothyroidism due to acquired atrophy of thyroid 06/24/2015    Vocal cord nodule 10/21/2013    Gout 10/21/2011    Family history of colon cancer. personal hx colon polyps 10/21/2011    Hyperlipidemia     Hypovitaminosis D        Current Outpatient Medications   Medication Sig Dispense Refill    carvediloL (COREG) 6.25 mg tablet TAKE ONE TABLET BY MOUTH TWICE A DAY WITH MEALS 180 Tablet 2    levothyroxine (SYNTHROID) 50 mcg tablet TAKE ONE TABLET BY MOUTH EVERY MORNING BEFORE BREAKFAST 90 Tablet 1    atorvastatin (LIPITOR) 40 mg tablet TAKE ONE TABLET BY MOUTH DAILY 90 Tablet 2    allopurinoL (ZYLOPRIM) 100 mg tablet TAKE ONE TABLET BY MOUTH TWICE A  Tablet 1    lisinopriL (PRINIVIL, ZESTRIL) 5 mg tablet TAKE 1 TABLET BY MOUTH DAILY 90 Tablet 1    sildenafiL (REVATIO) 20 mg tablet TAKE TWO TABLETS BY MOUTH DAILY 1 HOUR PRIOR TO SEXUAL RELATIONS AS NEEDED *MAX 5 TABLETS PER DAY* 30 Tablet 8    sertraline (ZOLOFT) 100 mg tablet Take 1 Tab by mouth daily. (Patient taking differently: Take 150 mg by mouth daily.) 90 Tab 3    ibuprofen (MOTRIN) 800 mg tablet Take 1 Tab by mouth three (3) times daily as needed for Pain. 40 Tab 0    ubidecarenone (COQ-10 PO) Take 200 mEq by mouth daily. multivitamin (ONE A DAY) tablet Take 1 Tab by mouth daily. cholecalciferol (VITAMIN D3) (2,000 UNITS /50 MCG) cap capsule Take 4,000 Units by mouth daily. varicella-zoster recombinant, PF, (SHINGRIX) 50 mcg/0.5 mL susr injection 0.5 mL by IntraMUSCular route every two (2) months. (Patient not taking: No sig reported) 0.5 mL 1    aspirin delayed-release 81 mg tablet Take 81 mg by mouth daily.  (Patient not taking: No sig reported)         Allergies   Allergen Reactions    Amlodipine Other (comments)     BLE edema       Past Medical History:   Diagnosis Date    Adverse effect of anesthesia     Violent dreams with Ether    Arthritis     hip, knee    Chronic kidney disease     had previous reaction with kidneys after a bp med, no issues now    Colon polyps     dysplastic    Depression     Diabetes mellitus ([address] 2017    no meds    Diverticulosis     seen on colonoscopy from 8/20/15    Diverticulosis of sigmoid colon 01/27/10    GERD (gastroesophageal reflux disease)     no meds    Gout     Hepatitis     Hepatitis A - while he was young    Hyperlipidemia     Hypertension 15 years    Hypovitaminosis D     Nephrolithiasis     Osteoarthritis of right knee 2018    Plantar wart     Rectal polyp     seen on colonoscopy from 8/20/15    Thyroid disease     hypothyroidism       Past Surgical History:   Procedure Laterality Date    ENDOSCOPY, COLON, DIAGNOSTIC  2010    polyp distal rectum, removed; diverticulosis of sigmoid    HX CATARACT REMOVAL Bilateral     HX COLONOSCOPY      HX HEENT      vocal cord polyp removed    HX POLYPECTOMY  2010    Distal rectum    HX TONSILLECTOMY      LITHOTRIPSY      MN LAP,INGUINAL HERNIA REPR,INITIAL Right 2018    Dr. Raymon rGegory Left 2014    MN TOTAL KNEE ARTHROPLASTY Right 2018       Family History   Problem Relation Age of Onset    Arthritis-rheumatoid Mother     Hypertension Mother     Elevated Lipids Mother     Thyroid Disease Sister     Cancer Father         colon    Heart Disease Other     Hypertension Other     Cancer Other         breast    Heart Disease Maternal Grandmother     Dementia Maternal Grandfather     Cancer Paternal Grandmother         breast    No Known Problems Paternal Grandfather        Social History     Tobacco Use    Smoking status: Former     Packs/day: 1.00     Years: 6.00     Pack years: 6.00     Types: Cigarettes     Quit date: 1992     Years since quittin.9    Smokeless tobacco: Never   Substance Use Topics    Alcohol use: Not Currently     Alcohol/week: 10.0 standard drinks     Types: 10 Glasses of wine per week     Comment: wine with dinner sometimes       ROS   Review of Systems   Constitutional:  Negative for chills and fever. HENT:  Negative for ear pain and sore throat. Eyes:  Negative for pain.    Respiratory:  Negative for cough and shortness of breath. Cardiovascular:  Negative for chest pain. Gastrointestinal:  Negative for abdominal pain, blood in stool and melena. Genitourinary:  Negative for dysuria and hematuria. Musculoskeletal:  Positive for joint pain (off/on). Negative for myalgias. Skin:  Negative for rash. Neurological:  Negative for headaches. Endo/Heme/Allergies:  Does not bruise/bleed easily. Psychiatric/Behavioral:  Positive for depression. Negative for substance abuse. The patient is nervous/anxious. Sx are better today than they were earlier this yr     Objective     Vitals:    11/21/22 1119   BP: 121/64   Pulse: 68   Resp: 16   Temp: 97.8 °F (36.6 °C)   TempSrc: Temporal   SpO2: 98%   Weight: 212 lb (96.2 kg)   Height: 5' 9\" (1.753 m)   PainSc:   0 - No pain       Physical Exam  Vitals and nursing note reviewed. Constitutional:       Appearance: Normal appearance. HENT:      Head: Normocephalic and atraumatic. Right Ear: External ear normal.      Left Ear: External ear normal.      Mouth/Throat:      Mouth: Mucous membranes are dry. Pharynx: No oropharyngeal exudate or posterior oropharyngeal erythema. Eyes:      General: No scleral icterus. Right eye: No discharge. Left eye: No discharge. Extraocular Movements: Extraocular movements intact. Conjunctiva/sclera: Conjunctivae normal.   Cardiovascular:      Rate and Rhythm: Normal rate and regular rhythm. Pulses: Normal pulses. Heart sounds: Normal heart sounds. Pulmonary:      Effort: Pulmonary effort is normal.      Breath sounds: Normal breath sounds. Abdominal:      General: Abdomen is flat. Bowel sounds are normal. There is no distension. Palpations: Abdomen is soft. Tenderness: There is no abdominal tenderness. Musculoskeletal:         General: No swelling (BUE) or tenderness (BUE). Cervical back: Neck supple. Lymphadenopathy:      Cervical: No cervical adenopathy.    Skin: General: Skin is warm and dry. Findings: No erythema. Neurological:      Mental Status: He is alert. Mental status is at baseline. Gait: Gait normal.   Psychiatric:         Mood and Affect: Mood normal.       LABS   Data Review:   Lab Results   Component Value Date/Time    WBC 5.3 03/01/2022 08:39 AM    HGB 13.5 03/01/2022 08:39 AM    HCT 41.6 03/01/2022 08:39 AM    PLATELET 292 28/81/5182 08:39 AM    MCV 93.1 03/01/2022 08:39 AM       Lab Results   Component Value Date/Time    Sodium 138 11/14/2022 09:58 AM    Potassium 4.3 11/14/2022 09:58 AM    Chloride 104 11/14/2022 09:58 AM    CO2 29 11/14/2022 09:58 AM    Anion gap 5 11/14/2022 09:58 AM    Glucose 140 (H) 11/14/2022 09:58 AM    BUN 25 (H) 11/14/2022 09:58 AM    Creatinine 1.26 11/14/2022 09:58 AM    BUN/Creatinine ratio 20 11/14/2022 09:58 AM    GFR est AA >60 07/26/2022 09:16 AM    GFR est non-AA 51 (L) 07/26/2022 09:16 AM    Calcium 8.7 11/14/2022 09:58 AM       Lab Results   Component Value Date/Time    Cholesterol, total 155 03/01/2022 08:39 AM    HDL Cholesterol 40 03/01/2022 08:39 AM    LDL, calculated 85 03/01/2022 08:39 AM    VLDL, calculated 30 03/01/2022 08:39 AM    Triglyceride 150 (H) 03/01/2022 08:39 AM    CHOL/HDL Ratio 3.9 03/01/2022 08:39 AM       Lab Results   Component Value Date/Time    Hemoglobin A1c 6.8 (H) 11/14/2022 09:58 AM    Hemoglobin A1c (POC) 6.6 07/05/2019 09:21 AM       Assessment/Plan:   1. Essential hypertension: Stable. - C/w rx as prescribed. - Checking labs in 6 months    ORDERS:  - METABOLIC PANEL, COMPREHENSIVE; Future  - MICROALBUMIN, UR, RAND W/ MICROALB/CREAT RATIO; Future  - HEMOGLOBIN A1C WITH EAG; Future  - LIPID PANEL; Future  - TSH AND FREE T4; Future    2. Hypothyroidism due to acquired atrophy of thyroid: Stable. - C/w rx as prescribed. - Checking labs in 6 months    ORDERS:  - METABOLIC PANEL, COMPREHENSIVE; Future  - CBC WITH AUTOMATED DIFF; Future  - TSH AND FREE T4; Future    3.  Type 2 diabetes mellitus without complication: His L3P slightly improved. - Checking labs in 6 months  - I encouraged him to schedule his formal eye exam.   - Low carb diet recommended. ORDERS:  - METABOLIC PANEL, COMPREHENSIVE; Future  - MICROALBUMIN, UR, RAND W/ MICROALB/CREAT RATIO; Future  - HEMOGLOBIN A1C WITH EAG; Future  - LIPID PANEL; Future    4. Recurrent major depressive disorder, in remission: Stable. +Sober  - I congratulated him on his continued sobriety  - C/w rx per his Psychiatry team. He will establish care with his new provider next month    5. Melanoma in situ: No new skin lesion  - Checking labs in 6 months  - I encouraged him to follow up with his Dermatologist for routine skin exams    ORDERS:  - METABOLIC PANEL, COMPREHENSIVE; Future  - CBC WITH AUTOMATED DIFF; Future    6. Urinary urgency: Unchanged. - Continue to see Urology for routine check ups  - Checking labs in 6 months  - Avoidance of bladder irritants per Urology recommendations. ORDERS:  - METABOLIC PANEL, COMPREHENSIVE; Future  - CBC WITH AUTOMATED DIFF; Future  - PSA, TOTAL &  FREE; Future    7. Chronic gout without tophus: Stable. - Checking labs in 6 months  - C/w rx as prescribed. - Continue a sober lifestyle. ORDERS:  - METABOLIC PANEL, COMPREHENSIVE; Future  - URIC ACID; Future          ICD-10-CM ICD-9-CM    1. Type 2 diabetes mellitus without complication, unspecified whether long term insulin use (HCC)  V73.3 697.05 METABOLIC PANEL, COMPREHENSIVE      MICROALBUMIN, UR, RAND W/ MICROALB/CREAT RATIO      HEMOGLOBIN A1C WITH EAG      LIPID PANEL      2. Essential hypertension  E63 625.9 METABOLIC PANEL, COMPREHENSIVE      MICROALBUMIN, UR, RAND W/ MICROALB/CREAT RATIO      HEMOGLOBIN A1C WITH EAG      LIPID PANEL      TSH AND FREE T4      3. Hypothyroidism due to acquired atrophy of thyroid  P40.2 723.0 METABOLIC PANEL, COMPREHENSIVE     246.8 CBC WITH AUTOMATED DIFF      TSH AND FREE T4      4.  Recurrent major depressive disorder, in remission ([facility]  F33.40 296.35       5. Melanoma in situ, unspecified site ([facility]  D43.3 122.4 METABOLIC PANEL, COMPREHENSIVE      CBC WITH AUTOMATED DIFF      6. Urinary urgency  F85.46 826.80 METABOLIC PANEL, COMPREHENSIVE      CBC WITH AUTOMATED DIFF      PSA, TOTAL &  FREE      7. Chronic gout without tophus, unspecified cause, unspecified site  M1A. 9XX0 694.42 METABOLIC PANEL, COMPREHENSIVE      URIC ACID            Health Maintenance Due   Topic Date Due    Shingrix Vaccine Age 49> (1 of 2) Never done    Foot Exam Q1  05/22/2020    Eye Exam Retinal or Dilated  11/05/2021     Lab review: labs are reviewed in the EHR and ordered as mentioned above    I have discussed the diagnosis with the patient and the intended plan as seen in the above orders. The patient has received an after-visit summary and questions were answered concerning future plans. I have discussed medication side effects and warnings with the patient as well. I have reviewed the plan of care with the patient, accepted their input and they are in agreement with the treatment goals. All questions were answered. The patient understands the plan of care. Handouts provided today with above information. Pt instructed if symptoms worsen to call the office or report to the ED for continued care. Greater than 50% of the visit time was spent in counseling and/or coordination of care. Voice recognition was used to generate this report, which may have resulted in some phonetic based errors in grammar and contents. Even though attempts were made to correct all the mistakes, some may have been missed, and remained in the body of the document.           Elda Singh MD

## 2022-11-21 NOTE — PROGRESS NOTES
Karol Flores presents today for   Chief Complaint   Patient presents with    Follow-up    Labs     11-14-22         1. \"Have you been to the ER, urgent care clinic since your last visit? Hospitalized since your last visit? \" no    2. \"Have you seen or consulted any other health care providers outside of the [address] [place] [address] since your last visit? \" yes     3. For patients aged 39-70: Has the patient had a colonoscopy / FIT/ Cologuard? Yes - no Care Gap present      If the patient is female:    4. For patients aged 41-77: Has the patient had a mammogram within the past 2 years? NA - based on age or sex  See top three    5. For patients aged 21-65: Has the patient had a pap smear?  NA - based on age or sex Left arm;

## 2022-11-27 RX ORDER — SERTRALINE HYDROCHLORIDE 100 MG/1
150 TABLET, FILM COATED ORAL DAILY
Qty: 135 TABLET | Refills: 5
Start: 2022-11-27

## 2022-12-26 DIAGNOSIS — I10 ESSENTIAL HYPERTENSION: ICD-10-CM

## 2022-12-26 DIAGNOSIS — M10.9 ACUTE GOUT, UNSPECIFIED CAUSE, UNSPECIFIED SITE: ICD-10-CM

## 2022-12-26 DIAGNOSIS — E11.9 TYPE 2 DIABETES MELLITUS WITHOUT COMPLICATION (HCC): ICD-10-CM

## 2022-12-26 DIAGNOSIS — E11.9 TYPE 2 DIABETES MELLITUS WITHOUT COMPLICATION, WITHOUT LONG-TERM CURRENT USE OF INSULIN (HCC): ICD-10-CM

## 2022-12-26 DIAGNOSIS — N18.2 RENAL FAILURE, CHRONIC, STAGE 2 (MILD): ICD-10-CM

## 2022-12-28 RX ORDER — ALLOPURINOL 100 MG/1
TABLET ORAL
Qty: 180 TABLET | Refills: 1 | Status: SHIPPED | OUTPATIENT
Start: 2022-12-28

## 2022-12-28 RX ORDER — LISINOPRIL 5 MG/1
TABLET ORAL
Qty: 90 TABLET | Refills: 1 | Status: SHIPPED | OUTPATIENT
Start: 2022-12-28

## 2023-02-04 DIAGNOSIS — E11.9 TYPE 2 DIABETES MELLITUS WITHOUT COMPLICATION, UNSPECIFIED WHETHER LONG TERM INSULIN USE (HCC): ICD-10-CM

## 2023-02-04 DIAGNOSIS — I10 ESSENTIAL HYPERTENSION: Primary | ICD-10-CM

## 2023-02-04 DIAGNOSIS — E03.4 HYPOTHYROIDISM DUE TO ACQUIRED ATROPHY OF THYROID: ICD-10-CM

## 2023-02-05 DIAGNOSIS — I10 ESSENTIAL HYPERTENSION: Primary | ICD-10-CM

## 2023-02-05 DIAGNOSIS — E11.9 TYPE 2 DIABETES MELLITUS WITHOUT COMPLICATION, UNSPECIFIED WHETHER LONG TERM INSULIN USE (HCC): ICD-10-CM

## 2023-02-06 DIAGNOSIS — E11.9 TYPE 2 DIABETES MELLITUS WITHOUT COMPLICATION, UNSPECIFIED WHETHER LONG TERM INSULIN USE (HCC): ICD-10-CM

## 2023-02-06 DIAGNOSIS — I10 ESSENTIAL HYPERTENSION: Primary | ICD-10-CM

## 2023-02-07 DIAGNOSIS — R39.15 URINARY URGENCY: Primary | ICD-10-CM

## 2023-02-07 DIAGNOSIS — E11.9 TYPE 2 DIABETES MELLITUS WITHOUT COMPLICATION, UNSPECIFIED WHETHER LONG TERM INSULIN USE (HCC): ICD-10-CM

## 2023-02-07 DIAGNOSIS — I10 ESSENTIAL HYPERTENSION: Primary | ICD-10-CM

## 2023-02-15 RX ORDER — LEVOTHYROXINE SODIUM 0.05 MG/1
TABLET ORAL
Qty: 90 TABLET | Refills: 2 | Status: SHIPPED | OUTPATIENT
Start: 2023-02-15

## 2023-03-14 RX ORDER — ATORVASTATIN CALCIUM 40 MG/1
TABLET, FILM COATED ORAL
Qty: 90 TABLET | Refills: 2 | Status: SHIPPED | OUTPATIENT
Start: 2023-03-14

## 2023-04-15 NOTE — PROGRESS NOTES
PT DISCHARGE DAILY NOTE AND [id number]    Date:2018  Patient name: Martina Stein Start of Care: 3/20/2018   Referral source: Zeynep Hinojosa MD : [id number]                         Medical Diagnosis: Right knee pain [M25.561] Onset Date:18 (surgery)                         Treatment Diagnosis: Right Knee Pain   Prior Hospitalization: see medical history Provider#: [id number]   Medications: Verified on Patient summary List   Comorbidities: Arthritis; HTN; DM Type II; Gastrointestinal issues; hx of kidney stones; Depression; hx of Hepatitis A   Prior Level of Function: Retired; lives in [place] home with spouse; active individual    Visits from [facility]: 22    Missed Visits: 0    Reporting Period : 3/20/18 to 18    [x]  Patient  Verified  Payor: [place] MEDICARE / Plan: [place] MEDICARE PART A & B / Product Type: Medicare /    In time:1027  Out time:1115  Total Treatment Time (min): 48  Total Timed Codes (min): 48  1:1 Treatment Time ( [address] only): 50   Visit #: 3 of 3    SUBJECTIVE  Pain Level (0-10 scale): 3  Any medication changes, allergies to medications, adverse drug reactions, diagnosis change, or new procedure performed?: [x] No    [] Yes (see summary sheet for update)  Subjective functional status/changes:   [] No changes reported  It's the extension.      OBJECTIVE    Modality rationale:    Min Type Additional Details    [] Estim:  []Unatt       []IFC  []Premod                        []Other:  []w/ice   []w/heat  Position:  Location:    [] Estim: []Att    []TENS instruct  []NMES                    []Other:  []w/US   []w/ice   []w/heat  Position:  Location:    []  Traction: [] Cervical       []Lumbar                       [] Prone          []Supine                       []Intermittent   []Continuous Lbs:  [] before manual  [] after manual    []  Ultrasound: []Continuous   [] Pulsed                           []1MHz   []3MHz W/cm2:  Location:    []  Iontophoresis with dexamethasone         Location: [] Take home patch   [] In clinic    []  Ice     []  heat  []  Ice massage  []  Laser   []  Anodyne Position:  Location:    []  Laser with stim  []  Other:  Position:  Location:    []  Vasopneumatic Device Pressure:       [] lo [] med [] hi   Temperature: [] lo [] med [] hi   [] Skin assessment post-treatment:  []intact []redness- no adverse reaction    []redness - adverse reaction:     33 min Neuromuscular Re-education:  [x]  See flow sheet :   Rationale: increase strength and improve coordination  to improve the patients ability to improve knee stability, extension     15 min Manual Therapy:  STM Right hamstrings, contract relax to improve extension, overpressure into extension, tibiofemoral joint mobs grade III-IV for extension   Rationale: decrease pain, increase ROM, increase tissue extensibility and decrease trigger points to improve knee extension       With   [] TE   [] TA   [] neuro   [] other: Patient Education: [x] Review HEP    [] Progressed/Changed HEP based on:   [] positioning   [] body mechanics   [] transfers   [] heat/ice application    [] other:      Other Objective/Functional Measures: issued extension program for home/CA     Pain Level (0-10 scale) post treatment: 3    Summary of Care:  Goal: Pt to increase right knee AROM to -5-110 deg or > to normalize gait pattern for easier navigation in community. Status at last note/certification: -29-86 deg right knee  Current status: progressing - right knee AROM remains -7-121 deg         Goal: Pt to perform right SLS x 30 seconds without LOB to work towards reciprocal pattern on porch steps. Status at last note/certification: unable to perform  Current status: progressing - only slight LOB  Goal: Pt to perform at least 5 double leg squats without deviations to bend and  objects without difficulty.   Status at last note/certification: pain and increased left LE WB  Current status: met - appears symmetrical with assistance at trapeze  Goal: Pt to amb community distances without deviations, fatigue, or A.D for increased activity tolerance. Status at last note/certification: uses [facility] distances with antalgic limp  Current status: progressing - not using AD but gait remains somewhat slowed and slight deviations persist due to knee extension deficits  Goal: Pt to report FOTO score of 62 pts to show improved function. Status at last note/certification: FOTO 44 pts  Current status: progressing - FOTO 58 pts    ASSESSMENT/Changes in Function: Patient has consistently attended therapy following Right TKA. He has made good gains in knee stability and in flexion AROM, reporting no limitations to standing or walking due to his Right knee. His biggest concern is that he has not yet achieved full extension; in therapy he has been limited to lacking 7 degrees extension for the past few weeks with no change. He reports that his lack of extension only affects him when standing up from sitting, until he takes a step and his knee begins to relax. Patient hesitant to discharge from therapy due to extension deficits, but he has no tenderness in the posterior muscles and reports that the pain is more in the joint. Explained that he may not get full extension for several weeks to months until quadriceps activation improves and swelling decreases. Issued updated home and gym program to assist with quad activation and extension AROM. Patient still frustrated but states he will discharge; aware that if extension worsens he can get a new script for PT. Told him that discharge at this point is due to good functional abilities and no recent changes in extension/nothing further we can offer at this time for extension other than HEP and time to heal.     G-Codes (GP)  Mobility    Goal  CJ= 20-39%  D/C  CK= 40-59%    The severity rating is based on clinical judgment and the FOTO score.       Thank you for this referral!     PLAN  [x]Discontinue therapy: [x]Patient has reached or is progressing toward set goals      []Patient is non-compliant or has abdicated      []Due to lack of appreciable progress towards set [address], PT 5/9/2018  7:51 AM yes

## 2023-04-17 ENCOUNTER — HOSPITAL ENCOUNTER (OUTPATIENT)
Facility: HOSPITAL | Age: 78
Setting detail: SPECIMEN
Discharge: HOME | End: 2023-04-20
Payer: MEDICARE

## 2023-04-17 DIAGNOSIS — R39.15 URINARY URGENCY: ICD-10-CM

## 2023-04-17 DIAGNOSIS — I10 ESSENTIAL HYPERTENSION: ICD-10-CM

## 2023-04-17 DIAGNOSIS — E03.4 HYPOTHYROIDISM DUE TO ACQUIRED ATROPHY OF THYROID: ICD-10-CM

## 2023-04-17 DIAGNOSIS — E11.9 TYPE 2 DIABETES MELLITUS WITHOUT COMPLICATION, UNSPECIFIED WHETHER LONG TERM INSULIN USE (HCC): ICD-10-CM

## 2023-04-17 LAB
ALBUMIN SERPL-MCNC: 4.2 G/DL (ref 3.4–5)
ALBUMIN/GLOB SERPL: 1.7 (ref 0.8–1.7)
ALP SERPL-CCNC: 89 U/L (ref 45–117)
ALT SERPL-CCNC: 24 U/L (ref 16–61)
ANION GAP SERPL CALC-SCNC: 4 MMOL/L (ref 3–18)
AST SERPL-CCNC: 18 U/L (ref 10–38)
BILIRUB SERPL-MCNC: 0.8 MG/DL (ref 0.2–1)
BUN SERPL-MCNC: 28 MG/DL (ref 7–18)
BUN/CREAT SERPL: 20 (ref 12–20)
CALCIUM SERPL-MCNC: 9.2 MG/DL (ref 8.5–10.1)
CHLORIDE SERPL-SCNC: 107 MMOL/L (ref 100–111)
CHOLEST SERPL-MCNC: 161 MG/DL
CO2 SERPL-SCNC: 26 MMOL/L (ref 21–32)
CREAT SERPL-MCNC: 1.41 MG/DL (ref 0.6–1.3)
CREAT UR-MCNC: 103 MG/DL (ref 30–125)
EST. AVERAGE GLUCOSE BLD GHB EST-MCNC: 146 MG/DL
GLOBULIN SER CALC-MCNC: 2.5 G/DL (ref 2–4)
GLUCOSE SERPL-MCNC: 151 MG/DL (ref 74–99)
HBA1C MFR BLD: 6.7 % (ref 4.2–5.6)
HDLC SERPL-MCNC: 43 MG/DL (ref 40–60)
HDLC SERPL: 3.7 (ref 0–5)
LDLC SERPL CALC-MCNC: 85.2 MG/DL (ref 0–100)
LIPID PANEL: ABNORMAL
MICROALBUMIN UR-MCNC: 0.97 MG/DL (ref 0–3)
MICROALBUMIN/CREAT UR-RTO: 9 MG/G (ref 0–30)
POTASSIUM SERPL-SCNC: 4.3 MMOL/L (ref 3.5–5.5)
PROT SERPL-MCNC: 6.7 G/DL (ref 6.4–8.2)
SODIUM SERPL-SCNC: 137 MMOL/L (ref 136–145)
TRIGL SERPL-MCNC: 164 MG/DL
VLDLC SERPL CALC-MCNC: 32.8 MG/DL

## 2023-04-17 PROCEDURE — 82043 UR ALBUMIN QUANTITATIVE: CPT

## 2023-04-17 PROCEDURE — 36415 COLL VENOUS BLD VENIPUNCTURE: CPT

## 2023-04-17 PROCEDURE — 80061 LIPID PANEL: CPT

## 2023-04-17 PROCEDURE — 80053 COMPREHEN METABOLIC PANEL: CPT

## 2023-04-17 PROCEDURE — 84154 ASSAY OF PSA FREE: CPT

## 2023-04-17 PROCEDURE — 83036 HEMOGLOBIN GLYCOSYLATED A1C: CPT

## 2023-04-18 LAB
PSA FREE MFR SERPL: 34.6 %
PSA FREE SERPL-MCNC: 0.9 NG/ML
PSA SERPL-MCNC: 2.6 NG/ML (ref 0–4)

## 2023-04-24 ENCOUNTER — OFFICE VISIT (OUTPATIENT)
Age: 78
End: 2023-04-24
Payer: MEDICARE

## 2023-04-24 VITALS
DIASTOLIC BLOOD PRESSURE: 78 MMHG | RESPIRATION RATE: 14 BRPM | TEMPERATURE: 98.2 F | WEIGHT: 212 LBS | HEART RATE: 64 BPM | SYSTOLIC BLOOD PRESSURE: 126 MMHG | HEIGHT: 69 IN | BODY MASS INDEX: 31.4 KG/M2 | OXYGEN SATURATION: 99 %

## 2023-04-24 DIAGNOSIS — M10.9 GOUT, UNSPECIFIED CAUSE, UNSPECIFIED CHRONICITY, UNSPECIFIED SITE: ICD-10-CM

## 2023-04-24 DIAGNOSIS — E78.2 MIXED HYPERLIPIDEMIA: ICD-10-CM

## 2023-04-24 DIAGNOSIS — E03.4 HYPOTHYROIDISM DUE TO ACQUIRED ATROPHY OF THYROID: ICD-10-CM

## 2023-04-24 DIAGNOSIS — R39.15 URINARY URGENCY: ICD-10-CM

## 2023-04-24 DIAGNOSIS — D03.9 MELANOMA IN SITU, UNSPECIFIED SITE (HCC): ICD-10-CM

## 2023-04-24 DIAGNOSIS — E11.9 TYPE 2 DIABETES MELLITUS WITHOUT COMPLICATION, WITHOUT LONG-TERM CURRENT USE OF INSULIN (HCC): Primary | ICD-10-CM

## 2023-04-24 DIAGNOSIS — Z00.00 MEDICARE ANNUAL WELLNESS VISIT, SUBSEQUENT: ICD-10-CM

## 2023-04-24 DIAGNOSIS — I10 ESSENTIAL (PRIMARY) HYPERTENSION: ICD-10-CM

## 2023-04-24 DIAGNOSIS — N18.30 STAGE 3 CHRONIC KIDNEY DISEASE, UNSPECIFIED WHETHER STAGE 3A OR 3B CKD (HCC): ICD-10-CM

## 2023-04-24 DIAGNOSIS — Z71.89 ADVANCE CARE PLANNING: ICD-10-CM

## 2023-04-24 DIAGNOSIS — F33.40 MAJOR DEPRESSIVE DISORDER, RECURRENT, IN REMISSION, UNSPECIFIED (HCC): ICD-10-CM

## 2023-04-24 PROCEDURE — 1036F TOBACCO NON-USER: CPT | Performed by: INTERNAL MEDICINE

## 2023-04-24 PROCEDURE — 99211 OFF/OP EST MAY X REQ PHY/QHP: CPT | Performed by: INTERNAL MEDICINE

## 2023-04-24 PROCEDURE — 3078F DIAST BP <80 MM HG: CPT | Performed by: INTERNAL MEDICINE

## 2023-04-24 PROCEDURE — 3074F SYST BP LT 130 MM HG: CPT | Performed by: INTERNAL MEDICINE

## 2023-04-24 PROCEDURE — G0439 PPPS, SUBSEQ VISIT: HCPCS | Performed by: INTERNAL MEDICINE

## 2023-04-24 PROCEDURE — 3044F HG A1C LEVEL LT 7.0%: CPT | Performed by: INTERNAL MEDICINE

## 2023-04-24 PROCEDURE — G8417 CALC BMI ABV UP PARAM F/U: HCPCS | Performed by: INTERNAL MEDICINE

## 2023-04-24 PROCEDURE — 99214 OFFICE O/P EST MOD 30 MIN: CPT | Performed by: INTERNAL MEDICINE

## 2023-04-24 PROCEDURE — 1123F ACP DISCUSS/DSCN MKR DOCD: CPT | Performed by: INTERNAL MEDICINE

## 2023-04-24 PROCEDURE — G8427 DOCREV CUR MEDS BY ELIG CLIN: HCPCS | Performed by: INTERNAL MEDICINE

## 2023-04-24 SDOH — ECONOMIC STABILITY: FOOD INSECURITY: WITHIN THE PAST 12 MONTHS, THE FOOD YOU BOUGHT JUST DIDN'T LAST AND YOU DIDN'T HAVE MONEY TO GET MORE.: NEVER TRUE

## 2023-04-24 SDOH — ECONOMIC STABILITY: INCOME INSECURITY: HOW HARD IS IT FOR YOU TO PAY FOR THE VERY BASICS LIKE FOOD, HOUSING, MEDICAL CARE, AND HEATING?: NOT HARD AT ALL

## 2023-04-24 SDOH — ECONOMIC STABILITY: FOOD INSECURITY: WITHIN THE PAST 12 MONTHS, YOU WORRIED THAT YOUR FOOD WOULD RUN OUT BEFORE YOU GOT MONEY TO BUY MORE.: NEVER TRUE

## 2023-04-24 SDOH — ECONOMIC STABILITY: HOUSING INSECURITY
IN THE LAST 12 MONTHS, WAS THERE A TIME WHEN YOU DID NOT HAVE A STEADY PLACE TO SLEEP OR SLEPT IN A SHELTER (INCLUDING NOW)?: NO

## 2023-04-24 ASSESSMENT — PATIENT HEALTH QUESTIONNAIRE - PHQ9
3. TROUBLE FALLING OR STAYING ASLEEP: 0
6. FEELING BAD ABOUT YOURSELF - OR THAT YOU ARE A FAILURE OR HAVE LET YOURSELF OR YOUR FAMILY DOWN: 0
8. MOVING OR SPEAKING SO SLOWLY THAT OTHER PEOPLE COULD HAVE NOTICED. OR THE OPPOSITE, BEING SO FIGETY OR RESTLESS THAT YOU HAVE BEEN MOVING AROUND A LOT MORE THAN USUAL: 0
7. TROUBLE CONCENTRATING ON THINGS, SUCH AS READING THE NEWSPAPER OR WATCHING TELEVISION: 0
2. FEELING DOWN, DEPRESSED OR HOPELESS: 0
9. THOUGHTS THAT YOU WOULD BE BETTER OFF DEAD, OR OF HURTING YOURSELF: 0
SUM OF ALL RESPONSES TO PHQ QUESTIONS 1-9: 0
1. LITTLE INTEREST OR PLEASURE IN DOING THINGS: 0
SUM OF ALL RESPONSES TO PHQ9 QUESTIONS 1 & 2: 0
5. POOR APPETITE OR OVEREATING: 0
10. IF YOU CHECKED OFF ANY PROBLEMS, HOW DIFFICULT HAVE THESE PROBLEMS MADE IT FOR YOU TO DO YOUR WORK, TAKE CARE OF THINGS AT HOME, OR GET ALONG WITH OTHER PEOPLE: 0
4. FEELING TIRED OR HAVING LITTLE ENERGY: 0
SUM OF ALL RESPONSES TO PHQ QUESTIONS 1-9: 0

## 2023-04-24 ASSESSMENT — LIFESTYLE VARIABLES
HOW MANY STANDARD DRINKS CONTAINING ALCOHOL DO YOU HAVE ON A TYPICAL DAY: PATIENT DOES NOT DRINK
HOW OFTEN DO YOU HAVE A DRINK CONTAINING ALCOHOL: NEVER

## 2023-05-02 ASSESSMENT — ENCOUNTER SYMPTOMS
ABDOMINAL PAIN: 0
SHORTNESS OF BREATH: 0
BLOOD IN STOOL: 0
SORE THROAT: 0
ANAL BLEEDING: 0
COUGH: 0
EYE PAIN: 0

## 2023-05-03 NOTE — ACP (ADVANCE CARE PLANNING)
Advance Care Planning     General Advance Care Planning (ACP) Conversation    Date of Conversation: 4/24/23    Conducted with: Patient with Decision Making Capacity    Healthcare Decision Maker: Today we documented Decision Maker(s) consistent with ACP documents on file. Content/Action Overview: Has ACP document(s) on file - reflects the patient's care preferences  He has no changes to make to his pre-existing advance directive.     Length of Voluntary ACP Conversation in minutes:  <16 minutes (Non-Billable)    Matt Nciolas MD

## 2023-06-09 RX ORDER — SILDENAFIL CITRATE 20 MG/1
TABLET ORAL
Qty: 30 TABLET | Refills: 5 | Status: SHIPPED | OUTPATIENT
Start: 2023-06-09

## 2023-06-20 ENCOUNTER — TELEPHONE (OUTPATIENT)
Age: 78
End: 2023-06-20

## 2023-06-20 RX ORDER — ALLOPURINOL 100 MG/1
100 TABLET ORAL 2 TIMES DAILY
Qty: 180 TABLET | Refills: 3 | Status: SHIPPED | OUTPATIENT
Start: 2023-06-20

## 2023-07-05 RX ORDER — LISINOPRIL 5 MG/1
5 TABLET ORAL DAILY
Qty: 30 TABLET | Refills: 0 | Status: SHIPPED | OUTPATIENT
Start: 2023-07-05

## 2023-07-26 ENCOUNTER — APPOINTMENT (OUTPATIENT)
Facility: HOSPITAL | Age: 78
End: 2023-07-26
Payer: MEDICARE

## 2023-07-26 ENCOUNTER — HOSPITAL ENCOUNTER (EMERGENCY)
Facility: HOSPITAL | Age: 78
Discharge: HOME | End: 2023-07-26
Attending: EMERGENCY MEDICINE
Payer: MEDICARE

## 2023-07-26 VITALS
SYSTOLIC BLOOD PRESSURE: 162 MMHG | RESPIRATION RATE: 16 BRPM | HEART RATE: 86 BPM | OXYGEN SATURATION: 98 % | DIASTOLIC BLOOD PRESSURE: 80 MMHG | WEIGHT: 206 LBS | BODY MASS INDEX: 30.51 KG/M2 | HEIGHT: 69 IN | TEMPERATURE: 97.6 F

## 2023-07-26 DIAGNOSIS — N28.89 NODULE OF KIDNEY: ICD-10-CM

## 2023-07-26 DIAGNOSIS — N20.0 KIDNEY STONE: Primary | ICD-10-CM

## 2023-07-26 LAB
ALBUMIN SERPL-MCNC: 4.3 G/DL (ref 3.4–5)
ALBUMIN/GLOB SERPL: 1.3 (ref 0.8–1.7)
ALP SERPL-CCNC: 104 U/L (ref 45–117)
ALT SERPL-CCNC: 29 U/L (ref 16–61)
ANION GAP SERPL CALC-SCNC: 5 MMOL/L (ref 3–18)
APPEARANCE UR: CLEAR
AST SERPL-CCNC: 23 U/L (ref 10–38)
BASOPHILS # BLD: 0 K/UL (ref 0–0.1)
BASOPHILS NFR BLD: 0 % (ref 0–2)
BILIRUB SERPL-MCNC: 0.8 MG/DL (ref 0.2–1)
BILIRUB UR QL: NEGATIVE
BUN SERPL-MCNC: 27 MG/DL (ref 7–18)
BUN/CREAT SERPL: 16 (ref 12–20)
CALCIUM SERPL-MCNC: 9.4 MG/DL (ref 8.5–10.1)
CHLORIDE SERPL-SCNC: 107 MMOL/L (ref 100–111)
CO2 SERPL-SCNC: 27 MMOL/L (ref 21–32)
COLOR UR: YELLOW
CREAT SERPL-MCNC: 1.69 MG/DL (ref 0.6–1.3)
DIFFERENTIAL METHOD BLD: ABNORMAL
EKG ATRIAL RATE: 58 BPM
EKG DIAGNOSIS: NORMAL
EKG P AXIS: 32 DEGREES
EKG P-R INTERVAL: 206 MS
EKG Q-T INTERVAL: 434 MS
EKG QRS DURATION: 142 MS
EKG QTC CALCULATION (BAZETT): 426 MS
EKG R AXIS: 56 DEGREES
EKG T AXIS: 36 DEGREES
EKG VENTRICULAR RATE: 58 BPM
EOSINOPHIL # BLD: 0.1 K/UL (ref 0–0.4)
EOSINOPHIL NFR BLD: 1 % (ref 0–5)
ERYTHROCYTE [DISTWIDTH] IN BLOOD BY AUTOMATED COUNT: 12.9 % (ref 11.6–14.5)
GLOBULIN SER CALC-MCNC: 3.2 G/DL (ref 2–4)
GLUCOSE SERPL-MCNC: 213 MG/DL (ref 74–99)
GLUCOSE UR STRIP.AUTO-MCNC: 100 MG/DL
HCT VFR BLD AUTO: 41.5 % (ref 36–48)
HGB BLD-MCNC: 14.5 G/DL (ref 13–16)
HGB UR QL STRIP: NEGATIVE
IMM GRANULOCYTES # BLD AUTO: 0 K/UL (ref 0–0.04)
IMM GRANULOCYTES NFR BLD AUTO: 0 % (ref 0–0.5)
KETONES UR QL STRIP.AUTO: NEGATIVE MG/DL
LEUKOCYTE ESTERASE UR QL STRIP.AUTO: NEGATIVE
LYMPHOCYTES # BLD: 0.8 K/UL (ref 0.9–3.6)
LYMPHOCYTES NFR BLD: 10 % (ref 21–52)
MCH RBC QN AUTO: 31.5 PG (ref 24–34)
MCHC RBC AUTO-ENTMCNC: 34.9 G/DL (ref 31–37)
MCV RBC AUTO: 90 FL (ref 78–100)
MONOCYTES # BLD: 0.3 K/UL (ref 0.05–1.2)
MONOCYTES NFR BLD: 4 % (ref 3–10)
NEUTS SEG # BLD: 6.5 K/UL (ref 1.8–8)
NEUTS SEG NFR BLD: 84 % (ref 40–73)
NITRITE UR QL STRIP.AUTO: NEGATIVE
NRBC # BLD: 0 K/UL (ref 0–0.01)
NRBC BLD-RTO: 0 PER 100 WBC
PH UR STRIP: 6.5 (ref 5–8)
PLATELET # BLD AUTO: 175 K/UL (ref 135–420)
PMV BLD AUTO: 9.7 FL (ref 9.2–11.8)
POTASSIUM SERPL-SCNC: 5 MMOL/L (ref 3.5–5.5)
PROT SERPL-MCNC: 7.5 G/DL (ref 6.4–8.2)
PROT UR STRIP-MCNC: NEGATIVE MG/DL
RBC # BLD AUTO: 4.61 M/UL (ref 4.35–5.65)
SODIUM SERPL-SCNC: 139 MMOL/L (ref 136–145)
SP GR UR REFRACTOMETRY: 1.02 (ref 1–1.03)
UROBILINOGEN UR QL STRIP.AUTO: 0.2 EU/DL (ref 0.2–1)
WBC # BLD AUTO: 7.8 K/UL (ref 4.6–13.2)

## 2023-07-26 PROCEDURE — 80053 COMPREHEN METABOLIC PANEL: CPT

## 2023-07-26 PROCEDURE — 99285 EMERGENCY DEPT VISIT HI MDM: CPT

## 2023-07-26 PROCEDURE — 81003 URINALYSIS AUTO W/O SCOPE: CPT

## 2023-07-26 PROCEDURE — 6360000004 HC RX CONTRAST MEDICATION: Performed by: EMERGENCY MEDICINE

## 2023-07-26 PROCEDURE — 74177 CT ABD & PELVIS W/CONTRAST: CPT

## 2023-07-26 PROCEDURE — 93010 ELECTROCARDIOGRAM REPORT: CPT | Performed by: INTERNAL MEDICINE

## 2023-07-26 PROCEDURE — 85025 COMPLETE CBC W/AUTO DIFF WBC: CPT

## 2023-07-26 PROCEDURE — 93005 ELECTROCARDIOGRAM TRACING: CPT | Performed by: EMERGENCY MEDICINE

## 2023-07-26 RX ADMIN — IOPAMIDOL 70 ML: 612 INJECTION, SOLUTION INTRAVENOUS at 15:54

## 2023-07-26 ASSESSMENT — PAIN - FUNCTIONAL ASSESSMENT: PAIN_FUNCTIONAL_ASSESSMENT: 0-10

## 2023-07-26 ASSESSMENT — PAIN SCALES - GENERAL: PAINLEVEL_OUTOF10: 6

## 2023-07-26 NOTE — DISCHARGE INSTRUCTIONS
Incidentally seen nodule on the kidney, cannot further characterize. Discussed with your primary care doctor for more imaging and follow-up.

## 2023-07-27 ENCOUNTER — TELEPHONE (OUTPATIENT)
Age: 78
End: 2023-07-27

## 2023-07-27 NOTE — ED NOTES
Discharge instructions reviewed at this time. Patient verbalized understanding. Patient A&Ox4 and ambulatory at this time. Patient instructed on follow ups, medications and education and verbalized understanding. Updated vitals obtained and IV removed. Patient discharge complete.         Mat Peoples RN  07/26/23 2009

## 2023-07-28 ENCOUNTER — TELEMEDICINE (OUTPATIENT)
Age: 78
End: 2023-07-28

## 2023-07-28 ENCOUNTER — TELEPHONE (OUTPATIENT)
Age: 78
End: 2023-07-28

## 2023-07-28 DIAGNOSIS — N20.0 NEPHROLITHIASIS: Primary | ICD-10-CM

## 2023-07-28 RX ORDER — HYDROCODONE BITARTRATE AND ACETAMINOPHEN 5; 325 MG/1; MG/1
1 TABLET ORAL EVERY 8 HOURS PRN
Qty: 21 TABLET | Refills: 0 | Status: SHIPPED | OUTPATIENT
Start: 2023-07-28 | End: 2023-08-04

## 2023-07-28 RX ORDER — TAMSULOSIN HYDROCHLORIDE 0.4 MG/1
0.4 CAPSULE ORAL DAILY
Qty: 30 CAPSULE | Refills: 2 | Status: SHIPPED | OUTPATIENT
Start: 2023-07-28

## 2023-07-28 RX ORDER — ONDANSETRON 4 MG/1
4 TABLET, FILM COATED ORAL 3 TIMES DAILY PRN
Qty: 30 TABLET | Refills: 2 | Status: SHIPPED | OUTPATIENT
Start: 2023-07-28

## 2023-07-28 NOTE — PROGRESS NOTES
Imelda Clay is a 76 y.o. male who was seen by synchronous (real-time) audio-video technology on 7/28/2023. Assessment & Plan:    Nephrolithiasis: Unfortunately, given the size of the stone, I do not believe that the patient will be successful in passing the stone on its own.  -Ordering tamsulosin 0.4 mg daily.  - Norco ordered for severe pain relief for short-term use. - Ordering Zofran 4 mg 3 times daily as needed for nausea and vomiting symptoms.  - I consulted with our urology colleagues across the [place] who already see him given his underlying BPH. They will schedule him for an urgent evaluation in their office       Diagnosis Orders   1. Nephrolithiasis  tamsulosin (FLOMAX) 0.4 MG capsule    HYDROcodone-acetaminophen (NORCO) 5-325 MG per tablet    ondansetron (ZOFRAN) 4 MG tablet              Lab review: labs are reviewed in the EHR     I have discussed the diagnosis with the patient and the intended plan as seen in the above orders. I have discussed medication side effects and warnings with the patient as well. I have reviewed the plan of care with the patient, accepted their input and they are in agreement with the treatment goals. All questions were answered. The patient understands the plan of care. Pt instructed if symptoms worsen to call the office or report to the ED for continued care. Greater than 50% of the visit time was spent in counseling and/or coordination of care. Voice recognition was used to generate this report, which may have resulted in some phonetic based errors in grammar and contents. Even though attempts were made to correct all the mistakes, some may have been missed, and remained in the body of the document. Subjective:    Imelda Clay was seen for   Chief Complaint   Patient presents with    Nephrolithiasis     Hospital follow-up seen at [facility] 7/26/23         The pt is a 69yo male with h/o ED, melanoma in situ on left dorsal forearm s/p removal, MDD, rectal

## 2023-07-28 NOTE — PROGRESS NOTES
Candelario Villasenor presents today for   Chief Complaint   Patient presents with    Nephrolithiasis     Hospital follow-up seen at [facility] 7/26/23         1. \"Have you been to the ER, urgent care clinic since your last visit? Hospitalized since your last visit? \" [facility] 7/26/23    2. \"Have you seen or consulted any other health care providers outside of the [address] [place] [address] since your last visit? \" No     3. For patients aged 43-73: Has the patient had a colonoscopy / FIT/ Cologuard?  NA - based on age

## 2023-07-28 NOTE — TELEPHONE ENCOUNTER
TRIAGE  Pt wife called stating he was recently seeen at ed on 07/26- (kidney stone)  he has an appt on 07/31 but today he is having  chills , dry heeves , for the past 4 hours  along with body aches   Justin

## 2023-08-03 RX ORDER — LISINOPRIL 5 MG/1
5 TABLET ORAL DAILY
Qty: 90 TABLET | Refills: 1 | Status: SHIPPED | OUTPATIENT
Start: 2023-08-03

## 2023-09-26 RX ORDER — BUPROPION HYDROCHLORIDE 100 MG/1
100 TABLET, EXTENDED RELEASE ORAL DAILY
COMMUNITY

## 2023-09-26 RX ORDER — UBIDECARENONE 200 MG
1 CAPSULE ORAL DAILY
COMMUNITY

## 2023-09-26 NOTE — PERIOP NOTE
Instructions for your procedure at [facility] Date: 9/26/2023      Patient's Name: Malick Nunes      Procedure Date: 10/9/2023        Please enter the main entrance of the hospital and check-in at the  located in the lobby. Do NOT eat or drink anything, including candy, gum, or ice chips after midnight prior to your procedure, unless it is part of your prep. Brush your teeth before coming to the hospital.You may swish with water, but do not swallow. No smoking/Vaping/E-Cigarettes 24 hours prior to the day of procedure. No alcohol 24 hours prior to the day of procedure. No recreational drugs for one week prior to the day of procedure. Bring Photo ID, Insurance information, and Co-pay if required on day of procedure. Bring in pertinent legal documents, such as, Medical Power of LINDSEY JOANNA, DNR, Advance Directive, etc.  Leave all other valuables, including money/purse, at home. Remove jewelry, including ALL body piercings, nail polish, acrylic nails, and makeup (including mascara); no lotions, powders, deodorant, and/or perfume/cologne/after shave on the skin. Glasses and dentures may be worn to the hospital.  They must be removed prior to procedure. Please bring case/container for glasses or dentures. 11. Contacts should not be worn on day of procedure. 12. Call the office ([phone]) if you have symptoms of a cold or illness within 24-48 hours prior to your procedure. 13. AN ADULT (relative or friend 18 years or older) MUST DRIVE YOU HOME AFTER YOUR PROCEDURE. 14. Please make arrangements for a responsible adult (18 years or older) to be with you for 24 hours after your procedure. 13. ONE VISITOR will be allowed in the waiting area during your procedure. Special Instructions:      Bring list of CURRENT medications.   Follow instructions from the office regarding Bowel Prep, Vitamins, Iron, Blood Thinners, Insulin, Seizure, and Blood

## 2023-10-06 ENCOUNTER — ANESTHESIA EVENT (OUTPATIENT)
Facility: HOSPITAL | Age: 78
End: 2023-10-06
Payer: MEDICARE

## 2023-10-09 ENCOUNTER — HOSPITAL ENCOUNTER (OUTPATIENT)
Facility: HOSPITAL | Age: 78
Setting detail: OUTPATIENT SURGERY
Discharge: HOME | End: 2023-10-09
Attending: INTERNAL MEDICINE | Admitting: INTERNAL MEDICINE
Payer: MEDICARE

## 2023-10-09 ENCOUNTER — ANESTHESIA (OUTPATIENT)
Facility: HOSPITAL | Age: 78
End: 2023-10-09
Payer: MEDICARE

## 2023-10-09 VITALS
SYSTOLIC BLOOD PRESSURE: 145 MMHG | OXYGEN SATURATION: 98 % | HEART RATE: 56 BPM | RESPIRATION RATE: 16 BRPM | BODY MASS INDEX: 29.12 KG/M2 | DIASTOLIC BLOOD PRESSURE: 77 MMHG | WEIGHT: 196.6 LBS | HEIGHT: 69 IN | TEMPERATURE: 98.2 F

## 2023-10-09 LAB — GLUCOSE BLD STRIP.AUTO-MCNC: 108 MG/DL (ref 70–110)

## 2023-10-09 PROCEDURE — 2500000003 HC RX 250 WO HCPCS: Performed by: NURSE ANESTHETIST, CERTIFIED REGISTERED

## 2023-10-09 PROCEDURE — 2580000003 HC RX 258: Performed by: NURSE ANESTHETIST, CERTIFIED REGISTERED

## 2023-10-09 PROCEDURE — 82962 GLUCOSE BLOOD TEST: CPT

## 2023-10-09 PROCEDURE — 7100000000 HC PACU RECOVERY - FIRST 15 MIN: Performed by: INTERNAL MEDICINE

## 2023-10-09 PROCEDURE — 2709999900 HC NON-CHARGEABLE SUPPLY: Performed by: INTERNAL MEDICINE

## 2023-10-09 PROCEDURE — 3700000001 HC ADD 15 MINUTES (ANESTHESIA): Performed by: INTERNAL MEDICINE

## 2023-10-09 PROCEDURE — 3600007512: Performed by: INTERNAL MEDICINE

## 2023-10-09 PROCEDURE — 3600007502: Performed by: INTERNAL MEDICINE

## 2023-10-09 PROCEDURE — 6360000002 HC RX W HCPCS: Performed by: NURSE ANESTHETIST, CERTIFIED REGISTERED

## 2023-10-09 PROCEDURE — 88305 TISSUE EXAM BY PATHOLOGIST: CPT

## 2023-10-09 PROCEDURE — 7100000010 HC PHASE II RECOVERY - FIRST 15 MIN: Performed by: INTERNAL MEDICINE

## 2023-10-09 PROCEDURE — 3700000000 HC ANESTHESIA ATTENDED CARE: Performed by: INTERNAL MEDICINE

## 2023-10-09 RX ORDER — PROPOFOL 10 MG/ML
INJECTION, EMULSION INTRAVENOUS PRN
Status: DISCONTINUED | OUTPATIENT
Start: 2023-10-09 | End: 2023-10-09 | Stop reason: SDUPTHER

## 2023-10-09 RX ORDER — SODIUM CHLORIDE, SODIUM LACTATE, POTASSIUM CHLORIDE, CALCIUM CHLORIDE 600; 310; 30; 20 MG/100ML; MG/100ML; MG/100ML; MG/100ML
INJECTION, SOLUTION INTRAVENOUS CONTINUOUS
Status: DISCONTINUED | OUTPATIENT
Start: 2023-10-09 | End: 2023-10-09 | Stop reason: HOSPADM

## 2023-10-09 RX ORDER — SODIUM CHLORIDE 0.9 % (FLUSH) 0.9 %
5-40 SYRINGE (ML) INJECTION EVERY 12 HOURS SCHEDULED
Status: DISCONTINUED | OUTPATIENT
Start: 2023-10-09 | End: 2023-10-09 | Stop reason: HOSPADM

## 2023-10-09 RX ORDER — SODIUM CHLORIDE 0.9 % (FLUSH) 0.9 %
5-40 SYRINGE (ML) INJECTION PRN
Status: DISCONTINUED | OUTPATIENT
Start: 2023-10-09 | End: 2023-10-09 | Stop reason: HOSPADM

## 2023-10-09 RX ORDER — FAMOTIDINE 20 MG/1
20 TABLET, FILM COATED ORAL ONCE
Status: DISCONTINUED | OUTPATIENT
Start: 2023-10-09 | End: 2023-10-09 | Stop reason: HOSPADM

## 2023-10-09 RX ORDER — SODIUM CHLORIDE 9 MG/ML
INJECTION, SOLUTION INTRAVENOUS PRN
Status: DISCONTINUED | OUTPATIENT
Start: 2023-10-09 | End: 2023-10-09 | Stop reason: HOSPADM

## 2023-10-09 RX ORDER — LIDOCAINE HYDROCHLORIDE 20 MG/ML
INJECTION, SOLUTION EPIDURAL; INFILTRATION; INTRACAUDAL; PERINEURAL PRN
Status: DISCONTINUED | OUTPATIENT
Start: 2023-10-09 | End: 2023-10-09 | Stop reason: SDUPTHER

## 2023-10-09 RX ADMIN — LIDOCAINE HYDROCHLORIDE 40 MG: 20 INJECTION, SOLUTION EPIDURAL; INFILTRATION; INTRACAUDAL; PERINEURAL at 08:01

## 2023-10-09 RX ADMIN — SODIUM CHLORIDE, POTASSIUM CHLORIDE, SODIUM LACTATE AND CALCIUM CHLORIDE: 600; 310; 30; 20 INJECTION, SOLUTION INTRAVENOUS at 07:35

## 2023-10-09 RX ADMIN — PROPOFOL 50 MG: 10 INJECTION, EMULSION INTRAVENOUS at 08:03

## 2023-10-09 RX ADMIN — PROPOFOL 30 MG: 10 INJECTION, EMULSION INTRAVENOUS at 08:05

## 2023-10-09 RX ADMIN — PROPOFOL 50 MG: 10 INJECTION, EMULSION INTRAVENOUS at 08:09

## 2023-10-09 RX ADMIN — PROPOFOL 70 MG: 10 INJECTION, EMULSION INTRAVENOUS at 08:01

## 2023-10-09 NOTE — DISCHARGE INSTRUCTIONS
Colonoscopy: What to Expect at [address]  After a colonoscopy, you'll stay at the clinic until you wake up. Then you can go home. But you'll need to arrange for a ride. Your doctor will tell you when you can eat and do your other usual activities. Your doctor will talk to you about when you'll need your next colonoscopy. Your doctor can help you decide how often you need to be checked. This will depend on the results of your test and your risk for colorectal cancer. After the test, you may be bloated or have gas pains. You may need to pass gas. If a biopsy was done or a polyp was removed, you may have streaks of blood in your stool (feces) for a few days. Problems such as heavy rectal bleeding may not occur until several weeks after the test. This isn't common. But it can happen after polyps are removed. This care sheet gives you a general idea about how long it will take for you to recover. But each person recovers at a different pace. Follow the steps below to get better as quickly as possible. How can you care for yourself at home? Activity    Rest when you feel tired. You can do your normal activities when it feels okay to do so. Diet    Follow your doctor's directions for eating. Unless your doctor has told you not to, drink plenty of fluids. This helps to replace the fluids that were lost during the colon prep. Do not drink alcohol. Medicines    Your doctor will tell you if and when you can restart your medicines. You will also be given instructions about taking any new medicines. If you take aspirin or some other blood thinner, ask your doctor if and when to start taking it again. Make sure that you understand exactly what your doctor wants you to do. If polyps were removed or a biopsy was done during the test, your doctor may tell you not to take aspirin or other anti-inflammatory medicines for a few days. These include ibuprofen (Advil, Motrin) and naproxen (Aleve).

## 2023-10-09 NOTE — ANESTHESIA PRE PROCEDURE
02:00 PM    MCV 90.0 07/26/2023 02:00 PM    RDW 12.9 07/26/2023 02:00 PM     07/26/2023 02:00 PM       CMP:   Lab Results   Component Value Date/Time     08/01/2023 01:41 PM    K 4.5 08/01/2023 01:41 PM    CL 95 08/01/2023 01:41 PM    CO2 20 08/01/2023 01:41 PM    BUN 75 08/01/2023 01:41 PM    CREATININE 5.33 08/01/2023 01:41 PM    GFRAA >60 07/26/2022 09:16 AM    AGRATIO 1.5 03/01/2022 08:39 AM    LABGLOM 10 08/01/2023 01:41 PM    GLUCOSE 195 08/01/2023 01:41 PM    PROT 7.5 07/26/2023 02:00 PM    CALCIUM 8.9 08/01/2023 01:41 PM    BILITOT 0.8 07/26/2023 02:00 PM    ALKPHOS 104 07/26/2023 02:00 PM    ALKPHOS 97 03/01/2022 08:39 AM    AST 23 07/26/2023 02:00 PM    ALT 29 07/26/2023 02:00 PM       POC Tests: No results for input(s): \"POCGLU\", \"POCNA\", \"POCK\", \"POCCL\", \"POCBUN\", \"POCHEMO\", \"POCHCT\" in the last 72 hours. Coags: No results found for: \"PROTIME\", \"INR\", \"APTT\"    HCG (If Applicable): No results found for: \"PREGTESTUR\", \"PREGSERUM\", \"HCG\", \"HCGQUANT\"     ABGs: No results found for: \"PHART\", \"PO2ART\", \"FEB0IAS\", \"FVL1ZRM\", \"BEART\", \"D6EEBLWE\"     Type & Screen (If Applicable):  No results found for: \"LABABO\", \"LABRH\"    Drug/Infectious Status (If Applicable):  Lab Results   Component Value Date/Time    HEPCAB <0.02 08/04/2016 09:20 AM    HEPCAB NEGATIVE 08/04/2016 09:20 AM       COVID-19 Screening (If Applicable): No results found for: \"COVID19\"        Anesthesia Evaluation  Patient summary reviewed  Airway: Mallampati: II     Neck ROM: limited  Mouth opening: > = 3 FB   Dental:    (+) poor dentition      Pulmonary: breath sounds clear to auscultation                             Cardiovascular:    (+) hypertension:,         Rhythm: regular  Rate: normal                    Neuro/Psych:   (+) psychiatric history:            GI/Hepatic/Renal:   (+) GERD:, liver disease:,           Endo/Other:    (+) Diabetes, . Abdominal:             Vascular:           Other Findings:

## 2023-10-09 NOTE — PERIOP NOTE
Unable to obtain digital signature for discharge instructions as signature pad associated with [facility]OW is not working.

## 2023-10-09 NOTE — H&P
\"AP\", \"LPSE\"       Ariel Lindquist MD  [facility]  [facility], [address]  Www. [employer]

## 2023-10-24 ENCOUNTER — HOSPITAL ENCOUNTER (OUTPATIENT)
Facility: HOSPITAL | Age: 78
Setting detail: SPECIMEN
Discharge: HOME | End: 2023-10-27
Payer: MEDICARE

## 2023-10-24 DIAGNOSIS — E03.4 HYPOTHYROIDISM DUE TO ACQUIRED ATROPHY OF THYROID: ICD-10-CM

## 2023-10-24 DIAGNOSIS — N18.30 STAGE 3 CHRONIC KIDNEY DISEASE, UNSPECIFIED WHETHER STAGE 3A OR 3B CKD (HCC): ICD-10-CM

## 2023-10-24 DIAGNOSIS — E11.9 TYPE 2 DIABETES MELLITUS WITHOUT COMPLICATION, WITHOUT LONG-TERM CURRENT USE OF INSULIN (HCC): ICD-10-CM

## 2023-10-24 DIAGNOSIS — M10.9 GOUT, UNSPECIFIED CAUSE, UNSPECIFIED CHRONICITY, UNSPECIFIED SITE: ICD-10-CM

## 2023-10-24 LAB
ALBUMIN SERPL-MCNC: 4.2 G/DL (ref 3.4–5)
ALBUMIN/GLOB SERPL: 1.6 (ref 0.8–1.7)
ALP SERPL-CCNC: 90 U/L (ref 45–117)
ALT SERPL-CCNC: 21 U/L (ref 16–61)
ANION GAP SERPL CALC-SCNC: 4 MMOL/L (ref 3–18)
AST SERPL-CCNC: 19 U/L (ref 10–38)
BILIRUB SERPL-MCNC: 0.5 MG/DL (ref 0.2–1)
BUN SERPL-MCNC: 26 MG/DL (ref 7–18)
BUN/CREAT SERPL: 18 (ref 12–20)
CALCIUM SERPL-MCNC: 9 MG/DL (ref 8.5–10.1)
CHLORIDE SERPL-SCNC: 106 MMOL/L (ref 100–111)
CO2 SERPL-SCNC: 29 MMOL/L (ref 21–32)
CREAT SERPL-MCNC: 1.45 MG/DL (ref 0.6–1.3)
EST. AVERAGE GLUCOSE BLD GHB EST-MCNC: 131 MG/DL
GLOBULIN SER CALC-MCNC: 2.6 G/DL (ref 2–4)
GLUCOSE SERPL-MCNC: 133 MG/DL (ref 74–99)
HBA1C MFR BLD: 6.2 % (ref 4.2–5.6)
POTASSIUM SERPL-SCNC: 5.1 MMOL/L (ref 3.5–5.5)
PROT SERPL-MCNC: 6.8 G/DL (ref 6.4–8.2)
SODIUM SERPL-SCNC: 139 MMOL/L (ref 136–145)
T4 FREE SERPL-MCNC: 0.9 NG/DL (ref 0.7–1.5)
TSH SERPL DL<=0.05 MIU/L-ACNC: 3.67 UIU/ML (ref 0.36–3.74)
URATE SERPL-MCNC: 4.2 MG/DL (ref 2.6–7.2)

## 2023-10-24 PROCEDURE — 84550 ASSAY OF BLOOD/URIC ACID: CPT

## 2023-10-24 PROCEDURE — 36415 COLL VENOUS BLD VENIPUNCTURE: CPT

## 2023-10-24 PROCEDURE — 83036 HEMOGLOBIN GLYCOSYLATED A1C: CPT

## 2023-10-24 PROCEDURE — 80053 COMPREHEN METABOLIC PANEL: CPT

## 2023-10-24 PROCEDURE — 84439 ASSAY OF FREE THYROXINE: CPT

## 2023-10-24 PROCEDURE — 84443 ASSAY THYROID STIM HORMONE: CPT

## 2023-10-30 ENCOUNTER — OFFICE VISIT (OUTPATIENT)
Age: 78
End: 2023-10-30
Payer: MEDICARE

## 2023-10-30 VITALS
TEMPERATURE: 98.3 F | BODY MASS INDEX: 29.68 KG/M2 | HEART RATE: 64 BPM | RESPIRATION RATE: 16 BRPM | OXYGEN SATURATION: 98 % | HEIGHT: 69 IN | WEIGHT: 200.4 LBS | SYSTOLIC BLOOD PRESSURE: 145 MMHG | DIASTOLIC BLOOD PRESSURE: 76 MMHG

## 2023-10-30 DIAGNOSIS — E11.9 TYPE 2 DIABETES MELLITUS WITHOUT COMPLICATION, WITHOUT LONG-TERM CURRENT USE OF INSULIN (HCC): ICD-10-CM

## 2023-10-30 DIAGNOSIS — N20.0 NEPHROLITHIASIS: ICD-10-CM

## 2023-10-30 DIAGNOSIS — N18.30 STAGE 3 CHRONIC KIDNEY DISEASE, UNSPECIFIED WHETHER STAGE 3A OR 3B CKD (HCC): ICD-10-CM

## 2023-10-30 DIAGNOSIS — I10 ESSENTIAL (PRIMARY) HYPERTENSION: ICD-10-CM

## 2023-10-30 DIAGNOSIS — R22.41 HIP MASS, RIGHT: Primary | ICD-10-CM

## 2023-10-30 DIAGNOSIS — E03.4 HYPOTHYROIDISM DUE TO ACQUIRED ATROPHY OF THYROID: ICD-10-CM

## 2023-10-30 PROCEDURE — 3074F SYST BP LT 130 MM HG: CPT | Performed by: INTERNAL MEDICINE

## 2023-10-30 PROCEDURE — 1036F TOBACCO NON-USER: CPT | Performed by: INTERNAL MEDICINE

## 2023-10-30 PROCEDURE — 3044F HG A1C LEVEL LT 7.0%: CPT | Performed by: INTERNAL MEDICINE

## 2023-10-30 PROCEDURE — G8484 FLU IMMUNIZE NO ADMIN: HCPCS | Performed by: INTERNAL MEDICINE

## 2023-10-30 PROCEDURE — 99214 OFFICE O/P EST MOD 30 MIN: CPT | Performed by: INTERNAL MEDICINE

## 2023-10-30 PROCEDURE — 1123F ACP DISCUSS/DSCN MKR DOCD: CPT | Performed by: INTERNAL MEDICINE

## 2023-10-30 PROCEDURE — G8417 CALC BMI ABV UP PARAM F/U: HCPCS | Performed by: INTERNAL MEDICINE

## 2023-10-30 PROCEDURE — 3078F DIAST BP <80 MM HG: CPT | Performed by: INTERNAL MEDICINE

## 2023-10-30 PROCEDURE — G8427 DOCREV CUR MEDS BY ELIG CLIN: HCPCS | Performed by: INTERNAL MEDICINE

## 2023-10-30 SDOH — ECONOMIC STABILITY: INCOME INSECURITY: HOW HARD IS IT FOR YOU TO PAY FOR THE VERY BASICS LIKE FOOD, HOUSING, MEDICAL CARE, AND HEATING?: NOT HARD AT ALL

## 2023-10-30 SDOH — ECONOMIC STABILITY: FOOD INSECURITY: WITHIN THE PAST 12 MONTHS, THE FOOD YOU BOUGHT JUST DIDN'T LAST AND YOU DIDN'T HAVE MONEY TO GET MORE.: NEVER TRUE

## 2023-10-30 SDOH — ECONOMIC STABILITY: FOOD INSECURITY: WITHIN THE PAST 12 MONTHS, YOU WORRIED THAT YOUR FOOD WOULD RUN OUT BEFORE YOU GOT MONEY TO BUY MORE.: NEVER TRUE

## 2023-10-30 ASSESSMENT — PATIENT HEALTH QUESTIONNAIRE - PHQ9
4. FEELING TIRED OR HAVING LITTLE ENERGY: 0
5. POOR APPETITE OR OVEREATING: 0
1. LITTLE INTEREST OR PLEASURE IN DOING THINGS: 0
2. FEELING DOWN, DEPRESSED OR HOPELESS: 0
SUM OF ALL RESPONSES TO PHQ9 QUESTIONS 1 & 2: 0
3. TROUBLE FALLING OR STAYING ASLEEP: 0
6. FEELING BAD ABOUT YOURSELF - OR THAT YOU ARE A FAILURE OR HAVE LET YOURSELF OR YOUR FAMILY DOWN: 0
10. IF YOU CHECKED OFF ANY PROBLEMS, HOW DIFFICULT HAVE THESE PROBLEMS MADE IT FOR YOU TO DO YOUR WORK, TAKE CARE OF THINGS AT HOME, OR GET ALONG WITH OTHER PEOPLE: 0
SUM OF ALL RESPONSES TO PHQ QUESTIONS 1-9: 0
SUM OF ALL RESPONSES TO PHQ QUESTIONS 1-9: 0
9. THOUGHTS THAT YOU WOULD BE BETTER OFF DEAD, OR OF HURTING YOURSELF: 0
SUM OF ALL RESPONSES TO PHQ QUESTIONS 1-9: 0
8. MOVING OR SPEAKING SO SLOWLY THAT OTHER PEOPLE COULD HAVE NOTICED. OR THE OPPOSITE, BEING SO FIGETY OR RESTLESS THAT YOU HAVE BEEN MOVING AROUND A LOT MORE THAN USUAL: 0
SUM OF ALL RESPONSES TO PHQ QUESTIONS 1-9: 0
7. TROUBLE CONCENTRATING ON THINGS, SUCH AS READING THE NEWSPAPER OR WATCHING TELEVISION: 0

## 2023-11-01 ENCOUNTER — TELEPHONE (OUTPATIENT)
Age: 78
End: 2023-11-01

## 2023-11-01 NOTE — TELEPHONE ENCOUNTER
Pt has a referral for  [facility]  that needs to be released , pt is trying to schedule it

## 2023-11-05 ASSESSMENT — ENCOUNTER SYMPTOMS
ANAL BLEEDING: 0
COUGH: 0
SHORTNESS OF BREATH: 0
SORE THROAT: 0
ABDOMINAL PAIN: 0
EYE PAIN: 0
BLOOD IN STOOL: 0

## 2023-11-06 ENCOUNTER — HOSPITAL ENCOUNTER (OUTPATIENT)
Facility: HOSPITAL | Age: 78
Discharge: HOME | End: 2023-11-09
Attending: INTERNAL MEDICINE
Payer: MEDICARE

## 2023-11-06 DIAGNOSIS — R22.41 HIP MASS, RIGHT: ICD-10-CM

## 2023-11-06 PROCEDURE — 3430000000 HC RX DIAGNOSTIC RADIOPHARMACEUTICAL: Performed by: INTERNAL MEDICINE

## 2023-11-06 PROCEDURE — A9503 TC99M MEDRONATE: HCPCS | Performed by: INTERNAL MEDICINE

## 2023-11-06 PROCEDURE — 78306 BONE IMAGING WHOLE BODY: CPT | Performed by: INTERNAL MEDICINE

## 2023-11-06 RX ORDER — TC 99M MEDRONATE 20 MG/10ML
27.5 INJECTION, POWDER, LYOPHILIZED, FOR SOLUTION INTRAVENOUS
Status: COMPLETED | OUTPATIENT
Start: 2023-11-06 | End: 2023-11-06

## 2023-11-06 RX ADMIN — TC 99M MEDRONATE 27.5 MILLICURIE: 20 INJECTION, POWDER, LYOPHILIZED, FOR SOLUTION INTRAVENOUS at 10:45

## 2023-11-10 DIAGNOSIS — R22.41 HIP MASS, RIGHT: Primary | ICD-10-CM

## 2023-11-15 RX ORDER — LEVOTHYROXINE SODIUM 0.05 MG/1
TABLET ORAL
Qty: 90 TABLET | Refills: 2 | Status: SHIPPED | OUTPATIENT
Start: 2023-11-15

## 2023-11-16 ENCOUNTER — TELEPHONE (OUTPATIENT)
Age: 78
End: 2023-11-16

## 2023-11-16 NOTE — TELEPHONE ENCOUNTER
----- Message from Kurtis Ware MD sent at 11/10/2023  1:46 PM EST -----  I discussed how his findings are indeterminate. Referral placed to Oncology for further recommendations.     Dr. Kurtis Ware  [facility] [address] [place] [address]  Phone: [phone]  Fax: [phone]

## 2023-12-12 RX ORDER — ATORVASTATIN CALCIUM 40 MG/1
TABLET, FILM COATED ORAL
Qty: 90 TABLET | Refills: 2 | Status: SHIPPED | OUTPATIENT
Start: 2023-12-12

## 2023-12-12 RX ORDER — CARVEDILOL 6.25 MG/1
TABLET ORAL
Qty: 180 TABLET | Refills: 2 | Status: SHIPPED | OUTPATIENT
Start: 2023-12-12

## 2023-12-12 NOTE — TELEPHONE ENCOUNTER
Last Office Visit: 10/30/23  Next Office Visit: 12/20/23  Last Refill: Carvedilol - 09/15/22                    Atorvastatin - 03/14/23

## 2023-12-13 ENCOUNTER — HOSPITAL ENCOUNTER (OUTPATIENT)
Facility: HOSPITAL | Age: 78
Setting detail: SPECIMEN
Discharge: HOME | End: 2023-12-16
Payer: MEDICARE

## 2023-12-13 DIAGNOSIS — N18.30 STAGE 3 CHRONIC KIDNEY DISEASE, UNSPECIFIED WHETHER STAGE 3A OR 3B CKD (HCC): ICD-10-CM

## 2023-12-13 DIAGNOSIS — R22.41 HIP MASS, RIGHT: ICD-10-CM

## 2023-12-13 LAB
ALBUMIN SERPL-MCNC: 4 G/DL (ref 3.4–5)
ALBUMIN/GLOB SERPL: 1.4 (ref 0.8–1.7)
ALP SERPL-CCNC: 107 U/L (ref 45–117)
ALT SERPL-CCNC: 28 U/L (ref 16–61)
ANION GAP SERPL CALC-SCNC: 3 MMOL/L (ref 3–18)
AST SERPL-CCNC: 29 U/L (ref 10–38)
BASOPHILS # BLD: 0 K/UL (ref 0–0.1)
BASOPHILS NFR BLD: 0 % (ref 0–2)
BILIRUB SERPL-MCNC: 0.6 MG/DL (ref 0.2–1)
BUN SERPL-MCNC: 23 MG/DL (ref 7–18)
BUN/CREAT SERPL: 17 (ref 12–20)
CALCIUM SERPL-MCNC: 9.2 MG/DL (ref 8.5–10.1)
CHLORIDE SERPL-SCNC: 108 MMOL/L (ref 100–111)
CO2 SERPL-SCNC: 30 MMOL/L (ref 21–32)
CREAT SERPL-MCNC: 1.32 MG/DL (ref 0.6–1.3)
DIFFERENTIAL METHOD BLD: NORMAL
EOSINOPHIL # BLD: 0.1 K/UL (ref 0–0.4)
EOSINOPHIL NFR BLD: 2 % (ref 0–5)
ERYTHROCYTE [DISTWIDTH] IN BLOOD BY AUTOMATED COUNT: 12.8 % (ref 11.6–14.5)
GLOBULIN SER CALC-MCNC: 2.8 G/DL (ref 2–4)
GLUCOSE SERPL-MCNC: 153 MG/DL (ref 74–99)
HCT VFR BLD AUTO: 41.5 % (ref 36–48)
HGB BLD-MCNC: 13.8 G/DL (ref 13–16)
IMM GRANULOCYTES # BLD AUTO: 0 K/UL (ref 0–0.04)
IMM GRANULOCYTES NFR BLD AUTO: 0 % (ref 0–0.5)
LYMPHOCYTES # BLD: 1 K/UL (ref 0.9–3.6)
LYMPHOCYTES NFR BLD: 22 % (ref 21–52)
MCH RBC QN AUTO: 30.1 PG (ref 24–34)
MCHC RBC AUTO-ENTMCNC: 33.3 G/DL (ref 31–37)
MCV RBC AUTO: 90.4 FL (ref 78–100)
MONOCYTES # BLD: 0.4 K/UL (ref 0.05–1.2)
MONOCYTES NFR BLD: 8 % (ref 3–10)
NEUTS SEG # BLD: 3.3 K/UL (ref 1.8–8)
NEUTS SEG NFR BLD: 69 % (ref 40–73)
NRBC # BLD: 0 K/UL (ref 0–0.01)
NRBC BLD-RTO: 0 PER 100 WBC
PLATELET # BLD AUTO: 153 K/UL (ref 135–420)
PMV BLD AUTO: 10 FL (ref 9.2–11.8)
POTASSIUM SERPL-SCNC: 4.2 MMOL/L (ref 3.5–5.5)
PROT SERPL-MCNC: 6.8 G/DL (ref 6.4–8.2)
RBC # BLD AUTO: 4.59 M/UL (ref 4.35–5.65)
SODIUM SERPL-SCNC: 141 MMOL/L (ref 136–145)
WBC # BLD AUTO: 4.8 K/UL (ref 4.6–13.2)

## 2023-12-13 PROCEDURE — 80053 COMPREHEN METABOLIC PANEL: CPT

## 2023-12-13 PROCEDURE — 85025 COMPLETE CBC W/AUTO DIFF WBC: CPT

## 2023-12-13 PROCEDURE — 84165 PROTEIN E-PHORESIS SERUM: CPT

## 2023-12-13 PROCEDURE — 36415 COLL VENOUS BLD VENIPUNCTURE: CPT

## 2023-12-20 ENCOUNTER — OFFICE VISIT (OUTPATIENT)
Age: 78
End: 2023-12-20
Payer: MEDICARE

## 2023-12-20 VITALS
HEART RATE: 66 BPM | TEMPERATURE: 99.1 F | HEIGHT: 69 IN | SYSTOLIC BLOOD PRESSURE: 140 MMHG | WEIGHT: 208 LBS | DIASTOLIC BLOOD PRESSURE: 75 MMHG | BODY MASS INDEX: 30.81 KG/M2 | OXYGEN SATURATION: 96 % | RESPIRATION RATE: 17 BRPM

## 2023-12-20 DIAGNOSIS — R22.41 HIP MASS, RIGHT: Primary | ICD-10-CM

## 2023-12-20 DIAGNOSIS — I10 ESSENTIAL (PRIMARY) HYPERTENSION: ICD-10-CM

## 2023-12-20 DIAGNOSIS — N28.89 RIGHT RENAL MASS: ICD-10-CM

## 2023-12-20 DIAGNOSIS — N18.30 STAGE 3 CHRONIC KIDNEY DISEASE, UNSPECIFIED WHETHER STAGE 3A OR 3B CKD (HCC): ICD-10-CM

## 2023-12-20 DIAGNOSIS — M10.9 GOUT, UNSPECIFIED CAUSE, UNSPECIFIED CHRONICITY, UNSPECIFIED SITE: ICD-10-CM

## 2023-12-20 DIAGNOSIS — R35.1 NOCTURIA: ICD-10-CM

## 2023-12-20 DIAGNOSIS — Z12.5 ENCOUNTER FOR PROSTATE CANCER SCREENING: ICD-10-CM

## 2023-12-20 DIAGNOSIS — E03.4 HYPOTHYROIDISM DUE TO ACQUIRED ATROPHY OF THYROID: ICD-10-CM

## 2023-12-20 DIAGNOSIS — E11.9 TYPE 2 DIABETES MELLITUS WITHOUT COMPLICATION, WITHOUT LONG-TERM CURRENT USE OF INSULIN (HCC): ICD-10-CM

## 2023-12-20 PROCEDURE — 1123F ACP DISCUSS/DSCN MKR DOCD: CPT | Performed by: INTERNAL MEDICINE

## 2023-12-20 PROCEDURE — G8427 DOCREV CUR MEDS BY ELIG CLIN: HCPCS | Performed by: INTERNAL MEDICINE

## 2023-12-20 PROCEDURE — 1036F TOBACCO NON-USER: CPT | Performed by: INTERNAL MEDICINE

## 2023-12-20 PROCEDURE — G8417 CALC BMI ABV UP PARAM F/U: HCPCS | Performed by: INTERNAL MEDICINE

## 2023-12-20 PROCEDURE — 3078F DIAST BP <80 MM HG: CPT | Performed by: INTERNAL MEDICINE

## 2023-12-20 PROCEDURE — G8484 FLU IMMUNIZE NO ADMIN: HCPCS | Performed by: INTERNAL MEDICINE

## 2023-12-20 PROCEDURE — 3077F SYST BP >= 140 MM HG: CPT | Performed by: INTERNAL MEDICINE

## 2023-12-20 PROCEDURE — 3044F HG A1C LEVEL LT 7.0%: CPT | Performed by: INTERNAL MEDICINE

## 2023-12-20 PROCEDURE — 99214 OFFICE O/P EST MOD 30 MIN: CPT | Performed by: INTERNAL MEDICINE

## 2023-12-20 NOTE — PROGRESS NOTES
Jessi Diallo presents today for   Chief Complaint   Patient presents with    Follow-up     6-8 wks follow up    Diabetes     6-8 wks follow up    Mass     Hip mass follow up                 1. \"Have you been to the ER, urgent care clinic since your last visit? Hospitalized since your last visit? \" no    2. \"Have you seen or consulted any other health care providers outside of the [address] [place] [address] since your last visit? \" no     3. For patients aged 43-73: Has the patient had a colonoscopy / FIT/ Cologuard? NA - based on age      If the patient is female:    4. For patients aged 43-66: Has the patient had a mammogram within the past 2 years? NA - based on age or sex      11. For patients aged 21-65: Has the patient had a pap smear?  NA - based on age or sex

## 2023-12-20 NOTE — PROGRESS NOTES
[facility]:  12/26/2023, MRN: [id number]      Raymond Howard is a 78 y.o. male and presents to clinic for Follow-up (6-8 wks follow up), Diabetes (6-8 wks follow up), and Mass (Hip mass follow up)      Subjective: The pt is a 77yo male with h/o ED, melanoma in situ on left dorsal forearm s/p removal, MDD, rectal polyp, left shoulder pain (s/p PT), diverticulosis, CKD stage 3, HTN, type 2 DM (foot exams are done by Cori Dye), hypothyroidism, DJD, gout, ETOH use, vocal cord nodule, depression/anxiety (followed by Psychiatry), vitamin D deficiency, precancerous colon polyps, right hip and renal mass (followed by [address] [place] [address]), RBBB (s/p normal stress test 2018), and HLD. 1. Right hip and renal masses: An incidental finding per recent imaging. At his last appointment, I ordered a bone scan. Lab work was also ordered. Follow-up labs show that his creatinine has improved and is 1.32, down from 1.45 in October. His globulin level was normal.  His CBC and SPEP are unremarkable. He was referred to [facility] hematology/oncology after his bone scan results were reviewed. He met with this provider earlier this month. I reviewed the office note. Per review of the office note, the patient is to have an MRI of the pelvis with and without contrast.    Given his history of a renal mass-1.2 cm noted on his abdomen pelvis CT scan from October, an MRI of his kidneys with and without contrast was also ordered. Bone Scan 11/6/23: Mild increased uptake at the right ilium correlates with heterogenous mixed density bone lesion seen on CT. Etiology indeterminate. MRI may or may not be helpful to further evaluate; otherwise, attention on continued CT and bone scan follow up for stability suggested. No other scintigraphic finding for osseous metastatic disease     10/17/23 Abdomen/Pelvis CT: Bilateral nonobstructing renal calculi measuring up to 3 mm on the left and 4 mm on the right.  No

## 2024-04-17 ENCOUNTER — HOSPITAL ENCOUNTER (OUTPATIENT)
Facility: HOSPITAL | Age: 79
Setting detail: SPECIMEN
Discharge: HOME | End: 2024-04-20
Payer: MEDICARE

## 2024-04-17 ENCOUNTER — TELEPHONE (OUTPATIENT)
Age: 79
End: 2024-04-17

## 2024-04-17 ENCOUNTER — OFFICE VISIT (OUTPATIENT)
Age: 79
End: 2024-04-17
Payer: MEDICARE

## 2024-04-17 VITALS
SYSTOLIC BLOOD PRESSURE: 119 MMHG | TEMPERATURE: 97.6 F | BODY MASS INDEX: 29.92 KG/M2 | HEIGHT: 69 IN | RESPIRATION RATE: 18 BRPM | WEIGHT: 202 LBS | DIASTOLIC BLOOD PRESSURE: 69 MMHG | HEART RATE: 60 BPM | OXYGEN SATURATION: 99 %

## 2024-04-17 DIAGNOSIS — R39.9 UTI SYMPTOMS: ICD-10-CM

## 2024-04-17 DIAGNOSIS — F33.40 MAJOR DEPRESSIVE DISORDER, RECURRENT, IN REMISSION, UNSPECIFIED (HCC): ICD-10-CM

## 2024-04-17 DIAGNOSIS — R35.0 FREQUENT URINATION: Primary | ICD-10-CM

## 2024-04-17 DIAGNOSIS — N28.89 RENAL MASS: ICD-10-CM

## 2024-04-17 DIAGNOSIS — R35.0 FREQUENT URINATION: ICD-10-CM

## 2024-04-17 LAB
BILIRUBIN, URINE, POC: NEGATIVE
BLOOD URINE, POC: NEGATIVE
GLUCOSE URINE, POC: NEGATIVE
KETONES, URINE, POC: NEGATIVE
LEUKOCYTE ESTERASE, URINE, POC: NEGATIVE
NITRITE, URINE, POC: NEGATIVE
PH, URINE, POC: 6 (ref 4.6–8)
PROTEIN,URINE, POC: NEGATIVE
SPECIFIC GRAVITY, URINE, POC: 1.02 (ref 1–1.03)
URINALYSIS CLARITY, POC: CLEAR
URINALYSIS COLOR, POC: YELLOW
UROBILINOGEN, POC: NORMAL

## 2024-04-17 PROCEDURE — 81002 URINALYSIS NONAUTO W/O SCOPE: CPT | Performed by: INTERNAL MEDICINE

## 2024-04-17 PROCEDURE — 3074F SYST BP LT 130 MM HG: CPT | Performed by: INTERNAL MEDICINE

## 2024-04-17 PROCEDURE — G8427 DOCREV CUR MEDS BY ELIG CLIN: HCPCS | Performed by: INTERNAL MEDICINE

## 2024-04-17 PROCEDURE — 1123F ACP DISCUSS/DSCN MKR DOCD: CPT | Performed by: INTERNAL MEDICINE

## 2024-04-17 PROCEDURE — G8417 CALC BMI ABV UP PARAM F/U: HCPCS | Performed by: INTERNAL MEDICINE

## 2024-04-17 PROCEDURE — 99214 OFFICE O/P EST MOD 30 MIN: CPT | Performed by: INTERNAL MEDICINE

## 2024-04-17 PROCEDURE — 3078F DIAST BP <80 MM HG: CPT | Performed by: INTERNAL MEDICINE

## 2024-04-17 PROCEDURE — 1036F TOBACCO NON-USER: CPT | Performed by: INTERNAL MEDICINE

## 2024-04-17 PROCEDURE — 87086 URINE CULTURE/COLONY COUNT: CPT

## 2024-04-17 ASSESSMENT — PATIENT HEALTH QUESTIONNAIRE - PHQ9
SUM OF ALL RESPONSES TO PHQ9 QUESTIONS 1 & 2: 0
9. THOUGHTS THAT YOU WOULD BE BETTER OFF DEAD, OR OF HURTING YOURSELF: NOT AT ALL
SUM OF ALL RESPONSES TO PHQ QUESTIONS 1-9: 0
SUM OF ALL RESPONSES TO PHQ QUESTIONS 1-9: 0
2. FEELING DOWN, DEPRESSED OR HOPELESS: NOT AT ALL
3. TROUBLE FALLING OR STAYING ASLEEP: NOT AT ALL
6. FEELING BAD ABOUT YOURSELF - OR THAT YOU ARE A FAILURE OR HAVE LET YOURSELF OR YOUR FAMILY DOWN: NOT AT ALL
7. TROUBLE CONCENTRATING ON THINGS, SUCH AS READING THE NEWSPAPER OR WATCHING TELEVISION: NOT AT ALL
8. MOVING OR SPEAKING SO SLOWLY THAT OTHER PEOPLE COULD HAVE NOTICED. OR THE OPPOSITE, BEING SO FIGETY OR RESTLESS THAT YOU HAVE BEEN MOVING AROUND A LOT MORE THAN USUAL: NOT AT ALL
1. LITTLE INTEREST OR PLEASURE IN DOING THINGS: NOT AT ALL
4. FEELING TIRED OR HAVING LITTLE ENERGY: NOT AT ALL
10. IF YOU CHECKED OFF ANY PROBLEMS, HOW DIFFICULT HAVE THESE PROBLEMS MADE IT FOR YOU TO DO YOUR WORK, TAKE CARE OF THINGS AT HOME, OR GET ALONG WITH OTHER PEOPLE: NOT DIFFICULT AT ALL
5. POOR APPETITE OR OVEREATING: NOT AT ALL
SUM OF ALL RESPONSES TO PHQ QUESTIONS 1-9: 0
SUM OF ALL RESPONSES TO PHQ QUESTIONS 1-9: 0

## 2024-04-17 NOTE — PROGRESS NOTES
[facility]:  4/17/2024, MRN: [id number]      Art Bergeron is a 78 y.o. male and presents to clinic for UTI symptoms and Urinary Frequency (Since today)      Subjective:   The pt is a 77yo male with h/o ED, melanoma in situ on left dorsal forearm s/p removal, MDD, rectal polyp, left shoulder pain (s/p PT), diverticulosis, CKD stage 3, HTN, type 2 DM (foot exams are done by ), hypothyroidism, DJD, gout, BPH, diverticulosis, nephrolithiasis, ETOH use, vocal cord nodule, depression/anxiety (followed by Psychiatry), vitamin D deficiency, precancerous colon polyps, stable right hip mass (cleared by [facility]), and renal mass (followed by [facility] - ), RBBB (s/p normal stress test 2018), gallbladder adenomyomatosis, and HLD.     1. Urinary Frequency: and Renal mass Hx Present since this morning. No hematuria. No back pain. No abdominal pain. He was urinating every 5 min earlier today. It resolved by the time of his apt. He had some discomfort with urination earlier today.  S/p a MRI earlier this month ([facility]).  The patient was evaluated after there are incidental findings of a right hip and renal masses seen on imaging.  He is cleared for oncology of any suspicious findings for cancer.  He is followed by urology given findings consistent with BPH on imaging and the renal mass is seen on imaging.  He had a recent MRI mentioned above.  Findings are below as mentioned.    4/2/24 Abdomen MRI: Compared to 12/21/2023. Bilateral renal lesions not significantly changed likely represents combination of simple and complex cysts as detailed above. No significant enhancement on subtraction sequences , solid component or mural nodularity. Probable focal adenomyomatosis at the gallbladder fundus (3-13). Colonic diverticulosis     Bone Scan 11/6/23: Mild increased uptake at the right ilium correlates with heterogenous mixed density bone lesion seen on CT. Etiology indeterminate.

## 2024-04-17 NOTE — PROGRESS NOTES
Art Bergeron presents today for   Chief Complaint   Patient presents with    UTI symptoms    Urinary Frequency     Since today       Poc urine/culture completed.Updated results for provider.     \"Have you been to the ER, urgent care clinic since your last visit?  Hospitalized since your last visit?\"    NO    “Have you seen or consulted any other health care providers outside of [facility] since your last visit?”    Urology-F/u

## 2024-04-18 ENCOUNTER — HOSPITAL ENCOUNTER (OUTPATIENT)
Facility: HOSPITAL | Age: 79
Setting detail: SPECIMEN
Discharge: HOME | End: 2024-04-18
Payer: MEDICARE

## 2024-04-18 DIAGNOSIS — E11.9 TYPE 2 DIABETES MELLITUS WITHOUT COMPLICATION, WITHOUT LONG-TERM CURRENT USE OF INSULIN (HCC): ICD-10-CM

## 2024-04-18 DIAGNOSIS — I10 ESSENTIAL (PRIMARY) HYPERTENSION: ICD-10-CM

## 2024-04-18 DIAGNOSIS — E03.4 HYPOTHYROIDISM DUE TO ACQUIRED ATROPHY OF THYROID: ICD-10-CM

## 2024-04-18 DIAGNOSIS — R22.41 HIP MASS, RIGHT: ICD-10-CM

## 2024-04-18 DIAGNOSIS — R35.1 NOCTURIA: ICD-10-CM

## 2024-04-18 DIAGNOSIS — Z12.5 ENCOUNTER FOR PROSTATE CANCER SCREENING: ICD-10-CM

## 2024-04-18 DIAGNOSIS — N28.89 RIGHT RENAL MASS: ICD-10-CM

## 2024-04-18 DIAGNOSIS — M10.9 GOUT, UNSPECIFIED CAUSE, UNSPECIFIED CHRONICITY, UNSPECIFIED SITE: ICD-10-CM

## 2024-04-18 DIAGNOSIS — N18.30 STAGE 3 CHRONIC KIDNEY DISEASE, UNSPECIFIED WHETHER STAGE 3A OR 3B CKD (HCC): ICD-10-CM

## 2024-04-18 LAB
ALBUMIN SERPL-MCNC: 4.1 G/DL (ref 3.4–5)
ALBUMIN/GLOB SERPL: 1.5 (ref 0.8–1.7)
ALP SERPL-CCNC: 115 U/L (ref 45–117)
ALT SERPL-CCNC: 25 U/L (ref 16–61)
ANION GAP SERPL CALC-SCNC: 5 MMOL/L (ref 3–18)
AST SERPL-CCNC: 24 U/L (ref 10–38)
BACTERIA SPEC CULT: NORMAL
BASOPHILS # BLD: 0 K/UL (ref 0–0.1)
BASOPHILS NFR BLD: 0 % (ref 0–2)
BILIRUB SERPL-MCNC: 0.5 MG/DL (ref 0.2–1)
BUN SERPL-MCNC: 31 MG/DL (ref 7–18)
BUN/CREAT SERPL: 24 (ref 12–20)
CALCIUM SERPL-MCNC: 8.9 MG/DL (ref 8.5–10.1)
CHLORIDE SERPL-SCNC: 104 MMOL/L (ref 100–111)
CHOLEST SERPL-MCNC: 140 MG/DL
CO2 SERPL-SCNC: 28 MMOL/L (ref 21–32)
CREAT SERPL-MCNC: 1.31 MG/DL (ref 0.6–1.3)
CREAT UR-MCNC: 55 MG/DL (ref 30–125)
DIFFERENTIAL METHOD BLD: ABNORMAL
EOSINOPHIL # BLD: 0.1 K/UL (ref 0–0.4)
EOSINOPHIL NFR BLD: 2 % (ref 0–5)
ERYTHROCYTE [DISTWIDTH] IN BLOOD BY AUTOMATED COUNT: 13.7 % (ref 11.6–14.5)
EST. AVERAGE GLUCOSE BLD GHB EST-MCNC: 137 MG/DL
GLOBULIN SER CALC-MCNC: 2.8 G/DL (ref 2–4)
GLUCOSE SERPL-MCNC: 121 MG/DL (ref 74–99)
HBA1C MFR BLD: 6.4 % (ref 4.2–5.6)
HCT VFR BLD AUTO: 42.6 % (ref 36–48)
HDLC SERPL-MCNC: 39 MG/DL (ref 40–60)
HDLC SERPL: 3.6 (ref 0–5)
HGB BLD-MCNC: 14.1 G/DL (ref 13–16)
IMM GRANULOCYTES # BLD AUTO: 0 K/UL (ref 0–0.04)
IMM GRANULOCYTES NFR BLD AUTO: 0 % (ref 0–0.5)
LDLC SERPL CALC-MCNC: 82.4 MG/DL (ref 0–100)
LIPID PANEL: ABNORMAL
LYMPHOCYTES # BLD: 0.9 K/UL (ref 0.9–3.6)
LYMPHOCYTES NFR BLD: 18 % (ref 21–52)
MCH RBC QN AUTO: 30.5 PG (ref 24–34)
MCHC RBC AUTO-ENTMCNC: 33.1 G/DL (ref 31–37)
MCV RBC AUTO: 92.2 FL (ref 78–100)
MICROALBUMIN UR-MCNC: 1.97 MG/DL (ref 0–3)
MICROALBUMIN/CREAT UR-RTO: 36 MG/G (ref 0–30)
MONOCYTES # BLD: 0.5 K/UL (ref 0.05–1.2)
MONOCYTES NFR BLD: 9 % (ref 3–10)
NEUTS SEG # BLD: 3.6 K/UL (ref 1.8–8)
NEUTS SEG NFR BLD: 71 % (ref 40–73)
NRBC # BLD: 0 K/UL (ref 0–0.01)
NRBC BLD-RTO: 0 PER 100 WBC
PLATELET # BLD AUTO: 149 K/UL (ref 135–420)
PMV BLD AUTO: 9.9 FL (ref 9.2–11.8)
POTASSIUM SERPL-SCNC: 4.2 MMOL/L (ref 3.5–5.5)
PROT SERPL-MCNC: 6.9 G/DL (ref 6.4–8.2)
RBC # BLD AUTO: 4.62 M/UL (ref 4.35–5.65)
SERVICE CMNT-IMP: NORMAL
SODIUM SERPL-SCNC: 137 MMOL/L (ref 136–145)
T4 FREE SERPL-MCNC: 1 NG/DL (ref 0.7–1.5)
TRIGL SERPL-MCNC: 93 MG/DL
TSH SERPL DL<=0.05 MIU/L-ACNC: 3.49 UIU/ML (ref 0.36–3.74)
URATE SERPL-MCNC: 3.7 MG/DL (ref 2.6–7.2)
VLDLC SERPL CALC-MCNC: 18.6 MG/DL
WBC # BLD AUTO: 5.1 K/UL (ref 4.6–13.2)

## 2024-04-18 PROCEDURE — 85025 COMPLETE CBC W/AUTO DIFF WBC: CPT

## 2024-04-18 PROCEDURE — 84443 ASSAY THYROID STIM HORMONE: CPT

## 2024-04-18 PROCEDURE — 80053 COMPREHEN METABOLIC PANEL: CPT

## 2024-04-18 PROCEDURE — 82570 ASSAY OF URINE CREATININE: CPT

## 2024-04-18 PROCEDURE — 84439 ASSAY OF FREE THYROXINE: CPT

## 2024-04-18 PROCEDURE — 87086 URINE CULTURE/COLONY COUNT: CPT

## 2024-04-18 PROCEDURE — 84154 ASSAY OF PSA FREE: CPT

## 2024-04-18 PROCEDURE — 83036 HEMOGLOBIN GLYCOSYLATED A1C: CPT

## 2024-04-18 PROCEDURE — 36415 COLL VENOUS BLD VENIPUNCTURE: CPT

## 2024-04-18 PROCEDURE — 84153 ASSAY OF PSA TOTAL: CPT

## 2024-04-18 PROCEDURE — 84550 ASSAY OF BLOOD/URIC ACID: CPT

## 2024-04-18 PROCEDURE — 80061 LIPID PANEL: CPT

## 2024-04-18 PROCEDURE — 82043 UR ALBUMIN QUANTITATIVE: CPT

## 2024-04-18 ASSESSMENT — ENCOUNTER SYMPTOMS
ANAL BLEEDING: 0
COUGH: 0
ABDOMINAL PAIN: 0
SHORTNESS OF BREATH: 0
BLOOD IN STOOL: 0
SORE THROAT: 0
EYE PAIN: 0

## 2024-04-19 LAB
BACTERIA SPEC CULT: NORMAL
PSA FREE MFR SERPL: 30 %
PSA FREE SERPL-MCNC: 0.45 NG/ML
PSA SERPL-MCNC: 1.5 NG/ML (ref 0–4)
SERVICE CMNT-IMP: NORMAL

## 2024-04-25 ENCOUNTER — OFFICE VISIT (OUTPATIENT)
Age: 79
End: 2024-04-25
Payer: MEDICARE

## 2024-04-25 VITALS
BODY MASS INDEX: 29.51 KG/M2 | RESPIRATION RATE: 17 BRPM | DIASTOLIC BLOOD PRESSURE: 76 MMHG | SYSTOLIC BLOOD PRESSURE: 134 MMHG | HEART RATE: 56 BPM | OXYGEN SATURATION: 99 % | HEIGHT: 69 IN | WEIGHT: 199.2 LBS | TEMPERATURE: 98.6 F

## 2024-04-25 DIAGNOSIS — D13.5 ADENOMYOMATOSIS OF GALLBLADDER: Primary | ICD-10-CM

## 2024-04-25 DIAGNOSIS — Z00.00 MEDICARE ANNUAL WELLNESS VISIT, SUBSEQUENT: ICD-10-CM

## 2024-04-25 DIAGNOSIS — D03.9 MELANOMA IN SITU, UNSPECIFIED SITE (HCC): ICD-10-CM

## 2024-04-25 DIAGNOSIS — I10 ESSENTIAL (PRIMARY) HYPERTENSION: ICD-10-CM

## 2024-04-25 DIAGNOSIS — Z71.89 ADVANCE CARE PLANNING: ICD-10-CM

## 2024-04-25 DIAGNOSIS — E03.4 HYPOTHYROIDISM DUE TO ACQUIRED ATROPHY OF THYROID: ICD-10-CM

## 2024-04-25 DIAGNOSIS — N18.30 STAGE 3 CHRONIC KIDNEY DISEASE, UNSPECIFIED WHETHER STAGE 3A OR 3B CKD (HCC): ICD-10-CM

## 2024-04-25 DIAGNOSIS — E11.22 TYPE 2 DIABETES MELLITUS WITH DIABETIC CHRONIC KIDNEY DISEASE, UNSPECIFIED CKD STAGE, UNSPECIFIED WHETHER LONG TERM INSULIN USE (HCC): ICD-10-CM

## 2024-04-25 DIAGNOSIS — F33.40 MAJOR DEPRESSIVE DISORDER, RECURRENT, IN REMISSION, UNSPECIFIED (HCC): ICD-10-CM

## 2024-04-25 PROCEDURE — 99213 OFFICE O/P EST LOW 20 MIN: CPT | Performed by: INTERNAL MEDICINE

## 2024-04-25 PROCEDURE — 1123F ACP DISCUSS/DSCN MKR DOCD: CPT | Performed by: INTERNAL MEDICINE

## 2024-04-25 PROCEDURE — G8427 DOCREV CUR MEDS BY ELIG CLIN: HCPCS | Performed by: INTERNAL MEDICINE

## 2024-04-25 PROCEDURE — 1036F TOBACCO NON-USER: CPT | Performed by: INTERNAL MEDICINE

## 2024-04-25 PROCEDURE — 3075F SYST BP GE 130 - 139MM HG: CPT | Performed by: INTERNAL MEDICINE

## 2024-04-25 PROCEDURE — G8417 CALC BMI ABV UP PARAM F/U: HCPCS | Performed by: INTERNAL MEDICINE

## 2024-04-25 PROCEDURE — 3044F HG A1C LEVEL LT 7.0%: CPT | Performed by: INTERNAL MEDICINE

## 2024-04-25 PROCEDURE — G0439 PPPS, SUBSEQ VISIT: HCPCS | Performed by: INTERNAL MEDICINE

## 2024-04-25 PROCEDURE — 3078F DIAST BP <80 MM HG: CPT | Performed by: INTERNAL MEDICINE

## 2024-04-25 ASSESSMENT — PATIENT HEALTH QUESTIONNAIRE - PHQ9
SUM OF ALL RESPONSES TO PHQ9 QUESTIONS 1 & 2: 1
1. LITTLE INTEREST OR PLEASURE IN DOING THINGS: NOT AT ALL
5. POOR APPETITE OR OVEREATING: SEVERAL DAYS
9. THOUGHTS THAT YOU WOULD BE BETTER OFF DEAD, OR OF HURTING YOURSELF: NOT AT ALL
7. TROUBLE CONCENTRATING ON THINGS, SUCH AS READING THE NEWSPAPER OR WATCHING TELEVISION: NOT AT ALL
SUM OF ALL RESPONSES TO PHQ QUESTIONS 1-9: 2
8. MOVING OR SPEAKING SO SLOWLY THAT OTHER PEOPLE COULD HAVE NOTICED. OR THE OPPOSITE, BEING SO FIGETY OR RESTLESS THAT YOU HAVE BEEN MOVING AROUND A LOT MORE THAN USUAL: NOT AT ALL
SUM OF ALL RESPONSES TO PHQ QUESTIONS 1-9: 2
2. FEELING DOWN, DEPRESSED OR HOPELESS: SEVERAL DAYS
3. TROUBLE FALLING OR STAYING ASLEEP: NOT AT ALL
SUM OF ALL RESPONSES TO PHQ QUESTIONS 1-9: 2
4. FEELING TIRED OR HAVING LITTLE ENERGY: NOT AT ALL
6. FEELING BAD ABOUT YOURSELF - OR THAT YOU ARE A FAILURE OR HAVE LET YOURSELF OR YOUR FAMILY DOWN: NOT AT ALL
SUM OF ALL RESPONSES TO PHQ QUESTIONS 1-9: 2
10. IF YOU CHECKED OFF ANY PROBLEMS, HOW DIFFICULT HAVE THESE PROBLEMS MADE IT FOR YOU TO DO YOUR WORK, TAKE CARE OF THINGS AT HOME, OR GET ALONG WITH OTHER PEOPLE: NOT DIFFICULT AT ALL

## 2024-04-25 ASSESSMENT — ENCOUNTER SYMPTOMS
COUGH: 0
BLOOD IN STOOL: 0
SHORTNESS OF BREATH: 0
ABDOMINAL PAIN: 0
SORE THROAT: 0
ANAL BLEEDING: 0
EYE PAIN: 0

## 2024-04-25 NOTE — PATIENT INSTRUCTIONS
Learning About Being Active as an Older Adult  Why is being active important as you get older?     Being active is one of the best things you can do for your health. And it's never too late to start. Being active--or getting active, if you aren't already--has definite benefits. It can:  Give you more energy,  Keep your mind sharp.  Improve balance to reduce your risk of falls.  Help you manage chronic illness with fewer medicines.  No matter how old you are, how fit you are, or what health problems you have, there is a form of activity that will work for you. And the more physical activity you can do, the better your overall health will be.  What kinds of activity can help you stay healthy?  Being more active will make your daily activities easier. Physical activity includes planned exercise and things you do in daily life. There are four types of activity:  Aerobic.  Doing aerobic activity makes your heart and lungs strong.  Includes walking, dancing, and gardening.  Aim for at least 2½ hours spread throughout the week.  It improves your energy and can help you sleep better.  Muscle-strengthening.  This type of activity can help maintain muscle and strengthen bones.  Includes climbing stairs, using resistance bands, and lifting or carrying heavy loads.  Aim for at least twice a week.  It can help protect the knees and other joints.  Stretching.  Stretching gives you better range of motion in joints and muscles.  Includes upper arm stretches, calf stretches, and gentle yoga.  Aim for at least twice a week, preferably after your muscles are warmed up from other activities.  It can help you function better in daily life.  Balancing.  This helps you stay coordinated and have good posture.  Includes heel-to-toe walking, jhonny chi, and certain types of yoga.  Aim for at least 3 days a week.  It can reduce your risk of falling.  Even if you have a hard time meeting the recommendations, it's better to be more active

## 2024-04-25 NOTE — PROGRESS NOTES
INTERNISTS OF [facility]:  4/25/2024, MRN: [id number]      Art Bergeron is a 78 y.o. male and presents to clinic for Medicare AWV      Subjective:   The pt is a 79yo male with h/o ED, melanoma in situ on left dorsal forearm s/p removal, MDD, rectal polyp, left shoulder pain (s/p PT), diverticulosis, CKD stage 3, HTN, type 2 DM (foot exams are done by ), hypothyroidism, DJD, gout, BPH, diverticulosis, nephrolithiasis, ETOH use, vocal cord nodule, depression/anxiety (followed by Psychiatry), vitamin D deficiency, precancerous colon polyps, stable right hip mass (cleared by [facility]), and renal mass (followed by [facility] - ), RBBB (s/p normal stress test 2018), gallbladder adenomyomatosis, and HLD.      1.  Adenomyomatosis of the Gallbladder: Seen incidentally on recent imaging in between apts.    2.  Anxiety/depression: Treated by his psychiatrist with Wellbutrin 200 mg daily and sertraline 150 mg daily.  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 Sober. No depression.     3.  History of Skin Cancer: No new skin lesions. Next appointment with dermatology: next wk.    4.  Type 2 diabetes/HLD: Diet controlled.  April labs show a creatinine of 1.31.  Estimated GFR is 55.  It is unchanged from December.  He has CKD 3.  His lipid panel was unremarkable except for a low HDL of 39.  LFTs are unremarkable.  His A1c is 6.4 for April labs.  He is on Lipitor 40 mg daily given underlying hyperlipidemia.    5.  Hypertension: Blood pressure today: 134/76. On carvedilol 6.25 mg twice daily and lisinopril 5 mg daily  He has mild microalbuminuria per his April lab work.    6.  Hypothyroidism: His TSH is 3.49 and his free T4 is 1.  Treated with Synthroid 50 mcg daily.        Patient Active Problem List    Diagnosis Date Noted    History of alcohol abuse 10/06/2020    Right inguinal hernia 05/17/2018    Osteoarthritis of right knee 02/19/2018    Erectile dysfunction 01/23/2017

## 2024-04-25 NOTE — ACP (ADVANCE CARE PLANNING)
Advance Care Planning     General Advance Care Planning (ACP) Conversation    Date of Conversation: 4/25/24    Conducted with: Patient with Decision Making Capacity    Healthcare Decision Maker:  Today we documented Decision Maker(s) consistent with ACP documents on file.    Content/Action Overview:  Has ACP document(s) on file - reflects the patient's care preferences  He has no changes to made to his pre-existing advance directive.    Length of Voluntary ACP Conversation in minutes:  <16 minutes (Non-Billable)    Sonya Wallace MD

## 2024-04-25 NOTE — PROGRESS NOTES
Art Bergeron presents today for   Chief Complaint   Patient presents with    Medicare AWV           \"Have you been to the ER, urgent care clinic since your last visit?  Hospitalized since your last visit?\"    NO    “Have you seen or consulted any other health care providers outside of [facility] since your last visit?”    NO

## 2024-05-31 NOTE — PATIENT INSTRUCTIONS
Home Blood Pressure Test: About This Test  What is it? A home blood pressure test allows you to keep track of your blood pressure at home. Blood pressure is a measure of the force of blood against the walls of your arteries. Blood pressure readings include two numbers, such as 130/80 (say \"130 over 80\"). The first number is the systolic pressure. The second number is the diastolic pressure. Why is this test done? You may do this test at home to:  Find out if you have high blood pressure. Track your blood pressure if you have high blood pressure. Track how well medicine is working to reduce high blood pressure. Check how lifestyle changes, such as weight loss and exercise, are affecting blood pressure. How do you prepare for the test?  For at least 30 minutes before you take your blood pressure, don't exercise, drink caffeine, or smoke. Empty your bladder before the test. Sit quietly with your back straight and both feet on the floor for at least 5 minutes. This helps you take your blood pressure while you feel comfortable and relaxed. How is the test done? If your doctor recommends it, take your blood pressure twice a day. Take it in the morning and evening. Sit with your arm slightly bent and resting on a table so that your upper arm is at the same level as your heart. Use the same arm each time you take your blood pressure. Place the blood pressure cuff on the bare skin of your upper arm. You may have to roll up your sleeve, remove your arm from the sleeve, or take your shirt off. Wrap the blood pressure cuff around your upper arm so that the lower edge of the cuff is about 1 inch above the bend of your elbow. Do not move, talk, or text while you take your blood pressure. Follow the instructions that came with your blood pressure monitor. They might be different from the following. Press the on/off button on the automatic monitor.  Then you may need to wait until the screen says the monitor 

## 2024-07-01 RX ORDER — SILDENAFIL CITRATE 20 MG/1
TABLET ORAL
Qty: 30 TABLET | Refills: 5 | Status: SHIPPED | OUTPATIENT
Start: 2024-07-01

## 2024-07-01 RX ORDER — ALLOPURINOL 100 MG/1
100 TABLET ORAL 2 TIMES DAILY
Qty: 180 TABLET | Refills: 3 | Status: SHIPPED | OUTPATIENT
Start: 2024-07-01

## 2024-08-14 NOTE — TELEPHONE ENCOUNTER
Pt called requesting a refill on medication       levothyroxine (SYNTHROID) 50 MCG tablet        [facility] [id number] - [address] - P [phone] - F [phone] [[id number]]

## 2024-08-16 RX ORDER — LEVOTHYROXINE SODIUM 0.05 MG/1
50 TABLET ORAL
Qty: 90 TABLET | Refills: 2 | Status: SHIPPED | OUTPATIENT
Start: 2024-08-16

## 2024-09-09 RX ORDER — CARVEDILOL 6.25 MG/1
TABLET ORAL
Qty: 180 TABLET | Refills: 2 | Status: SHIPPED | OUTPATIENT
Start: 2024-09-09

## 2024-09-16 RX ORDER — ATORVASTATIN CALCIUM 40 MG/1
40 TABLET, FILM COATED ORAL DAILY
Qty: 90 TABLET | Refills: 2 | Status: SHIPPED | OUTPATIENT
Start: 2024-09-16

## 2024-10-22 ENCOUNTER — TELEPHONE (OUTPATIENT)
Facility: CLINIC | Age: 79
End: 2024-10-22

## 2024-10-22 NOTE — TELEPHONE ENCOUNTER
Pt states he tried to schedule an ultrasound with Central [facility], and was told the order said start date is for 06/02/2025 please  advise

## 2024-10-22 NOTE — TELEPHONE ENCOUNTER
Called CS and spoke to Ms.Darlin oJhnson to inquire about the order date for US that pt stated that was for 06/02/25 as he was unable to schedule earlier. Ms. Johnson stated prev rep was looking @ at another test that was due (CT). She was not seeing where the US date was future dated. She will contact the pt to schedule.       RICKY Schroeder LPN

## 2024-10-23 NOTE — TELEPHONE ENCOUNTER
Please let him know that the order start date is:       Future Order Information    Expected Expires      10/25/2024 4/25/2025        He can schedule it for any time after 10/25/24 per the original order.      Dr. Sonya Wallace  [facility] of [address], [facility] [address]  [place] [address]  Phone: [phone]  Fax: [phone]

## 2024-11-01 ENCOUNTER — TELEPHONE (OUTPATIENT)
Facility: CLINIC | Age: 79
End: 2024-11-01

## 2024-11-01 NOTE — TELEPHONE ENCOUNTER
Central Sched[facility] (Perla) called stating that US was dated 06/25. Upon research, Perla hung up.Called CS and spoke Trent to advise that US is not future dated for 06/25, is dated 10/25/24 and expires 04/25/25. Appears that there is a CT of the Abdomen that was ordered by provider Jessica Chilel in which the date was incorrectly entered. Advised that Dr. Chilel is not apart of our practice but US is correct. Trent verbalized understanding and will contact pt to schedule.      RICKY Schroeder LPN

## 2024-11-05 ENCOUNTER — HOSPITAL ENCOUNTER (OUTPATIENT)
Facility: HOSPITAL | Age: 79
Discharge: HOME | End: 2024-11-08
Attending: INTERNAL MEDICINE
Payer: MEDICARE

## 2024-11-05 DIAGNOSIS — D13.5 ADENOMYOMATOSIS OF GALLBLADDER: ICD-10-CM

## 2024-11-05 PROCEDURE — 76705 ECHO EXAM OF ABDOMEN: CPT

## 2024-11-18 ENCOUNTER — HOSPITAL ENCOUNTER (OUTPATIENT)
Facility: HOSPITAL | Age: 79
Setting detail: SPECIMEN
Discharge: HOME | End: 2024-11-21
Payer: MEDICARE

## 2024-11-18 DIAGNOSIS — E11.22 TYPE 2 DIABETES MELLITUS WITH DIABETIC CHRONIC KIDNEY DISEASE, UNSPECIFIED CKD STAGE, UNSPECIFIED WHETHER LONG TERM INSULIN USE (HCC): ICD-10-CM

## 2024-11-18 LAB
EST. AVERAGE GLUCOSE BLD GHB EST-MCNC: 154 MG/DL
HBA1C MFR BLD: 7 % (ref 4.2–5.6)

## 2024-11-18 PROCEDURE — 83036 HEMOGLOBIN GLYCOSYLATED A1C: CPT

## 2024-11-18 PROCEDURE — 36415 COLL VENOUS BLD VENIPUNCTURE: CPT

## 2024-11-22 ENCOUNTER — OFFICE VISIT (OUTPATIENT)
Facility: CLINIC | Age: 79
End: 2024-11-22

## 2024-11-22 VITALS
OXYGEN SATURATION: 97 % | WEIGHT: 211 LBS | SYSTOLIC BLOOD PRESSURE: 119 MMHG | TEMPERATURE: 98.6 F | DIASTOLIC BLOOD PRESSURE: 67 MMHG | BODY MASS INDEX: 31.25 KG/M2 | HEIGHT: 69 IN | HEART RATE: 65 BPM | RESPIRATION RATE: 17 BRPM

## 2024-11-22 DIAGNOSIS — D13.5 ADENOMYOMATOSIS OF GALLBLADDER: ICD-10-CM

## 2024-11-22 DIAGNOSIS — N18.30 STAGE 3 CHRONIC KIDNEY DISEASE, UNSPECIFIED WHETHER STAGE 3A OR 3B CKD (HCC): ICD-10-CM

## 2024-11-22 DIAGNOSIS — F33.40 MAJOR DEPRESSIVE DISORDER, RECURRENT, IN REMISSION, UNSPECIFIED (HCC): ICD-10-CM

## 2024-11-22 DIAGNOSIS — E11.22 TYPE 2 DIABETES MELLITUS WITH DIABETIC CHRONIC KIDNEY DISEASE, UNSPECIFIED CKD STAGE, UNSPECIFIED WHETHER LONG TERM INSULIN USE (HCC): Primary | ICD-10-CM

## 2024-11-22 DIAGNOSIS — E03.4 HYPOTHYROIDISM DUE TO ACQUIRED ATROPHY OF THYROID: ICD-10-CM

## 2024-11-22 DIAGNOSIS — I10 ESSENTIAL (PRIMARY) HYPERTENSION: ICD-10-CM

## 2024-11-22 DIAGNOSIS — M10.9 GOUT, UNSPECIFIED CAUSE, UNSPECIFIED CHRONICITY, UNSPECIFIED SITE: ICD-10-CM

## 2024-11-22 SDOH — ECONOMIC STABILITY: INCOME INSECURITY: HOW HARD IS IT FOR YOU TO PAY FOR THE VERY BASICS LIKE FOOD, HOUSING, MEDICAL CARE, AND HEATING?: NOT HARD AT ALL

## 2024-11-22 SDOH — ECONOMIC STABILITY: FOOD INSECURITY: WITHIN THE PAST 12 MONTHS, THE FOOD YOU BOUGHT JUST DIDN'T LAST AND YOU DIDN'T HAVE MONEY TO GET MORE.: NEVER TRUE

## 2024-11-22 SDOH — ECONOMIC STABILITY: FOOD INSECURITY: WITHIN THE PAST 12 MONTHS, YOU WORRIED THAT YOUR FOOD WOULD RUN OUT BEFORE YOU GOT MONEY TO BUY MORE.: NEVER TRUE

## 2024-11-22 NOTE — PROGRESS NOTES
Art Bergeron presents today for   Chief Complaint   Patient presents with    Follow-up     7 mon f/u           \"Have you been to the ER, urgent care clinic since your last visit?  Hospitalized since your last visit?\"    NO    “Have you seen or consulted any other health care providers outside of [facility] since your last visit?”    NO

## 2024-11-22 NOTE — PROGRESS NOTES
INTERNISTS OF [facility]:  2024, MRN: [id number]      Art Bergeron is a 79 y.o. male and presents to clinic for Follow-up (7 mon f/u)      Subjective:   The pt is a 78yo male with h/o ED, melanoma in situ on left dorsal forearm s/p removal, MDD, rectal polyp, left shoulder pain (s/p PT), diverticulosis, CKD stage 3, HTN, type 2 DM (foot exams are done by ), hypothyroidism, DJD, gout, BPH, diverticulosis, nephrolithiasis, ETOH use, vocal cord nodule, depression/anxiety (followed by Psychiatry), vitamin D deficiency, precancerous colon polyps, stable right hip mass (cleared by [facility]), and renal mass (followed by [facility] - ), RBBB (s/p normal stress test ), gallbladder adenomyomatosis, and HLD.      1. Gallbladder Adenomyomatosis: Seen on imaging.    Liver Ultrasound 24: No acute abnormality seen sonographically in the right upper quadrant. Unremarkable appearance of the gallbladder    2. Anxiety/Depression: Taking: Wellbutrin 200 mg daily and Zoloft 150 mg daily. He believes his meds are working well. No ETOH use.     3. Type 2 DM: Diet controlled. +Eating ice cream and peanut butter. His weight is up 12lbs.      Latest Reference Range & Units 24 10:40 24 09:45   Hemoglobin A1C 4.2 - 5.6 % 6.4 (H) 7.0 (H)   eAG (mg/dL) mg/dL 137 154   (H): Data is abnormally high    4. CKD Stage 3: Not on lisinopril 5mg daily. Nephrology apt: 2025. He is having imaging done.     5. Hypothyroidism: Taking Synthroid 50mcg daily.     6. HTN: BP is stable today measurin/67. Taking: Coreg 6.25mcg bid. .     7. Gout: Allopurinol 100mg daily. No recent flare ups.         Patient Active Problem List    Diagnosis Date Noted    History of alcohol abuse 10/06/2020    Right inguinal hernia 2018    Osteoarthritis of right knee 2018    Erectile dysfunction 2017    Melanoma in situ (HCC) 2016    Major depressive disorder in remission (HCC)

## 2024-11-22 NOTE — PATIENT INSTRUCTIONS
how well your kidneys are working. Creatinine is a waste product that muscles release into the blood. Blood creatinine is used to estimate the glomerular filtration rate. A high level of creatinine and/or a low eGFR may mean your kidneys are not working as well as they should.  How often: Once a year  Goal: Normal level of creatinine in the blood. The eGFR goal is greater than 60 mL/min/1.73 m².  Complete foot exam.  The doctor checks for foot sores and whether any sensation has been lost.  How often: Once a year  Goal: Healthy feet with no foot ulcers or loss of feeling  Dental exam and cleaning.  The dentist checks for gum disease and tooth decay. People with high blood sugar are more likely to have these problems.  How often: Every 6 months  Goal: Healthy teeth and gums  Complete eye exam.  High blood sugar levels can damage the eyes. This exam is done by an ophthalmologist or optometrist. It includes a dilated eye exam. The exam shows whether there's damage to the back of the eye (diabetic retinopathy).  How often: Once a year. If you don't have any signs of diabetic retinopathy, your doctor may recommend an exam every 2 years.  Goal: No damage to the back of the eye  Thyroid-stimulating hormone (TSH) blood test.  This test checks for thyroid disease, which is especially common for people with type 1 diabetes. Having too little or too much thyroid hormone can make it hard to manage your blood sugar.  How often: As part of your diabetes diagnosis, and as often as your doctor recommends after that  Goal: Normal level of TSH in the blood  Follow-up care is a key part of your treatment and safety. Be sure to make and go to all appointments, and call your doctor if you are having problems. It's also a good idea to know your test results and keep a list of the medicines you take.  Where can you learn more?  Go to https://www.healthwise.net/patientEd and enter A243 to learn more about \"Learning About Tests When You

## 2024-11-28 ASSESSMENT — ENCOUNTER SYMPTOMS
BLOOD IN STOOL: 0
ANAL BLEEDING: 0
ABDOMINAL PAIN: 0
COUGH: 0
SORE THROAT: 0
EYE PAIN: 0
SHORTNESS OF BREATH: 0

## 2025-03-27 ENCOUNTER — HOSPITAL ENCOUNTER (OUTPATIENT)
Facility: HOSPITAL | Age: 80
Setting detail: SPECIMEN
Discharge: HOME | End: 2025-03-30
Payer: MEDICARE

## 2025-03-27 DIAGNOSIS — M10.9 GOUT, UNSPECIFIED CAUSE, UNSPECIFIED CHRONICITY, UNSPECIFIED SITE: ICD-10-CM

## 2025-03-27 DIAGNOSIS — I10 ESSENTIAL (PRIMARY) HYPERTENSION: ICD-10-CM

## 2025-03-27 DIAGNOSIS — E11.22 TYPE 2 DIABETES MELLITUS WITH DIABETIC CHRONIC KIDNEY DISEASE, UNSPECIFIED CKD STAGE, UNSPECIFIED WHETHER LONG TERM INSULIN USE (HCC): ICD-10-CM

## 2025-03-27 DIAGNOSIS — E03.4 HYPOTHYROIDISM DUE TO ACQUIRED ATROPHY OF THYROID: ICD-10-CM

## 2025-03-27 DIAGNOSIS — N18.30 STAGE 3 CHRONIC KIDNEY DISEASE, UNSPECIFIED WHETHER STAGE 3A OR 3B CKD (HCC): ICD-10-CM

## 2025-03-27 LAB
ALBUMIN SERPL-MCNC: 4 G/DL (ref 3.4–5)
ALBUMIN/GLOB SERPL: 1.5 (ref 0.8–1.7)
ALP SERPL-CCNC: 112 U/L (ref 45–117)
ALT SERPL-CCNC: 27 U/L (ref 16–61)
ANION GAP SERPL CALC-SCNC: 6 MMOL/L (ref 3–18)
AST SERPL-CCNC: 21 U/L (ref 10–38)
BASOPHILS # BLD: 0.03 K/UL (ref 0–0.1)
BASOPHILS NFR BLD: 0.5 % (ref 0–2)
BILIRUB SERPL-MCNC: 0.8 MG/DL (ref 0.2–1)
BUN SERPL-MCNC: 28 MG/DL (ref 7–18)
BUN/CREAT SERPL: 18 (ref 12–20)
CALCIUM SERPL-MCNC: 9.1 MG/DL (ref 8.5–10.1)
CHLORIDE SERPL-SCNC: 105 MMOL/L (ref 100–111)
CHOLEST SERPL-MCNC: 127 MG/DL
CO2 SERPL-SCNC: 27 MMOL/L (ref 21–32)
CREAT SERPL-MCNC: 1.53 MG/DL (ref 0.6–1.3)
CREAT UR-MCNC: 132 MG/DL (ref 30–125)
DIFFERENTIAL METHOD BLD: ABNORMAL
EOSINOPHIL # BLD: 0.06 K/UL (ref 0–0.4)
EOSINOPHIL NFR BLD: 1 % (ref 0–5)
ERYTHROCYTE [DISTWIDTH] IN BLOOD BY AUTOMATED COUNT: 13.3 % (ref 11.6–14.5)
EST. AVERAGE GLUCOSE BLD GHB EST-MCNC: 169 MG/DL
GLOBULIN SER CALC-MCNC: 2.7 G/DL (ref 2–4)
GLUCOSE SERPL-MCNC: 143 MG/DL (ref 74–99)
HBA1C MFR BLD: 7.5 % (ref 4.2–5.6)
HCT VFR BLD AUTO: 43.3 % (ref 36–48)
HDLC SERPL-MCNC: 42 MG/DL (ref 40–60)
HDLC SERPL: 3 (ref 0–5)
HGB BLD-MCNC: 13.9 G/DL (ref 13–16)
IMM GRANULOCYTES # BLD AUTO: 0.02 K/UL (ref 0–0.04)
IMM GRANULOCYTES NFR BLD AUTO: 0.3 % (ref 0–0.5)
LDLC SERPL CALC-MCNC: 63.2 MG/DL (ref 0–100)
LIPID PANEL: NORMAL
LYMPHOCYTES # BLD: 0.85 K/UL (ref 0.9–3.6)
LYMPHOCYTES NFR BLD: 14.6 % (ref 21–52)
MCH RBC QN AUTO: 30.1 PG (ref 24–34)
MCHC RBC AUTO-ENTMCNC: 32.1 G/DL (ref 31–37)
MCV RBC AUTO: 93.7 FL (ref 78–100)
MICROALBUMIN UR-MCNC: 6.14 MG/DL (ref 0–3)
MICROALBUMIN/CREAT UR-RTO: 47 MG/G (ref 0–30)
MONOCYTES # BLD: 0.42 K/UL (ref 0.05–1.2)
MONOCYTES NFR BLD: 7.2 % (ref 3–10)
NEUTS SEG # BLD: 4.44 K/UL (ref 1.8–8)
NEUTS SEG NFR BLD: 76.4 % (ref 40–73)
NRBC # BLD: 0 K/UL (ref 0–0.01)
NRBC BLD-RTO: 0 PER 100 WBC
PLATELET # BLD AUTO: 153 K/UL (ref 135–420)
PMV BLD AUTO: 10.2 FL (ref 9.2–11.8)
POTASSIUM SERPL-SCNC: 3.9 MMOL/L (ref 3.5–5.5)
PROT SERPL-MCNC: 6.7 G/DL (ref 6.4–8.2)
RBC # BLD AUTO: 4.62 M/UL (ref 4.35–5.65)
SODIUM SERPL-SCNC: 138 MMOL/L (ref 136–145)
T4 FREE SERPL-MCNC: 1 NG/DL (ref 0.7–1.5)
TRIGL SERPL-MCNC: 109 MG/DL
TSH SERPL DL<=0.05 MIU/L-ACNC: 2.6 UIU/ML (ref 0.36–3.74)
URATE SERPL-MCNC: 4 MG/DL (ref 2.6–7.2)
VLDLC SERPL CALC-MCNC: 21.8 MG/DL
WBC # BLD AUTO: 5.8 K/UL (ref 4.6–13.2)

## 2025-03-27 PROCEDURE — 84550 ASSAY OF BLOOD/URIC ACID: CPT

## 2025-03-27 PROCEDURE — 82570 ASSAY OF URINE CREATININE: CPT

## 2025-03-27 PROCEDURE — 84443 ASSAY THYROID STIM HORMONE: CPT

## 2025-03-27 PROCEDURE — 36415 COLL VENOUS BLD VENIPUNCTURE: CPT

## 2025-03-27 PROCEDURE — 84439 ASSAY OF FREE THYROXINE: CPT

## 2025-03-27 PROCEDURE — 80053 COMPREHEN METABOLIC PANEL: CPT

## 2025-03-27 PROCEDURE — 80061 LIPID PANEL: CPT

## 2025-03-27 PROCEDURE — 83036 HEMOGLOBIN GLYCOSYLATED A1C: CPT

## 2025-03-27 PROCEDURE — 85025 COMPLETE CBC W/AUTO DIFF WBC: CPT

## 2025-03-27 PROCEDURE — 82043 UR ALBUMIN QUANTITATIVE: CPT

## 2025-04-03 SDOH — ECONOMIC STABILITY: FOOD INSECURITY: WITHIN THE PAST 12 MONTHS, YOU WORRIED THAT YOUR FOOD WOULD RUN OUT BEFORE YOU GOT MONEY TO BUY MORE.: NEVER TRUE

## 2025-04-03 SDOH — ECONOMIC STABILITY: INCOME INSECURITY: IN THE LAST 12 MONTHS, WAS THERE A TIME WHEN YOU WERE NOT ABLE TO PAY THE MORTGAGE OR RENT ON TIME?: NO

## 2025-04-03 SDOH — ECONOMIC STABILITY: FOOD INSECURITY: WITHIN THE PAST 12 MONTHS, THE FOOD YOU BOUGHT JUST DIDN'T LAST AND YOU DIDN'T HAVE MONEY TO GET MORE.: NEVER TRUE

## 2025-04-03 SDOH — ECONOMIC STABILITY: TRANSPORTATION INSECURITY
IN THE PAST 12 MONTHS, HAS THE LACK OF TRANSPORTATION KEPT YOU FROM MEDICAL APPOINTMENTS OR FROM GETTING MEDICATIONS?: NO

## 2025-04-03 SDOH — HEALTH STABILITY: PHYSICAL HEALTH: ON AVERAGE, HOW MANY MINUTES DO YOU ENGAGE IN EXERCISE AT THIS LEVEL?: 30 MIN

## 2025-04-03 SDOH — HEALTH STABILITY: PHYSICAL HEALTH: ON AVERAGE, HOW MANY DAYS PER WEEK DO YOU ENGAGE IN MODERATE TO STRENUOUS EXERCISE (LIKE A BRISK WALK)?: 2 DAYS

## 2025-04-03 SDOH — ECONOMIC STABILITY: TRANSPORTATION INSECURITY
IN THE PAST 12 MONTHS, HAS LACK OF TRANSPORTATION KEPT YOU FROM MEETINGS, WORK, OR FROM GETTING THINGS NEEDED FOR DAILY LIVING?: NO

## 2025-04-03 ASSESSMENT — PATIENT HEALTH QUESTIONNAIRE - PHQ9
1. LITTLE INTEREST OR PLEASURE IN DOING THINGS: SEVERAL DAYS
4. FEELING TIRED OR HAVING LITTLE ENERGY: NOT AT ALL
9. THOUGHTS THAT YOU WOULD BE BETTER OFF DEAD, OR OF HURTING YOURSELF: NOT AT ALL
SUM OF ALL RESPONSES TO PHQ QUESTIONS 1-9: 2
7. TROUBLE CONCENTRATING ON THINGS, SUCH AS READING THE NEWSPAPER OR WATCHING TELEVISION: NOT AT ALL
8. MOVING OR SPEAKING SO SLOWLY THAT OTHER PEOPLE COULD HAVE NOTICED. OR THE OPPOSITE, BEING SO FIGETY OR RESTLESS THAT YOU HAVE BEEN MOVING AROUND A LOT MORE THAN USUAL: NOT AT ALL
5. POOR APPETITE OR OVEREATING: NOT AT ALL
3. TROUBLE FALLING OR STAYING ASLEEP: NOT AT ALL
SUM OF ALL RESPONSES TO PHQ QUESTIONS 1-9: 2
SUM OF ALL RESPONSES TO PHQ QUESTIONS 1-9: 2
2. FEELING DOWN, DEPRESSED OR HOPELESS: SEVERAL DAYS
6. FEELING BAD ABOUT YOURSELF - OR THAT YOU ARE A FAILURE OR HAVE LET YOURSELF OR YOUR FAMILY DOWN: NOT AT ALL
SUM OF ALL RESPONSES TO PHQ QUESTIONS 1-9: 2
10. IF YOU CHECKED OFF ANY PROBLEMS, HOW DIFFICULT HAVE THESE PROBLEMS MADE IT FOR YOU TO DO YOUR WORK, TAKE CARE OF THINGS AT HOME, OR GET ALONG WITH OTHER PEOPLE: NOT DIFFICULT AT ALL

## 2025-04-03 ASSESSMENT — LIFESTYLE VARIABLES
HOW MANY STANDARD DRINKS CONTAINING ALCOHOL DO YOU HAVE ON A TYPICAL DAY: 0
HOW MANY STANDARD DRINKS CONTAINING ALCOHOL DO YOU HAVE ON A TYPICAL DAY: PATIENT DOES NOT DRINK
HOW OFTEN DO YOU HAVE A DRINK CONTAINING ALCOHOL: NEVER
HOW OFTEN DO YOU HAVE A DRINK CONTAINING ALCOHOL: 1
HOW OFTEN DO YOU HAVE SIX OR MORE DRINKS ON ONE OCCASION: 1

## 2025-04-04 ENCOUNTER — OFFICE VISIT (OUTPATIENT)
Facility: CLINIC | Age: 80
End: 2025-04-04

## 2025-04-04 VITALS
DIASTOLIC BLOOD PRESSURE: 74 MMHG | RESPIRATION RATE: 17 BRPM | TEMPERATURE: 98.4 F | WEIGHT: 205.4 LBS | HEART RATE: 56 BPM | BODY MASS INDEX: 30.42 KG/M2 | SYSTOLIC BLOOD PRESSURE: 139 MMHG | HEIGHT: 69 IN | OXYGEN SATURATION: 99 %

## 2025-04-04 DIAGNOSIS — M10.9 GOUT, UNSPECIFIED CAUSE, UNSPECIFIED CHRONICITY, UNSPECIFIED SITE: ICD-10-CM

## 2025-04-04 DIAGNOSIS — E03.4 HYPOTHYROIDISM DUE TO ACQUIRED ATROPHY OF THYROID: ICD-10-CM

## 2025-04-04 DIAGNOSIS — N18.30 STAGE 3 CHRONIC KIDNEY DISEASE, UNSPECIFIED WHETHER STAGE 3A OR 3B CKD (HCC): ICD-10-CM

## 2025-04-04 DIAGNOSIS — F33.40 MAJOR DEPRESSIVE DISORDER, RECURRENT, IN REMISSION, UNSPECIFIED: ICD-10-CM

## 2025-04-04 DIAGNOSIS — Z85.828 HISTORY OF SKIN CANCER: ICD-10-CM

## 2025-04-04 DIAGNOSIS — Z71.89 ADVANCE CARE PLANNING: ICD-10-CM

## 2025-04-04 DIAGNOSIS — E11.22 TYPE 2 DIABETES MELLITUS WITH DIABETIC CHRONIC KIDNEY DISEASE, UNSPECIFIED CKD STAGE, UNSPECIFIED WHETHER LONG TERM INSULIN USE (HCC): Primary | ICD-10-CM

## 2025-04-04 DIAGNOSIS — Z00.00 MEDICARE ANNUAL WELLNESS VISIT, SUBSEQUENT: ICD-10-CM

## 2025-04-04 DIAGNOSIS — I10 ESSENTIAL (PRIMARY) HYPERTENSION: ICD-10-CM

## 2025-04-04 NOTE — PROGRESS NOTES
INTERNISTS OF [facility]:  5/4/2025, MRN: [id number]      Art Bergeron is a 79 y.o. male and presents to clinic for a Medicare AWV      Subjective:   The pt is a 78yo male with h/o ED, melanoma in situ on left dorsal forearm s/p removal, MDD, rectal polyp, left shoulder pain (s/p PT), diverticulosis, CKD stage 3, HTN, type 2 DM (foot exams are done by ), hypothyroidism, DJD, gout, BPH, diverticulosis, nephrolithiasis, ETOH use, vocal cord nodule, depression/anxiety (followed by Psychiatry), vitamin D deficiency, precancerous colon polyps, stable right hip mass (cleared by [facility]), and renal mass (followed by [facility] - ), RBBB (s/p normal stress test 2018), gallbladder adenomyomatosis, and HLD.      1.  Type II DM: Diet controlled.  Worsening. He is eating more peanut butter and snacking since his last apt. He has an [employer] - purchased w/I 6 months.      Latest Reference Range & Units 04/18/24 10:40 11/18/24 09:45 03/27/25 10:05   Hemoglobin A1C 4.2 - 5.6 % 6.4 (H) 7.0 (H) 7.5 (H)   eAG (mg/dL) mg/dL 137 154 169   (H): Data is abnormally high    2.  Hypertension and CKD stage III: Treated with Coreg 6.25 mg twice daily.  Blood pressure is 139/74.  Followed by nephrology.  CKD appears to have worsened.  His recent lab work suggest that he is dehydrated.  March labs are normal lipid panel.  On Lipitor 40 mg nightly. His next apt with Nephrology is in May.      Latest Reference Range & Units 04/18/24 10:40 03/27/25 10:05   Albumin/Creatinine Ratio 0 - 30 mg/g 36 (H) 47 (H)   (H): Data is abnormally high     Latest Reference Range & Units 04/18/24 10:40 03/27/25 10:05   BUN,BUNPL 7.0 - 18 MG/DL 31 (H) 28 (H)   Creatinine 0.6 - 1.3 MG/DL 1.31 (H) 1.53 (H)   Bun/Cre 12 - 20   24 (H) 18   Anion Gap 3.0 - 18 mmol/L 5 6   Est, Glom Filt Rate >60 ml/min/1.73m2 55 (L) 45 (L)   (H): Data is abnormally high  (L): Data is abnormally low    3.  Hypothyroidism: Treated with Synthroid

## 2025-04-04 NOTE — PROGRESS NOTES
Art Bergeron presents today for   Chief Complaint   Patient presents with    Medicare AWV           \"Have you been to the ER, urgent care clinic since your last visit?  Hospitalized since your last visit?\"    NO    “Have you seen or consulted any other health care providers outside of [facility] since your last visit?”    NO

## 2025-04-18 ENCOUNTER — HOSPITAL ENCOUNTER (OUTPATIENT)
Facility: HOSPITAL | Age: 80
Setting detail: SPECIMEN
Discharge: HOME | End: 2025-04-21
Payer: COMMERCIAL

## 2025-04-18 DIAGNOSIS — I10 ESSENTIAL (PRIMARY) HYPERTENSION: ICD-10-CM

## 2025-04-18 DIAGNOSIS — N18.30 STAGE 3 CHRONIC KIDNEY DISEASE, UNSPECIFIED WHETHER STAGE 3A OR 3B CKD (HCC): ICD-10-CM

## 2025-04-18 LAB
ANION GAP SERPL CALC-SCNC: 5 MMOL/L (ref 3–18)
BUN SERPL-MCNC: 29 MG/DL (ref 7–18)
BUN/CREAT SERPL: 19 (ref 12–20)
CALCIUM SERPL-MCNC: 9.5 MG/DL (ref 8.5–10.1)
CHLORIDE SERPL-SCNC: 106 MMOL/L (ref 100–111)
CO2 SERPL-SCNC: 27 MMOL/L (ref 21–32)
CREAT SERPL-MCNC: 1.51 MG/DL (ref 0.6–1.3)
GLUCOSE SERPL-MCNC: 133 MG/DL (ref 74–99)
POTASSIUM SERPL-SCNC: 4.3 MMOL/L (ref 3.5–5.5)
SODIUM SERPL-SCNC: 138 MMOL/L (ref 136–145)

## 2025-04-18 PROCEDURE — 80048 BASIC METABOLIC PNL TOTAL CA: CPT

## 2025-04-18 PROCEDURE — 36415 COLL VENOUS BLD VENIPUNCTURE: CPT

## 2025-04-21 ENCOUNTER — RESULTS FOLLOW-UP (OUTPATIENT)
Facility: CLINIC | Age: 80
End: 2025-04-21

## 2025-04-21 NOTE — TELEPHONE ENCOUNTER
----- Message from Dr. Sonya Wallace MD sent at 4/21/2025  5:20 AM EDT -----  Please let him know that his kidney function lab shows a slight improvement.  He should continue taking Jardiance as prescribed.  There are no electrolyte abnormalities.       Dr. Sonya Wallace  [facility] of [address], [address]  [place] [address]  Phone: [phone]  Fax: [phone]

## 2025-05-02 NOTE — PROGRESS NOTES
Pharmacy Progress Note - CGM Education     S/O: Mr. Art Bergeron is a 79 y.o. referred by Sonya Wallace MD for continuous glucose monitoring (CGM) device teaching.      CGM Device:  FreeStyle Dominik 3 Plus    Freestyle Dominik Education:  Pt was educated on changing the setting ranges and goals BG, meaning of arrows by blood sugar readings and how to address if indicated, when to check BG with fingerstick, scan for sugar / review of data / starting new sensor, 60 minute delay starting scanning after activate sensor, placement of sensor on the back of the arm, alternating arms every 15 days when new sensor placed, using both alcohol swabs before placing sensor, opening sensor and applicator, how to place on arm, starting new sensor, charging, sharing data, fibers in arm / needle retracts, can shower with sensor on, can cover / use barrier wipe if needed for adhesion, alert when time to change sensor, removing sensor, use of [employer] zhanna if would like to use phone, linking up data with clinic using home computer if indicated. Alarms for low and high sugars and signal loss and how to change. Cannot re apply sensor that has fallen off. Ability to call company if sensor is faulty.   Sensor placed on back of arm without incident today during teaching.      Advised to utilize the [employer] card to bring the cost down to $75/month and card provided.      Past Medical History:   Diagnosis Date    Adverse effect of anesthesia     Violent dreams with Ether    Arthritis     hip, knee    Chronic kidney disease     had previous reaction with kidneys after a bp med, no issues now    Colon polyps     dysplastic    Depression     Diabetes mellitus (HCC) 2017    no meds    Diverticulosis     seen on colonoscopy from 8/20/15    Diverticulosis of sigmoid colon 01/27/10    GERD (gastroesophageal reflux disease)     no meds    Gout     Hepatitis     Hepatitis A - while he was young    Hyperlipidemia     Hypertension 15 years

## 2025-05-09 ENCOUNTER — PHARMACY VISIT (OUTPATIENT)
Facility: CLINIC | Age: 80
End: 2025-05-09

## 2025-05-09 DIAGNOSIS — E11.22 TYPE 2 DIABETES MELLITUS WITH DIABETIC CHRONIC KIDNEY DISEASE, UNSPECIFIED CKD STAGE, UNSPECIFIED WHETHER LONG TERM INSULIN USE (HCC): Primary | ICD-10-CM

## 2025-05-09 RX ORDER — HYDROCHLOROTHIAZIDE 12.5 MG/1
CAPSULE ORAL
Qty: 2 EACH | Refills: 11 | Status: SHIPPED | OUTPATIENT
Start: 2025-05-09

## 2025-05-15 ENCOUNTER — OFFICE VISIT (OUTPATIENT)
Facility: CLINIC | Age: 80
End: 2025-05-15
Payer: MEDICARE

## 2025-05-15 VITALS
BODY MASS INDEX: 29.33 KG/M2 | RESPIRATION RATE: 18 BRPM | SYSTOLIC BLOOD PRESSURE: 105 MMHG | HEIGHT: 69 IN | OXYGEN SATURATION: 100 % | DIASTOLIC BLOOD PRESSURE: 58 MMHG | TEMPERATURE: 97.4 F | WEIGHT: 198 LBS | HEART RATE: 60 BPM

## 2025-05-15 DIAGNOSIS — E11.22 TYPE 2 DIABETES MELLITUS WITH DIABETIC CHRONIC KIDNEY DISEASE, UNSPECIFIED CKD STAGE, UNSPECIFIED WHETHER LONG TERM INSULIN USE (HCC): Primary | ICD-10-CM

## 2025-05-15 DIAGNOSIS — N18.30 STAGE 3 CHRONIC KIDNEY DISEASE, UNSPECIFIED WHETHER STAGE 3A OR 3B CKD (HCC): ICD-10-CM

## 2025-05-15 DIAGNOSIS — I10 ESSENTIAL (PRIMARY) HYPERTENSION: ICD-10-CM

## 2025-05-15 PROCEDURE — 1123F ACP DISCUSS/DSCN MKR DOCD: CPT | Performed by: INTERNAL MEDICINE

## 2025-05-15 PROCEDURE — G8427 DOCREV CUR MEDS BY ELIG CLIN: HCPCS | Performed by: INTERNAL MEDICINE

## 2025-05-15 PROCEDURE — G8417 CALC BMI ABV UP PARAM F/U: HCPCS | Performed by: INTERNAL MEDICINE

## 2025-05-15 PROCEDURE — 3078F DIAST BP <80 MM HG: CPT | Performed by: INTERNAL MEDICINE

## 2025-05-15 PROCEDURE — 99214 OFFICE O/P EST MOD 30 MIN: CPT | Performed by: INTERNAL MEDICINE

## 2025-05-15 PROCEDURE — 3051F HG A1C>EQUAL 7.0%<8.0%: CPT | Performed by: INTERNAL MEDICINE

## 2025-05-15 PROCEDURE — 3074F SYST BP LT 130 MM HG: CPT | Performed by: INTERNAL MEDICINE

## 2025-05-15 PROCEDURE — 1036F TOBACCO NON-USER: CPT | Performed by: INTERNAL MEDICINE

## 2025-05-15 RX ORDER — LEVOTHYROXINE SODIUM 50 UG/1
50 TABLET ORAL
Qty: 90 TABLET | Refills: 2 | Status: SHIPPED | OUTPATIENT
Start: 2025-05-15

## 2025-05-15 RX ORDER — CARVEDILOL 3.12 MG/1
3.12 TABLET ORAL 2 TIMES DAILY
Qty: 60 TABLET | Refills: 5 | Status: SHIPPED | OUTPATIENT
Start: 2025-05-15

## 2025-05-15 NOTE — PROGRESS NOTES
Art Bergeron presents today for   Chief Complaint   Patient presents with    Diabetes     6 week follow up           \"Have you been to the ER, urgent care clinic since your last visit?  Hospitalized since your last visit?\"    NO    “Have you seen or consulted any other health care providers outside of [facility] since your last visit?”    NO

## 2025-05-15 NOTE — PROGRESS NOTES
INTERNISTS OF [facility]:  5/21/2025, MRN: [id number]      Art Bergeron is a 79 y.o. male and presents to clinic for Diabetes (6 week follow up)      Subjective:   The pt is a 78yo male with h/o ED, melanoma in situ on left dorsal forearm s/p removal, MDD, rectal polyp, left shoulder pain (s/p PT), diverticulosis, CKD stage 3, HTN, type 2 DM (foot exams are done by ), hypothyroidism, DJD, gout, BPH, diverticulosis, nephrolithiasis, ETOH use, vocal cord nodule, depression/anxiety (followed by Psychiatry), vitamin D deficiency, precancerous colon polyps, stable right hip mass (cleared by [facility]), and renal mass (followed by [facility] - ), RBBB (s/p normal stress test 2018), gallbladder adenomyomatosis, and HLD.     HTN and Type 2 DM with CKD Stage 3: He is wearing his CGM. His last AC was up to 7.5. He ate some yogurt this morning and it shot his sugar up. He is scheduled with Endocrinology soon. His eGFR is up to 46%. At his last apt, I ordered jardiance 10mg daily. No dysuria. He urinates more but is tolerating this rx well. He lost 7lbs in a month. On coreg 6.25mg bid. BP is 105/58.         Patient Active Problem List    Diagnosis Date Noted    History of alcohol abuse 10/06/2020    Right inguinal hernia 05/17/2018    Osteoarthritis of right knee 02/19/2018    Erectile dysfunction 01/23/2017    Major depressive disorder in remission 08/03/2016    Rectal polyp 08/02/2016    Diverticulosis of intestine without bleeding 08/02/2016    CKD (chronic kidney disease) stage 3, GFR 30-59 ml/min ([facility]) 11/09/2015    Hypothyroidism due to acquired atrophy of thyroid 06/24/2015    Type 2 diabetes mellitus with diabetic chronic kidney disease, unspecified CKD stage, unspecified whether long term insulin use (HCC) 06/24/2015    Essential hypertension 06/24/2015    Vocal cord nodule 10/21/2013    Gout 10/21/2011    Family history of colon cancer 10/21/2011    Hypovitaminosis D

## 2025-05-22 ENCOUNTER — PATIENT MESSAGE (OUTPATIENT)
Facility: CLINIC | Age: 80
End: 2025-05-22

## 2025-05-23 DIAGNOSIS — H61.20 IMPACTED CERUMEN, UNSPECIFIED LATERALITY: Primary | ICD-10-CM

## 2025-06-09 NOTE — ANESTHESIA PREPROCEDURE EVALUATION
Anesthetic History   No history of anesthetic complications            Review of Systems / Medical History  Patient summary reviewed and pertinent labs reviewed    Pulmonary  Within defined limits                 Neuro/Psych         Psychiatric history (Depression)     Cardiovascular    Hypertension: well controlled              Exercise tolerance: >4 METS     GI/Hepatic/Renal     GERD: well controlled           Endo/Other    Diabetes (On np meds)  Hypothyroidism: well controlled  Arthritis     Other Findings   Comments: Documentation of current medication  Current medications obtained, documented and obtained? YES      Risk Factors for Postoperative nausea/vomiting:       History of postoperative nausea/vomiting? NO       Female? NO       Motion sickness? NO       Intended opioid administration for postoperative analgesia? YES      Smoking Abstinence:  Current Smoker? NO  Elective Surgery? YES  Seen preoperatively by anesthesiologist or proxy prior to day of surgery? YES  Pt abstained from smoking 24 hours prior to anesthesia?  N/A    Preventive care/screening for High Blood Pressure:  Aged 18 years and older: YES  Screened for high blood pressure: YES  Patients with high blood pressure referred to primary care provider   for BP management: YES                 Physical Exam    Airway  Mallampati: II  TM Distance: 4 - 6 cm  Neck ROM: normal range of motion   Mouth opening: Normal     Cardiovascular  Regular rate and rhythm,  S1 and S2 normal,  no murmur, click, rub, or gallop             Dental  No notable dental hx       Pulmonary  Breath sounds clear to auscultation               Abdominal  GI exam deferred       Other Findings            Anesthetic Plan    ASA: 2  Anesthesia type: general          Induction: Intravenous  Anesthetic plan and risks discussed with: Patient
Price (Do Not Change): 0.00
Detail Level: Simple
Instructions: This plan will send the code FBSE to the PM system.  DO NOT or CHANGE the price.

## 2025-06-23 RX ORDER — ATORVASTATIN CALCIUM 40 MG/1
40 TABLET, FILM COATED ORAL DAILY
Qty: 90 TABLET | Refills: 2 | Status: SHIPPED | OUTPATIENT
Start: 2025-06-23

## 2025-07-18 RX ORDER — ALLOPURINOL 100 MG/1
100 TABLET ORAL 2 TIMES DAILY
Qty: 180 TABLET | Refills: 3 | Status: SHIPPED | OUTPATIENT
Start: 2025-07-18

## 2025-08-07 ENCOUNTER — TELEPHONE (OUTPATIENT)
Facility: CLINIC | Age: 80
End: 2025-08-07

## 2025-08-09 LAB
ALBUMIN SERPL-MCNC: 4.4 G/DL (ref 3.7–4.7)
ALP SERPL-CCNC: 124 IU/L (ref 44–121)
ALT SERPL-CCNC: 21 IU/L (ref 0–44)
AST SERPL-CCNC: 27 IU/L (ref 0–40)
BILIRUB SERPL-MCNC: 0.6 MG/DL (ref 0–1.2)
BUN SERPL-MCNC: 24 MG/DL (ref 8–27)
BUN/CREAT SERPL: 19 (ref 10–24)
CALCIUM SERPL-MCNC: 9.2 MG/DL (ref 8.6–10.2)
CHLORIDE SERPL-SCNC: 103 MMOL/L (ref 96–106)
CO2 SERPL-SCNC: 23 MMOL/L (ref 20–29)
CREAT SERPL-MCNC: 1.28 MG/DL (ref 0.76–1.27)
EGFRCR SERPLBLD CKD-EPI 2021: 56 ML/MIN/1.73
GLOBULIN SER CALC-MCNC: 2.3 G/DL (ref 1.5–4.5)
GLUCOSE SERPL-MCNC: 137 MG/DL (ref 70–99)
HBA1C MFR BLD: 6.6 % (ref 4.8–5.6)
POTASSIUM SERPL-SCNC: 4.5 MMOL/L (ref 3.5–5.2)
PROT SERPL-MCNC: 6.7 G/DL (ref 6–8.5)
SODIUM SERPL-SCNC: 141 MMOL/L (ref 134–144)

## 2025-08-13 DIAGNOSIS — E11.22 TYPE 2 DIABETES MELLITUS WITH DIABETIC CHRONIC KIDNEY DISEASE, UNSPECIFIED CKD STAGE, UNSPECIFIED WHETHER LONG TERM INSULIN USE (HCC): ICD-10-CM

## 2025-08-13 DIAGNOSIS — N18.30 STAGE 3 CHRONIC KIDNEY DISEASE, UNSPECIFIED WHETHER STAGE 3A OR 3B CKD (HCC): ICD-10-CM

## 2025-08-13 RX ORDER — EMPAGLIFLOZIN 10 MG/1
10 TABLET, FILM COATED ORAL DAILY
Qty: 90 TABLET | Refills: 0 | Status: SHIPPED | OUTPATIENT
Start: 2025-08-13

## 2025-08-22 ENCOUNTER — OFFICE VISIT (OUTPATIENT)
Facility: CLINIC | Age: 80
End: 2025-08-22

## 2025-08-22 VITALS
SYSTOLIC BLOOD PRESSURE: 94 MMHG | OXYGEN SATURATION: 98 % | DIASTOLIC BLOOD PRESSURE: 58 MMHG | HEIGHT: 69 IN | TEMPERATURE: 98.3 F | HEART RATE: 66 BPM | BODY MASS INDEX: 28.29 KG/M2 | RESPIRATION RATE: 18 BRPM | WEIGHT: 191 LBS

## 2025-08-22 DIAGNOSIS — F33.40 MAJOR DEPRESSIVE DISORDER, RECURRENT, IN REMISSION, UNSPECIFIED: ICD-10-CM

## 2025-08-22 DIAGNOSIS — N18.30 STAGE 3 CHRONIC KIDNEY DISEASE, UNSPECIFIED WHETHER STAGE 3A OR 3B CKD (HCC): ICD-10-CM

## 2025-08-22 DIAGNOSIS — E11.22 TYPE 2 DIABETES MELLITUS WITH DIABETIC CHRONIC KIDNEY DISEASE, UNSPECIFIED CKD STAGE, UNSPECIFIED WHETHER LONG TERM INSULIN USE (HCC): ICD-10-CM

## 2025-08-22 DIAGNOSIS — Z85.828 HISTORY OF SKIN CANCER: ICD-10-CM

## 2025-08-22 DIAGNOSIS — M10.9 GOUT, UNSPECIFIED CAUSE, UNSPECIFIED CHRONICITY, UNSPECIFIED SITE: ICD-10-CM

## 2025-08-22 DIAGNOSIS — I10 ESSENTIAL (PRIMARY) HYPERTENSION: Primary | ICD-10-CM

## 2025-08-28 ASSESSMENT — ENCOUNTER SYMPTOMS
EYE PAIN: 0
SORE THROAT: 0
BLOOD IN STOOL: 0
ANAL BLEEDING: 0
COUGH: 0
ABDOMINAL PAIN: 0
SHORTNESS OF BREATH: 0

## (undated) DEVICE — GOWN PLASTIC FILM THMBHKS UNIV BLUE: Brand: CARDINAL HEALTH

## (undated) DEVICE — SYRINGE MED 25GA 3ML L5/8IN SUBQ PLAS W/ DETACH NDL SFTY

## (undated) DEVICE — Device

## (undated) DEVICE — COLUMN DRAPE

## (undated) DEVICE — SEAL UNIV 5-8MM DISP BX/10 -- DA VINCI XI - SNGL USE

## (undated) DEVICE — NEEDLE HYPO 25GA L1.5IN BVL ORIENTED ECLIPSE

## (undated) DEVICE — DRSG FOAM MEPILX PST OP 4X12IN --

## (undated) DEVICE — LINER SUCT CANSTR 3000CC PLAS SFT PRE ASSEMB W/OUT TBNG W/

## (undated) DEVICE — TRAY PHAR SYR 30ML PLAS LUERLOCK TIP N CTRL CONVENIENCE

## (undated) DEVICE — SYRINGE MED 50ML LUERSLIP TIP

## (undated) DEVICE — (D)PACK ICE DISP -- DISC BY MFR

## (undated) DEVICE — TRAY PREP DRY W/ PREM GLV 2 APPL 6 SPNG 2 UNDPD 1 OVERWRAP

## (undated) DEVICE — UNDERPAD INCONT W23XL36IN STD BLU POLYPR BK FLUF SFT

## (undated) DEVICE — NDL PRT INJ NSAF BLNT 18GX1.5 --

## (undated) DEVICE — SYRINGE 20ML LL S/C 50

## (undated) DEVICE — ADHESIVE TISS CLOSURE 22X4 CM 4 CC HI VISC EXOFIN

## (undated) DEVICE — DEVON™ KNEE AND BODY STRAP 60" X 3" (1.5 M X 7.6 CM): Brand: DEVON

## (undated) DEVICE — SOLUTION IV 100ML 0.9% SOD CHL DIL INJ

## (undated) DEVICE — INTENDED FOR TISSUE SEPARATION, AND OTHER PROCEDURES THAT REQUIRE A SHARP SURGICAL BLADE TO PUNCTURE OR CUT.: Brand: BARD-PARKER ®  SAFETY SCALPED

## (undated) DEVICE — SUTURE MCRYL SZ 4-0 L27IN ABSRB UD L24MM PS-1 3/8 CIR PRIM Y935H

## (undated) DEVICE — PLUS HANDPIECE WITH SPRAY TIP: Brand: SURGILAV

## (undated) DEVICE — SOLUTION IV 1000ML 0.9% SOD CHL

## (undated) DEVICE — SUT VCRL + 1 36IN CT1 VIO --

## (undated) DEVICE — THE CANADY HYBRID PLASMA SCALPEL IS AN ELECTROSURGICAL PLASMA SCALPEL THAT USES AN 85MM BENDABLE PADDLE BLADE TIP. THE ELECTROSURGICAL PLASMA SCALPEL IS USED TO SIMULTANEOUSLY CUT AND COAGULATE BIOLOGICAL TISSUE.: Brand: CANADY HYBRID PLASMA PADDLE BLADE

## (undated) DEVICE — SUTURE STRATAFIX SYMMETRIC PDS + 1 SGL ARMED CT 18 IN LEN SXPP1A405

## (undated) DEVICE — SUTURE VCRL SZ 2-0 L27IN ABSRB VLT L26MM CT-2 1/2 CIR J333H

## (undated) DEVICE — SYRINGE MED 10ML LUERLOCK TIP W/O SFTY DISP

## (undated) DEVICE — CANNULA NSL AD TBNG L14FT STD PVC O2 CRV CONN NONFLARED NSL

## (undated) DEVICE — SUTURE V-LOC 180 SZ 0 L9IN ABSRB GRN GS-21 L37MM 1/2 CIR VLOCL0346

## (undated) DEVICE — GOWN,SIRUS,NONRNF,SETINSLV,XL,20/CS: Brand: MEDLINE

## (undated) DEVICE — 3M™ IOBAN™ 2 ANTIMICROBIAL INCISE DRAPE 6651EZ: Brand: IOBAN™ 2

## (undated) DEVICE — SOFT SILICONE HYDROCELLULAR SACRUM DRESSING WITH LOCK AWAY LAYER: Brand: ALLEVYN LIFE SACRUM (LARGE) PACK OF 10

## (undated) DEVICE — TRAP SPEC POLYP REM STRNR CLN DSGN MAGNIFYING WIND DISP

## (undated) DEVICE — 3 BONE CEMENT MIXER: Brand: MIXEVAC

## (undated) DEVICE — TIP COVER ACCESSORY

## (undated) DEVICE — REM POLYHESIVE ADULT PATIENT RETURN ELECTRODE: Brand: VALLEYLAB

## (undated) DEVICE — FORCEPS BX L240CM JAW DIA2.8MM L CAP W/ NDL MIC MESH TOOTH

## (undated) DEVICE — CANNULA ORIG TL CLR W FOAM CUSHIONS AND 14FT SUPL TB 3 CHN

## (undated) DEVICE — SNARE VASC L240CM LOOP W10MM SHTH DIA2.4MM RND STIFF CLD

## (undated) DEVICE — SOL IRRIGATION INJ NACL 0.9% 500ML BTL

## (undated) DEVICE — CATHETER SUCT TR FL TIP 14FR W/ O CTRL

## (undated) DEVICE — GAUZE,SPONGE,4"X4",16PLY,STRL,LF,10/TRAY: Brand: MEDLINE

## (undated) DEVICE — UNDERCAST PADDING: Brand: DEROYAL

## (undated) DEVICE — NEEDLE SPNL 20GA L3.5IN YEL HUB S STL REG WALL FIT STYL W/

## (undated) DEVICE — BLADE SAW 1.19X20X90 MM FOR LG BNE

## (undated) DEVICE — ENDOSCOPY PUMP TUBING/ CAP SET: Brand: ERBE

## (undated) DEVICE — (D)SYR 10ML 1/5ML GRAD NSAF -- PKGING CHANGE USE ITEM 338027

## (undated) DEVICE — CONCISE CEMENT SCULPS KIT: Brand: CONCISE

## (undated) DEVICE — 3M™ BAIR PAWS FLEX™ WARMING GOWN, STANDARD, 20 PER CASE 81003: Brand: BAIR PAWS™

## (undated) DEVICE — SYR 50ML LR LCK 1ML GRAD NSAF --

## (undated) DEVICE — SNARE POLYP M W27MMXL240CM OVL STIFF DISP CAPTIVATOR

## (undated) DEVICE — SOL INJ L R 1000ML BG --

## (undated) DEVICE — DRAPE TOWEL: Brand: CONVERTORS

## (undated) DEVICE — BLADELESS OBTURATOR: Brand: WECK VISTA

## (undated) DEVICE — ARM DRAPE

## (undated) DEVICE — DRAPE TWL SURG 16X26IN BLU ORB04] ALLCARE INC]

## (undated) DEVICE — KENDALL SCD EXPRESS SLEEVES, KNEE LENGTH, MEDIUM: Brand: KENDALL SCD

## (undated) DEVICE — GLOVE SURG BIOGEL 8.0 STRL -- SKINSENSE

## (undated) DEVICE — KIT CLN UP BON SECOURS MARYV

## (undated) DEVICE — TRAY CATH OD16FR SIL URIN M STATLOK STBL DEV SURSTP

## (undated) DEVICE — SYR 3ML LL TIP 1/10ML GRAD --

## (undated) DEVICE — STERILE POLYISOPRENE POWDER-FREE SURGICAL GLOVES: Brand: PROTEXIS

## (undated) DEVICE — NDL SPNE QNCKE 18GX3.5IN LF --

## (undated) DEVICE — BLADE SAW W13XL90MM 1.19MM PARA

## (undated) DEVICE — MAYO STAND COVER: Brand: CONVERTORS

## (undated) DEVICE — KENDALL SCD EXPRESS FOOT CUFF, MEDIUM: Brand: KENDALL SCD

## (undated) DEVICE — ELECTRO LUBE IS A SINGLE PATIENT USE DEVICE THAT IS INTENDED TO BE USED ON ELECTROSURGICAL ELECTRODES TO REDUCE STICKING.: Brand: KEY SURGICAL ELECTRO LUBE

## (undated) DEVICE — YANKAUER,SMOOTH HANDLE,HIGH CAPACITY: Brand: MEDLINE INDUSTRIES, INC.

## (undated) DEVICE — SUT VCRL + 2-0 36IN CT1 UD --

## (undated) DEVICE — MEDI-VAC NON-CONDUCTIVE SUCTION TUBING: Brand: CARDINAL HEALTH

## (undated) DEVICE — COVER LT HNDL FLX

## (undated) DEVICE — SOLUTION SCRB 4OZ 10% PVP I POVIDONE IOD TOP PAINT EXIDINE

## (undated) DEVICE — SOLUTION IRRIG 1000ML H2O STRL BLT

## (undated) DEVICE — SYRINGE MED 20ML STD CLR PLAS LUERLOCK TIP N CTRL DISP

## (undated) DEVICE — DERMABOND SKIN ADH 0.7ML -- DERMABOND ADVANCED 12/BX